# Patient Record
Sex: FEMALE | Race: WHITE | Employment: UNEMPLOYED | URBAN - NONMETROPOLITAN AREA
[De-identification: names, ages, dates, MRNs, and addresses within clinical notes are randomized per-mention and may not be internally consistent; named-entity substitution may affect disease eponyms.]

---

## 2017-03-15 ENCOUNTER — OFFICE VISIT (OUTPATIENT)
Dept: FAMILY MEDICINE CLINIC | Age: 82
End: 2017-03-15

## 2017-03-15 VITALS
BODY MASS INDEX: 29.04 KG/M2 | SYSTOLIC BLOOD PRESSURE: 118 MMHG | WEIGHT: 191.6 LBS | DIASTOLIC BLOOD PRESSURE: 68 MMHG | OXYGEN SATURATION: 98 % | HEART RATE: 93 BPM | TEMPERATURE: 95.9 F | HEIGHT: 68 IN

## 2017-03-15 DIAGNOSIS — I10 ESSENTIAL HYPERTENSION: ICD-10-CM

## 2017-03-15 DIAGNOSIS — Z23 NEED FOR PNEUMOCOCCAL VACCINATION: ICD-10-CM

## 2017-03-15 DIAGNOSIS — E55.9 VITAMIN D DEFICIENCY: ICD-10-CM

## 2017-03-15 DIAGNOSIS — E78.00 PURE HYPERCHOLESTEROLEMIA: Primary | ICD-10-CM

## 2017-03-15 DIAGNOSIS — L30.9 DERMATITIS: ICD-10-CM

## 2017-03-15 DIAGNOSIS — L98.9 SKIN LESION: ICD-10-CM

## 2017-03-15 PROCEDURE — G8399 PT W/DXA RESULTS DOCUMENT: HCPCS | Performed by: NURSE PRACTITIONER

## 2017-03-15 PROCEDURE — 1090F PRES/ABSN URINE INCON ASSESS: CPT | Performed by: NURSE PRACTITIONER

## 2017-03-15 PROCEDURE — 4040F PNEUMOC VAC/ADMIN/RCVD: CPT | Performed by: NURSE PRACTITIONER

## 2017-03-15 PROCEDURE — G8484 FLU IMMUNIZE NO ADMIN: HCPCS | Performed by: NURSE PRACTITIONER

## 2017-03-15 PROCEDURE — G8427 DOCREV CUR MEDS BY ELIG CLIN: HCPCS | Performed by: NURSE PRACTITIONER

## 2017-03-15 PROCEDURE — 90732 PPSV23 VACC 2 YRS+ SUBQ/IM: CPT | Performed by: NURSE PRACTITIONER

## 2017-03-15 PROCEDURE — 1123F ACP DISCUSS/DSCN MKR DOCD: CPT | Performed by: NURSE PRACTITIONER

## 2017-03-15 PROCEDURE — G0009 ADMIN PNEUMOCOCCAL VACCINE: HCPCS | Performed by: NURSE PRACTITIONER

## 2017-03-15 PROCEDURE — G8420 CALC BMI NORM PARAMETERS: HCPCS | Performed by: NURSE PRACTITIONER

## 2017-03-15 PROCEDURE — 1036F TOBACCO NON-USER: CPT | Performed by: NURSE PRACTITIONER

## 2017-03-15 PROCEDURE — 99213 OFFICE O/P EST LOW 20 MIN: CPT | Performed by: NURSE PRACTITIONER

## 2017-03-15 RX ORDER — ERGOCALCIFEROL 1.25 MG/1
50000 CAPSULE ORAL WEEKLY
Qty: 8 CAPSULE | Refills: 2 | Status: SHIPPED | OUTPATIENT
Start: 2017-03-15 | End: 2017-04-12 | Stop reason: SDUPTHER

## 2017-03-15 RX ORDER — ATENOLOL 25 MG/1
25 TABLET ORAL DAILY
Qty: 90 TABLET | Refills: 1 | Status: ON HOLD | OUTPATIENT
Start: 2017-03-15 | End: 2018-08-14

## 2017-03-15 RX ORDER — MOMETASONE FUROATE 1 MG/G
CREAM TOPICAL
Qty: 1 TUBE | Refills: 0 | Status: SHIPPED | OUTPATIENT
Start: 2017-03-15 | End: 2018-08-17 | Stop reason: SDUPTHER

## 2017-03-15 ASSESSMENT — PATIENT HEALTH QUESTIONNAIRE - PHQ9
SUM OF ALL RESPONSES TO PHQ9 QUESTIONS 1 & 2: 0
SUM OF ALL RESPONSES TO PHQ QUESTIONS 1-9: 0
2. FEELING DOWN, DEPRESSED OR HOPELESS: 0
1. LITTLE INTEREST OR PLEASURE IN DOING THINGS: 0

## 2017-03-15 ASSESSMENT — ENCOUNTER SYMPTOMS: SHORTNESS OF BREATH: 0

## 2017-03-31 DIAGNOSIS — I10 ESSENTIAL HYPERTENSION: ICD-10-CM

## 2017-03-31 DIAGNOSIS — E55.9 VITAMIN D DEFICIENCY: ICD-10-CM

## 2017-03-31 DIAGNOSIS — E78.00 PURE HYPERCHOLESTEROLEMIA: ICD-10-CM

## 2017-03-31 LAB
ALBUMIN SERPL-MCNC: 4.1 G/DL (ref 3.9–4.9)
ALP BLD-CCNC: 69 U/L (ref 40–130)
ALT SERPL-CCNC: 8 U/L (ref 0–33)
ANION GAP SERPL CALCULATED.3IONS-SCNC: 9 MEQ/L (ref 7–13)
AST SERPL-CCNC: 13 U/L (ref 0–35)
BILIRUB SERPL-MCNC: 0.4 MG/DL (ref 0–1.2)
BUN BLDV-MCNC: 15 MG/DL (ref 8–23)
CALCIUM SERPL-MCNC: 9 MG/DL (ref 8.6–10.2)
CHLORIDE BLD-SCNC: 104 MEQ/L (ref 98–107)
CHOLESTEROL, TOTAL: 210 MG/DL (ref 0–199)
CO2: 28 MEQ/L (ref 22–29)
CREAT SERPL-MCNC: 0.81 MG/DL (ref 0.5–0.9)
GFR AFRICAN AMERICAN: >60
GFR NON-AFRICAN AMERICAN: >60
GLOBULIN: 2.1 G/DL (ref 2.3–3.5)
GLUCOSE BLD-MCNC: 87 MG/DL (ref 74–109)
HCT VFR BLD CALC: 37.6 % (ref 37–47)
HDLC SERPL-MCNC: 83 MG/DL (ref 40–59)
HEMOGLOBIN: 13 G/DL (ref 12–16)
LDL CHOLESTEROL CALCULATED: 113 MG/DL (ref 0–129)
MCH RBC QN AUTO: 32.4 PG (ref 27–31.3)
MCHC RBC AUTO-ENTMCNC: 34.6 % (ref 33–37)
MCV RBC AUTO: 93.7 FL (ref 82–100)
PDW BLD-RTO: 14.2 % (ref 11.5–14.5)
PLATELET # BLD: 138 K/UL (ref 130–400)
POTASSIUM SERPL-SCNC: 4.4 MEQ/L (ref 3.5–5.1)
RBC # BLD: 4.01 M/UL (ref 4.2–5.4)
SODIUM BLD-SCNC: 141 MEQ/L (ref 132–144)
TOTAL PROTEIN: 6.2 G/DL (ref 6.4–8.1)
TRIGL SERPL-MCNC: 70 MG/DL (ref 0–200)
VITAMIN D 25-HYDROXY: 17.6 NG/ML (ref 30–100)
WBC # BLD: 5.7 K/UL (ref 4.8–10.8)

## 2017-04-03 ENCOUNTER — TELEPHONE (OUTPATIENT)
Dept: FAMILY MEDICINE CLINIC | Age: 82
End: 2017-04-03

## 2017-04-10 ENCOUNTER — OFFICE VISIT (OUTPATIENT)
Dept: FAMILY MEDICINE CLINIC | Age: 82
End: 2017-04-10

## 2017-04-10 VITALS
HEART RATE: 62 BPM | SYSTOLIC BLOOD PRESSURE: 120 MMHG | DIASTOLIC BLOOD PRESSURE: 70 MMHG | WEIGHT: 194.2 LBS | BODY MASS INDEX: 29.43 KG/M2 | HEIGHT: 68 IN | OXYGEN SATURATION: 97 %

## 2017-04-10 DIAGNOSIS — L28.1 PRURIGO NODULARIS: Primary | ICD-10-CM

## 2017-04-10 PROCEDURE — 11302 SHAVE SKIN LESION 1.1-2.0 CM: CPT | Performed by: FAMILY MEDICINE

## 2017-04-10 ASSESSMENT — ENCOUNTER SYMPTOMS
SHORTNESS OF BREATH: 0
ABDOMINAL PAIN: 0

## 2017-04-12 ENCOUNTER — TELEPHONE (OUTPATIENT)
Dept: FAMILY MEDICINE CLINIC | Age: 82
End: 2017-04-12

## 2017-04-12 DIAGNOSIS — E55.9 VITAMIN D DEFICIENCY: ICD-10-CM

## 2017-04-12 RX ORDER — ERGOCALCIFEROL 1.25 MG/1
CAPSULE ORAL
Qty: 16 CAPSULE | Refills: 2 | Status: SHIPPED | OUTPATIENT
Start: 2017-04-12 | End: 2017-10-02 | Stop reason: SDUPTHER

## 2017-04-19 PROBLEM — L28.1 PRURIGO NODULARIS: Status: ACTIVE | Noted: 2017-04-01

## 2017-05-01 ENCOUNTER — OFFICE VISIT (OUTPATIENT)
Dept: FAMILY MEDICINE CLINIC | Age: 82
End: 2017-05-01

## 2017-05-01 VITALS
BODY MASS INDEX: 29.25 KG/M2 | WEIGHT: 193 LBS | SYSTOLIC BLOOD PRESSURE: 126 MMHG | OXYGEN SATURATION: 97 % | HEART RATE: 76 BPM | HEIGHT: 68 IN | DIASTOLIC BLOOD PRESSURE: 70 MMHG

## 2017-05-01 DIAGNOSIS — L57.0 ACTINIC KERATOSES: ICD-10-CM

## 2017-05-01 DIAGNOSIS — L28.1 PRURIGO NODULARIS: Primary | ICD-10-CM

## 2017-05-01 DIAGNOSIS — L30.9 DERMATITIS: ICD-10-CM

## 2017-05-01 PROCEDURE — 17003 DESTRUCT PREMALG LES 2-14: CPT | Performed by: FAMILY MEDICINE

## 2017-05-01 PROCEDURE — G8420 CALC BMI NORM PARAMETERS: HCPCS | Performed by: FAMILY MEDICINE

## 2017-05-01 PROCEDURE — G8427 DOCREV CUR MEDS BY ELIG CLIN: HCPCS | Performed by: FAMILY MEDICINE

## 2017-05-01 PROCEDURE — 17000 DESTRUCT PREMALG LESION: CPT | Performed by: FAMILY MEDICINE

## 2017-05-01 PROCEDURE — 1036F TOBACCO NON-USER: CPT | Performed by: FAMILY MEDICINE

## 2017-05-01 PROCEDURE — G8399 PT W/DXA RESULTS DOCUMENT: HCPCS | Performed by: FAMILY MEDICINE

## 2017-05-01 PROCEDURE — 99212 OFFICE O/P EST SF 10 MIN: CPT | Performed by: FAMILY MEDICINE

## 2017-05-01 PROCEDURE — 4040F PNEUMOC VAC/ADMIN/RCVD: CPT | Performed by: FAMILY MEDICINE

## 2017-05-01 PROCEDURE — 1090F PRES/ABSN URINE INCON ASSESS: CPT | Performed by: FAMILY MEDICINE

## 2017-05-01 PROCEDURE — 1123F ACP DISCUSS/DSCN MKR DOCD: CPT | Performed by: FAMILY MEDICINE

## 2017-05-01 ASSESSMENT — ENCOUNTER SYMPTOMS
SORE THROAT: 0
ABDOMINAL PAIN: 0
SHORTNESS OF BREATH: 0
DIARRHEA: 0

## 2017-05-24 ENCOUNTER — OFFICE VISIT (OUTPATIENT)
Dept: FAMILY MEDICINE CLINIC | Age: 82
End: 2017-05-24

## 2017-05-24 VITALS
BODY MASS INDEX: 29.25 KG/M2 | DIASTOLIC BLOOD PRESSURE: 72 MMHG | WEIGHT: 193 LBS | OXYGEN SATURATION: 95 % | HEART RATE: 62 BPM | HEIGHT: 68 IN | SYSTOLIC BLOOD PRESSURE: 128 MMHG

## 2017-05-24 DIAGNOSIS — L57.0 ACTINIC KERATOSES: ICD-10-CM

## 2017-05-24 DIAGNOSIS — L28.1 PRURIGO NODULARIS: ICD-10-CM

## 2017-05-24 DIAGNOSIS — L30.9 DERMATITIS: Primary | ICD-10-CM

## 2017-05-24 PROCEDURE — 1090F PRES/ABSN URINE INCON ASSESS: CPT | Performed by: FAMILY MEDICINE

## 2017-05-24 PROCEDURE — 99214 OFFICE O/P EST MOD 30 MIN: CPT | Performed by: FAMILY MEDICINE

## 2017-05-24 PROCEDURE — 1123F ACP DISCUSS/DSCN MKR DOCD: CPT | Performed by: FAMILY MEDICINE

## 2017-05-24 PROCEDURE — 4040F PNEUMOC VAC/ADMIN/RCVD: CPT | Performed by: FAMILY MEDICINE

## 2017-05-24 PROCEDURE — G8427 DOCREV CUR MEDS BY ELIG CLIN: HCPCS | Performed by: FAMILY MEDICINE

## 2017-05-24 PROCEDURE — G8399 PT W/DXA RESULTS DOCUMENT: HCPCS | Performed by: FAMILY MEDICINE

## 2017-05-24 PROCEDURE — 1036F TOBACCO NON-USER: CPT | Performed by: FAMILY MEDICINE

## 2017-05-24 PROCEDURE — G8420 CALC BMI NORM PARAMETERS: HCPCS | Performed by: FAMILY MEDICINE

## 2017-05-24 ASSESSMENT — ENCOUNTER SYMPTOMS
SHORTNESS OF BREATH: 0
ABDOMINAL PAIN: 0
DIARRHEA: 0
SORE THROAT: 0

## 2017-10-02 DIAGNOSIS — E55.9 VITAMIN D DEFICIENCY: ICD-10-CM

## 2017-10-03 RX ORDER — ERGOCALCIFEROL 1.25 MG/1
CAPSULE ORAL
Qty: 16 CAPSULE | Refills: 2 | Status: ON HOLD | OUTPATIENT
Start: 2017-10-03 | End: 2018-08-14

## 2018-04-19 ENCOUNTER — OFFICE VISIT (OUTPATIENT)
Dept: FAMILY MEDICINE CLINIC | Age: 83
End: 2018-04-19
Payer: MEDICARE

## 2018-04-19 VITALS
DIASTOLIC BLOOD PRESSURE: 60 MMHG | BODY MASS INDEX: 29.7 KG/M2 | SYSTOLIC BLOOD PRESSURE: 128 MMHG | OXYGEN SATURATION: 98 % | WEIGHT: 196 LBS | HEART RATE: 97 BPM | HEIGHT: 68 IN | TEMPERATURE: 98 F

## 2018-04-19 DIAGNOSIS — E78.00 PURE HYPERCHOLESTEROLEMIA: ICD-10-CM

## 2018-04-19 DIAGNOSIS — R68.89 FORGETFULNESS: ICD-10-CM

## 2018-04-19 DIAGNOSIS — I10 ESSENTIAL HYPERTENSION: Primary | ICD-10-CM

## 2018-04-19 DIAGNOSIS — E55.9 VITAMIN D DEFICIENCY: ICD-10-CM

## 2018-04-19 PROCEDURE — 99213 OFFICE O/P EST LOW 20 MIN: CPT | Performed by: NURSE PRACTITIONER

## 2018-04-19 PROCEDURE — G8399 PT W/DXA RESULTS DOCUMENT: HCPCS | Performed by: NURSE PRACTITIONER

## 2018-04-19 PROCEDURE — 1036F TOBACCO NON-USER: CPT | Performed by: NURSE PRACTITIONER

## 2018-04-19 PROCEDURE — 3288F FALL RISK ASSESSMENT DOCD: CPT | Performed by: NURSE PRACTITIONER

## 2018-04-19 PROCEDURE — 1123F ACP DISCUSS/DSCN MKR DOCD: CPT | Performed by: NURSE PRACTITIONER

## 2018-04-19 PROCEDURE — 4040F PNEUMOC VAC/ADMIN/RCVD: CPT | Performed by: NURSE PRACTITIONER

## 2018-04-19 PROCEDURE — G8419 CALC BMI OUT NRM PARAM NOF/U: HCPCS | Performed by: NURSE PRACTITIONER

## 2018-04-19 PROCEDURE — G8510 SCR DEP NEG, NO PLAN REQD: HCPCS | Performed by: NURSE PRACTITIONER

## 2018-04-19 PROCEDURE — 1090F PRES/ABSN URINE INCON ASSESS: CPT | Performed by: NURSE PRACTITIONER

## 2018-04-19 PROCEDURE — G8427 DOCREV CUR MEDS BY ELIG CLIN: HCPCS | Performed by: NURSE PRACTITIONER

## 2018-04-19 ASSESSMENT — PATIENT HEALTH QUESTIONNAIRE - PHQ9
SUM OF ALL RESPONSES TO PHQ QUESTIONS 1-9: 0
2. FEELING DOWN, DEPRESSED OR HOPELESS: 0
1. LITTLE INTEREST OR PLEASURE IN DOING THINGS: 0
SUM OF ALL RESPONSES TO PHQ9 QUESTIONS 1 & 2: 0

## 2018-04-19 ASSESSMENT — ENCOUNTER SYMPTOMS
COUGH: 0
ORTHOPNEA: 0
SHORTNESS OF BREATH: 0

## 2018-04-20 DIAGNOSIS — R68.89 FORGETFULNESS: ICD-10-CM

## 2018-04-20 DIAGNOSIS — I10 ESSENTIAL HYPERTENSION: ICD-10-CM

## 2018-04-20 DIAGNOSIS — E55.9 VITAMIN D DEFICIENCY: ICD-10-CM

## 2018-04-20 DIAGNOSIS — E78.00 PURE HYPERCHOLESTEROLEMIA: ICD-10-CM

## 2018-04-20 LAB
ALT SERPL-CCNC: 9 U/L (ref 0–33)
ANION GAP SERPL CALCULATED.3IONS-SCNC: 14 MEQ/L (ref 7–13)
AST SERPL-CCNC: 14 U/L (ref 0–35)
BASOPHILS ABSOLUTE: 0.1 K/UL (ref 0–0.2)
BASOPHILS RELATIVE PERCENT: 1.2 %
BUN BLDV-MCNC: 20 MG/DL (ref 8–23)
CALCIUM SERPL-MCNC: 9.1 MG/DL (ref 8.6–10.2)
CHLORIDE BLD-SCNC: 101 MEQ/L (ref 98–107)
CHOLESTEROL, TOTAL: 226 MG/DL (ref 0–199)
CO2: 26 MEQ/L (ref 22–29)
CREAT SERPL-MCNC: 0.81 MG/DL (ref 0.5–0.9)
EOSINOPHILS ABSOLUTE: 0.1 K/UL (ref 0–0.7)
EOSINOPHILS RELATIVE PERCENT: 2.3 %
FOLATE: 6.3 NG/ML (ref 7.3–26.1)
GFR AFRICAN AMERICAN: >60
GFR NON-AFRICAN AMERICAN: >60
GLUCOSE BLD-MCNC: 92 MG/DL (ref 74–109)
HCT VFR BLD CALC: 39.1 % (ref 37–47)
HDLC SERPL-MCNC: 71 MG/DL (ref 40–59)
HEMOGLOBIN: 13.3 G/DL (ref 12–16)
LDL CHOLESTEROL CALCULATED: 142 MG/DL (ref 0–129)
LYMPHOCYTES ABSOLUTE: 0.8 K/UL (ref 1–4.8)
LYMPHOCYTES RELATIVE PERCENT: 16.6 %
MCH RBC QN AUTO: 32.6 PG (ref 27–31.3)
MCHC RBC AUTO-ENTMCNC: 34.1 % (ref 33–37)
MCV RBC AUTO: 95.6 FL (ref 82–100)
MONOCYTES ABSOLUTE: 0.4 K/UL (ref 0.2–0.8)
MONOCYTES RELATIVE PERCENT: 7.6 %
NEUTROPHILS ABSOLUTE: 3.4 K/UL (ref 1.4–6.5)
NEUTROPHILS RELATIVE PERCENT: 72.3 %
PDW BLD-RTO: 14 % (ref 11.5–14.5)
PLATELET # BLD: 146 K/UL (ref 130–400)
POTASSIUM SERPL-SCNC: 4.1 MEQ/L (ref 3.5–5.1)
RBC # BLD: 4.09 M/UL (ref 4.2–5.4)
SODIUM BLD-SCNC: 141 MEQ/L (ref 132–144)
TRIGL SERPL-MCNC: 65 MG/DL (ref 0–200)
TSH REFLEX: 1.05 UIU/ML (ref 0.27–4.2)
VITAMIN B-12: 385 PG/ML (ref 232–1245)
VITAMIN D 25-HYDROXY: 32 NG/ML (ref 30–100)
WBC # BLD: 4.7 K/UL (ref 4.8–10.8)

## 2018-06-25 ENCOUNTER — TELEPHONE (OUTPATIENT)
Dept: FAMILY MEDICINE CLINIC | Age: 83
End: 2018-06-25

## 2018-06-29 ENCOUNTER — HOSPITAL ENCOUNTER (OUTPATIENT)
Dept: MRI IMAGING | Age: 83
Discharge: HOME | End: 2018-07-01
Payer: MEDICARE

## 2018-06-29 DIAGNOSIS — M25.511 RIGHT SHOULDER PAIN, UNSPECIFIED CHRONICITY: ICD-10-CM

## 2018-06-29 PROCEDURE — 73221 MRI JOINT UPR EXTREM W/O DYE: CPT

## 2018-08-14 ENCOUNTER — APPOINTMENT (OUTPATIENT)
Dept: GENERAL RADIOLOGY | Age: 83
End: 2018-08-14
Payer: MEDICARE

## 2018-08-14 ENCOUNTER — HOSPITAL ENCOUNTER (OUTPATIENT)
Age: 83
Setting detail: OBSERVATION
Discharge: HOME | End: 2018-08-16
Attending: INTERNAL MEDICINE | Admitting: INTERNAL MEDICINE
Payer: MEDICARE

## 2018-08-14 ENCOUNTER — APPOINTMENT (OUTPATIENT)
Dept: CT IMAGING | Age: 83
End: 2018-08-14
Payer: MEDICARE

## 2018-08-14 DIAGNOSIS — G45.9 TRANSIENT CEREBRAL ISCHEMIA, UNSPECIFIED TYPE: Primary | ICD-10-CM

## 2018-08-14 DIAGNOSIS — N30.00 ACUTE CYSTITIS WITHOUT HEMATURIA: ICD-10-CM

## 2018-08-14 LAB
ABO/RH: NORMAL
ALBUMIN SERPL-MCNC: 3.9 G/DL (ref 3.9–4.9)
ALP BLD-CCNC: 79 U/L (ref 40–130)
ALT SERPL-CCNC: 9 U/L (ref 0–33)
AMORPHOUS: ABNORMAL
AMPHETAMINE SCREEN, URINE: NORMAL
ANION GAP SERPL CALCULATED.3IONS-SCNC: 13 MEQ/L (ref 7–13)
ANTIBODY SCREEN: NORMAL
APTT: 27.5 SEC (ref 21.6–35.4)
AST SERPL-CCNC: 16 U/L (ref 0–35)
BACTERIA: ABNORMAL /HPF
BARBITURATE SCREEN URINE: NORMAL
BASOPHILS ABSOLUTE: 0.1 K/UL (ref 0–0.2)
BASOPHILS RELATIVE PERCENT: 0.9 %
BENZODIAZEPINE SCREEN, URINE: NORMAL
BILIRUB SERPL-MCNC: 0.4 MG/DL (ref 0–1.2)
BILIRUBIN URINE: NEGATIVE
BLOOD, URINE: ABNORMAL
BUN BLDV-MCNC: 16 MG/DL (ref 8–23)
CALCIUM SERPL-MCNC: 8.7 MG/DL (ref 8.6–10.2)
CANNABINOID SCREEN URINE: NORMAL
CHLORIDE BLD-SCNC: 99 MEQ/L (ref 98–107)
CHP ED QC CHECK: YES
CLARITY: CLEAR
CO2: 25 MEQ/L (ref 22–29)
COCAINE METABOLITE SCREEN URINE: NORMAL
COLOR: YELLOW
CREAT SERPL-MCNC: 0.93 MG/DL (ref 0.5–0.9)
EKG ATRIAL RATE: 67 BPM
EKG P AXIS: 15 DEGREES
EKG P-R INTERVAL: 140 MS
EKG Q-T INTERVAL: 416 MS
EKG QRS DURATION: 94 MS
EKG QTC CALCULATION (BAZETT): 439 MS
EKG R AXIS: -27 DEGREES
EKG T AXIS: 14 DEGREES
EKG VENTRICULAR RATE: 67 BPM
EOSINOPHILS ABSOLUTE: 0.1 K/UL (ref 0–0.7)
EOSINOPHILS RELATIVE PERCENT: 1.4 %
EPITHELIAL CELLS, UA: ABNORMAL /HPF
ETHANOL PERCENT: NORMAL G/DL
ETHANOL: <10 MG/DL (ref 0–0.08)
GFR AFRICAN AMERICAN: >60
GFR NON-AFRICAN AMERICAN: 57.6
GLOBULIN: 2.4 G/DL (ref 2.3–3.5)
GLUCOSE BLD-MCNC: 104 MG/DL
GLUCOSE BLD-MCNC: 104 MG/DL (ref 60–115)
GLUCOSE BLD-MCNC: 97 MG/DL (ref 74–109)
GLUCOSE URINE: NEGATIVE MG/DL
HCT VFR BLD CALC: 37.2 % (ref 37–47)
HEMOGLOBIN: 13 G/DL (ref 12–16)
INR BLD: 1
KETONES, URINE: ABNORMAL MG/DL
LEUKOCYTE ESTERASE, URINE: ABNORMAL
LYMPHOCYTES ABSOLUTE: 0.8 K/UL (ref 1–4.8)
LYMPHOCYTES RELATIVE PERCENT: 13.8 %
Lab: NORMAL
MCH RBC QN AUTO: 32.6 PG (ref 27–31.3)
MCHC RBC AUTO-ENTMCNC: 34.8 % (ref 33–37)
MCV RBC AUTO: 93.7 FL (ref 82–100)
MONOCYTES ABSOLUTE: 0.5 K/UL (ref 0.2–0.8)
MONOCYTES RELATIVE PERCENT: 8.5 %
NEUTROPHILS ABSOLUTE: 4.4 K/UL (ref 1.4–6.5)
NEUTROPHILS RELATIVE PERCENT: 75.4 %
NITRITE, URINE: POSITIVE
OPIATE SCREEN URINE: NORMAL
PDW BLD-RTO: 14.8 % (ref 11.5–14.5)
PERFORMED ON: NORMAL
PH UA: 6.5 (ref 5–9)
PHENCYCLIDINE SCREEN URINE: NORMAL
PLATELET # BLD: 146 K/UL (ref 130–400)
POTASSIUM SERPL-SCNC: 3.9 MEQ/L (ref 3.5–5.1)
PROTEIN UA: NEGATIVE MG/DL
PROTHROMBIN TIME: 10.8 SEC (ref 9.6–12.3)
RBC # BLD: 3.97 M/UL (ref 4.2–5.4)
RBC UA: ABNORMAL /HPF (ref 0–2)
RENAL EPITHELIAL, UA: ABNORMAL /HPF
SODIUM BLD-SCNC: 137 MEQ/L (ref 132–144)
SPECIFIC GRAVITY UA: 1.01 (ref 1–1.03)
TOTAL CK: 57 U/L (ref 0–170)
TOTAL PROTEIN: 6.3 G/DL (ref 6.4–8.1)
TROPONIN: <0.01 NG/ML (ref 0–0.01)
URINE REFLEX TO CULTURE: YES
UROBILINOGEN, URINE: 1 E.U./DL
WBC # BLD: 5.9 K/UL (ref 4.8–10.8)
WBC UA: ABNORMAL /HPF (ref 0–5)

## 2018-08-14 PROCEDURE — 85730 THROMBOPLASTIN TIME PARTIAL: CPT

## 2018-08-14 PROCEDURE — 82550 ASSAY OF CK (CPK): CPT

## 2018-08-14 PROCEDURE — 93005 ELECTROCARDIOGRAM TRACING: CPT

## 2018-08-14 PROCEDURE — 6370000000 HC RX 637 (ALT 250 FOR IP): Performed by: PERSONAL EMERGENCY RESPONSE ATTENDANT

## 2018-08-14 PROCEDURE — 2580000003 HC RX 258: Performed by: PERSONAL EMERGENCY RESPONSE ATTENDANT

## 2018-08-14 PROCEDURE — 84484 ASSAY OF TROPONIN QUANT: CPT

## 2018-08-14 PROCEDURE — G0378 HOSPITAL OBSERVATION PER HR: HCPCS

## 2018-08-14 PROCEDURE — 99285 EMERGENCY DEPT VISIT HI MDM: CPT

## 2018-08-14 PROCEDURE — 70450 CT HEAD/BRAIN W/O DYE: CPT

## 2018-08-14 PROCEDURE — 87186 SC STD MICRODIL/AGAR DIL: CPT

## 2018-08-14 PROCEDURE — 6360000002 HC RX W HCPCS: Performed by: PERSONAL EMERGENCY RESPONSE ATTENDANT

## 2018-08-14 PROCEDURE — 80307 DRUG TEST PRSMV CHEM ANLYZR: CPT

## 2018-08-14 PROCEDURE — 80053 COMPREHEN METABOLIC PANEL: CPT

## 2018-08-14 PROCEDURE — 86900 BLOOD TYPING SEROLOGIC ABO: CPT

## 2018-08-14 PROCEDURE — 87086 URINE CULTURE/COLONY COUNT: CPT

## 2018-08-14 PROCEDURE — 87077 CULTURE AEROBIC IDENTIFY: CPT

## 2018-08-14 PROCEDURE — 2580000003 HC RX 258: Performed by: PHYSICIAN ASSISTANT

## 2018-08-14 PROCEDURE — 71045 X-RAY EXAM CHEST 1 VIEW: CPT

## 2018-08-14 PROCEDURE — 86901 BLOOD TYPING SEROLOGIC RH(D): CPT

## 2018-08-14 PROCEDURE — 85610 PROTHROMBIN TIME: CPT

## 2018-08-14 PROCEDURE — 86850 RBC ANTIBODY SCREEN: CPT

## 2018-08-14 PROCEDURE — 96365 THER/PROPH/DIAG IV INF INIT: CPT

## 2018-08-14 PROCEDURE — 81001 URINALYSIS AUTO W/SCOPE: CPT

## 2018-08-14 PROCEDURE — 87040 BLOOD CULTURE FOR BACTERIA: CPT

## 2018-08-14 PROCEDURE — 85025 COMPLETE CBC W/AUTO DIFF WBC: CPT

## 2018-08-14 PROCEDURE — G0480 DRUG TEST DEF 1-7 CLASSES: HCPCS

## 2018-08-14 PROCEDURE — 36415 COLL VENOUS BLD VENIPUNCTURE: CPT

## 2018-08-14 RX ORDER — SODIUM CHLORIDE 0.9 % (FLUSH) 0.9 %
10 SYRINGE (ML) INJECTION PRN
Status: DISCONTINUED | OUTPATIENT
Start: 2018-08-14 | End: 2018-08-16 | Stop reason: HOSPADM

## 2018-08-14 RX ORDER — ASPIRIN 81 MG/1
324 TABLET, CHEWABLE ORAL ONCE
Status: COMPLETED | OUTPATIENT
Start: 2018-08-14 | End: 2018-08-14

## 2018-08-14 RX ORDER — SODIUM CHLORIDE 0.9 % (FLUSH) 0.9 %
10 SYRINGE (ML) INJECTION EVERY 12 HOURS SCHEDULED
Status: DISCONTINUED | OUTPATIENT
Start: 2018-08-14 | End: 2018-08-16 | Stop reason: HOSPADM

## 2018-08-14 RX ORDER — SODIUM CHLORIDE 9 MG/ML
INJECTION, SOLUTION INTRAVENOUS CONTINUOUS
Status: DISCONTINUED | OUTPATIENT
Start: 2018-08-14 | End: 2018-08-15 | Stop reason: ALTCHOICE

## 2018-08-14 RX ORDER — ONDANSETRON 2 MG/ML
4 INJECTION INTRAMUSCULAR; INTRAVENOUS EVERY 6 HOURS PRN
Status: DISCONTINUED | OUTPATIENT
Start: 2018-08-14 | End: 2018-08-16 | Stop reason: HOSPADM

## 2018-08-14 RX ADMIN — CEFTRIAXONE SODIUM 1 G: 1 INJECTION, POWDER, FOR SOLUTION INTRAMUSCULAR; INTRAVENOUS at 20:32

## 2018-08-14 RX ADMIN — SODIUM CHLORIDE: 9 INJECTION, SOLUTION INTRAVENOUS at 23:05

## 2018-08-14 RX ADMIN — Medication 10 ML: at 23:05

## 2018-08-14 RX ADMIN — ASPIRIN 81 MG 324 MG: 81 TABLET ORAL at 20:33

## 2018-08-14 ASSESSMENT — ENCOUNTER SYMPTOMS
COUGH: 0
SHORTNESS OF BREATH: 0
BLOOD IN STOOL: 0
VOMITING: 0
NAUSEA: 0
RHINORRHEA: 0
ABDOMINAL PAIN: 0
DIARRHEA: 0
SORE THROAT: 0
COLOR CHANGE: 0

## 2018-08-14 ASSESSMENT — PAIN DESCRIPTION - PAIN TYPE: TYPE: ACUTE PAIN

## 2018-08-14 ASSESSMENT — PAIN DESCRIPTION - DESCRIPTORS: DESCRIPTORS: ACHING

## 2018-08-14 ASSESSMENT — PAIN SCALES - GENERAL
PAINLEVEL_OUTOF10: 0
PAINLEVEL_OUTOF10: 3

## 2018-08-14 ASSESSMENT — PAIN DESCRIPTION - LOCATION: LOCATION: HEAD

## 2018-08-14 NOTE — ED PROVIDER NOTES
the patient's condition which required my urgent intervention. Procedures    FINAL IMPRESSION      1. Transient cerebral ischemia, unspecified type    2. Acute cystitis without hematuria          DISPOSITION/PLAN   DISPOSITION Decision To Admit 08/14/2018 07:44:00 PM      PATIENT REFERRED TO:  No follow-up provider specified. DISCHARGE MEDICATIONS:  New Prescriptions    No medications on file          (Please note that portions of this note were completed with a voice recognition program.  Efforts were made to edit the dictations but occasionally words are mis-transcribed. )    JOSH Gunn (electronically signed)  Emergency Physician [address]  08/14/18 2007

## 2018-08-15 ENCOUNTER — APPOINTMENT (OUTPATIENT)
Dept: MRI IMAGING | Age: 83
End: 2018-08-15
Payer: MEDICARE

## 2018-08-15 ENCOUNTER — APPOINTMENT (OUTPATIENT)
Dept: ULTRASOUND IMAGING | Age: 83
End: 2018-08-15
Payer: MEDICARE

## 2018-08-15 LAB
ANION GAP SERPL CALCULATED.3IONS-SCNC: 12 MEQ/L (ref 7–13)
BASOPHILS ABSOLUTE: 0.1 K/UL (ref 0–0.2)
BASOPHILS RELATIVE PERCENT: 0.9 %
BUN BLDV-MCNC: 12 MG/DL (ref 8–23)
CALCIUM SERPL-MCNC: 8.4 MG/DL (ref 8.6–10.2)
CHLORIDE BLD-SCNC: 103 MEQ/L (ref 98–107)
CO2: 25 MEQ/L (ref 22–29)
CREAT SERPL-MCNC: 0.7 MG/DL (ref 0.5–0.9)
EOSINOPHILS ABSOLUTE: 0.1 K/UL (ref 0–0.7)
EOSINOPHILS RELATIVE PERCENT: 1.9 %
GFR AFRICAN AMERICAN: >60
GFR NON-AFRICAN AMERICAN: >60
GLUCOSE BLD-MCNC: 92 MG/DL (ref 74–109)
HCT VFR BLD CALC: 35.2 % (ref 37–47)
HEMOGLOBIN: 12 G/DL (ref 12–16)
LV EF: 60 %
LVEF MODALITY: NORMAL
LYMPHOCYTES ABSOLUTE: 1 K/UL (ref 1–4.8)
LYMPHOCYTES RELATIVE PERCENT: 18.8 %
MAGNESIUM: 2 MG/DL (ref 1.7–2.3)
MCH RBC QN AUTO: 32.3 PG (ref 27–31.3)
MCHC RBC AUTO-ENTMCNC: 34.1 % (ref 33–37)
MCV RBC AUTO: 94.5 FL (ref 82–100)
MONOCYTES ABSOLUTE: 0.5 K/UL (ref 0.2–0.8)
MONOCYTES RELATIVE PERCENT: 9 %
NEUTROPHILS ABSOLUTE: 3.8 K/UL (ref 1.4–6.5)
NEUTROPHILS RELATIVE PERCENT: 69.4 %
PDW BLD-RTO: 14.7 % (ref 11.5–14.5)
PLATELET # BLD: 131 K/UL (ref 130–400)
POTASSIUM REFLEX MAGNESIUM: 3.5 MEQ/L (ref 3.5–5.1)
RBC # BLD: 3.72 M/UL (ref 4.2–5.4)
SODIUM BLD-SCNC: 140 MEQ/L (ref 132–144)
WBC # BLD: 5.5 K/UL (ref 4.8–10.8)

## 2018-08-15 PROCEDURE — 70553 MRI BRAIN STEM W/O & W/DYE: CPT

## 2018-08-15 PROCEDURE — G0378 HOSPITAL OBSERVATION PER HR: HCPCS

## 2018-08-15 PROCEDURE — 80048 BASIC METABOLIC PNL TOTAL CA: CPT

## 2018-08-15 PROCEDURE — G8989 SELF CARE D/C STATUS: HCPCS

## 2018-08-15 PROCEDURE — 93880 EXTRACRANIAL BILAT STUDY: CPT

## 2018-08-15 PROCEDURE — 96372 THER/PROPH/DIAG INJ SC/IM: CPT

## 2018-08-15 PROCEDURE — 70544 MR ANGIOGRAPHY HEAD W/O DYE: CPT

## 2018-08-15 PROCEDURE — G8988 SELF CARE GOAL STATUS: HCPCS

## 2018-08-15 PROCEDURE — G8987 SELF CARE CURRENT STATUS: HCPCS

## 2018-08-15 PROCEDURE — 6360000002 HC RX W HCPCS: Performed by: PHYSICIAN ASSISTANT

## 2018-08-15 PROCEDURE — 93306 TTE W/DOPPLER COMPLETE: CPT

## 2018-08-15 PROCEDURE — 83735 ASSAY OF MAGNESIUM: CPT

## 2018-08-15 PROCEDURE — 6360000004 HC RX CONTRAST MEDICATION: Performed by: PHYSICIAN ASSISTANT

## 2018-08-15 PROCEDURE — 2580000003 HC RX 258: Performed by: PHYSICIAN ASSISTANT

## 2018-08-15 PROCEDURE — 6370000000 HC RX 637 (ALT 250 FOR IP): Performed by: PSYCHIATRY & NEUROLOGY

## 2018-08-15 PROCEDURE — A9579 GAD-BASE MR CONTRAST NOS,1ML: HCPCS | Performed by: PHYSICIAN ASSISTANT

## 2018-08-15 PROCEDURE — 2500000003 HC RX 250 WO HCPCS

## 2018-08-15 PROCEDURE — 36415 COLL VENOUS BLD VENIPUNCTURE: CPT

## 2018-08-15 PROCEDURE — 97165 OT EVAL LOW COMPLEX 30 MIN: CPT

## 2018-08-15 PROCEDURE — 96366 THER/PROPH/DIAG IV INF ADDON: CPT

## 2018-08-15 PROCEDURE — 85025 COMPLETE CBC W/AUTO DIFF WBC: CPT

## 2018-08-15 RX ORDER — DONEPEZIL HYDROCHLORIDE 5 MG/1
5 TABLET, FILM COATED ORAL NIGHTLY
Status: DISCONTINUED | OUTPATIENT
Start: 2018-08-15 | End: 2018-08-16 | Stop reason: HOSPADM

## 2018-08-15 RX ORDER — WATER FOR INJ.,BACTERIOSTATIC
VIAL (ML) INJECTION
Status: COMPLETED
Start: 2018-08-15 | End: 2018-08-15

## 2018-08-15 RX ORDER — ATORVASTATIN CALCIUM 20 MG/1
20 TABLET, FILM COATED ORAL NIGHTLY
Qty: 30 TABLET | Refills: 1 | Status: SHIPPED | OUTPATIENT
Start: 2018-08-15 | End: 2018-10-08 | Stop reason: SDUPTHER

## 2018-08-15 RX ORDER — SODIUM CHLORIDE 0.9 % (FLUSH) 0.9 %
10 SYRINGE (ML) INJECTION 2 TIMES DAILY
Status: DISCONTINUED | OUTPATIENT
Start: 2018-08-15 | End: 2018-08-16 | Stop reason: HOSPADM

## 2018-08-15 RX ORDER — DONEPEZIL HYDROCHLORIDE 5 MG/1
5 TABLET, FILM COATED ORAL NIGHTLY
Qty: 30 TABLET | Refills: 1 | Status: SHIPPED | OUTPATIENT
Start: 2018-08-15 | End: 2018-10-08 | Stop reason: SDUPTHER

## 2018-08-15 RX ORDER — ATORVASTATIN CALCIUM 20 MG/1
20 TABLET, FILM COATED ORAL NIGHTLY
Status: DISCONTINUED | OUTPATIENT
Start: 2018-08-15 | End: 2018-08-16 | Stop reason: HOSPADM

## 2018-08-15 RX ORDER — ASPIRIN 81 MG/1
81 TABLET, CHEWABLE ORAL DAILY
Status: DISCONTINUED | OUTPATIENT
Start: 2018-08-15 | End: 2018-08-16 | Stop reason: HOSPADM

## 2018-08-15 RX ORDER — LISINOPRIL 10 MG/1
10 TABLET ORAL DAILY
Status: DISCONTINUED | OUTPATIENT
Start: 2018-08-15 | End: 2018-08-16 | Stop reason: HOSPADM

## 2018-08-15 RX ADMIN — ENOXAPARIN SODIUM 40 MG: 100 INJECTION SUBCUTANEOUS at 08:42

## 2018-08-15 RX ADMIN — GADOTERIDOL 18 ML: 279.3 INJECTION, SOLUTION INTRAVENOUS at 14:54

## 2018-08-15 RX ADMIN — ATORVASTATIN CALCIUM 20 MG: 20 TABLET, FILM COATED ORAL at 22:13

## 2018-08-15 RX ADMIN — CEFTRIAXONE SODIUM 1 G: 1 INJECTION, POWDER, FOR SOLUTION INTRAMUSCULAR; INTRAVENOUS at 22:13

## 2018-08-15 RX ADMIN — BACTERIOSTATIC WATER 30 ML: 1 INJECTION, SOLUTION INTRAMUSCULAR; INTRAVENOUS; SUBCUTANEOUS at 09:44

## 2018-08-15 RX ADMIN — SODIUM CHLORIDE 75 ML/HR: 9 INJECTION, SOLUTION INTRAVENOUS at 17:51

## 2018-08-15 RX ADMIN — DONEPEZIL HYDROCHLORIDE 5 MG: 5 TABLET, FILM COATED ORAL at 22:13

## 2018-08-15 RX ADMIN — ASPIRIN 81 MG 81 MG: 81 TABLET ORAL at 17:51

## 2018-08-15 ASSESSMENT — PAIN SCALES - GENERAL
PAINLEVEL_OUTOF10: 0
PAINLEVEL_OUTOF10: 0

## 2018-08-15 NOTE — PROGRESS NOTES
Physical Therapy  Facility/Department: [facility] MED SURG [id number]/[id number]      PT evaluation attempted 8/15/18, at 8:35 AM, however this patient status is currently observation. Per facility protocol, PT evaluation and treatment orders for patients in observation status must include a PT specific diagnosis (i.e. \"difficulty ambulating\", \"lack of coordination\", etc.) These order specifications may be added to the \"comments\" section of the PT evaluation and treatment order. Requested updated Physical Therapy orders from referring provider via \"sticky note\". Coordination team notified.      We thank you for your referral!    [facility]) Physical Therapy Department    Electronically signed by Celestino Jc PT on 8/15/18 at 8:35 AM

## 2018-08-15 NOTE — CONSULTS
[address]                       [facility], [address] [facility]                                   CONSULTATION    PATIENT NAME: Chente Moore                      :        1935  MED REC NO:   [id number]                            ROOM:       W406  ACCOUNT NO:   [de-identified]                           ADMIT DATE: 2018  PROVIDER:     Rai Rae MD    CONSULT DATE:  08/15/2018    ATTENDING:  Francisco Stevenson MD    HISTORY OF PRESENT ILLNESS:  This is an 80-year-old right-handed female  admitted with history of confusion episode. This was noticed by the  family. The patient had meatloaf in the oven and she was going back and  forth to the oven and then she was somewhat confused. She recalls all the  events, though. She does have some memory issues going on for almost 2  years. She was recommended to see a neurologist 8 months ago which she did  not see. She between herself and her  functions well. She cooks. She drives. She has been lost while driving according to her daughter but  the patient does not agree. She did have a mild headache yesterday but she  has no history of migraines. She denies any headaches now. Denies any  nausea, vomiting, chest pain, palpitations, or shortness of breath. She is  not complaining of any bleeding, bruising, choking, drooling, recent falls,  injuries, or trauma. No head injury. The patient has no history of CVA in  the past.  There appeared to be suggestion of mild facial droop, though  this was not very well documented. PAST MEDICAL HISTORY:  Remarkable for dermatitis. She has endometrial  cancer, mammography, hyperlipidemia, sciatica, and vitamin D deficiency. She has history of cataract removal and hysterectomy. MEDICATIONS:  Ceftriaxone, enoxaparin, and Zofran. PHYSICAL EXAMINATION:  GENERAL:  She is in no acute distress. General examination reveals no skin  lesions or skin marks.   There is

## 2018-08-15 NOTE — H&P
The patient currently lives home    TOBACCO:   reports that she has never smoked. She has never used smokeless tobacco.  ETOH:   reports that she does not drink alcohol. Family History:       Positive as follows:    Family History   Problem Relation Age of Onset    Coronary Art Dis Father     Coronary Art Dis Brother        REVIEW OF SYSTEMS:   Pertinent positives as noted in the HPI. All other systems reviewed and negative. PHYSICAL EXAM:    BP (!) 154/71   Pulse 65   Temp 97.6 °F (36.4 °C) (Oral)   Resp 18   Ht 5' 8\" (1.727 m)   Wt 190 lb (86.2 kg)   SpO2 97%   BMI 28.89 kg/m²     General appearance:  No apparent distress, appears stated age and cooperative. HEENT:  Normal cephalic, atraumatic without obvious deformity. Pupils equal, round, and reactive to light. Extra ocular muscles intact. Conjunctivae/corneas clear. Neck: Supple, with full range of motion. No jugular venous distention. Trachea midline. Respiratory:  Normal respiratory effort. Clear to auscultation, bilaterally without Rales/Wheezes/Rhonchi. Cardiovascular:  Regular rate and rhythm with normal S1/S2 without murmurs, rubs or gallops. Abdomen: Soft, non-tender, non-distended with normal bowel sounds. Musculoskeletal:  No clubbing, cyanosis or edema bilaterally. Full range of motion without deformity. Skin: Skin color, texture, turgor normal.  No rashes or lesions. Neurologic:  Neurovascularly intact without any focal sensory/motor deficits.  Cranial nerves: II-XII intact, grossly non-focal.  Psychiatric:  Alert and oriented, thought content appropriate, normal insight  Capillary Refill: Brisk,< 3 seconds   Peripheral Pulses: +2 palpable, equal bilaterally       Labs:     Recent Labs      08/14/18   1823   WBC  5.9   HGB  13.0   HCT  37.2   PLT  146     Recent Labs      08/14/18   1823   NA  137   K  3.9   CL  99   CO2  25   BUN  16   CREATININE  0.93*   CALCIUM  8.7     Recent Labs      08/14/18   1823   AST  16   ALT

## 2018-08-15 NOTE — ED NOTES
Pt states that she is having a headache and her pain is increasing. Davy Leventhal PA was advised.       Talya Ribeiro RN  08/14/18 2000

## 2018-08-15 NOTE — PROGRESS NOTES
No new complaints. No nausea, vomiting, chest pain, or headache     BP (!) 164/60   Pulse 66   Temp 97.9 °F (36.6 °C) (Oral)   Resp 16   Ht 5' 8\" (1.727 m)   Wt 198 lb 13.7 oz (90.2 kg)   SpO2 96%   BMI 30.24 kg/m²   Physical Exam   Constitutional: She is well-developed, well-nourished, and in no distress. HENT:   Head: Normocephalic and atraumatic. Eyes: Conjunctivae are normal.   Neck: Normal range of motion. Neck supple. Cardiovascular: Intact distal pulses. Abdominal: Soft. Bowel sounds are normal.   Skin: Skin is warm and dry. Metabolic encephalopathy sec to uti. MRI pending. If studies are neg can dc on statin, asa, and aricept as per neuro. Will convert from iv to po meds at the time of dc.  .    35 minutes total care time, >1/2 in unit/floor time and care coordination

## 2018-08-15 NOTE — ED NOTES
Pt report was called to [address] [place] [address]. [facility] Lucretia placed tele packs.       Talya Ribeiro RN  08/14/18 [id number]

## 2018-08-15 NOTE — PROGRESS NOTES
Action Necessary    [] Patient reports pain at acceptable level for treatment  [] Nursing notified    [] Other      Objective:  Observation:  Pt seated EOB eating dinner. Orientation: Oriented to  [x] Person   [x] Place  []Time (pt used board for visual cue, pt stated 3 different months before correct month. Vision:   [x]  WFL   [] Impaired  Comments:      Hearing:  [] WFL   [x] Impaired  Comments: Some [place]   Sensation:   [x] WFL   []  Impaired   Comments:  Pt accurately stated 4/4 areas on BUE's. Cognition:   [x] WFL   [] Impaired  Comments:  Pt able to identify correctly what to do in case of fire. Pt recalled 3/3 words IND'ly after 1 min. Communication:   [x] WFL   [] Impaired  Comments:    Hand Dominance: [x] Right   [] Left    Range of Motion:  R UE AROM/PROM: []  WFL [x] Impaired  Comments: Horizontal abduction. Reported rotator cuff tear. L UE AROM/PROM:  [x]  [facility] [] Impaired  Comments:     Strength:   R UE Strength: []1    [] 2   [] 2+   [] 3   [] 3+   [] 4   [x] 4+  [] 5  Comments:  Pt reported right rotator cuff tear. Assessed elbow down. L UE Strength:  []1    [] 2   [] 2+   [] 3   [] 3+  [] 4   [] 4+   [x] 5  Comments:     Quality of Movement:  [x] Good   [] Fair   [] Poor     Coordination:  Gross motor: [x] WFL   [] Impaired   Fine motor: [x] WFL   [] Impaired     Functional Mobility:  Toilet Transfers: Mod IND with use of grab bar   Bed Transfer:  NT pt seated EOB   Sit to stand: IND  Bed to Bathroom:  IND    Seated Balance:      Static: [x] Good  [] Fair   [] Poor   Dynamic: [x]  Good  [] Fair   [] Poor     Standing Balance: Pt able to reach down and hold ankles without LOB.       Static: [x] Good   [] Fair  [] Poor   Dynamic: [x] Good   [] Fair   [] Poor     Functional Endurance: [x] Good  [] Fair  [] Poor     ADLs  Feeding:  IND  UE Dressing:  IND  LB Dressing:  IND  Bathing:  Simulated IND  Toileting: Simulated IND  Grooming: IND     Treatment Plan will consist of:   [] ADL Limitation: Self care  Self Care Current Status (): At least 1 percent but less than 20 percent impaired, limited or restricted  Self Care Goal Status (): At least 1 percent but less than 20 percent impaired, limited or restricted  Self Care Discharge Status (): At least 1 percent but less than 20 percent impaired, limited or restricted    Time in:  4:40  Time out:  5:00  Timed treatment minutes:  0  Total treatment time/minutes:  20    Electronically signed by:     BELLA Wynn  8/15/2018, 5:07 PM

## 2018-08-16 VITALS
BODY MASS INDEX: 30.14 KG/M2 | TEMPERATURE: 97.3 F | OXYGEN SATURATION: 99 % | DIASTOLIC BLOOD PRESSURE: 76 MMHG | HEART RATE: 75 BPM | HEIGHT: 68 IN | RESPIRATION RATE: 16 BRPM | SYSTOLIC BLOOD PRESSURE: 157 MMHG | WEIGHT: 198.85 LBS

## 2018-08-16 LAB
ANION GAP SERPL CALCULATED.3IONS-SCNC: 13 MEQ/L (ref 7–13)
BASOPHILS ABSOLUTE: 0.1 K/UL (ref 0–0.2)
BASOPHILS RELATIVE PERCENT: 1.2 %
BUN BLDV-MCNC: 10 MG/DL (ref 8–23)
CALCIUM SERPL-MCNC: 8.9 MG/DL (ref 8.6–10.2)
CHLORIDE BLD-SCNC: 105 MEQ/L (ref 98–107)
CO2: 24 MEQ/L (ref 22–29)
CREAT SERPL-MCNC: 0.56 MG/DL (ref 0.5–0.9)
EOSINOPHILS ABSOLUTE: 0.1 K/UL (ref 0–0.7)
EOSINOPHILS RELATIVE PERCENT: 2.4 %
GFR AFRICAN AMERICAN: >60
GFR NON-AFRICAN AMERICAN: >60
GLUCOSE BLD-MCNC: 95 MG/DL (ref 74–109)
HCT VFR BLD CALC: 37.4 % (ref 37–47)
HEMOGLOBIN: 12.7 G/DL (ref 12–16)
LYMPHOCYTES ABSOLUTE: 0.9 K/UL (ref 1–4.8)
LYMPHOCYTES RELATIVE PERCENT: 15.2 %
MCH RBC QN AUTO: 32 PG (ref 27–31.3)
MCHC RBC AUTO-ENTMCNC: 34 % (ref 33–37)
MCV RBC AUTO: 94.4 FL (ref 82–100)
MONOCYTES ABSOLUTE: 0.5 K/UL (ref 0.2–0.8)
MONOCYTES RELATIVE PERCENT: 9.3 %
NEUTROPHILS ABSOLUTE: 4 K/UL (ref 1.4–6.5)
NEUTROPHILS RELATIVE PERCENT: 71.9 %
ORGANISM: ABNORMAL
PDW BLD-RTO: 14.8 % (ref 11.5–14.5)
PLATELET # BLD: 137 K/UL (ref 130–400)
POTASSIUM REFLEX MAGNESIUM: 3.6 MEQ/L (ref 3.5–5.1)
RBC # BLD: 3.96 M/UL (ref 4.2–5.4)
SODIUM BLD-SCNC: 142 MEQ/L (ref 132–144)
URINE CULTURE, ROUTINE: ABNORMAL
URINE CULTURE, ROUTINE: ABNORMAL
WBC # BLD: 5.6 K/UL (ref 4.8–10.8)

## 2018-08-16 PROCEDURE — 36415 COLL VENOUS BLD VENIPUNCTURE: CPT

## 2018-08-16 PROCEDURE — 96372 THER/PROPH/DIAG INJ SC/IM: CPT

## 2018-08-16 PROCEDURE — 85025 COMPLETE CBC W/AUTO DIFF WBC: CPT

## 2018-08-16 PROCEDURE — 80048 BASIC METABOLIC PNL TOTAL CA: CPT

## 2018-08-16 PROCEDURE — 6370000000 HC RX 637 (ALT 250 FOR IP): Performed by: HOSPITALIST

## 2018-08-16 PROCEDURE — G0378 HOSPITAL OBSERVATION PER HR: HCPCS

## 2018-08-16 PROCEDURE — 93010 ELECTROCARDIOGRAM REPORT: CPT | Performed by: INTERNAL MEDICINE

## 2018-08-16 PROCEDURE — 6370000000 HC RX 637 (ALT 250 FOR IP): Performed by: PSYCHIATRY & NEUROLOGY

## 2018-08-16 PROCEDURE — 6360000002 HC RX W HCPCS: Performed by: PHYSICIAN ASSISTANT

## 2018-08-16 RX ORDER — BENAZEPRIL HYDROCHLORIDE 10 MG/1
10 TABLET ORAL DAILY
Qty: 30 TABLET | Refills: 3 | Status: SHIPPED | OUTPATIENT
Start: 2018-08-16 | End: 2018-11-12 | Stop reason: SINTOL

## 2018-08-16 RX ORDER — LEVOFLOXACIN 500 MG/1
500 TABLET, FILM COATED ORAL DAILY
Qty: 4 TABLET | Refills: 0 | Status: SHIPPED | OUTPATIENT
Start: 2018-08-16 | End: 2018-08-20 | Stop reason: ALTCHOICE

## 2018-08-16 RX ADMIN — ENOXAPARIN SODIUM 40 MG: 100 INJECTION SUBCUTANEOUS at 08:54

## 2018-08-16 RX ADMIN — ASPIRIN 81 MG 81 MG: 81 TABLET ORAL at 08:54

## 2018-08-16 RX ADMIN — LISINOPRIL 10 MG: 10 TABLET ORAL at 00:00

## 2018-08-17 ENCOUNTER — APPOINTMENT (OUTPATIENT)
Dept: GENERAL RADIOLOGY | Age: 83
End: 2018-08-17
Payer: MEDICARE

## 2018-08-17 ENCOUNTER — HOSPITAL ENCOUNTER (EMERGENCY)
Age: 83
Discharge: HOME | End: 2018-08-17
Payer: MEDICARE

## 2018-08-17 ENCOUNTER — TELEPHONE (OUTPATIENT)
Dept: FAMILY MEDICINE CLINIC | Age: 83
End: 2018-08-17

## 2018-08-17 ENCOUNTER — OFFICE VISIT (OUTPATIENT)
Dept: FAMILY MEDICINE CLINIC | Age: 83
End: 2018-08-17
Payer: MEDICARE

## 2018-08-17 VITALS
TEMPERATURE: 97.9 F | OXYGEN SATURATION: 97 % | HEIGHT: 68 IN | HEART RATE: 68 BPM | BODY MASS INDEX: 27.28 KG/M2 | RESPIRATION RATE: 16 BRPM | WEIGHT: 180 LBS | SYSTOLIC BLOOD PRESSURE: 169 MMHG | DIASTOLIC BLOOD PRESSURE: 87 MMHG

## 2018-08-17 VITALS
BODY MASS INDEX: 28.04 KG/M2 | HEART RATE: 72 BPM | DIASTOLIC BLOOD PRESSURE: 80 MMHG | SYSTOLIC BLOOD PRESSURE: 138 MMHG | TEMPERATURE: 97.7 F | HEIGHT: 68 IN | OXYGEN SATURATION: 98 % | WEIGHT: 185 LBS

## 2018-08-17 DIAGNOSIS — N30.01 ACUTE CYSTITIS WITH HEMATURIA: ICD-10-CM

## 2018-08-17 DIAGNOSIS — L30.9 DERMATITIS: ICD-10-CM

## 2018-08-17 DIAGNOSIS — R41.82 ALTERED MENTAL STATUS, UNSPECIFIED ALTERED MENTAL STATUS TYPE: Primary | ICD-10-CM

## 2018-08-17 DIAGNOSIS — Z71.1 FEARED CONDITION NOT DEMONSTRATED: Primary | ICD-10-CM

## 2018-08-17 LAB
ALBUMIN SERPL-MCNC: 4 G/DL (ref 3.9–4.9)
ALP BLD-CCNC: 79 U/L (ref 40–130)
ALT SERPL-CCNC: 10 U/L (ref 0–33)
AMORPHOUS: ABNORMAL
ANION GAP SERPL CALCULATED.3IONS-SCNC: 12 MEQ/L (ref 7–13)
AST SERPL-CCNC: 18 U/L (ref 0–35)
BACTERIA: ABNORMAL /HPF
BASOPHILS ABSOLUTE: 0 K/UL (ref 0–0.2)
BASOPHILS RELATIVE PERCENT: 0.7 %
BILIRUB SERPL-MCNC: 0.4 MG/DL (ref 0–1.2)
BILIRUBIN URINE: NEGATIVE
BILIRUBIN, POC: NORMAL
BLOOD URINE, POC: NORMAL
BLOOD, URINE: ABNORMAL
BUN BLDV-MCNC: 13 MG/DL (ref 8–23)
CALCIUM SERPL-MCNC: 9 MG/DL (ref 8.6–10.2)
CHLORIDE BLD-SCNC: 99 MEQ/L (ref 98–107)
CLARITY, POC: CLEAR
CLARITY: CLEAR
CO2: 26 MEQ/L (ref 22–29)
COLOR, POC: NORMAL
COLOR: YELLOW
CREAT SERPL-MCNC: 0.76 MG/DL (ref 0.5–0.9)
EOSINOPHILS ABSOLUTE: 0.1 K/UL (ref 0–0.7)
EOSINOPHILS RELATIVE PERCENT: 1.2 %
GFR AFRICAN AMERICAN: >60
GFR NON-AFRICAN AMERICAN: >60
GLOBULIN: 2.4 G/DL (ref 2.3–3.5)
GLUCOSE BLD-MCNC: 104 MG/DL (ref 74–109)
GLUCOSE URINE, POC: NORMAL
GLUCOSE URINE: NEGATIVE MG/DL
HCT VFR BLD CALC: 38.7 % (ref 37–47)
HEMOGLOBIN: 13.3 G/DL (ref 12–16)
KETONES, POC: NORMAL
KETONES, URINE: NEGATIVE MG/DL
LEUKOCYTE EST, POC: NORMAL
LEUKOCYTE ESTERASE, URINE: ABNORMAL
LYMPHOCYTES ABSOLUTE: 0.9 K/UL (ref 1–4.8)
LYMPHOCYTES RELATIVE PERCENT: 14.3 %
MCH RBC QN AUTO: 32.4 PG (ref 27–31.3)
MCHC RBC AUTO-ENTMCNC: 34.4 % (ref 33–37)
MCV RBC AUTO: 94.2 FL (ref 82–100)
MONOCYTES ABSOLUTE: 0.5 K/UL (ref 0.2–0.8)
MONOCYTES RELATIVE PERCENT: 8.3 %
NEUTROPHILS ABSOLUTE: 4.5 K/UL (ref 1.4–6.5)
NEUTROPHILS RELATIVE PERCENT: 75.5 %
NITRITE, POC: NORMAL
NITRITE, URINE: NEGATIVE
PDW BLD-RTO: 14.6 % (ref 11.5–14.5)
PH UA: 5 (ref 5–9)
PH, POC: 5
PLATELET # BLD: 140 K/UL (ref 130–400)
POTASSIUM SERPL-SCNC: 3.8 MEQ/L (ref 3.5–5.1)
PROTEIN UA: NEGATIVE MG/DL
PROTEIN, POC: NORMAL
RBC # BLD: 4.11 M/UL (ref 4.2–5.4)
RBC UA: ABNORMAL /HPF (ref 0–2)
SODIUM BLD-SCNC: 137 MEQ/L (ref 132–144)
SPECIFIC GRAVITY UA: 1.02 (ref 1–1.03)
SPECIFIC GRAVITY, POC: 1.01
TOTAL PROTEIN: 6.4 G/DL (ref 6.4–8.1)
URINE REFLEX TO CULTURE: YES
UROBILINOGEN, POC: 0.2
UROBILINOGEN, URINE: 0.2 E.U./DL
WBC # BLD: 6 K/UL (ref 4.8–10.8)
WBC UA: ABNORMAL /HPF (ref 0–5)

## 2018-08-17 PROCEDURE — 85025 COMPLETE CBC W/AUTO DIFF WBC: CPT

## 2018-08-17 PROCEDURE — 81002 URINALYSIS NONAUTO W/O SCOPE: CPT | Performed by: NURSE PRACTITIONER

## 2018-08-17 PROCEDURE — 81001 URINALYSIS AUTO W/SCOPE: CPT

## 2018-08-17 PROCEDURE — 87086 URINE CULTURE/COLONY COUNT: CPT

## 2018-08-17 PROCEDURE — 99284 EMERGENCY DEPT VISIT MOD MDM: CPT

## 2018-08-17 PROCEDURE — 71045 X-RAY EXAM CHEST 1 VIEW: CPT

## 2018-08-17 PROCEDURE — 80053 COMPREHEN METABOLIC PANEL: CPT

## 2018-08-17 PROCEDURE — 99495 TRANSJ CARE MGMT MOD F2F 14D: CPT | Performed by: NURSE PRACTITIONER

## 2018-08-17 PROCEDURE — 36415 COLL VENOUS BLD VENIPUNCTURE: CPT

## 2018-08-17 RX ORDER — MOMETASONE FUROATE 1 MG/G
CREAM TOPICAL
Qty: 1 TUBE | Refills: 0 | Status: SHIPPED | OUTPATIENT
Start: 2018-08-17 | End: 2019-03-30 | Stop reason: SDUPTHER

## 2018-08-17 RX ORDER — MULTIVIT-MIN/IRON/FOLIC ACID/K 18-600-40
CAPSULE ORAL DAILY
COMMUNITY

## 2018-08-17 ASSESSMENT — ENCOUNTER SYMPTOMS
DIARRHEA: 0
RHINORRHEA: 0
SHORTNESS OF BREATH: 0
SHORTNESS OF BREATH: 0
COUGH: 0
BACK PAIN: 0
SORE THROAT: 0
VOMITING: 0
PHOTOPHOBIA: 0
COUGH: 0
EYE PAIN: 0
NAUSEA: 0
ABDOMINAL PAIN: 0

## 2018-08-19 LAB — URINE CULTURE, ROUTINE: NORMAL

## 2018-08-20 ENCOUNTER — OFFICE VISIT (OUTPATIENT)
Dept: FAMILY MEDICINE CLINIC | Age: 83
End: 2018-08-20
Payer: MEDICARE

## 2018-08-20 VITALS
TEMPERATURE: 97.6 F | BODY MASS INDEX: 28.34 KG/M2 | DIASTOLIC BLOOD PRESSURE: 72 MMHG | SYSTOLIC BLOOD PRESSURE: 132 MMHG | WEIGHT: 187 LBS | HEART RATE: 71 BPM | OXYGEN SATURATION: 99 % | HEIGHT: 68 IN

## 2018-08-20 DIAGNOSIS — R41.3 MEMORY LOSS: ICD-10-CM

## 2018-08-20 DIAGNOSIS — N30.01 ACUTE CYSTITIS WITH HEMATURIA: ICD-10-CM

## 2018-08-20 DIAGNOSIS — G45.9 TRANSIENT CEREBRAL ISCHEMIA, UNSPECIFIED TYPE: Primary | ICD-10-CM

## 2018-08-20 LAB
BACTERIA: ABNORMAL /HPF
BILIRUBIN URINE: NEGATIVE
BILIRUBIN, POC: NORMAL
BLOOD CULTURE, ROUTINE: NORMAL
BLOOD URINE, POC: NORMAL
BLOOD, URINE: ABNORMAL
CLARITY, POC: CLEAR
CLARITY: CLEAR
COLOR, POC: NORMAL
COLOR: YELLOW
CULTURE, BLOOD 2: NORMAL
EPITHELIAL CELLS, UA: ABNORMAL /HPF
GLUCOSE URINE, POC: NORMAL
GLUCOSE URINE: NEGATIVE MG/DL
KETONES, POC: NORMAL
KETONES, URINE: NEGATIVE MG/DL
LEUKOCYTE EST, POC: NORMAL
LEUKOCYTE ESTERASE, URINE: ABNORMAL
NITRITE, POC: NORMAL
NITRITE, URINE: NEGATIVE
PH UA: 5 (ref 5–9)
PH, POC: 5
PROTEIN UA: NEGATIVE MG/DL
PROTEIN, POC: NORMAL
RBC UA: ABNORMAL /HPF (ref 0–2)
SPECIFIC GRAVITY UA: 1.01 (ref 1–1.03)
SPECIFIC GRAVITY, POC: 1.02
UROBILINOGEN, POC: 0.2
UROBILINOGEN, URINE: 0.2 E.U./DL
WBC UA: ABNORMAL /HPF (ref 0–5)

## 2018-08-20 PROCEDURE — 1101F PT FALLS ASSESS-DOCD LE1/YR: CPT | Performed by: NURSE PRACTITIONER

## 2018-08-20 PROCEDURE — 1090F PRES/ABSN URINE INCON ASSESS: CPT | Performed by: NURSE PRACTITIONER

## 2018-08-20 PROCEDURE — 99214 OFFICE O/P EST MOD 30 MIN: CPT | Performed by: NURSE PRACTITIONER

## 2018-08-20 PROCEDURE — G8427 DOCREV CUR MEDS BY ELIG CLIN: HCPCS | Performed by: NURSE PRACTITIONER

## 2018-08-20 PROCEDURE — 1036F TOBACCO NON-USER: CPT | Performed by: NURSE PRACTITIONER

## 2018-08-20 PROCEDURE — G8419 CALC BMI OUT NRM PARAM NOF/U: HCPCS | Performed by: NURSE PRACTITIONER

## 2018-08-20 PROCEDURE — 4040F PNEUMOC VAC/ADMIN/RCVD: CPT | Performed by: NURSE PRACTITIONER

## 2018-08-20 PROCEDURE — G8399 PT W/DXA RESULTS DOCUMENT: HCPCS | Performed by: NURSE PRACTITIONER

## 2018-08-20 PROCEDURE — 81002 URINALYSIS NONAUTO W/O SCOPE: CPT | Performed by: NURSE PRACTITIONER

## 2018-08-20 PROCEDURE — 1123F ACP DISCUSS/DSCN MKR DOCD: CPT | Performed by: NURSE PRACTITIONER

## 2018-08-20 ASSESSMENT — ENCOUNTER SYMPTOMS
COUGH: 0
SHORTNESS OF BREATH: 0

## 2018-08-20 NOTE — PROGRESS NOTES
post-menopausal bleeding     Family History   Problem Relation Age of Onset    Coronary Art Dis Father     Coronary Art Dis Brother      Social History     Social History    Marital status:      Spouse name: N/A    Number of children: N/A    Years of education: N/A     Social History Main Topics    Smoking status: Never Smoker    Smokeless tobacco: Never Used    Alcohol use No    Drug use: No    Sexual activity: Not Asked     Other Topics Concern    None     Social History Narrative    None     Current Outpatient Prescriptions on File Prior to Visit   Medication Sig Dispense Refill    mometasone (ELOCON) 0.1 % cream Apply topically daily. 1 Tube 0    Cholecalciferol (VITAMIN D) 2000 units CAPS capsule Take by mouth daily      benazepril (LOTENSIN) 10 MG tablet Take 1 tablet by mouth daily 30 tablet 3    atorvastatin (LIPITOR) 20 MG tablet Take 1 tablet by mouth nightly 30 tablet 1    donepezil (ARICEPT) 5 MG tablet Take 1 tablet by mouth nightly 30 tablet 1    aspirin 81 MG tablet Take 81 mg by mouth daily       No current facility-administered medications on file prior to visit. Allergies   Allergen Reactions    Bactrim [Sulfamethoxazole-Trimethoprim] Hives    Codeine     Sulfa Antibiotics        Review of Systems   Constitutional: Negative for chills, diaphoresis, fatigue and fever. Respiratory: Negative for cough and shortness of breath. Cardiovascular: Negative for chest pain, palpitations and leg swelling. Neurological: Positive for dizziness. Negative for headaches. Psychiatric/Behavioral: Positive for agitation, behavioral problems and confusion. Objective  Vitals:    08/20/18 1203   BP: 132/72   Site: Left Arm   Position: Sitting   Cuff Size: Large Adult   Pulse: 71   Temp: 97.6 °F (36.4 °C)   TempSrc: Tympanic   SpO2: 99%   Weight: 187 lb (84.8 kg)   Height: 5' 8\" (1.727 m)     Physical Exam   Constitutional: She is oriented to person, place, and time.  She appears well-developed and well-nourished. No distress. HENT:   Head: Normocephalic and atraumatic. Cardiovascular: Normal rate, regular rhythm and normal heart sounds. Pulmonary/Chest: Effort normal and breath sounds normal. No respiratory distress. Neurological: She is alert and oriented to person, place, and time. Skin: Skin is warm and dry. No rash noted. She is not diaphoretic. No erythema. No pallor. Psychiatric: She has a normal mood and affect. Her behavior is normal. Judgment and thought content normal.     Assessment & Plan     Diagnosis Orders   1. Transient cerebral ischemia, unspecified type  Referral To Unknown External Neurology   2. Memory loss  Referral To Unknown External Neurology   3. Acute cystitis with hematuria  POCT Urinalysis no Micro    Urine Culture    Urinalysis     MMSE today scored 23.5 down from 25/30 on her previous. Urgent referral ordered to neurology. Safety discussed including close monitoring to ensure she is safe. Once again discussed no driving until seen by neurology. Send urine for culture and urinalysis. Side effects, adverse effects of the medication prescribed today, as well as treatment plan/ rationale and result expectations have been discussed with the patient who expresses understanding and desires to proceed. Close follow up to evaluate treatment results and for coordination of care. I have reviewed the patient's medical history in detail and updated the computerized patient record. As always, patient is advised that if symptoms worsen in any way they will proceed to the nearest emergency room. Orders Placed This Encounter   Procedures    Urine Culture     Standing Status:   Future     Standing Expiration Date:   8/20/2019     Order Specific Question:   Specify (ex-cath, midstream, cysto, etc)?      Answer:   midstream    Urinalysis     Standing Status:   Future     Standing Expiration Date:   8/20/2019    Referral To Unknown External

## 2018-08-23 DIAGNOSIS — N30.01 ACUTE CYSTITIS WITH HEMATURIA: Primary | ICD-10-CM

## 2018-08-23 LAB
ORGANISM: ABNORMAL
URINE CULTURE, ROUTINE: ABNORMAL
URINE CULTURE, ROUTINE: ABNORMAL

## 2018-08-23 RX ORDER — NITROFURANTOIN 25; 75 MG/1; MG/1
100 CAPSULE ORAL 2 TIMES DAILY
Qty: 14 CAPSULE | Refills: 0 | Status: SHIPPED | OUTPATIENT
Start: 2018-08-23 | End: 2018-08-30

## 2018-08-31 ENCOUNTER — NURSE ONLY (OUTPATIENT)
Dept: FAMILY MEDICINE CLINIC | Age: 83
End: 2018-08-31
Payer: MEDICARE

## 2018-08-31 DIAGNOSIS — N30.01 ACUTE CYSTITIS WITH HEMATURIA: Primary | ICD-10-CM

## 2018-08-31 LAB
BILIRUBIN, POC: NORMAL
BLOOD URINE, POC: NORMAL
CLARITY, POC: CLEAR
COLOR, POC: NORMAL
GLUCOSE URINE, POC: NORMAL
KETONES, POC: NORMAL
LEUKOCYTE EST, POC: NORMAL
NITRITE, POC: NORMAL
PH, POC: 6
PROTEIN, POC: NORMAL
SPECIFIC GRAVITY, POC: 1.01
UROBILINOGEN, POC: 0.2

## 2018-08-31 PROCEDURE — 81002 URINALYSIS NONAUTO W/O SCOPE: CPT | Performed by: NURSE PRACTITIONER

## 2018-09-07 ENCOUNTER — NURSE ONLY (OUTPATIENT)
Dept: FAMILY MEDICINE CLINIC | Age: 83
End: 2018-09-07
Payer: MEDICARE

## 2018-09-07 DIAGNOSIS — R30.0 DYSURIA: Primary | ICD-10-CM

## 2018-09-07 DIAGNOSIS — R30.0 DYSURIA: ICD-10-CM

## 2018-09-07 LAB
BACTERIA: ABNORMAL /HPF
BILIRUBIN URINE: NEGATIVE
BILIRUBIN, POC: 0
BLOOD URINE, POC: ABNORMAL
BLOOD, URINE: ABNORMAL
CLARITY, POC: CLEAR
CLARITY: CLEAR
COLOR, POC: YELLOW
COLOR: YELLOW
EPITHELIAL CELLS, UA: ABNORMAL /HPF
GLUCOSE URINE, POC: ABNORMAL
GLUCOSE URINE: NEGATIVE MG/DL
KETONES, POC: ABNORMAL
KETONES, URINE: NEGATIVE MG/DL
LEUKOCYTE EST, POC: ABNORMAL
LEUKOCYTE ESTERASE, URINE: ABNORMAL
NITRITE, POC: ABNORMAL
NITRITE, URINE: NEGATIVE
PH UA: 5.5 (ref 5–9)
PH, POC: 5
PROTEIN UA: NEGATIVE MG/DL
PROTEIN, POC: ABNORMAL
RBC UA: ABNORMAL /HPF (ref 0–2)
SPECIFIC GRAVITY UA: 1.02 (ref 1–1.03)
SPECIFIC GRAVITY, POC: 1.02
UROBILINOGEN, POC: 0.2
UROBILINOGEN, URINE: 1 E.U./DL
WBC UA: ABNORMAL /HPF (ref 0–5)

## 2018-09-07 PROCEDURE — 81002 URINALYSIS NONAUTO W/O SCOPE: CPT | Performed by: NURSE PRACTITIONER

## 2018-09-07 NOTE — PATIENT INSTRUCTIONS
Pt is having no symptoms. Order culture and urinalysis. Sandra Rodriguez saw these results.  Notified pt we will call her Monday with results

## 2018-09-09 LAB — URINE CULTURE, ROUTINE: NORMAL

## 2018-09-10 DIAGNOSIS — R31.9 HEMATURIA, UNSPECIFIED TYPE: Primary | ICD-10-CM

## 2018-09-21 ENCOUNTER — OFFICE VISIT (OUTPATIENT)
Dept: UROLOGY | Age: 83
End: 2018-09-21
Payer: MEDICARE

## 2018-09-21 VITALS
HEIGHT: 68 IN | SYSTOLIC BLOOD PRESSURE: 124 MMHG | DIASTOLIC BLOOD PRESSURE: 64 MMHG | HEART RATE: 72 BPM | BODY MASS INDEX: 27.28 KG/M2 | WEIGHT: 180 LBS

## 2018-09-21 DIAGNOSIS — R33.9 URINARY RETENTION: ICD-10-CM

## 2018-09-21 DIAGNOSIS — R31.29 MICROHEMATURIA: Primary | ICD-10-CM

## 2018-09-21 LAB
BILIRUBIN, POC: ABNORMAL
BLOOD URINE, POC: ABNORMAL
CLARITY, POC: ABNORMAL
COLOR, POC: YELLOW
GLUCOSE URINE, POC: ABNORMAL
KETONES, POC: ABNORMAL
LEUKOCYTE EST, POC: ABNORMAL
NITRITE, POC: ABNORMAL
PH, POC: 5.5
POST VOID RESIDUAL (PVR): 0 ML
PROTEIN, POC: ABNORMAL
SPECIFIC GRAVITY, POC: 1.01
UROBILINOGEN, POC: 0.2

## 2018-09-21 PROCEDURE — 1101F PT FALLS ASSESS-DOCD LE1/YR: CPT | Performed by: UROLOGY

## 2018-09-21 PROCEDURE — 1123F ACP DISCUSS/DSCN MKR DOCD: CPT | Performed by: UROLOGY

## 2018-09-21 PROCEDURE — 1036F TOBACCO NON-USER: CPT | Performed by: UROLOGY

## 2018-09-21 PROCEDURE — G8419 CALC BMI OUT NRM PARAM NOF/U: HCPCS | Performed by: UROLOGY

## 2018-09-21 PROCEDURE — 1090F PRES/ABSN URINE INCON ASSESS: CPT | Performed by: UROLOGY

## 2018-09-21 PROCEDURE — 4040F PNEUMOC VAC/ADMIN/RCVD: CPT | Performed by: UROLOGY

## 2018-09-21 PROCEDURE — G8399 PT W/DXA RESULTS DOCUMENT: HCPCS | Performed by: UROLOGY

## 2018-09-21 PROCEDURE — 81003 URINALYSIS AUTO W/O SCOPE: CPT | Performed by: UROLOGY

## 2018-09-21 PROCEDURE — G8427 DOCREV CUR MEDS BY ELIG CLIN: HCPCS | Performed by: UROLOGY

## 2018-09-21 PROCEDURE — 51798 US URINE CAPACITY MEASURE: CPT | Performed by: UROLOGY

## 2018-09-21 PROCEDURE — 99203 OFFICE O/P NEW LOW 30 MIN: CPT | Performed by: UROLOGY

## 2018-09-25 ENCOUNTER — HOSPITAL ENCOUNTER (OUTPATIENT)
Dept: GENERAL RADIOLOGY | Age: 83
Discharge: HOME | End: 2018-09-27
Payer: MEDICARE

## 2018-09-25 ENCOUNTER — HOSPITAL ENCOUNTER (OUTPATIENT)
Dept: CT IMAGING | Age: 83
Discharge: HOME | End: 2018-09-27
Payer: MEDICARE

## 2018-09-25 VITALS
RESPIRATION RATE: 16 BRPM | DIASTOLIC BLOOD PRESSURE: 82 MMHG | HEIGHT: 68 IN | BODY MASS INDEX: 27.28 KG/M2 | WEIGHT: 180 LBS | SYSTOLIC BLOOD PRESSURE: 145 MMHG | HEART RATE: 68 BPM

## 2018-09-25 DIAGNOSIS — R31.29 HEMATURIA, MICROSCOPIC: ICD-10-CM

## 2018-09-25 DIAGNOSIS — R31.29 MICROHEMATURIA: ICD-10-CM

## 2018-09-25 PROCEDURE — 74178 CT ABD&PLV WO CNTR FLWD CNTR: CPT

## 2018-09-25 PROCEDURE — 6360000004 HC RX CONTRAST MEDICATION: Performed by: UROLOGY

## 2018-09-25 PROCEDURE — 74018 RADEX ABDOMEN 1 VIEW: CPT

## 2018-09-25 PROCEDURE — 2580000003 HC RX 258: Performed by: UROLOGY

## 2018-09-25 RX ORDER — SODIUM CHLORIDE 0.9 % (FLUSH) 0.9 %
10 SYRINGE (ML) INJECTION ONCE
Status: COMPLETED | OUTPATIENT
Start: 2018-09-25 | End: 2018-09-25

## 2018-09-25 RX ADMIN — IOPAMIDOL 100 ML: 755 INJECTION, SOLUTION INTRAVENOUS at 12:45

## 2018-09-25 RX ADMIN — Medication 10 ML: at 12:45

## 2018-10-02 ENCOUNTER — PROCEDURE VISIT (OUTPATIENT)
Dept: UROLOGY | Age: 83
End: 2018-10-02
Payer: MEDICARE

## 2018-10-02 VITALS
DIASTOLIC BLOOD PRESSURE: 84 MMHG | HEART RATE: 78 BPM | SYSTOLIC BLOOD PRESSURE: 120 MMHG | BODY MASS INDEX: 27.28 KG/M2 | HEIGHT: 68 IN | WEIGHT: 180 LBS

## 2018-10-02 DIAGNOSIS — R31.29 MICROHEMATURIA: Primary | ICD-10-CM

## 2018-10-02 LAB
BILIRUBIN, POC: ABNORMAL
BLOOD URINE, POC: ABNORMAL
CLARITY, POC: CLEAR
COLOR, POC: YELLOW
GLUCOSE URINE, POC: ABNORMAL
KETONES, POC: ABNORMAL
LEUKOCYTE EST, POC: ABNORMAL
NITRITE, POC: ABNORMAL
PH, POC: 6
PROTEIN, POC: ABNORMAL
SPECIFIC GRAVITY, POC: 1.01
UROBILINOGEN, POC: 1

## 2018-10-02 PROCEDURE — 99999 PR OFFICE/OUTPT VISIT,PROCEDURE ONLY: CPT | Performed by: UROLOGY

## 2018-10-02 PROCEDURE — 81003 URINALYSIS AUTO W/O SCOPE: CPT | Performed by: UROLOGY

## 2018-10-02 PROCEDURE — 52000 CYSTOURETHROSCOPY: CPT | Performed by: UROLOGY

## 2018-10-02 RX ORDER — DOXYCYCLINE HYCLATE 100 MG
100 TABLET ORAL ONCE
Qty: 1 TABLET | Refills: 0 | COMMUNITY
Start: 2018-10-02 | End: 2018-10-02

## 2018-10-02 NOTE — PROGRESS NOTES
longitudinal ligament T9-T11. Joint space narrowing bilateral hips with bilateral subchondral cyst formation femoral heads. No post operative changes.            Impression       No hydronephrosis/hydroureter. No renal/ureteral calculi.       3.7 mm noncalcified pleural-based right lower lobe nodule.       Hysterectomy.       Other findings discussed.           ____________________________________________________________       Fleischner Society 2017 Guidelines for Management of Incidentally Detected Pulmonary Nodules in Adults       A: Solid Nodules*            Single                     Low risk**                        <6 mm     No routine follow-up                                  6-8 mm     CT at 6-12 months, then consider CT at 18-24 months                    >8 mm     Consider CT at 3 months, PET/CT, or tissue sampling   Nodules <6 mm do not require routine follow-up, but certain patients at high risk with suspicious nodule morphology, upper lobe location, or both may warrant 12-month follow-up (recommendation 1A).               High risk**                   <6 mm     Optional CT at 12 months                  6-8 mm     CT at 6-12 months, then CT at 18-24 months                    >8 mm     Consider CT at 3 months, PET/CT, or tissue sampling   Nodules <6 mm do not require routine follow-up, but certain patients at high risk with suspicious nodule morphology, upper lobe location, or both may warrant 12-month follow-up (recommendation 1A).          Multiple                              Low risk**                   <6 mm     No routine follow-up                  6-8 mm     CT at 3-6 months, then consider CT at 18-24 months                   >8 mm     CT at 3-6 months, then  consider CT at 18-24 months   Use most suspicious nodule as guide to management. Follow-up intervals may vary according to size and risk (recommendation 2A).                               High risk**                   <6 mm     Optional CT at 12 months                  6-8 mm     CT at 3-6 months, then at 18-24 months                  >8 mm     CT at 3-6 months, then at 18-24 months   Use most suspicious nodule as guide to management. Follow-up intervals may vary according to size and risk (recommendation 2A).         B: Subsolid Nodules*            Single                        Ground glass                   <6 mm     No routine follow-up                 >=6 mm     CT at 6-12 months to confirm persistence, then CT  every 2 years until 5 years   In certain suspicious nodules <6 mm, consider follow-up at 2 and 4 years. If solid component(s) or growth develops, consider resection. (Recommendations 3A and 4A).               Part solid                   <6 mm     No routine follow-up                 >=6 mm     CT at 3-6 months to confirm persistence. If unchanged and solid component remains <6 mm, annual CT  should be performed for 5 years. In practice, part-solid nodules cannot be defined as such until >=6 mm, and nodules <6 mm do not usually require follow-up. Persistent part-solid nodules with solid components >=6 mm should be considered highly suspicious (recommendations 4A-4C)            Multiple                   <6 mm     CT at 3-6 months. If stable, consider CT at 2 and 4 years.                >=6 mm     CT at 3-6 months. Subsequent management based  on the most suspicious nodule(s). Multiple <6 mm pure ground-glass nodules are usually benign, but consider follow-up in selected patients at high risk at 2 and 4 years (recommendation 5A).     Note. --These recommendations do not apply to lung cancer screening, patients with immunosuppression, or patients with known primary cancer. * Dimensions are average of long and short axes, rounded to the nearest millimeter.     ** Consider all relevant risk factors (see Risk Factors).       ____________________________________________________________     Calcified lung nodule less than 3 mm does

## 2018-10-09 RX ORDER — ATORVASTATIN CALCIUM 20 MG/1
20 TABLET, FILM COATED ORAL NIGHTLY
Qty: 30 TABLET | Refills: 5 | Status: SHIPPED | OUTPATIENT
Start: 2018-10-09 | End: 2019-01-18 | Stop reason: SDUPTHER

## 2018-10-09 RX ORDER — DONEPEZIL HYDROCHLORIDE 5 MG/1
5 TABLET, FILM COATED ORAL NIGHTLY
Qty: 30 TABLET | Refills: 5 | Status: SHIPPED | OUTPATIENT
Start: 2018-10-09 | End: 2019-01-18 | Stop reason: SDUPTHER

## 2018-10-26 ENCOUNTER — OFFICE VISIT (OUTPATIENT)
Dept: FAMILY MEDICINE CLINIC | Age: 83
End: 2018-10-26
Payer: MEDICARE

## 2018-10-26 VITALS
WEIGHT: 181 LBS | TEMPERATURE: 97.2 F | SYSTOLIC BLOOD PRESSURE: 128 MMHG | BODY MASS INDEX: 27.43 KG/M2 | DIASTOLIC BLOOD PRESSURE: 80 MMHG | OXYGEN SATURATION: 98 % | HEIGHT: 68 IN | HEART RATE: 72 BPM

## 2018-10-26 DIAGNOSIS — E55.9 VITAMIN D DEFICIENCY: ICD-10-CM

## 2018-10-26 DIAGNOSIS — G31.84 MILD COGNITIVE IMPAIRMENT: ICD-10-CM

## 2018-10-26 DIAGNOSIS — E78.00 PURE HYPERCHOLESTEROLEMIA: ICD-10-CM

## 2018-10-26 DIAGNOSIS — I10 ESSENTIAL HYPERTENSION: Primary | ICD-10-CM

## 2018-10-26 PROCEDURE — G8419 CALC BMI OUT NRM PARAM NOF/U: HCPCS | Performed by: NURSE PRACTITIONER

## 2018-10-26 PROCEDURE — G8427 DOCREV CUR MEDS BY ELIG CLIN: HCPCS | Performed by: NURSE PRACTITIONER

## 2018-10-26 PROCEDURE — G8482 FLU IMMUNIZE ORDER/ADMIN: HCPCS | Performed by: NURSE PRACTITIONER

## 2018-10-26 PROCEDURE — 1036F TOBACCO NON-USER: CPT | Performed by: NURSE PRACTITIONER

## 2018-10-26 PROCEDURE — 99213 OFFICE O/P EST LOW 20 MIN: CPT | Performed by: NURSE PRACTITIONER

## 2018-10-26 PROCEDURE — 1090F PRES/ABSN URINE INCON ASSESS: CPT | Performed by: NURSE PRACTITIONER

## 2018-10-26 PROCEDURE — 1101F PT FALLS ASSESS-DOCD LE1/YR: CPT | Performed by: NURSE PRACTITIONER

## 2018-10-26 PROCEDURE — G8399 PT W/DXA RESULTS DOCUMENT: HCPCS | Performed by: NURSE PRACTITIONER

## 2018-10-26 PROCEDURE — 4040F PNEUMOC VAC/ADMIN/RCVD: CPT | Performed by: NURSE PRACTITIONER

## 2018-10-26 PROCEDURE — 1123F ACP DISCUSS/DSCN MKR DOCD: CPT | Performed by: NURSE PRACTITIONER

## 2018-10-26 ASSESSMENT — ENCOUNTER SYMPTOMS
CHEST TIGHTNESS: 0
ABDOMINAL PAIN: 0
COUGH: 0
SHORTNESS OF BREATH: 0

## 2018-10-26 NOTE — PROGRESS NOTES
Subjective  Chief Complaint   Patient presents with    6 Month Follow-Up     pt states that she is here for 6 month f/u, states that her one med is making her cough       Hypertension   This is a chronic problem. The current episode started more than 1 year ago. The problem is unchanged. The problem is controlled. Pertinent negatives include no chest pain, headaches, malaise/fatigue, palpitations, peripheral edema or shortness of breath. There are no associated agents to hypertension. Risk factors for coronary artery disease include obesity, post-menopausal state and dyslipidemia. Past treatments include ACE inhibitors. Ran out of her vitamin d supplement and switched to a different brand and her cough went away completely. Also switched the timing of her benazepril at the same time she started the new vitamin d so isn't sure which helped the cough, but it is completely gone. Feels she is doing very well. States she feels very safe at home. Still driving but only during daytime and close locations that are easy to get to. Had a ramp installed at home. Did see Dr. Gerson Rodriguez and had work up for microhematuria which was negative. She will f/u PRN with him.      Past Medical History:   Diagnosis Date    Actinic keratoses     Arthritis     Dermatitis     Encounter for annual health examination 12/19/2011    Endometrial cancer [facility])     s/p hyst in 2009 - was told clear by Dr. 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

## 2018-11-12 DIAGNOSIS — I10 ESSENTIAL HYPERTENSION: Primary | ICD-10-CM

## 2018-11-12 RX ORDER — LOSARTAN POTASSIUM 25 MG/1
25 TABLET ORAL DAILY
Qty: 90 TABLET | Refills: 1 | Status: SHIPPED | OUTPATIENT
Start: 2018-11-12 | End: 2019-01-18 | Stop reason: SDUPTHER

## 2019-01-18 DIAGNOSIS — E78.00 PURE HYPERCHOLESTEROLEMIA: Primary | ICD-10-CM

## 2019-01-18 DIAGNOSIS — I10 ESSENTIAL HYPERTENSION: ICD-10-CM

## 2019-01-18 RX ORDER — LOSARTAN POTASSIUM 25 MG/1
25 TABLET ORAL DAILY
Qty: 90 TABLET | Refills: 1 | Status: SHIPPED | OUTPATIENT
Start: 2019-01-18 | End: 2019-07-30 | Stop reason: SDUPTHER

## 2019-01-18 RX ORDER — DONEPEZIL HYDROCHLORIDE 5 MG/1
5 TABLET, FILM COATED ORAL NIGHTLY
Qty: 90 TABLET | Refills: 1 | Status: SHIPPED | OUTPATIENT
Start: 2019-01-18 | End: 2019-07-30 | Stop reason: SDUPTHER

## 2019-01-18 RX ORDER — ATORVASTATIN CALCIUM 20 MG/1
20 TABLET, FILM COATED ORAL NIGHTLY
Qty: 90 TABLET | Refills: 1 | Status: SHIPPED | OUTPATIENT
Start: 2019-01-18 | End: 2019-07-30 | Stop reason: SDUPTHER

## 2019-02-28 ENCOUNTER — OFFICE VISIT (OUTPATIENT)
Dept: FAMILY MEDICINE CLINIC | Age: 84
End: 2019-02-28
Payer: MEDICARE

## 2019-02-28 VITALS
OXYGEN SATURATION: 95 % | TEMPERATURE: 97.6 F | HEIGHT: 68 IN | SYSTOLIC BLOOD PRESSURE: 118 MMHG | HEART RATE: 71 BPM | BODY MASS INDEX: 27.28 KG/M2 | DIASTOLIC BLOOD PRESSURE: 70 MMHG | WEIGHT: 180 LBS

## 2019-02-28 DIAGNOSIS — I10 ESSENTIAL HYPERTENSION: Primary | ICD-10-CM

## 2019-02-28 DIAGNOSIS — E78.00 PURE HYPERCHOLESTEROLEMIA: ICD-10-CM

## 2019-02-28 DIAGNOSIS — E55.9 VITAMIN D DEFICIENCY: ICD-10-CM

## 2019-02-28 PROCEDURE — 1101F PT FALLS ASSESS-DOCD LE1/YR: CPT | Performed by: NURSE PRACTITIONER

## 2019-02-28 PROCEDURE — 1036F TOBACCO NON-USER: CPT | Performed by: NURSE PRACTITIONER

## 2019-02-28 PROCEDURE — 99213 OFFICE O/P EST LOW 20 MIN: CPT | Performed by: NURSE PRACTITIONER

## 2019-02-28 PROCEDURE — G8482 FLU IMMUNIZE ORDER/ADMIN: HCPCS | Performed by: NURSE PRACTITIONER

## 2019-02-28 PROCEDURE — G8419 CALC BMI OUT NRM PARAM NOF/U: HCPCS | Performed by: NURSE PRACTITIONER

## 2019-02-28 PROCEDURE — G8510 SCR DEP NEG, NO PLAN REQD: HCPCS | Performed by: NURSE PRACTITIONER

## 2019-02-28 PROCEDURE — G8427 DOCREV CUR MEDS BY ELIG CLIN: HCPCS | Performed by: NURSE PRACTITIONER

## 2019-02-28 PROCEDURE — 4040F PNEUMOC VAC/ADMIN/RCVD: CPT | Performed by: NURSE PRACTITIONER

## 2019-02-28 PROCEDURE — 1090F PRES/ABSN URINE INCON ASSESS: CPT | Performed by: NURSE PRACTITIONER

## 2019-02-28 PROCEDURE — G8399 PT W/DXA RESULTS DOCUMENT: HCPCS | Performed by: NURSE PRACTITIONER

## 2019-02-28 PROCEDURE — 1123F ACP DISCUSS/DSCN MKR DOCD: CPT | Performed by: NURSE PRACTITIONER

## 2019-02-28 ASSESSMENT — PATIENT HEALTH QUESTIONNAIRE - PHQ9
SUM OF ALL RESPONSES TO PHQ9 QUESTIONS 1 & 2: 0
SUM OF ALL RESPONSES TO PHQ QUESTIONS 1-9: 0
SUM OF ALL RESPONSES TO PHQ QUESTIONS 1-9: 0
1. LITTLE INTEREST OR PLEASURE IN DOING THINGS: 0
2. FEELING DOWN, DEPRESSED OR HOPELESS: 0

## 2019-02-28 ASSESSMENT — ENCOUNTER SYMPTOMS
COUGH: 0
SHORTNESS OF BREATH: 0

## 2019-03-30 DIAGNOSIS — L30.9 DERMATITIS: ICD-10-CM

## 2019-03-31 NOTE — TELEPHONE ENCOUNTER
Requesting medication refill. Please approve or deny this request.    Rx requested:  Requested Prescriptions     Pending Prescriptions Disp Refills    mometasone (ELOCON) 0.1 % cream [Pharmacy Med Name: Mometasone Furoate External Cream 0.1 %] 15 g 0     Sig: apply topically every day.        Last Office Visit:   2/28/2019        Next Visit Date:  Future Appointments   Date Time Provider Berenice Weller   7/29/2019 10:30 AM YUSUF Slater - CNP [facility] EMERGENCY [facility] AT [place]

## 2019-04-01 RX ORDER — MOMETASONE FUROATE 1 MG/G
CREAM TOPICAL
Qty: 15 G | Refills: 0 | Status: SHIPPED | OUTPATIENT
Start: 2019-04-01 | End: 2019-09-24 | Stop reason: SDUPTHER

## 2019-05-22 DIAGNOSIS — E78.00 PURE HYPERCHOLESTEROLEMIA: ICD-10-CM

## 2019-05-22 DIAGNOSIS — I10 ESSENTIAL HYPERTENSION: ICD-10-CM

## 2019-05-22 DIAGNOSIS — E55.9 VITAMIN D DEFICIENCY: ICD-10-CM

## 2019-05-22 LAB
ALBUMIN SERPL-MCNC: 4.2 G/DL (ref 3.5–4.6)
ALP BLD-CCNC: 111 U/L (ref 40–130)
ALT SERPL-CCNC: 10 U/L (ref 0–33)
ANION GAP SERPL CALCULATED.3IONS-SCNC: 16 MEQ/L (ref 9–15)
AST SERPL-CCNC: 14 U/L (ref 0–35)
BILIRUB SERPL-MCNC: 0.4 MG/DL (ref 0.2–0.7)
BUN BLDV-MCNC: 16 MG/DL (ref 8–23)
CALCIUM SERPL-MCNC: 8.9 MG/DL (ref 8.5–9.9)
CHLORIDE BLD-SCNC: 102 MEQ/L (ref 95–107)
CHOLESTEROL, TOTAL: 155 MG/DL (ref 0–199)
CO2: 26 MEQ/L (ref 20–31)
CREAT SERPL-MCNC: 0.84 MG/DL (ref 0.5–0.9)
GFR AFRICAN AMERICAN: >60
GFR NON-AFRICAN AMERICAN: >60
GLOBULIN: 2.2 G/DL (ref 2.3–3.5)
GLUCOSE BLD-MCNC: 72 MG/DL (ref 70–99)
HDLC SERPL-MCNC: 75 MG/DL (ref 40–59)
LDL CHOLESTEROL CALCULATED: 71 MG/DL (ref 0–129)
POTASSIUM SERPL-SCNC: 4.3 MEQ/L (ref 3.4–4.9)
SODIUM BLD-SCNC: 144 MEQ/L (ref 135–144)
TOTAL PROTEIN: 6.4 G/DL (ref 6.3–8)
TRIGL SERPL-MCNC: 43 MG/DL (ref 0–150)
VITAMIN D 25-HYDROXY: 25.6 NG/ML (ref 30–100)

## 2019-07-11 ENCOUNTER — OFFICE VISIT (OUTPATIENT)
Dept: FAMILY MEDICINE CLINIC | Age: 84
End: 2019-07-11
Payer: MEDICARE

## 2019-07-11 VITALS
DIASTOLIC BLOOD PRESSURE: 78 MMHG | BODY MASS INDEX: 26.64 KG/M2 | HEART RATE: 80 BPM | OXYGEN SATURATION: 98 % | WEIGHT: 175.8 LBS | HEIGHT: 68 IN | TEMPERATURE: 98.3 F | SYSTOLIC BLOOD PRESSURE: 130 MMHG

## 2019-07-11 DIAGNOSIS — N39.0 URINARY TRACT INFECTION WITHOUT HEMATURIA, SITE UNSPECIFIED: ICD-10-CM

## 2019-07-11 DIAGNOSIS — N39.0 URINARY TRACT INFECTION WITHOUT HEMATURIA, SITE UNSPECIFIED: Primary | ICD-10-CM

## 2019-07-11 LAB
BILIRUBIN, POC: NORMAL
BLOOD URINE, POC: NORMAL
CLARITY, POC: NORMAL
COLOR, POC: NORMAL
GLUCOSE URINE, POC: NORMAL
KETONES, POC: NORMAL
LEUKOCYTE EST, POC: NORMAL
NITRITE, POC: NORMAL
PH, POC: 6
PROTEIN, POC: NORMAL
SPECIFIC GRAVITY, POC: 1.02
UROBILINOGEN, POC: 0.2

## 2019-07-11 PROCEDURE — 4040F PNEUMOC VAC/ADMIN/RCVD: CPT | Performed by: NURSE PRACTITIONER

## 2019-07-11 PROCEDURE — 81002 URINALYSIS NONAUTO W/O SCOPE: CPT | Performed by: NURSE PRACTITIONER

## 2019-07-11 PROCEDURE — G8419 CALC BMI OUT NRM PARAM NOF/U: HCPCS | Performed by: NURSE PRACTITIONER

## 2019-07-11 PROCEDURE — 1123F ACP DISCUSS/DSCN MKR DOCD: CPT | Performed by: NURSE PRACTITIONER

## 2019-07-11 PROCEDURE — G8510 SCR DEP NEG, NO PLAN REQD: HCPCS | Performed by: NURSE PRACTITIONER

## 2019-07-11 PROCEDURE — G8427 DOCREV CUR MEDS BY ELIG CLIN: HCPCS | Performed by: NURSE PRACTITIONER

## 2019-07-11 PROCEDURE — 1090F PRES/ABSN URINE INCON ASSESS: CPT | Performed by: NURSE PRACTITIONER

## 2019-07-11 PROCEDURE — 99213 OFFICE O/P EST LOW 20 MIN: CPT | Performed by: NURSE PRACTITIONER

## 2019-07-11 PROCEDURE — G8399 PT W/DXA RESULTS DOCUMENT: HCPCS | Performed by: NURSE PRACTITIONER

## 2019-07-11 PROCEDURE — 1036F TOBACCO NON-USER: CPT | Performed by: NURSE PRACTITIONER

## 2019-07-11 RX ORDER — NITROFURANTOIN 25; 75 MG/1; MG/1
100 CAPSULE ORAL 2 TIMES DAILY
Qty: 14 CAPSULE | Refills: 0 | Status: SHIPPED | OUTPATIENT
Start: 2019-07-11 | End: 2019-07-18

## 2019-07-11 ASSESSMENT — ENCOUNTER SYMPTOMS
ABDOMINAL PAIN: 0
COUGH: 0
VOMITING: 0
SHORTNESS OF BREATH: 0
NAUSEA: 0

## 2019-07-13 LAB
ORGANISM: ABNORMAL
URINE CULTURE, ROUTINE: ABNORMAL
URINE CULTURE, ROUTINE: ABNORMAL

## 2019-07-29 ENCOUNTER — TELEPHONE (OUTPATIENT)
Dept: FAMILY MEDICINE CLINIC | Age: 84
End: 2019-07-29

## 2019-07-29 ASSESSMENT — LIFESTYLE VARIABLES
AUDIT TOTAL SCORE: 2
HOW OFTEN DURING THE LAST YEAR HAVE YOU NEEDED AN ALCOHOLIC DRINK FIRST THING IN THE MORNING TO GET YOURSELF GOING AFTER A NIGHT OF HEAVY DRINKING: 0
HOW MANY STANDARD DRINKS CONTAINING ALCOHOL DO YOU HAVE ON A TYPICAL DAY: 0
HOW OFTEN DURING THE LAST YEAR HAVE YOU FOUND THAT YOU WERE NOT ABLE TO STOP DRINKING ONCE YOU HAD STARTED: 0
HAVE YOU OR SOMEONE ELSE BEEN INJURED AS A RESULT OF YOUR DRINKING: 0
HOW OFTEN DURING THE LAST YEAR HAVE YOU HAD A FEELING OF GUILT OR REMORSE AFTER DRINKING: 0
HAS A RELATIVE, FRIEND, DOCTOR, OR ANOTHER HEALTH PROFESSIONAL EXPRESSED CONCERN ABOUT YOUR DRINKING OR SUGGESTED YOU CUT DOWN: 0
HOW OFTEN DURING THE LAST YEAR HAVE YOU BEEN UNABLE TO REMEMBER WHAT HAPPENED THE NIGHT BEFORE BECAUSE YOU HAD BEEN DRINKING: 0
HOW OFTEN DO YOU HAVE SIX OR MORE DRINKS ON ONE OCCASION: 0
AUDIT-C TOTAL SCORE: 2
HOW OFTEN DO YOU HAVE A DRINK CONTAINING ALCOHOL: 2
HOW OFTEN DURING THE LAST YEAR HAVE YOU FAILED TO DO WHAT WAS NORMALLY EXPECTED FROM YOU BECAUSE OF DRINKING: 0

## 2019-07-29 ASSESSMENT — PATIENT HEALTH QUESTIONNAIRE - PHQ9
SUM OF ALL RESPONSES TO PHQ QUESTIONS 1-9: 0
SUM OF ALL RESPONSES TO PHQ QUESTIONS 1-9: 0

## 2019-07-30 ENCOUNTER — OFFICE VISIT (OUTPATIENT)
Dept: FAMILY MEDICINE CLINIC | Age: 84
End: 2019-07-30
Payer: MEDICARE

## 2019-07-30 VITALS
WEIGHT: 177 LBS | OXYGEN SATURATION: 98 % | HEART RATE: 69 BPM | SYSTOLIC BLOOD PRESSURE: 132 MMHG | DIASTOLIC BLOOD PRESSURE: 80 MMHG | TEMPERATURE: 98 F | BODY MASS INDEX: 26.83 KG/M2 | HEIGHT: 68 IN

## 2019-07-30 DIAGNOSIS — G31.84 MILD COGNITIVE IMPAIRMENT: ICD-10-CM

## 2019-07-30 DIAGNOSIS — Z65.8 LONELINESS: ICD-10-CM

## 2019-07-30 DIAGNOSIS — I10 ESSENTIAL HYPERTENSION: ICD-10-CM

## 2019-07-30 DIAGNOSIS — Z00.00 ROUTINE GENERAL MEDICAL EXAMINATION AT A HEALTH CARE FACILITY: Primary | ICD-10-CM

## 2019-07-30 DIAGNOSIS — E78.00 PURE HYPERCHOLESTEROLEMIA: ICD-10-CM

## 2019-07-30 DIAGNOSIS — E55.9 VITAMIN D DEFICIENCY: ICD-10-CM

## 2019-07-30 PROCEDURE — 1123F ACP DISCUSS/DSCN MKR DOCD: CPT | Performed by: NURSE PRACTITIONER

## 2019-07-30 PROCEDURE — G0438 PPPS, INITIAL VISIT: HCPCS | Performed by: NURSE PRACTITIONER

## 2019-07-30 PROCEDURE — 4040F PNEUMOC VAC/ADMIN/RCVD: CPT | Performed by: NURSE PRACTITIONER

## 2019-07-30 RX ORDER — ATORVASTATIN CALCIUM 20 MG/1
20 TABLET, FILM COATED ORAL NIGHTLY
Qty: 90 TABLET | Refills: 1 | Status: SHIPPED | OUTPATIENT
Start: 2019-07-30 | End: 2019-08-11 | Stop reason: SDUPTHER

## 2019-07-30 RX ORDER — LOSARTAN POTASSIUM 25 MG/1
25 TABLET ORAL DAILY
Qty: 90 TABLET | Refills: 1 | Status: SHIPPED | OUTPATIENT
Start: 2019-07-30 | End: 2019-08-11 | Stop reason: SDUPTHER

## 2019-07-30 RX ORDER — DONEPEZIL HYDROCHLORIDE 5 MG/1
5 TABLET, FILM COATED ORAL NIGHTLY
Qty: 90 TABLET | Refills: 1 | Status: SHIPPED | OUTPATIENT
Start: 2019-07-30 | End: 2019-08-11 | Stop reason: SDUPTHER

## 2019-07-30 ASSESSMENT — ENCOUNTER SYMPTOMS
SHORTNESS OF BREATH: 0
COUGH: 0

## 2019-07-30 ASSESSMENT — PATIENT HEALTH QUESTIONNAIRE - PHQ9
SUM OF ALL RESPONSES TO PHQ QUESTIONS 1-9: 1
SUM OF ALL RESPONSES TO PHQ QUESTIONS 1-9: 1

## 2019-07-30 NOTE — PROGRESS NOTES
Patient presents with    Check-Up     5 month f/u HTN       Hypertension   This is a chronic problem. The current episode started more than 1 year ago. The problem is unchanged. The problem is controlled. Pertinent negatives include no chest pain, headaches, malaise/fatigue, palpitations, peripheral edema or shortness of breath. There are no associated agents to hypertension. Risk factors for coronary artery disease include post-menopausal state, sedentary lifestyle and dyslipidemia. Past treatments include angiotensin blockers. The current treatment provides significant improvement. Pt caring for her  regularly at home. Has limited social outings related to this. Declines assistance in the home with this at this time. Taking aricept daily. Daughter comes to home to make sure she is taking medications and fills pill boxes. Pt still with rash on right upper ext and new skin lesions on bilateral lower ext.     Past Medical History:   Diagnosis Date    Actinic keratoses     Arthritis     Dermatitis     Encounter for annual health examination 12/19/2011    Endometrial cancer [facility])     s/p hyst in 2009 - was told clear by Dr. 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    Glaucoma suspect 08/20/2015    Hyperopic astigmatism 08/01/2014    Vitamin D deficiency     HTN (hypertension)     Hyperlipidemia     Sciatica      Past Surgical History:   Procedure Laterality Date    CATARACT REMOVAL WITH Component Value Date    WBC 6.0 08/17/2018    HGB 13.3 08/17/2018    HCT 38.7 08/17/2018     08/17/2018    CHOL 155 05/22/2019    TRIG 43 05/22/2019    HDL 75 (H) 05/22/2019    ALT 10 05/22/2019    AST 14 05/22/2019     05/22/2019    K 4.3 05/22/2019     05/22/2019    CREATININE 0.84 05/22/2019    BUN 16 05/22/2019    CO2 26 05/22/2019    TSH 1.140 08/22/2018    INR 1.0 08/14/2018         Assessment& Plan     Diagnosis Orders   1. Routine general medical examination at a health care facility     2. Essential hypertension  losartan (COZAAR) 25 MG tablet   3. Vitamin D deficiency     4. Pure hypercholesterolemia  atorvastatin (LIPITOR) 20 MG tablet   5. Mild cognitive impairment  donepezil (ARICEPT) 5 MG tablet   6. Loneliness       BP well controlled so continue same. Declines further interventions for loneliness. Will schedule with Dr. Varela Members for skin check. Continue current regimen. F/u in 6 months or sooner PRN. Side effects, adverse effects of the medication prescribed today, as well as treatment plan/ rationale and result expectations have been discussed with the patient who expresses understanding and desires to proceed. Close follow up to evaluate treatment results and for coordination of care. I have reviewed the patient's medical history in detail and updated the computerized patient record. As always, patient is advised that if symptoms worsen in any way they will proceed to the nearest emergency room. No orders of the defined types were placed in this encounter.       Orders Placed This Encounter   Medications    donepezil (ARICEPT) 5 MG tablet     Sig: Take 1 tablet by mouth nightly     Dispense:  90 tablet     Refill:  1    atorvastatin (LIPITOR) 20 MG tablet     Sig: Take 1 tablet by mouth nightly     Dispense:  90 tablet     Refill:  1    losartan (COZAAR) 25 MG tablet     Sig: Take 1 tablet by mouth daily     Dispense:  90 tablet     Refill:  1       Return

## 2019-08-11 DIAGNOSIS — I10 ESSENTIAL HYPERTENSION: ICD-10-CM

## 2019-08-11 DIAGNOSIS — G31.84 MILD COGNITIVE IMPAIRMENT: ICD-10-CM

## 2019-08-11 DIAGNOSIS — E78.00 PURE HYPERCHOLESTEROLEMIA: ICD-10-CM

## 2019-08-13 RX ORDER — DONEPEZIL HYDROCHLORIDE 5 MG/1
5 TABLET, FILM COATED ORAL NIGHTLY
Qty: 90 TABLET | Refills: 1 | Status: SHIPPED | OUTPATIENT
Start: 2019-08-13 | End: 2022-04-15 | Stop reason: SDUPTHER

## 2019-08-13 RX ORDER — ATORVASTATIN CALCIUM 20 MG/1
20 TABLET, FILM COATED ORAL NIGHTLY
Qty: 90 TABLET | Refills: 1 | Status: SHIPPED | OUTPATIENT
Start: 2019-08-13 | End: 2022-04-15

## 2019-08-13 RX ORDER — LOSARTAN POTASSIUM 25 MG/1
25 TABLET ORAL DAILY
Qty: 90 TABLET | Refills: 1 | Status: SHIPPED | OUTPATIENT
Start: 2019-08-13 | End: 2022-04-15

## 2019-08-29 ENCOUNTER — HOSPITAL ENCOUNTER (OUTPATIENT)
Dept: GENERAL RADIOLOGY | Age: 84
Discharge: HOME | End: 2019-08-31
Payer: MEDICARE

## 2019-08-29 VITALS — DIASTOLIC BLOOD PRESSURE: 84 MMHG | SYSTOLIC BLOOD PRESSURE: 150 MMHG

## 2019-08-29 DIAGNOSIS — M16.11 OSTEOARTHRITIS OF RIGHT HIP, UNSPECIFIED OSTEOARTHRITIS TYPE: ICD-10-CM

## 2019-08-29 PROCEDURE — 20610 DRAIN/INJ JOINT/BURSA W/O US: CPT

## 2019-08-29 PROCEDURE — 6360000002 HC RX W HCPCS: Performed by: ORTHOPAEDIC SURGERY

## 2019-08-29 PROCEDURE — 2500000003 HC RX 250 WO HCPCS: Performed by: ORTHOPAEDIC SURGERY

## 2019-08-29 PROCEDURE — 6360000004 HC RX CONTRAST MEDICATION: Performed by: ORTHOPAEDIC SURGERY

## 2019-08-29 PROCEDURE — 73525 CONTRAST X-RAY OF HIP: CPT

## 2019-08-29 RX ORDER — BUPIVACAINE HYDROCHLORIDE 5 MG/ML
30 INJECTION, SOLUTION EPIDURAL; INTRACAUDAL ONCE
Status: COMPLETED | OUTPATIENT
Start: 2019-08-29 | End: 2019-08-29

## 2019-08-29 RX ORDER — LIDOCAINE HYDROCHLORIDE 20 MG/ML
20 INJECTION, SOLUTION EPIDURAL; INFILTRATION; INTRACAUDAL; PERINEURAL ONCE
Status: COMPLETED | OUTPATIENT
Start: 2019-08-29 | End: 2019-08-29

## 2019-08-29 RX ORDER — TRIAMCINOLONE ACETONIDE 40 MG/ML
80 INJECTION, SUSPENSION INTRA-ARTICULAR; INTRAMUSCULAR ONCE
Status: COMPLETED | OUTPATIENT
Start: 2019-08-29 | End: 2019-08-29

## 2019-08-29 RX ADMIN — TRIAMCINOLONE ACETONIDE 80 MG: 40 INJECTION, SUSPENSION INTRA-ARTICULAR; INTRAMUSCULAR at 10:10

## 2019-08-29 RX ADMIN — LIDOCAINE HYDROCHLORIDE 9 ML: 20 INJECTION, SOLUTION EPIDURAL; INFILTRATION; INTRACAUDAL; PERINEURAL at 10:10

## 2019-08-29 RX ADMIN — IOVERSOL 1 ML: 678 INJECTION INTRA-ARTERIAL; INTRAVENOUS at 10:10

## 2019-08-29 RX ADMIN — BUPIVACAINE HYDROCHLORIDE 3 MG: 5 INJECTION, SOLUTION EPIDURAL; INTRACAUDAL; PERINEURAL at 10:18

## 2019-09-14 ENCOUNTER — OFFICE VISIT (OUTPATIENT)
Dept: FAMILY MEDICINE CLINIC | Age: 84
End: 2019-09-14
Payer: MEDICARE

## 2019-09-14 ENCOUNTER — HOSPITAL ENCOUNTER (OUTPATIENT)
Dept: GENERAL RADIOLOGY | Age: 84
Discharge: HOME | End: 2019-09-16
Payer: MEDICARE

## 2019-09-14 VITALS
TEMPERATURE: 97.8 F | SYSTOLIC BLOOD PRESSURE: 138 MMHG | HEART RATE: 72 BPM | WEIGHT: 175.6 LBS | RESPIRATION RATE: 16 BRPM | OXYGEN SATURATION: 99 % | DIASTOLIC BLOOD PRESSURE: 80 MMHG | BODY MASS INDEX: 26.61 KG/M2 | HEIGHT: 68 IN

## 2019-09-14 DIAGNOSIS — S99.911A RIGHT ANKLE INJURY, INITIAL ENCOUNTER: ICD-10-CM

## 2019-09-14 DIAGNOSIS — S93.411A SPRAIN OF CALCANEOFIBULAR LIGAMENT OF RIGHT ANKLE, INITIAL ENCOUNTER: ICD-10-CM

## 2019-09-14 DIAGNOSIS — S99.911A RIGHT ANKLE INJURY, INITIAL ENCOUNTER: Primary | ICD-10-CM

## 2019-09-14 PROCEDURE — G8419 CALC BMI OUT NRM PARAM NOF/U: HCPCS | Performed by: PHYSICIAN ASSISTANT

## 2019-09-14 PROCEDURE — G8427 DOCREV CUR MEDS BY ELIG CLIN: HCPCS | Performed by: PHYSICIAN ASSISTANT

## 2019-09-14 PROCEDURE — 99214 OFFICE O/P EST MOD 30 MIN: CPT | Performed by: PHYSICIAN ASSISTANT

## 2019-09-14 PROCEDURE — G8399 PT W/DXA RESULTS DOCUMENT: HCPCS | Performed by: PHYSICIAN ASSISTANT

## 2019-09-14 PROCEDURE — 1090F PRES/ABSN URINE INCON ASSESS: CPT | Performed by: PHYSICIAN ASSISTANT

## 2019-09-14 PROCEDURE — 1036F TOBACCO NON-USER: CPT | Performed by: PHYSICIAN ASSISTANT

## 2019-09-14 PROCEDURE — 4040F PNEUMOC VAC/ADMIN/RCVD: CPT | Performed by: PHYSICIAN ASSISTANT

## 2019-09-14 PROCEDURE — 1123F ACP DISCUSS/DSCN MKR DOCD: CPT | Performed by: PHYSICIAN ASSISTANT

## 2019-09-14 PROCEDURE — 73610 X-RAY EXAM OF ANKLE: CPT

## 2019-09-16 ENCOUNTER — TELEPHONE (OUTPATIENT)
Dept: FAMILY MEDICINE CLINIC | Age: 84
End: 2019-09-16

## 2019-09-16 NOTE — TELEPHONE ENCOUNTER
NO pt. Pt was seen at the walk-in on Saturday and was sent to the hospital to have an ankle XR. Pt is still waiting for the results. Could you please review and release results? Thanks.

## 2019-09-24 DIAGNOSIS — L30.9 DERMATITIS: ICD-10-CM

## 2019-09-26 RX ORDER — MOMETASONE FUROATE 1 MG/G
CREAM TOPICAL
Qty: 15 G | Refills: 0 | Status: SHIPPED | OUTPATIENT
Start: 2019-09-26 | End: 2022-10-10 | Stop reason: ALTCHOICE

## 2019-12-11 ENCOUNTER — HOSPITAL ENCOUNTER (OUTPATIENT)
Dept: GENERAL RADIOLOGY | Age: 84
Discharge: HOME | End: 2019-12-13
Payer: MEDICARE

## 2019-12-11 VITALS — SYSTOLIC BLOOD PRESSURE: 128 MMHG | DIASTOLIC BLOOD PRESSURE: 82 MMHG | HEART RATE: 76 BPM

## 2019-12-11 DIAGNOSIS — M16.9 ARTHROSIS OF HIP: ICD-10-CM

## 2019-12-11 PROCEDURE — 2500000003 HC RX 250 WO HCPCS: Performed by: ORTHOPAEDIC SURGERY

## 2019-12-11 PROCEDURE — 6360000004 HC RX CONTRAST MEDICATION: Performed by: ORTHOPAEDIC SURGERY

## 2019-12-11 PROCEDURE — 20610 DRAIN/INJ JOINT/BURSA W/O US: CPT

## 2019-12-11 PROCEDURE — 6360000002 HC RX W HCPCS: Performed by: ORTHOPAEDIC SURGERY

## 2019-12-11 RX ORDER — BUPIVACAINE HYDROCHLORIDE 5 MG/ML
30 INJECTION, SOLUTION EPIDURAL; INTRACAUDAL ONCE
Status: COMPLETED | OUTPATIENT
Start: 2019-12-11 | End: 2019-12-11

## 2019-12-11 RX ORDER — LIDOCAINE HYDROCHLORIDE 20 MG/ML
5 INJECTION, SOLUTION INFILTRATION; PERINEURAL ONCE
Status: COMPLETED | OUTPATIENT
Start: 2019-12-11 | End: 2019-12-11

## 2019-12-11 RX ORDER — TRIAMCINOLONE ACETONIDE 40 MG/ML
40 INJECTION, SUSPENSION INTRA-ARTICULAR; INTRAMUSCULAR ONCE
Status: COMPLETED | OUTPATIENT
Start: 2019-12-11 | End: 2019-12-11

## 2019-12-11 RX ADMIN — IOVERSOL 1 ML: 678 INJECTION INTRA-ARTERIAL; INTRAVENOUS at 14:19

## 2019-12-11 RX ADMIN — LIDOCAINE HYDROCHLORIDE 5 ML: 20 INJECTION, SOLUTION INFILTRATION; PERINEURAL at 14:19

## 2019-12-11 RX ADMIN — TRIAMCINOLONE ACETONIDE 80 MG: 40 INJECTION, SUSPENSION INTRA-ARTICULAR; INTRAMUSCULAR at 14:20

## 2019-12-11 RX ADMIN — BUPIVACAINE HYDROCHLORIDE 3 ML: 5 INJECTION, SOLUTION EPIDURAL; INTRACAUDAL at 14:18

## 2019-12-11 ASSESSMENT — PAIN SCALES - GENERAL
PAINLEVEL_OUTOF10: 0
PAINLEVEL_OUTOF10: 0

## 2019-12-31 ENCOUNTER — OFFICE VISIT (OUTPATIENT)
Dept: FAMILY MEDICINE CLINIC | Age: 84
End: 2019-12-31
Payer: MEDICARE

## 2019-12-31 VITALS
DIASTOLIC BLOOD PRESSURE: 76 MMHG | BODY MASS INDEX: 25.61 KG/M2 | HEIGHT: 68 IN | WEIGHT: 169 LBS | HEART RATE: 82 BPM | OXYGEN SATURATION: 98 % | SYSTOLIC BLOOD PRESSURE: 124 MMHG | TEMPERATURE: 97.6 F

## 2019-12-31 DIAGNOSIS — G31.84 MILD COGNITIVE IMPAIRMENT: ICD-10-CM

## 2019-12-31 DIAGNOSIS — I10 ESSENTIAL HYPERTENSION: ICD-10-CM

## 2019-12-31 DIAGNOSIS — F43.9 STRESS: ICD-10-CM

## 2019-12-31 DIAGNOSIS — R63.4 WEIGHT LOSS, UNINTENTIONAL: ICD-10-CM

## 2019-12-31 DIAGNOSIS — R20.0 NUMBNESS AND TINGLING OF RIGHT ARM: ICD-10-CM

## 2019-12-31 DIAGNOSIS — R29.898 WEAKNESS OF BOTH LOWER EXTREMITIES: Primary | ICD-10-CM

## 2019-12-31 DIAGNOSIS — R20.2 NUMBNESS AND TINGLING OF RIGHT ARM: ICD-10-CM

## 2019-12-31 PROCEDURE — 1036F TOBACCO NON-USER: CPT | Performed by: NURSE PRACTITIONER

## 2019-12-31 PROCEDURE — G8399 PT W/DXA RESULTS DOCUMENT: HCPCS | Performed by: NURSE PRACTITIONER

## 2019-12-31 PROCEDURE — 99214 OFFICE O/P EST MOD 30 MIN: CPT | Performed by: NURSE PRACTITIONER

## 2019-12-31 PROCEDURE — 4040F PNEUMOC VAC/ADMIN/RCVD: CPT | Performed by: NURSE PRACTITIONER

## 2019-12-31 PROCEDURE — G8417 CALC BMI ABV UP PARAM F/U: HCPCS | Performed by: NURSE PRACTITIONER

## 2019-12-31 PROCEDURE — G8484 FLU IMMUNIZE NO ADMIN: HCPCS | Performed by: NURSE PRACTITIONER

## 2019-12-31 PROCEDURE — 1090F PRES/ABSN URINE INCON ASSESS: CPT | Performed by: NURSE PRACTITIONER

## 2019-12-31 PROCEDURE — G8427 DOCREV CUR MEDS BY ELIG CLIN: HCPCS | Performed by: NURSE PRACTITIONER

## 2019-12-31 PROCEDURE — 1123F ACP DISCUSS/DSCN MKR DOCD: CPT | Performed by: NURSE PRACTITIONER

## 2020-04-21 ENCOUNTER — OFFICE VISIT (OUTPATIENT)
Dept: FAMILY MEDICINE CLINIC | Age: 85
End: 2020-04-21
Payer: MEDICARE

## 2020-04-21 VITALS
HEART RATE: 78 BPM | HEIGHT: 68 IN | BODY MASS INDEX: 24.86 KG/M2 | SYSTOLIC BLOOD PRESSURE: 136 MMHG | DIASTOLIC BLOOD PRESSURE: 74 MMHG | OXYGEN SATURATION: 98 % | WEIGHT: 164 LBS | TEMPERATURE: 97.6 F

## 2020-04-21 PROCEDURE — G8399 PT W/DXA RESULTS DOCUMENT: HCPCS | Performed by: NURSE PRACTITIONER

## 2020-04-21 PROCEDURE — 1123F ACP DISCUSS/DSCN MKR DOCD: CPT | Performed by: NURSE PRACTITIONER

## 2020-04-21 PROCEDURE — G8427 DOCREV CUR MEDS BY ELIG CLIN: HCPCS | Performed by: NURSE PRACTITIONER

## 2020-04-21 PROCEDURE — G8420 CALC BMI NORM PARAMETERS: HCPCS | Performed by: NURSE PRACTITIONER

## 2020-04-21 PROCEDURE — 99213 OFFICE O/P EST LOW 20 MIN: CPT | Performed by: NURSE PRACTITIONER

## 2020-04-21 PROCEDURE — 1036F TOBACCO NON-USER: CPT | Performed by: NURSE PRACTITIONER

## 2020-04-21 PROCEDURE — 4040F PNEUMOC VAC/ADMIN/RCVD: CPT | Performed by: NURSE PRACTITIONER

## 2020-04-21 PROCEDURE — 1090F PRES/ABSN URINE INCON ASSESS: CPT | Performed by: NURSE PRACTITIONER

## 2020-04-21 RX ORDER — SODIUM FLUORIDE 1.1 G/100G
GEL ORAL
COMMUNITY
Start: 2020-02-26 | End: 2022-03-21

## 2020-04-21 ASSESSMENT — PATIENT HEALTH QUESTIONNAIRE - PHQ9
2. FEELING DOWN, DEPRESSED OR HOPELESS: 0
SUM OF ALL RESPONSES TO PHQ QUESTIONS 1-9: 0
SUM OF ALL RESPONSES TO PHQ9 QUESTIONS 1 & 2: 0
1. LITTLE INTEREST OR PLEASURE IN DOING THINGS: 0
SUM OF ALL RESPONSES TO PHQ QUESTIONS 1-9: 0

## 2020-04-21 ASSESSMENT — ENCOUNTER SYMPTOMS
PHOTOPHOBIA: 0
COUGH: 0
BACK PAIN: 0
SHORTNESS OF BREATH: 0

## 2020-04-21 NOTE — PROGRESS NOTES
Lymphadenopathy:      Cervical: No cervical adenopathy. Skin:     General: Skin is warm and dry. Capillary Refill: Capillary refill takes less than 2 seconds. Coloration: Skin is not jaundiced or pale. Findings: No bruising, erythema, lesion or rash. Neurological:      General: No focal deficit present. Mental Status: She is alert and oriented to person, place, and time. Mental status is at baseline. Coordination: Coordination normal.   Psychiatric:         Mood and Affect: Mood normal.         Behavior: Behavior normal.         Thought Content: Thought content normal.         Judgment: Judgment normal.         Assessment& Plan     Diagnosis Orders   1. Essential hypertension  CBC With Auto Differential    Comprehensive Metabolic Panel   2. Vitamin D deficiency  Vitamin D 25 Hydroxy   3. Pure hypercholesterolemia  Lipid Panel     Continue current regimen. Check labs as ordered when safe to do so. Increase caloric intake-boost, ensure, small snacks. F/u in 4 months or sooner PRN>  Side effects, adverse effects of the medication prescribed today, as well as treatment plan/ rationale and result expectations have been discussed with the patient who expresses understanding and desires to proceed. Close follow up to evaluate treatment results and for coordination of care. I have reviewed the patient's medical history in detail and updated the computerized patient record. As always, patient is advised that if symptoms worsen in any way they will proceed to the nearest emergency room.        Orders Placed This Encounter   Procedures    CBC With Auto Differential     Standing Status:   Future     Standing Expiration Date:   4/21/2021    Comprehensive Metabolic Panel     Standing Status:   Future     Standing Expiration Date:   4/21/2021    Lipid Panel     Standing Status:   Future     Standing Expiration Date:   4/21/2021     Order Specific Question:   Is Patient Fasting?/# of

## 2020-07-31 ENCOUNTER — TELEPHONE (OUTPATIENT)
Dept: FAMILY MEDICINE CLINIC | Age: 85
End: 2020-07-31

## 2020-07-31 ENCOUNTER — OFFICE VISIT (OUTPATIENT)
Dept: FAMILY MEDICINE CLINIC | Age: 85
End: 2020-07-31
Payer: MEDICARE

## 2020-07-31 VITALS
HEART RATE: 86 BPM | SYSTOLIC BLOOD PRESSURE: 124 MMHG | TEMPERATURE: 97.5 F | DIASTOLIC BLOOD PRESSURE: 76 MMHG | BODY MASS INDEX: 25.01 KG/M2 | WEIGHT: 165 LBS | HEIGHT: 68 IN | OXYGEN SATURATION: 98 %

## 2020-07-31 PROCEDURE — G0439 PPPS, SUBSEQ VISIT: HCPCS | Performed by: NURSE PRACTITIONER

## 2020-07-31 PROCEDURE — 1123F ACP DISCUSS/DSCN MKR DOCD: CPT | Performed by: NURSE PRACTITIONER

## 2020-07-31 PROCEDURE — 4040F PNEUMOC VAC/ADMIN/RCVD: CPT | Performed by: NURSE PRACTITIONER

## 2020-07-31 ASSESSMENT — PATIENT HEALTH QUESTIONNAIRE - PHQ9
SUM OF ALL RESPONSES TO PHQ QUESTIONS 1-9: 1
SUM OF ALL RESPONSES TO PHQ QUESTIONS 1-9: 1

## 2020-07-31 ASSESSMENT — LIFESTYLE VARIABLES: HOW OFTEN DO YOU HAVE A DRINK CONTAINING ALCOHOL: 0

## 2020-07-31 NOTE — TELEPHONE ENCOUNTER
Attempted to aiden pt for [facility] @ check out and accidentally aiden her with Tenisha Rod. Aug 7 appt needs changed to [facility].

## 2020-07-31 NOTE — PROGRESS NOTES
Medicare Annual Wellness Visit  Name: Duran Correia Date: 2020   MRN: [id number] Sex: Female   Age: 80 y.o. Ethnicity: Non-/Non    : 1935 Race: Marlen Cline is here for Medicare AWV and Depression (states that she does get a little depressed over her homebound )    Screenings for behavioral, psychosocial and functional/safety risks, and cognitive dysfunction are all negative except as indicated below. These results, as well as other patient data from the [address] form, are documented in Flowsheets linked to this Encounter. Allergies   Allergen Reactions    Bactrim [Sulfamethoxazole-Trimethoprim] Hives    Benazepril Other (See Comments)     cough    Codeine     Sulfa Antibiotics          Prior to Visit Medications    Medication Sig Taking?  Authorizing Provider   DENTAGEL 1.1 % GEL USE TWICE PER DAY AS DIRECTED ON TUBE LABEL Yes Historical Provider, MD   mometasone (ELOCON) 0.1 % cream APPLY TOPICALLY EVERY DAY  Yes YUSUF Chawla CNP   donepezil (ARICEPT) 5 MG tablet Take 1 tablet by mouth nightly Yes YUSUF Chawla CNP   atorvastatin (LIPITOR) 20 MG tablet Take 1 tablet by mouth nightly Yes YUSUF Chawla CNP   losartan (COZAAR) 25 MG tablet Take 1 tablet by mouth daily Yes YUSUF Chawla CNP   Cholecalciferol (VITAMIN D) 2000 units CAPS capsule Take by mouth daily Yes Historical Provider, MD   aspirin 81 MG tablet Take 81 mg by mouth daily Yes Historical Provider, MD         Past Medical History:   Diagnosis Date    Actinic keratoses     Arthritis     Dermatitis     Encounter for annual health examination 2011    Endometrial cancer [facility])     s/p hyst in  - was told clear by Dr. Rosita Moss History of bone density study 2011    History of mammography, screening 2011    HTN (hypertension)     Hyperlipidemia     Prurigo nodularis 2017    left lower leg    Sciatica     Vitamin D deficiency        Past Surgical History:   Procedure Laterality Date    CATARACT REMOVAL WITH IMPLANT      HYSTERECTOMY  9/2009    post-menopausal bleeding         Family History   Problem Relation Age of Onset    Coronary Art Dis Father     Other Father         glaucoma    Coronary Art Dis Brother        CareTeam (Including outside providers/suppliers regularly involved in providing care):   Patient Care Team:  YUSUF Currie CNP as PCP - General  YUSUF Currie CNP as PCP - [facility] Empaneled Provider  Maryann Milton MD as Surgeon (Orthopedic Surgery)    Wt Readings from Last 3 Encounters:   07/31/20 165 lb (74.8 kg)   04/21/20 164 lb (74.4 kg)   12/31/19 169 lb (76.7 kg)     Vitals:    07/31/20 0939   BP: 124/76   Site: Left Upper Arm   Position: Sitting   Cuff Size: Medium Adult   Pulse: 86   Temp: 97.5 °F (36.4 °C)   TempSrc: Infrared   SpO2: 98%   Weight: 165 lb (74.8 kg)   Height: 5' 8\" (1.727 m)     Body mass index is 25.09 kg/m². Based upon direct observation of the patient, evaluation of cognition reveals recent and remote memory intact.     General Appearance: alert and oriented to person, place and time, well developed and well- nourished, in no acute distress  Skin: warm and dry, no rash or erythema  Head: normocephalic and atraumatic  Eyes: pupils equal, round, and reactive to light, extraocular eye movements intact, conjunctivae normal  ENT: tympanic membrane, external ear and ear canal normal bilaterally, nose without deformity, nasal mucosa and turbinates normal without polyps  Neck: supple and non-tender without mass, no thyromegaly or thyroid nodules, no cervical lymphadenopathy  Pulmonary/Chest: clear to auscultation bilaterally- no wheezes, rales or rhonchi, normal air movement, no respiratory distress  Cardiovascular: normal rate, regular rhythm, normal S1 and S2, murmur present, rubs, clicks, or gallops, distal pulses intact, bilateral carotid bruits present  Extremities: no cyanosis, clubbing or edema  Musculoskeletal: normal range of motion, no joint swelling, deformity or tenderness  Neurologic: reflexes normal and symmetric, no cranial nerve deficit, gait, coordination and speech normal    Patient's complete Health Risk Assessment and screening values have been reviewed and are found in Flowsheets. The following problems were reviewed today and where indicated follow up appointments were made and/or referrals ordered. Positive Risk Factor Screenings with Interventions:     Health Habits/Nutrition:  Health Habits/Nutrition  Do you exercise for at least 20 minutes 2-3 times per week?: (!) No  Have you lost any weight without trying in the past 3 months?: (!) Yes  Do you eat fewer than 2 meals per day?: No  Have you seen a dentist within the past year?: Yes  Body mass index is 25.09 kg/m².   Health Habits/Nutrition Interventions:  · Inadequate physical activity:  patient agrees to increase physical activity as follows: will work on increasing physical activity  · has not lost weight, has actually gained 1 pound        Personalized Preventive Plan   Current Health Maintenance Status  Immunization History   Administered Date(s) Administered    Influenza Vaccine, unspecified formulation 10/28/2016    Influenza Virus Vaccine 11/01/2011    Influenza, High Dose (Fluzone 65 yrs and older) 10/23/2015, 10/22/2018    Pneumococcal Conjugate 13-valent ([id number]) 01/30/2016    Pneumococcal Conjugate 7-valent (Prevnar7) 10/09/2005    Pneumococcal Polysaccharide ([id number]) 03/15/2017        Health Maintenance   Topic Date Due    DTaP/Tdap/Td vaccine (1 - Tdap) 09/06/1954    Shingles Vaccine (1 of 2) 09/06/1985    Annual Wellness Visit (AWV)  05/29/2019    Lipid screen  05/22/2020    Potassium monitoring  05/22/2020    Creatinine monitoring  05/22/2020    Flu vaccine (1) 09/01/2020    DEXA (modify frequency per FRAX score)  Completed    Pneumococcal 65+ years Vaccine  Completed    Hepatitis A vaccine  Aged Out    Hepatitis B vaccine  Aged Out    Hib vaccine  Aged Out    Meningococcal (ACWY) vaccine  Aged Out     Recommendations for Wir3[employer] Due: see orders and patient instructions/AVS.  . Recommended screening schedule for the next 5-10 years is provided to the patient in written form: see Patient Instructions/AVS.    Андрей Del Real was seen today for medicare awv and depression. Diagnoses and all orders for this visit:    Routine general medical examination at a health care facility    Heart murmur  -     Echocardiogram complete; Future    Adjustment disorder with depressed mood  -     Ambulatory referral to Psychology    Bruit of left carotid artery    Bilateral carotid bruits  -     US CAROTID ARTERY BILATERAL; Future          C/o generalized body aches. \"I hurt all over. Takes aleve once daily. Does seem to help when she takes it. Feeling overwhelmed with being the primary caregiver for her . Fatigue, feeling run down. Plan as above, f/u in 1 month. Side effects, adverse effects of the medication prescribed today, as well as treatment plan/ rationale and result expectations have been discussed with the patient who expresses understanding and desires to proceed. Close follow up to evaluate treatment results and for coordination of care. I have reviewed the patient's medical history in detail and updated the computerized patient record. As always, patient is advised that if symptoms worsen in any way they will proceed to the nearest emergency room.

## 2020-07-31 NOTE — PATIENT INSTRUCTIONS
the tests and screenings are paid in full while other may be subject to a deductible, co-insurance, and/or copay. Some of these benefits include a comprehensive review of your medical history including lifestyle, illnesses that may run in your family, and various assessments and screenings as appropriate. After reviewing your medical record and screening and assessments performed today your provider may have ordered immunizations, labs, imaging, and/or referrals for you. A list of these orders (if applicable) as well as your Preventive Care list are included within your After Visit Summary for your review. Other Preventive Recommendations:    A preventive eye exam performed by an eye specialist is recommended every 1-2 years to screen for glaucoma; cataracts, macular degeneration, and other eye disorders. A preventive dental visit is recommended every 6 months. Try to get at least 150 minutes of exercise per week or 10,000 steps per day on a pedometer . Order or download the FREE \"Exercise & Physical Activity: Your Everyday Guide\" from [employer]. Call 1-652.967.7057 or search [employer] online. You need 7327-1744 mg of calcium and 2638-2077 IU of vitamin D per day. It is possible to meet your calcium requirement with diet alone, but a vitamin D supplement is usually necessary to meet this goal.  When exposed to the sun, use a sunscreen that protects against both UVA and UVB radiation with an SPF of 30 or greater. Reapply every 2 to 3 hours or after sweating, drying off with a towel, or swimming. Always wear a seat belt when traveling in a car. Always wear a helmet when riding a bicycle or motorcycle.

## 2020-08-03 NOTE — TELEPHONE ENCOUNTER
Spoke to pt this AM. States that she does not want to aiden with Dr Shayne Phillips at this time. States that things have improved since seeing you last wk?

## 2020-09-24 DIAGNOSIS — E78.00 PURE HYPERCHOLESTEROLEMIA: ICD-10-CM

## 2020-09-24 DIAGNOSIS — I10 ESSENTIAL HYPERTENSION: ICD-10-CM

## 2020-09-24 DIAGNOSIS — R09.89 BILATERAL CAROTID BRUITS: ICD-10-CM

## 2020-09-24 DIAGNOSIS — E55.9 VITAMIN D DEFICIENCY: ICD-10-CM

## 2020-09-24 LAB
ALBUMIN SERPL-MCNC: 3.9 G/DL (ref 3.5–4.6)
ALP BLD-CCNC: 71 U/L (ref 40–130)
ALT SERPL-CCNC: 6 U/L (ref 0–33)
ANION GAP SERPL CALCULATED.3IONS-SCNC: 10 MEQ/L (ref 9–15)
AST SERPL-CCNC: 13 U/L (ref 0–35)
BASOPHILS ABSOLUTE: 0.1 K/UL (ref 0–0.2)
BASOPHILS RELATIVE PERCENT: 1.1 %
BILIRUB SERPL-MCNC: 0.5 MG/DL (ref 0.2–0.7)
BUN BLDV-MCNC: 13 MG/DL (ref 8–23)
CALCIUM SERPL-MCNC: 9.2 MG/DL (ref 8.5–9.9)
CHLORIDE BLD-SCNC: 101 MEQ/L (ref 95–107)
CHOLESTEROL, TOTAL: 196 MG/DL (ref 0–199)
CO2: 27 MEQ/L (ref 20–31)
CREAT SERPL-MCNC: 0.73 MG/DL (ref 0.5–0.9)
EOSINOPHILS ABSOLUTE: 0.1 K/UL (ref 0–0.7)
EOSINOPHILS RELATIVE PERCENT: 1.1 %
GFR AFRICAN AMERICAN: >60
GFR NON-AFRICAN AMERICAN: >60
GLOBULIN: 2.7 G/DL (ref 2.3–3.5)
GLUCOSE BLD-MCNC: 89 MG/DL (ref 70–99)
HCT VFR BLD CALC: 36.7 % (ref 37–47)
HDLC SERPL-MCNC: 75 MG/DL (ref 40–59)
HEMOGLOBIN: 12.4 G/DL (ref 12–16)
LDL CHOLESTEROL CALCULATED: 111 MG/DL (ref 0–129)
LYMPHOCYTES ABSOLUTE: 0.8 K/UL (ref 1–4.8)
LYMPHOCYTES RELATIVE PERCENT: 16.4 %
MCH RBC QN AUTO: 31.3 PG (ref 27–31.3)
MCHC RBC AUTO-ENTMCNC: 33.7 % (ref 33–37)
MCV RBC AUTO: 92.7 FL (ref 82–100)
MONOCYTES ABSOLUTE: 0.4 K/UL (ref 0.2–0.8)
MONOCYTES RELATIVE PERCENT: 8.8 %
NEUTROPHILS ABSOLUTE: 3.6 K/UL (ref 1.4–6.5)
NEUTROPHILS RELATIVE PERCENT: 72.6 %
PDW BLD-RTO: 15 % (ref 11.5–14.5)
PLATELET # BLD: 158 K/UL (ref 130–400)
POTASSIUM SERPL-SCNC: 4.2 MEQ/L (ref 3.4–4.9)
RBC # BLD: 3.96 M/UL (ref 4.2–5.4)
SODIUM BLD-SCNC: 138 MEQ/L (ref 135–144)
TOTAL PROTEIN: 6.6 G/DL (ref 6.3–8)
TRIGL SERPL-MCNC: 50 MG/DL (ref 0–150)
VITAMIN D 25-HYDROXY: 21 NG/ML (ref 30–100)
WBC # BLD: 4.9 K/UL (ref 4.8–10.8)

## 2021-01-04 ENCOUNTER — OFFICE VISIT (OUTPATIENT)
Dept: FAMILY MEDICINE CLINIC | Age: 86
End: 2021-01-04
Payer: MEDICARE

## 2021-01-04 VITALS
SYSTOLIC BLOOD PRESSURE: 130 MMHG | HEART RATE: 76 BPM | WEIGHT: 145 LBS | DIASTOLIC BLOOD PRESSURE: 74 MMHG | TEMPERATURE: 98.1 F | OXYGEN SATURATION: 99 % | HEIGHT: 68 IN | BODY MASS INDEX: 21.98 KG/M2

## 2021-01-04 DIAGNOSIS — R35.1 FREQUENT URINATION AT NIGHT: Primary | ICD-10-CM

## 2021-01-04 DIAGNOSIS — R31.9 URINARY TRACT INFECTION WITH HEMATURIA, SITE UNSPECIFIED: ICD-10-CM

## 2021-01-04 DIAGNOSIS — R20.0 NUMBNESS OF LEFT LOWER EXTREMITY: ICD-10-CM

## 2021-01-04 DIAGNOSIS — R31.29 OTHER MICROSCOPIC HEMATURIA: ICD-10-CM

## 2021-01-04 DIAGNOSIS — N39.0 URINARY TRACT INFECTION WITH HEMATURIA, SITE UNSPECIFIED: ICD-10-CM

## 2021-01-04 DIAGNOSIS — M16.12 OSTEOARTHRITIS OF LEFT HIP, UNSPECIFIED OSTEOARTHRITIS TYPE: Primary | ICD-10-CM

## 2021-01-04 DIAGNOSIS — R35.1 FREQUENT URINATION AT NIGHT: ICD-10-CM

## 2021-01-04 DIAGNOSIS — M25.552 ARTHRALGIA OF HIP, LEFT: ICD-10-CM

## 2021-01-04 LAB
BILIRUBIN, POC: ABNORMAL
BLOOD URINE, POC: ABNORMAL
CLARITY, POC: CLEAR
COLOR, POC: YELLOW
GLUCOSE URINE, POC: ABNORMAL
KETONES, POC: ABNORMAL
LEUKOCYTE EST, POC: 2
NITRITE, POC: ABNORMAL
PH, POC: 6
PROTEIN, POC: ABNORMAL
SPECIFIC GRAVITY, POC: 1.01
UROBILINOGEN, POC: ABNORMAL

## 2021-01-04 PROCEDURE — G8399 PT W/DXA RESULTS DOCUMENT: HCPCS | Performed by: NURSE PRACTITIONER

## 2021-01-04 PROCEDURE — 1090F PRES/ABSN URINE INCON ASSESS: CPT | Performed by: NURSE PRACTITIONER

## 2021-01-04 PROCEDURE — G8427 DOCREV CUR MEDS BY ELIG CLIN: HCPCS | Performed by: NURSE PRACTITIONER

## 2021-01-04 PROCEDURE — 99213 OFFICE O/P EST LOW 20 MIN: CPT | Performed by: NURSE PRACTITIONER

## 2021-01-04 PROCEDURE — G8420 CALC BMI NORM PARAMETERS: HCPCS | Performed by: NURSE PRACTITIONER

## 2021-01-04 PROCEDURE — 4040F PNEUMOC VAC/ADMIN/RCVD: CPT | Performed by: NURSE PRACTITIONER

## 2021-01-04 PROCEDURE — 81003 URINALYSIS AUTO W/O SCOPE: CPT | Performed by: NURSE PRACTITIONER

## 2021-01-04 PROCEDURE — G8484 FLU IMMUNIZE NO ADMIN: HCPCS | Performed by: NURSE PRACTITIONER

## 2021-01-04 PROCEDURE — 1123F ACP DISCUSS/DSCN MKR DOCD: CPT | Performed by: NURSE PRACTITIONER

## 2021-01-04 PROCEDURE — 1036F TOBACCO NON-USER: CPT | Performed by: NURSE PRACTITIONER

## 2021-01-04 RX ORDER — NITROFURANTOIN 25; 75 MG/1; MG/1
100 CAPSULE ORAL 2 TIMES DAILY
Qty: 10 CAPSULE | Refills: 0 | Status: SHIPPED | OUTPATIENT
Start: 2021-01-04 | End: 2021-01-12 | Stop reason: SDUPTHER

## 2021-01-04 ASSESSMENT — ENCOUNTER SYMPTOMS
NAUSEA: 0
VOMITING: 0
ABDOMINAL PAIN: 0
DIARRHEA: 0

## 2021-01-04 ASSESSMENT — PATIENT HEALTH QUESTIONNAIRE - PHQ9
2. FEELING DOWN, DEPRESSED OR HOPELESS: 0
SUM OF ALL RESPONSES TO PHQ QUESTIONS 1-9: 0

## 2021-01-04 NOTE — PROGRESS NOTES
Subjective  Antonina Benavides, 80 y.o. female presents today with:  Chief Complaint   Patient presents with    Hip Pain     pt states she feels like feeling on his left hip goes away at night x 2 weeks. pt states she uses bathroom alot at night almost every hour for the past several weeks. pt states no pain just numbness. HPI  Patient is here for left hip numbness at night. Left hip hurts all the time during the day for at least 2 weeks. Also having urgency and frequency at night almost every hour. No dysuria. Denies fever, chills sweats. Denies nausea/vomiting. Denies incontinence. Review of Systems   Constitutional: Positive for fatigue. Negative for chills, diaphoresis and fever. Gastrointestinal: Negative for abdominal pain, diarrhea, nausea and vomiting. Genitourinary: Positive for frequency, pelvic pain (left hip) and urgency. Negative for dysuria. Objective    Vitals:    01/04/21 1110   BP: 130/74   Pulse: 76   Temp: 98.1 °F (36.7 °C)   SpO2: 99%   Weight: 145 lb (65.8 kg)   Height: 5' 8\" (1.727 m)       Physical Exam  Vitals signs reviewed. Constitutional:       General: She is not in acute distress. Appearance: She is well-developed. She is not ill-appearing or diaphoretic. HENT:      Head: Normocephalic and atraumatic. Right Ear: External ear normal.      Left Ear: External ear normal.   Neck:      Musculoskeletal: Normal range of motion. Pulmonary:      Effort: Pulmonary effort is normal. No respiratory distress. Abdominal:      Tenderness: There is no abdominal tenderness. There is no right CVA tenderness or left CVA tenderness. Skin:     General: Skin is warm and dry. Neurological:      Mental Status: She is alert.    Psychiatric:         Behavior: Behavior normal.       POC Testing Today:  Results for POC orders placed in visit on 01/04/21   POCT Urinalysis No Micro (Auto)   Result Value Ref Range    Color, UA yellow     Clarity, UA clear     Glucose, UA POC neg     Bilirubin, UA neg     Ketones, UA neg     Spec Grav, UA 1.010     Blood, UA POC pos     pH, UA 6.0     Protein, UA POC neg     Urobilinogen, UA neg     Leukocytes, UA 2     Nitrite, UA pos        Assessment & Plan    Diagnosis Orders   1. Frequent urination at night  POCT Urinalysis No Micro (Auto)    Culture, Urine   2. Other microscopic hematuria  Culture, Urine   3. Urinary tract infection with hematuria, site unspecified  nitrofurantoin, macrocrystal-monohydrate, (MACROBID) 100 MG capsule   4. Arthralgia of hip, left  XR HIP LEFT (2-3 VIEWS)   5. Numbness of left lower extremity  XR HIP LEFT (2-3 VIEWS)       Orders Placed This Encounter   Procedures    Culture, Urine     Standing Status:   Future     Standing Expiration Date:   1/4/2022     Order Specific Question:   Specify (ex-cath, midstream, cysto, etc)? Answer:   midstream    XR HIP LEFT (2-3 VIEWS)     Standing Status:   Future     Standing Expiration Date:   1/4/2022     Order Specific Question:   Reason for exam:     Answer:   acute numbness at night, pain during day of left hip    POCT Urinalysis No Micro (Auto)     Treat UTI. XR ordered. Will treat based on results. UTI Instructions: Complete the full course of antibiotics as ordered. Take each dose with a small snack or meal to lessen potential GI upset. To prevent antibiotic resistance, please take medication as ordered and for the full duration even if you start to feel better. Consider intake of yogurt or probiotic during antibiotic use and for a few days after to help reduce the risk of developing a secondary infection. Separate the yogurt and antibiotic by at least 2 hours. Avoid alcohol while taking antibiotics. Drink plenty of water to keep your urinary system flushed. Urine specimen sent for culture and sensitivity. I will f/u with you when the results are available from the lab and may adjust your antibiotic if needed.   If you do not feel that your symptoms have resolved after completing the ordered course of antibiotics, f/u here or with your PCP for a repeat UA. Return if symptoms worsen or fail to improve, for follow up with your PCP. Side effects and adverse effects of any medication prescribed today, as well as treatment plan/rationale, follow-up care, and result expectations have been discussed with the patient. Expresses understanding and desires to proceed with treatment plan. Discussed signs and symptoms which require immediate follow-up in ED/call to 911. Understanding verbalized. I have reviewed and updated the electronic medical record. As always, if symptoms worsen, go directly to ED.     Renea Bolton, APRN - CNP

## 2021-01-04 NOTE — PATIENT INSTRUCTIONS
UTI Instructions: Complete the full course of antibiotics as ordered. Take each dose with a small snack or meal to lessen potential GI upset. To prevent antibiotic resistance, please take medication as ordered and for the full duration even if you start to feel better. Consider intake of yogurt or probiotic during antibiotic use and for a few days after to help reduce the risk of developing a secondary infection. Separate the yogurt and antibiotic by at least 2 hours. Avoid alcohol while taking antibiotics. Drink plenty of water to keep your urinary system flushed. Urine specimen sent for culture and sensitivity. I will f/u with you when the results are available from the lab and may adjust your antibiotic if needed. If you do not feel that your symptoms have resolved after completing the ordered course of antibiotics, f/u here or with your PCP for a repeat UA. Patient Education        Hip Pain: Care Instructions  Your Care Instructions     Hip pain may be caused by many things, including overuse, a fall, or a twisting movement. Another cause of hip pain is arthritis. Your pain may increase when you stand up, walk, or squat. The pain may come and go or may be constant. Home treatment can help relieve hip pain, swelling, and stiffness. If your pain is ongoing, you may need more tests and treatment. Follow-up care is a key part of your treatment and safety. Be sure to make and go to all appointments, and call your doctor if you are having problems. It's also a good idea to know your test results and keep a list of the medicines you take. How can you care for yourself at home? · Take pain medicines exactly as directed. ? If the doctor gave you a prescription medicine for pain, take it as prescribed. ? If you are not taking a prescription pain medicine, ask your doctor if you can take an over-the-counter medicine. · Rest and protect your hip.  Take a break from any activity, including standing or walking, that may cause pain. · Put ice or a cold pack against your hip for 10 to 20 minutes at a time. Try to do this every 1 to 2 hours for the next 3 days (when you are awake) or until the swelling goes down. Put a thin cloth between the ice and your skin. · Sleep on your healthy side with a pillow between your knees, or sleep on your back with pillows under your knees. · If there is no swelling, you can put moist heat, a heating pad, or a warm cloth on your hip. Do gentle stretching exercises to help keep your hip flexible. · Learn how to prevent falls. Have your vision and hearing checked regularly. Wear slippers or shoes with a nonskid sole. · Stay at a healthy weight. · Wear comfortable shoes. When should you call for help? Call 911 anytime you think you may need emergency care. For example, call if:    · You have sudden chest pain and shortness of breath, or you cough up blood.     · You are not able to stand or walk or bear weight.     · Your buttocks, legs, or feet feel numb or tingly.     · Your leg or foot is cool or pale or changes color.     · You have severe pain. Call your doctor now or seek immediate medical care if:    · You have signs of infection, such as:  ? Increased pain, swelling, warmth, or redness in the hip area. ? Red streaks leading from the hip area. ? Pus draining from the hip area. ? A fever.     · You have signs of a blood clot, such as:  ? Pain in your calf, back of the knee, thigh, or groin. ? Redness and swelling in your leg or groin.     · You are not able to bend, straighten, or move your leg normally.     · You have trouble urinating or having bowel movements. Watch closely for changes in your health, and be sure to contact your doctor if:    · You do not get better as expected. Where can you learn more? Go to https://jovita.[employer]. org and sign in to your [employer] account.  Enter O020 in the [employer] box to learn more about \"Hip

## 2021-01-07 LAB
ORGANISM: ABNORMAL
URINE CULTURE, ROUTINE: ABNORMAL

## 2021-01-12 ENCOUNTER — OFFICE VISIT (OUTPATIENT)
Dept: FAMILY MEDICINE CLINIC | Age: 86
End: 2021-01-12
Payer: MEDICARE

## 2021-01-12 VITALS
SYSTOLIC BLOOD PRESSURE: 122 MMHG | WEIGHT: 158 LBS | DIASTOLIC BLOOD PRESSURE: 70 MMHG | BODY MASS INDEX: 23.95 KG/M2 | HEIGHT: 68 IN | OXYGEN SATURATION: 99 % | HEART RATE: 78 BPM

## 2021-01-12 DIAGNOSIS — N39.0 URINARY TRACT INFECTION WITH HEMATURIA, SITE UNSPECIFIED: ICD-10-CM

## 2021-01-12 DIAGNOSIS — M16.12 OSTEOARTHRITIS OF LEFT HIP, UNSPECIFIED OSTEOARTHRITIS TYPE: ICD-10-CM

## 2021-01-12 DIAGNOSIS — R31.9 URINARY TRACT INFECTION WITH HEMATURIA, SITE UNSPECIFIED: Primary | ICD-10-CM

## 2021-01-12 DIAGNOSIS — N39.0 URINARY TRACT INFECTION WITH HEMATURIA, SITE UNSPECIFIED: Primary | ICD-10-CM

## 2021-01-12 DIAGNOSIS — R31.9 URINARY TRACT INFECTION WITH HEMATURIA, SITE UNSPECIFIED: ICD-10-CM

## 2021-01-12 LAB
BILIRUBIN, POC: NORMAL
BLOOD URINE, POC: NORMAL
CLARITY, POC: NORMAL
COLOR, POC: NORMAL
GLUCOSE URINE, POC: NORMAL
KETONES, POC: NORMAL
LEUKOCYTE EST, POC: NORMAL
NITRITE, POC: NORMAL
PH, POC: 7
PROTEIN, POC: NORMAL
SPECIFIC GRAVITY, POC: 1
UROBILINOGEN, POC: 0.2

## 2021-01-12 PROCEDURE — 81002 URINALYSIS NONAUTO W/O SCOPE: CPT | Performed by: NURSE PRACTITIONER

## 2021-01-12 PROCEDURE — 1123F ACP DISCUSS/DSCN MKR DOCD: CPT | Performed by: NURSE PRACTITIONER

## 2021-01-12 PROCEDURE — G8399 PT W/DXA RESULTS DOCUMENT: HCPCS | Performed by: NURSE PRACTITIONER

## 2021-01-12 PROCEDURE — 99214 OFFICE O/P EST MOD 30 MIN: CPT | Performed by: NURSE PRACTITIONER

## 2021-01-12 PROCEDURE — 4040F PNEUMOC VAC/ADMIN/RCVD: CPT | Performed by: NURSE PRACTITIONER

## 2021-01-12 PROCEDURE — G8484 FLU IMMUNIZE NO ADMIN: HCPCS | Performed by: NURSE PRACTITIONER

## 2021-01-12 PROCEDURE — G8420 CALC BMI NORM PARAMETERS: HCPCS | Performed by: NURSE PRACTITIONER

## 2021-01-12 PROCEDURE — G8427 DOCREV CUR MEDS BY ELIG CLIN: HCPCS | Performed by: NURSE PRACTITIONER

## 2021-01-12 PROCEDURE — 1090F PRES/ABSN URINE INCON ASSESS: CPT | Performed by: NURSE PRACTITIONER

## 2021-01-12 PROCEDURE — 1036F TOBACCO NON-USER: CPT | Performed by: NURSE PRACTITIONER

## 2021-01-12 RX ORDER — NITROFURANTOIN 25; 75 MG/1; MG/1
100 CAPSULE ORAL 2 TIMES DAILY
Qty: 14 CAPSULE | Refills: 0 | Status: SHIPPED | OUTPATIENT
Start: 2021-01-12 | End: 2021-01-19 | Stop reason: ALTCHOICE

## 2021-01-12 RX ORDER — MELOXICAM 7.5 MG/1
7.5 TABLET ORAL DAILY
Qty: 30 TABLET | Refills: 1 | Status: SHIPPED | OUTPATIENT
Start: 2021-01-12 | End: 2022-04-15

## 2021-01-12 ASSESSMENT — ENCOUNTER SYMPTOMS
COUGH: 0
SHORTNESS OF BREATH: 0
CHEST TIGHTNESS: 0
PHOTOPHOBIA: 0

## 2021-01-12 NOTE — PROGRESS NOTES
Subjective  Chief Complaint   Patient presents with    Hip Pain     states that she is having LT hip pain and has been using the voltaren gel and it is not working.  Urinary Tract Infection       HPI    Pt here for RCC f/u. Was seen in [facility] for hip pain and UTI. Urine culture confirmed UTI which was treated with 5 days worth of macrobid. Still having urinary urgency. Left hip pain-had xray which showed:    \"  Narrative   COMPARISON: No prior           HISTORY:   M25.552 Arthralgia of hip, left ICD10       PATIENT NAME: DAVID MENENDEZ:               TECHNIQUE: XR HIP LEFT (2-3 VIEWS)       FINDINGS:   Mild degenerative changes are seen and sacroiliac joints. The pelvic ring is intact. Joint space narrowing is seen bilaterally in the hips. Acetabular sclerosis is also seen. Spurring is seen on the superior and inferior aspects of the femoral heads    bilaterally. Spurring is also seen in the area of the greater tuberosities of both hips.       Impression   Severe degenerative changes are seen bilaterally without evidence for acute fracture.         Was prescribed diclofenac cream, has been using without relief.      Past Medical History:   Diagnosis Date    Actinic keratoses     Arthritis     Dermatitis     Encounter for annual health examination 12/19/2011    Endometrial cancer [facility])     s/p hyst in 2009 - was told clear by Dr. 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    Combined form of senile cataract 08/20/2015    Glaucoma suspect 08/20/2015    Hyperopic astigmatism 08/01/2014    Vitamin D deficiency     HTN (hypertension)     Hyperlipidemia     Sciatica      Past Surgical History:   Procedure Laterality Date    CATARACT REMOVAL WITH IMPLANT      HYSTERECTOMY  9/2009    post-menopausal bleeding     Family History   Problem Relation Age of Onset    Coronary Art Dis Father     Other Father         glaucoma    Coronary Art Dis Brother      Social History     Socioeconomic History    Marital status:      Spouse name: None    Number of children: None    Years of education: None    Highest education level: None   Occupational History    None   Social Needs    Financial resource strain: None    Food insecurity     Worry: None     Inability: None    Transportation needs     Medical: None     Non-medical: None   Tobacco Use    Smoking status: Never Smoker    Smokeless tobacco: Never Used   Substance and Sexual Activity    Alcohol use: No    Drug use: No    Sexual activity: None   Lifestyle    Physical activity     Days per week: None     Minutes per session: None    Stress: None   Relationships    Social connections     Talks on phone: None     Gets together: None     Attends Jain service: None     Active member of club or organization: None     Attends meetings of clubs or organizations: None     Relationship status: None    Intimate partner violence     Fear of current or ex partner: None     Emotionally abused: None     Physically abused: None     Forced sexual activity: None   Other Topics Concern    None   Social History Narrative    None     Current Outpatient Medications on File Prior to Visit   Medication Sig Dispense Refill    diclofenac sodium (VOLTAREN) 1 % GEL Apply 2 g topically 2 times daily for 21 days 100 g 0    DENTAGEL 1.1 % GEL USE TWICE PER DAY AS DIRECTED ON TUBE LABEL      mometasone (ELOCON) 0.1 % cream APPLY TOPICALLY EVERY DAY  15 g 0    donepezil (ARICEPT) 5 MG tablet Take 1 tablet by mouth stridor. No wheezing, rhonchi or rales. Chest:      Chest wall: No tenderness. Musculoskeletal: Normal range of motion. Right lower leg: No edema. Left lower leg: No edema. Lymphadenopathy:      Cervical: No cervical adenopathy. Skin:     General: Skin is warm and dry. Capillary Refill: Capillary refill takes less than 2 seconds. Coloration: Skin is not jaundiced or pale. Findings: No bruising, erythema, lesion or rash. Neurological:      General: No focal deficit present. Mental Status: She is alert and oriented to person, place, and time. Mental status is at baseline. Cranial Nerves: No cranial nerve deficit. Coordination: Coordination normal.      Gait: Gait normal.   Psychiatric:         Mood and Affect: Mood normal.         Behavior: Behavior normal.         Thought Content: Thought content normal.         Judgment: Judgment normal.         Assessment& Plan     Diagnosis Orders   1. Urinary tract infection with hematuria, site unspecified  POCT Urinalysis no Micro    nitrofurantoin, macrocrystal-monohydrate, (MACROBID) 100 MG capsule    Culture, Urine   2. Osteoarthritis of left hip, unspecified osteoarthritis type  meloxicam (MOBIC) 7.5 MG tablet    [address]     Will send urine for culture. Drink plenty of fluids. Pyridium PRN for symptoms. Antibiotic as ordered. F/u if no improvement. Mobic daily with food. No other NSAIDS. Referral to ortho as ordered. F/u in 1 week for recheck urine. Side effects, adverse effects of the medication prescribed today, as well as treatment plan/ rationale and result expectations have been discussed with the patient who expresses understanding and desires to proceed. Close follow up to evaluate treatment results and for coordination of care. I have reviewed the patient's medical history in detail and updated the computerized patient record.     As always, patient is

## 2021-01-14 LAB — URINE CULTURE, ROUTINE: NORMAL

## 2021-01-19 ENCOUNTER — OFFICE VISIT (OUTPATIENT)
Dept: FAMILY MEDICINE CLINIC | Age: 86
End: 2021-01-19
Payer: MEDICARE

## 2021-01-19 VITALS
OXYGEN SATURATION: 99 % | TEMPERATURE: 97 F | HEART RATE: 72 BPM | WEIGHT: 154 LBS | BODY MASS INDEX: 23.34 KG/M2 | HEIGHT: 68 IN | DIASTOLIC BLOOD PRESSURE: 84 MMHG | SYSTOLIC BLOOD PRESSURE: 144 MMHG

## 2021-01-19 DIAGNOSIS — R31.9 URINARY TRACT INFECTION WITH HEMATURIA, SITE UNSPECIFIED: ICD-10-CM

## 2021-01-19 DIAGNOSIS — I10 ESSENTIAL HYPERTENSION: ICD-10-CM

## 2021-01-19 DIAGNOSIS — R63.4 WEIGHT LOSS: ICD-10-CM

## 2021-01-19 DIAGNOSIS — E55.9 VITAMIN D DEFICIENCY: ICD-10-CM

## 2021-01-19 DIAGNOSIS — N39.0 URINARY TRACT INFECTION WITH HEMATURIA, SITE UNSPECIFIED: ICD-10-CM

## 2021-01-19 DIAGNOSIS — M16.12 OSTEOARTHRITIS OF LEFT HIP, UNSPECIFIED OSTEOARTHRITIS TYPE: ICD-10-CM

## 2021-01-19 DIAGNOSIS — R63.4 WEIGHT LOSS: Primary | ICD-10-CM

## 2021-01-19 LAB
ALBUMIN SERPL-MCNC: 4.1 G/DL (ref 3.5–4.6)
ALP BLD-CCNC: 80 U/L (ref 40–130)
ALT SERPL-CCNC: 9 U/L (ref 0–33)
ANION GAP SERPL CALCULATED.3IONS-SCNC: 11 MEQ/L (ref 9–15)
AST SERPL-CCNC: 16 U/L (ref 0–35)
BILIRUB SERPL-MCNC: 0.4 MG/DL (ref 0.2–0.7)
BILIRUBIN, POC: NORMAL
BLOOD URINE, POC: NORMAL
BUN BLDV-MCNC: 16 MG/DL (ref 8–23)
CALCIUM SERPL-MCNC: 9.4 MG/DL (ref 8.5–9.9)
CHLORIDE BLD-SCNC: 98 MEQ/L (ref 95–107)
CLARITY, POC: NORMAL
CO2: 25 MEQ/L (ref 20–31)
COLOR, POC: NORMAL
CREAT SERPL-MCNC: 0.73 MG/DL (ref 0.5–0.9)
GFR AFRICAN AMERICAN: >60
GFR NON-AFRICAN AMERICAN: >60
GLOBULIN: 2.7 G/DL (ref 2.3–3.5)
GLUCOSE BLD-MCNC: 92 MG/DL (ref 70–99)
GLUCOSE URINE, POC: NORMAL
KETONES, POC: NORMAL
LEUKOCYTE EST, POC: NORMAL
NITRITE, POC: NORMAL
PH, POC: 7
POTASSIUM SERPL-SCNC: 4 MEQ/L (ref 3.4–4.9)
PROTEIN, POC: 7
SODIUM BLD-SCNC: 134 MEQ/L (ref 135–144)
SPECIFIC GRAVITY, POC: 1.01
TOTAL PROTEIN: 6.8 G/DL (ref 6.3–8)
TSH REFLEX: 0.8 UIU/ML (ref 0.44–3.86)
UROBILINOGEN, POC: 0.2
VITAMIN D 25-HYDROXY: 17.2 NG/ML (ref 30–100)

## 2021-01-19 PROCEDURE — 1090F PRES/ABSN URINE INCON ASSESS: CPT | Performed by: NURSE PRACTITIONER

## 2021-01-19 PROCEDURE — 99214 OFFICE O/P EST MOD 30 MIN: CPT | Performed by: NURSE PRACTITIONER

## 2021-01-19 PROCEDURE — G8484 FLU IMMUNIZE NO ADMIN: HCPCS | Performed by: NURSE PRACTITIONER

## 2021-01-19 PROCEDURE — 4040F PNEUMOC VAC/ADMIN/RCVD: CPT | Performed by: NURSE PRACTITIONER

## 2021-01-19 PROCEDURE — 81002 URINALYSIS NONAUTO W/O SCOPE: CPT | Performed by: NURSE PRACTITIONER

## 2021-01-19 PROCEDURE — 1036F TOBACCO NON-USER: CPT | Performed by: NURSE PRACTITIONER

## 2021-01-19 PROCEDURE — 1123F ACP DISCUSS/DSCN MKR DOCD: CPT | Performed by: NURSE PRACTITIONER

## 2021-01-19 PROCEDURE — G8420 CALC BMI NORM PARAMETERS: HCPCS | Performed by: NURSE PRACTITIONER

## 2021-01-19 PROCEDURE — G8427 DOCREV CUR MEDS BY ELIG CLIN: HCPCS | Performed by: NURSE PRACTITIONER

## 2021-01-19 PROCEDURE — G8399 PT W/DXA RESULTS DOCUMENT: HCPCS | Performed by: NURSE PRACTITIONER

## 2021-01-19 SDOH — ECONOMIC STABILITY: FOOD INSECURITY: WITHIN THE PAST 12 MONTHS, THE FOOD YOU BOUGHT JUST DIDN'T LAST AND YOU DIDN'T HAVE MONEY TO GET MORE.: NEVER TRUE

## 2021-01-19 SDOH — ECONOMIC STABILITY: FOOD INSECURITY: WITHIN THE PAST 12 MONTHS, YOU WORRIED THAT YOUR FOOD WOULD RUN OUT BEFORE YOU GOT MONEY TO BUY MORE.: NEVER TRUE

## 2021-01-19 ASSESSMENT — ENCOUNTER SYMPTOMS
BACK PAIN: 0
COUGH: 0
CHEST TIGHTNESS: 0
PHOTOPHOBIA: 0
ABDOMINAL PAIN: 0
SHORTNESS OF BREATH: 0

## 2021-01-19 NOTE — PROGRESS NOTES
Subjective  Chief Complaint   Patient presents with    Check-Up     1 week follow up UTI, states that she is feeling better. HPI    F/u on UTI. Feeling much better after being treated with ATB. NO further urinary symptoms. Hip pain is much better since starting meloxicam. Scheduled to see ortho 1/25/21. Pt with continued weight loss, reports she is eating well. 3 meals per day and snacks. Does report being very busy with caring for her .     Past Medical History:   Diagnosis Date    Actinic keratoses     Arthritis     Dermatitis     Encounter for annual health examination 12/19/2011    Endometrial cancer [facility])     s/p hyst in 2009 - was told clear by Dr. 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    Combined form of senile cataract 08/20/2015    Glaucoma suspect 08/20/2015    Hyperopic astigmatism 08/01/2014    Vitamin D deficiency     HTN (hypertension)     Hyperlipidemia     Sciatica      Past Surgical History:   Procedure Laterality Date    CATARACT REMOVAL WITH IMPLANT      HYSTERECTOMY  9/2009    post-menopausal bleeding     Family History   Problem Relation Age of Onset    Coronary Art Dis Father     Other Father         glaucoma    Coronary Art Dis Brother      Social History     Socioeconomic History    Marital status:      Spouse name: None    Number of children: None    Years of education: None    Highest education level: None   Occupational History    None   Social Needs    Financial resource strain: Not hard at all    Food insecurity     Worry: Never true     Inability: Never true    Transportation needs     Medical: No     Non-medical: No   Tobacco Use    Smoking status: Never Smoker    Smokeless tobacco: Never Used   Substance and Sexual Activity    Alcohol use: No    Drug use: No    Sexual activity: None   Lifestyle    Physical activity     Days per week: None     Minutes per session: None    Stress: None   Relationships    Social connections     Talks on phone: None     Gets together: None     Attends Temple service: None     Active member of club or organization: None     Attends meetings of clubs or organizations: None     Relationship status: None    Intimate partner violence     Fear of current or ex partner: None     Emotionally abused: None     Physically abused: None     Forced sexual activity: None   Other Topics Concern    None   Social History Narrative    None     Current Outpatient Medications on File Prior to Visit   Medication Sig Dispense Refill    meloxicam (MOBIC) 7.5 MG tablet Take 1 tablet by mouth daily 30 tablet 1    diclofenac sodium (VOLTAREN) 1 % GEL Apply 2 g topically 2 times daily for 21 days 100 g 0    DENTAGEL 1.1 % GEL USE TWICE PER DAY AS DIRECTED ON TUBE LABEL      donepezil (ARICEPT) 5 MG tablet Take 1 tablet by mouth nightly 90 tablet 1    atorvastatin (LIPITOR) 20 MG tablet Take 1 tablet by mouth nightly 90 tablet 1    losartan (COZAAR) 25 MG tablet Take 1 tablet by mouth daily 90 tablet 1    Cholecalciferol (VITAMIN D) 2000 units CAPS capsule Take by mouth daily      aspirin 81 MG tablet Take 81 mg by mouth daily      mometasone (ELOCON) 0.1 % cream APPLY TOPICALLY EVERY DAY  (Patient not taking: Reported on 1/19/2021) 15 g 0     No current facility-administered medications on file prior to visit.       Allergies   Allergen Reactions    Bactrim [Sulfamethoxazole-Trimethoprim] Hives    Benazepril Other (See Comments)     cough    Codeine     Sulfa Antibiotics        Review of Systems   Constitutional: Positive for unexpected weight change. Negative for chills, diaphoresis, fatigue and fever. HENT: Negative for congestion and ear pain. Eyes: Negative for photophobia and visual disturbance. Respiratory: Negative for cough, chest tightness and shortness of breath. Cardiovascular: Negative for chest pain, palpitations and leg swelling. Gastrointestinal: Negative for abdominal pain. Genitourinary: Negative for dysuria, frequency and urgency. Musculoskeletal: Negative for arthralgias and back pain. Neurological: Negative for dizziness and headaches. Psychiatric/Behavioral: Negative for dysphoric mood. The patient is not nervous/anxious. Objective  Vitals:    01/19/21 1135 01/19/21 1139   BP: (!) 144/82 (!) 144/84   Site: Left Upper Arm    Position: Sitting    Cuff Size: Medium Adult    Pulse: 72    Temp: 97 °F (36.1 °C)    TempSrc: Infrared    SpO2: 99%    Weight: 154 lb (69.9 kg)    Height: 5' 8\" (1.727 m)      Physical Exam  Constitutional:       General: She is not in acute distress. Appearance: Normal appearance. She is normal weight. She is not ill-appearing, toxic-appearing or diaphoretic. HENT:      Head: Normocephalic and atraumatic. Right Ear: External ear normal.      Left Ear: External ear normal.   Neck:      Musculoskeletal: Normal range of motion and neck supple. No muscular tenderness. Cardiovascular:      Rate and Rhythm: Normal rate and regular rhythm. Pulses: Normal pulses. Heart sounds: Murmur present. Pulmonary:      Effort: Pulmonary effort is normal. No respiratory distress. Breath sounds: Normal breath sounds. No stridor. No wheezing, rhonchi or rales. Chest:      Chest wall: No tenderness. Musculoskeletal: Normal range of motion. Right lower leg: No edema. Left lower leg: No edema. Lymphadenopathy:      Cervical: No cervical adenopathy.    Skin:     General: Skin is warm and dry. Capillary Refill: Capillary refill takes less than 2 seconds. Coloration: Skin is not jaundiced or pale. Findings: No bruising, erythema, lesion or rash. Neurological:      General: No focal deficit present. Mental Status: She is alert and oriented to person, place, and time. Mental status is at baseline. Cranial Nerves: No cranial nerve deficit. Coordination: Coordination normal.      Gait: Gait normal.   Psychiatric:         Mood and Affect: Mood normal.         Behavior: Behavior normal.         Thought Content: Thought content normal.         Judgment: Judgment normal.         Assessment& Plan     Diagnosis Orders   1. Weight loss  TSH with Reflex   2. Vitamin D deficiency  Vitamin D 25 Hydroxy   3. Osteoarthritis of left hip, unspecified osteoarthritis type     4. Urinary tract infection with hematuria, site unspecified  Comprehensive Metabolic Panel    Culture, Urine    POCT Urinalysis no Micro   5. Essential hypertension  Comprehensive Metabolic Panel     Check labs as ordered. Send urine for culture. Will see ortho. Encouraged boost, carnation instant breakfast or ensure daily. F/u in 4 weeks to recheck weight. Side effects, adverse effects of the medication prescribed today, as well as treatment plan/ rationale and result expectations have been discussed with the patient who expresses understanding and desires to proceed. Close follow up to evaluate treatment results and for coordination of care. I have reviewed the patient's medical history in detail and updated the computerized patient record. As always, patient is advised that if symptoms worsen in any way they will proceed to the nearest emergency room. Orders Placed This Encounter   Procedures    Culture, Urine     Standing Status:   Future     Number of Occurrences:   1     Standing Expiration Date:   1/19/2022     Order Specific Question:   Specify (ex-cath, midstream, cysto, etc)? Answer:   midstream    TSH with Reflex     Standing Status:   Future     Number of Occurrences:   1     Standing Expiration Date:   1/19/2022    Comprehensive Metabolic Panel     Standing Status:   Future     Number of Occurrences:   1     Standing Expiration Date:   1/19/2022    Vitamin D 25 Hydroxy     Standing Status:   Future     Number of Occurrences:   1     Standing Expiration Date:   1/19/2022    POCT Urinalysis no Micro       No orders of the defined types were placed in this encounter. Return in about 4 weeks (around 2/16/2021) for recheck weight.     Jose Elias Dickson, APRN - CNP

## 2021-01-21 DIAGNOSIS — R31.9 URINARY TRACT INFECTION WITH HEMATURIA, SITE UNSPECIFIED: ICD-10-CM

## 2021-01-21 DIAGNOSIS — E55.9 VITAMIN D DEFICIENCY: Primary | ICD-10-CM

## 2021-01-21 DIAGNOSIS — N39.0 URINARY TRACT INFECTION WITH HEMATURIA, SITE UNSPECIFIED: ICD-10-CM

## 2021-01-21 LAB — URINE CULTURE, ROUTINE: NORMAL

## 2021-01-21 RX ORDER — ERGOCALCIFEROL 1.25 MG/1
50000 CAPSULE ORAL WEEKLY
Qty: 12 CAPSULE | Refills: 1 | Status: SHIPPED | OUTPATIENT
Start: 2021-01-21 | End: 2022-04-15

## 2021-01-22 DIAGNOSIS — N39.0 URINARY TRACT INFECTION WITH HEMATURIA, SITE UNSPECIFIED: ICD-10-CM

## 2021-01-22 DIAGNOSIS — R31.9 URINARY TRACT INFECTION WITH HEMATURIA, SITE UNSPECIFIED: ICD-10-CM

## 2021-01-22 LAB
BACTERIA: NEGATIVE /HPF
BILIRUBIN URINE: NEGATIVE
BLOOD, URINE: ABNORMAL
CLARITY: ABNORMAL
COLOR: YELLOW
EPITHELIAL CELLS, UA: ABNORMAL /HPF (ref 0–5)
GLUCOSE URINE: NEGATIVE MG/DL
HYALINE CASTS: ABNORMAL /HPF (ref 0–5)
KETONES, URINE: NEGATIVE MG/DL
LEUKOCYTE ESTERASE, URINE: ABNORMAL
NITRITE, URINE: NEGATIVE
PH UA: 6 (ref 5–9)
PROTEIN UA: NEGATIVE MG/DL
RBC UA: ABNORMAL /HPF (ref 0–5)
SPECIFIC GRAVITY UA: 1.01 (ref 1–1.03)
UROBILINOGEN, URINE: 1 E.U./DL
WBC UA: ABNORMAL /HPF (ref 0–5)

## 2021-01-24 LAB — URINE CULTURE, ROUTINE: NORMAL

## 2021-01-25 ENCOUNTER — OFFICE VISIT (OUTPATIENT)
Dept: ORTHOPEDIC SURGERY | Age: 86
End: 2021-01-25
Payer: MEDICARE

## 2021-01-25 VITALS
WEIGHT: 154 LBS | HEIGHT: 68 IN | HEART RATE: 69 BPM | BODY MASS INDEX: 23.34 KG/M2 | OXYGEN SATURATION: 98 % | TEMPERATURE: 97.1 F

## 2021-01-25 DIAGNOSIS — M16.12 ARTHRITIS OF LEFT HIP: Primary | ICD-10-CM

## 2021-01-25 PROCEDURE — G8427 DOCREV CUR MEDS BY ELIG CLIN: HCPCS | Performed by: ORTHOPAEDIC SURGERY

## 2021-01-25 PROCEDURE — 1123F ACP DISCUSS/DSCN MKR DOCD: CPT | Performed by: ORTHOPAEDIC SURGERY

## 2021-01-25 PROCEDURE — G8399 PT W/DXA RESULTS DOCUMENT: HCPCS | Performed by: ORTHOPAEDIC SURGERY

## 2021-01-25 PROCEDURE — 99203 OFFICE O/P NEW LOW 30 MIN: CPT | Performed by: ORTHOPAEDIC SURGERY

## 2021-01-25 PROCEDURE — G8484 FLU IMMUNIZE NO ADMIN: HCPCS | Performed by: ORTHOPAEDIC SURGERY

## 2021-01-25 PROCEDURE — G8420 CALC BMI NORM PARAMETERS: HCPCS | Performed by: ORTHOPAEDIC SURGERY

## 2021-01-25 PROCEDURE — 1036F TOBACCO NON-USER: CPT | Performed by: ORTHOPAEDIC SURGERY

## 2021-01-25 PROCEDURE — 4040F PNEUMOC VAC/ADMIN/RCVD: CPT | Performed by: ORTHOPAEDIC SURGERY

## 2021-01-25 PROCEDURE — 1090F PRES/ABSN URINE INCON ASSESS: CPT | Performed by: ORTHOPAEDIC SURGERY

## 2021-01-25 NOTE — PROGRESS NOTES
Subjective:      Patient ID: Chacha De La Paz is a 80 y.o. female who presents today for:  Chief Complaint   Patient presents with    New Patient     left hip pain, no injury,xray 01/04/21, she did have injections in her hip years ago. 6/10 on the pain scale       HPI  Patient presents today for evaluation regarding her left hip. Patient has been taking turmeric as well as meloxicam and has noted significant improvement in her hip pain since the combination of these 2 medications. Overall patient is here today simply because she was referred from her primary care doctor and as such is not actively pursuing any surgical intervention regarding her left hip. Patient ambulates without the need for a walker or cane. Patient maintains a fair amount of independence and helps take care of her  who has been through multiple surgeries over the last 2 years.     Past Medical History:   Diagnosis Date    Actinic keratoses     Arthritis     Dermatitis     Encounter for annual health examination 12/19/2011    Endometrial cancer [facility])     s/p hyst in 2009 - was told clear by Dr. Gladystine Gilford History of bone density study 1/11/2011    History of mammography, screening 1/11/2011    HTN (hypertension)     Hyperlipidemia     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    left lower leg    Sciatica     Vitamin D deficiency       Past Surgical History:   Procedure Laterality Date    CATARACT REMOVAL WITH IMPLANT      HYSTERECTOMY  9/2009    post-menopausal bleeding     Social History     Socioeconomic History    Marital status:      Spouse name: Not on file    Number of children: Not on file    Years of education: Not on file    Highest education level: Not on file   Occupational History    Not on file   Social Needs    Financial resource strain: Not hard at all   [address] insecurity     Worry: Never true     Inability: Never true   [employer] needs     Medical: No     Non-medical: No   Tobacco Use    Smoking status: Never Smoker    Smokeless tobacco: Never Used   Substance and Sexual Activity    Alcohol use: No    Drug use: No    Sexual activity: Not on file   Lifestyle    Physical activity     Days per week: Not on file     Minutes per session: Not on file    Stress: Not on file   Relationships    Social connections     Talks on phone: Not on file     Gets together: Not on file     Attends Pentecostalism service: Not on file     Active member of club or organization: Not on file     Attends meetings of clubs or organizations: Not on file     Relationship status: Not on file    Intimate partner violence     Fear of current or ex partner: Not on file     Emotionally abused: Not on file     Physically abused: Not on file     Forced sexual activity: Not on file   Other Topics Concern    Not on file   Social History Narrative    Not on file     Family History   Problem Relation Age of Onset    Coronary Art Dis Father     Other Father         glaucoma    Coronary Art Dis Brother      Allergies   Allergen Reactions    Bactrim [Sulfamethoxazole-Trimethoprim] Hives    Benazepril Other (See Comments)     cough    Codeine     Sulfa Antibiotics      Current Outpatient Medications on File Prior to Visit   Medication Sig Dispense Refill    vitamin D (ERGOCALCIFEROL) 1.25 MG (57244 UT) CAPS capsule Take 1 capsule by mouth once a week 12 capsule 1    meloxicam (MOBIC) 7.5 MG tablet Take 1 tablet by mouth daily 30 tablet 1    diclofenac sodium (VOLTAREN) 1 % GEL Apply 2 g topically 2 times daily for 21 days 100 g 0    DENTAGEL 1.1 % GEL USE TWICE PER DAY AS DIRECTED ON TUBE LABEL      mometasone (ELOCON) 0.1 % cream APPLY TOPICALLY EVERY DAY  15 g 0    donepezil (ARICEPT) 5 MG tablet Take 1 tablet by mouth nightly 90 tablet 1    atorvastatin (LIPITOR) 20 MG tablet Take 1 tablet by mouth nightly 90 tablet 1    losartan (COZAAR) 25 MG tablet Take 1 tablet by mouth daily 90 tablet 1    Cholecalciferol (VITAMIN D) 2000 units CAPS capsule Take by mouth daily      aspirin 81 MG tablet Take 81 mg by mouth daily       No current facility-administered medications on file prior to visit. Review of Systems   General: Denies fever, chills  Cardiovascular: Denies chest pain  Pulmonary: Denies shortness of breath  GI: Denies nausea or vomiting  Neuro: Denies numbness or tingling      Objective:   Pulse 69   Temp 97.1 °F (36.2 °C) (Temporal)   Ht 5' 8\" (1.727 m)   Wt 154 lb (69.9 kg)   LMP  (LMP Unknown)   SpO2 98%   BMI 23.42 kg/m²     Ortho Exam   General: Well-appearing individual who appears their stated age  HEENT: Normocephalic atraumatic extraocular motions are intact  Pelvis: Axial lateral compression iliac wings does not elicit any increased pain. Nontender palpation about the bilateral SI joints. Lower extremity: Painful range of motion with the left hip specifically on terminal extensive external rotation, internal rotation and hip flexion with minimal motion appreciated at the joint. Hip abductor strength appears to be intact. Significantly limited arc of motion of the right hip compared to normal.  Non tender to palpation about the greater trochanter. Neuro: Sensation intact light touch in the superficial peroneal, deep peroneal, and tibial nerve distributions. Dorsiflexion, plantarflexion and extension of the big toe are intact. No dermatomal sensory deficits compared to the contralateral side. Skin: No significant swelling or ecchymosis no open wounds or ulcers. Vascular: Foot is warm and well-perfused. Radiographs and Laboratory Studies:     Diagnostic Imaging Studies:    Previous imaging was reviewed including AP and lateral imaging of the left hip which includes AP pelvis    X-rays demonstrate advanced arthritic change of the left hip with minimal joint space preserved and mild protrusio appreciated with femoral head appear to be partially crossing the ilioischial line.   Right hip demonstrates advanced arthritic changes well with essentially ankylosed right hip joint. Laboratory Studies:   Lab Results   Component Value Date    WBC 4.9 09/24/2020    HGB 12.4 09/24/2020    HCT 36.7 (L) 09/24/2020    MCV 92.7 09/24/2020     09/24/2020     No results found for: SEDRATE  No results found for: CRP    Assessment:     Advanced arthritic change bilateral hips symptomatic more on the left than the right     Plan:     Patient currently is experiencing significant relief from her anti-inflammatory medication and does not wish to pursue arthroplasty of the hip at this time. Overall given patient's current functional status I think that is very much appropriate. I did  patient that prolonged delay in proceeding with operative invention may ultimately result in a significantly more complex hip surgery and as such would recommend sooner rather than later should symptoms progress as total hip arthroplasty would provide a significant amount of pain relief and overall mild improvement in function as well patient can be seen again on an as-needed basis or should new symptoms present themselves.       Marah Landis MD

## 2021-11-19 ENCOUNTER — TELEPHONE (OUTPATIENT)
Dept: FAMILY MEDICINE CLINIC | Age: 86
End: 2021-11-19

## 2022-01-01 ENCOUNTER — OFFICE VISIT (OUTPATIENT)
Dept: GERIATRIC MEDICINE | Age: 87
End: 2022-01-01

## 2022-01-01 DIAGNOSIS — I10 PRIMARY HYPERTENSION: ICD-10-CM

## 2022-01-01 DIAGNOSIS — G45.9 TIA (TRANSIENT ISCHEMIC ATTACK): ICD-10-CM

## 2022-01-01 DIAGNOSIS — I48.91 ATRIAL FIBRILLATION, UNSPECIFIED TYPE (HCC): Primary | ICD-10-CM

## 2022-01-01 DIAGNOSIS — R53.1 GENERAL WEAKNESS: ICD-10-CM

## 2022-03-21 ENCOUNTER — OFFICE VISIT (OUTPATIENT)
Dept: FAMILY MEDICINE CLINIC | Age: 87
End: 2022-03-21
Payer: MEDICARE

## 2022-03-21 ENCOUNTER — NURSE TRIAGE (OUTPATIENT)
Dept: OTHER | Facility: CLINIC | Age: 87
End: 2022-03-21

## 2022-03-21 DIAGNOSIS — R42 DIZZINESS: Primary | ICD-10-CM

## 2022-03-21 DIAGNOSIS — Z91.81 AT HIGH RISK FOR FALLS: ICD-10-CM

## 2022-03-21 DIAGNOSIS — E55.9 VITAMIN D DEFICIENCY, UNSPECIFIED: ICD-10-CM

## 2022-03-21 DIAGNOSIS — R53.83 FATIGUE, UNSPECIFIED TYPE: ICD-10-CM

## 2022-03-21 PROCEDURE — 1036F TOBACCO NON-USER: CPT | Performed by: NURSE PRACTITIONER

## 2022-03-21 PROCEDURE — G8420 CALC BMI NORM PARAMETERS: HCPCS | Performed by: NURSE PRACTITIONER

## 2022-03-21 PROCEDURE — 4040F PNEUMOC VAC/ADMIN/RCVD: CPT | Performed by: NURSE PRACTITIONER

## 2022-03-21 PROCEDURE — G8427 DOCREV CUR MEDS BY ELIG CLIN: HCPCS | Performed by: NURSE PRACTITIONER

## 2022-03-21 PROCEDURE — 99213 OFFICE O/P EST LOW 20 MIN: CPT | Performed by: NURSE PRACTITIONER

## 2022-03-21 PROCEDURE — 1123F ACP DISCUSS/DSCN MKR DOCD: CPT | Performed by: NURSE PRACTITIONER

## 2022-03-21 PROCEDURE — 1090F PRES/ABSN URINE INCON ASSESS: CPT | Performed by: NURSE PRACTITIONER

## 2022-03-21 PROCEDURE — G8484 FLU IMMUNIZE NO ADMIN: HCPCS | Performed by: NURSE PRACTITIONER

## 2022-03-21 SDOH — ECONOMIC STABILITY: FOOD INSECURITY: WITHIN THE PAST 12 MONTHS, YOU WORRIED THAT YOUR FOOD WOULD RUN OUT BEFORE YOU GOT MONEY TO BUY MORE.: NEVER TRUE

## 2022-03-21 SDOH — ECONOMIC STABILITY: FOOD INSECURITY: WITHIN THE PAST 12 MONTHS, THE FOOD YOU BOUGHT JUST DIDN'T LAST AND YOU DIDN'T HAVE MONEY TO GET MORE.: NEVER TRUE

## 2022-03-21 ASSESSMENT — PATIENT HEALTH QUESTIONNAIRE - PHQ9
SUM OF ALL RESPONSES TO PHQ QUESTIONS 1-9: 0
SUM OF ALL RESPONSES TO PHQ QUESTIONS 1-9: 0
1. LITTLE INTEREST OR PLEASURE IN DOING THINGS: 0
SUM OF ALL RESPONSES TO PHQ QUESTIONS 1-9: 0
SUM OF ALL RESPONSES TO PHQ QUESTIONS 1-9: 0
SUM OF ALL RESPONSES TO PHQ9 QUESTIONS 1 & 2: 0
2. FEELING DOWN, DEPRESSED OR HOPELESS: 0

## 2022-03-21 ASSESSMENT — SOCIAL DETERMINANTS OF HEALTH (SDOH): HOW HARD IS IT FOR YOU TO PAY FOR THE VERY BASICS LIKE FOOD, HOUSING, MEDICAL CARE, AND HEATING?: NOT HARD AT ALL

## 2022-03-21 NOTE — TELEPHONE ENCOUNTER
Received call from [facility] with [employer]. Subjective: Caller states \"The past few days she has been really dizzy and laying in bed. Sunday she slept most of the day and she is eating very little. When she stands up she is super dizzy. When I woke her up yesterday she was very disoriented. She has been diagnosed with Alzheimer's. My dad said she is sleeping all the time. \"     Current Symptoms: dizzy, tired, sleeping a lot    Onset: 5 days ago; worsening    Associated Symptoms: reduced activity, reduced appetite, reduced fluid intake, increased sleepiness    Pain Severity: Unable to assess    Temperature: unable to assess, patient not with caller     What has been tried: rest    LMP: NA Pregnant: NA    Recommended disposition: See in Office Today    Care advice provided, patient verbalizes understanding; denies any other questions or concerns; instructed to call back for any new or worsening symptoms. Patient/Caller agrees with recommended disposition; writer provided warm transfer to [facility] for appointment scheduling     Attention Provider: Thank you for allowing me to participate in the care of your patient. The patient was connected to triage in response to information provided to the ECC/PSC. Please do not respond through this encounter as the response is not directed to a shared pool.           Reason for Disposition   MODERATE dizziness (e.g., interferes with normal activities) (Exception: dizziness caused by heat exposure, sudden standing, or poor fluid intake)    Protocols used: DIZZINESS-ADULT-[place]

## 2022-03-21 NOTE — PROGRESS NOTES
rhinorrhea and sore throat. Respiratory: Negative for cough. Cardiovascular: Negative for chest pain and palpitations. Gastrointestinal: Negative for abdominal pain, diarrhea, nausea and vomiting. Musculoskeletal: Negative for neck pain and neck stiffness. Skin: Negative for rash. Neurological: Positive for dizziness and light-headedness. Negative for headaches. Psychiatric/Behavioral: Negative for sleep disturbance. PMH, Surgical Hx, Family Hx, and Social Hx reviewed and updated. Objective  Vitals:    03/21/22 1330   BP: 116/68   Site: Right Upper Arm   Position: Sitting   Cuff Size: Medium Adult   Pulse: 73   Temp: 97.4 °F (36.3 °C)   TempSrc: Temporal   SpO2: 100%   Height: 5' 8\" (1.727 m)     BP Readings from Last 3 Encounters:   03/21/22 116/68   01/19/21 (!) 144/84   01/12/21 122/70     Wt Readings from Last 3 Encounters:   01/25/21 154 lb (69.9 kg)   01/19/21 154 lb (69.9 kg)   01/12/21 158 lb (71.7 kg)             Physical Exam  Vitals reviewed. Constitutional:       Appearance: Normal appearance. She is not ill-appearing or toxic-appearing. HENT:      Right Ear: Tympanic membrane, ear canal and external ear normal.      Left Ear: Tympanic membrane, ear canal and external ear normal.   Eyes:      General: Lids are normal.      Extraocular Movements: Extraocular movements intact. Conjunctiva/sclera: Conjunctivae normal.      Pupils: Pupils are equal, round, and reactive to light. Cardiovascular:      Rate and Rhythm: Normal rate. Pulmonary:      Effort: Pulmonary effort is normal.   Musculoskeletal:      Cervical back: Normal range of motion. Skin:     General: Skin is warm and dry. Capillary Refill: Capillary refill takes less than 2 seconds. Coloration: Skin is not pale. Neurological:      Mental Status: She is alert and oriented to person, place, and time. Psychiatric:         Mood and Affect: Mood is depressed. Affect is flat. Assessment & Plan    Diagnosis Orders   1. Dizziness  Comprehensive Metabolic Panel    Vitamin D 25 Hydroxy    CBC with Auto Differential    Magnesium   2. Fatigue, unspecified type  Comprehensive Metabolic Panel    Vitamin D 25 Hydroxy    CBC with Auto Differential    Magnesium   3. At high risk for falls     4. Vitamin D deficiency, unspecified   Vitamin D 25 Hydroxy     Orders Placed This Encounter   Procedures    Comprehensive Metabolic Panel     Standing Status:   Future     Standing Expiration Date:   3/21/2023    Vitamin D 25 Hydroxy     Standing Status:   Future     Standing Expiration Date:   3/21/2023    CBC with Auto Differential     Standing Status:   Future     Standing Expiration Date:   3/21/2023    Magnesium     Standing Status:   Future     Standing Expiration Date:   3/21/2023     No orders of the defined types were placed in this encounter. Return for follow up with PCP. Pt to get lab work completed a couple days prior to appt with PCP        Reviewed with the patient/her daughter: current clinical status. The treatment plan/rationale and result expectations have been discussed with the patient who expressed understanding. Discussed dietary needs daily & various supplements to try. Pt will address daily medications with PCP at her visit with PCP. Close follow up to evaluate treatment results and for coordination of care. I have reviewed the patient's medical history in detail and updated the computerized patient record.         YUSUF Howard NP

## 2022-03-24 VITALS
HEART RATE: 73 BPM | OXYGEN SATURATION: 100 % | BODY MASS INDEX: 23.42 KG/M2 | TEMPERATURE: 97.4 F | SYSTOLIC BLOOD PRESSURE: 116 MMHG | DIASTOLIC BLOOD PRESSURE: 68 MMHG | HEIGHT: 68 IN

## 2022-03-24 PROBLEM — Z91.81 AT HIGH RISK FOR FALLS: Status: ACTIVE | Noted: 2022-01-01

## 2022-03-24 ASSESSMENT — ENCOUNTER SYMPTOMS
NAUSEA: 0
SORE THROAT: 0
COUGH: 0
RHINORRHEA: 0
VOMITING: 0
DIARRHEA: 0
ABDOMINAL PAIN: 0

## 2022-04-12 DIAGNOSIS — R42 DIZZINESS: ICD-10-CM

## 2022-04-12 DIAGNOSIS — E55.9 VITAMIN D DEFICIENCY, UNSPECIFIED: ICD-10-CM

## 2022-04-12 DIAGNOSIS — R53.83 FATIGUE, UNSPECIFIED TYPE: ICD-10-CM

## 2022-04-12 LAB
ALBUMIN SERPL-MCNC: 4.1 G/DL (ref 3.5–4.6)
ALP BLD-CCNC: 65 U/L (ref 40–130)
ALT SERPL-CCNC: 6 U/L (ref 0–33)
ANION GAP SERPL CALCULATED.3IONS-SCNC: 10 MEQ/L (ref 9–15)
AST SERPL-CCNC: 14 U/L (ref 0–35)
BASOPHILS ABSOLUTE: 0 K/UL (ref 0–0.2)
BASOPHILS RELATIVE PERCENT: 1 %
BILIRUB SERPL-MCNC: 0.5 MG/DL (ref 0.2–0.7)
BUN BLDV-MCNC: 15 MG/DL (ref 8–23)
CALCIUM SERPL-MCNC: 9.3 MG/DL (ref 8.5–9.9)
CHLORIDE BLD-SCNC: 98 MEQ/L (ref 95–107)
CO2: 28 MEQ/L (ref 20–31)
CREAT SERPL-MCNC: 0.8 MG/DL (ref 0.5–0.9)
EOSINOPHILS ABSOLUTE: 0 K/UL (ref 0–0.7)
EOSINOPHILS RELATIVE PERCENT: 1 %
GFR AFRICAN AMERICAN: >60
GFR NON-AFRICAN AMERICAN: >60
GLOBULIN: 2.5 G/DL (ref 2.3–3.5)
GLUCOSE BLD-MCNC: 98 MG/DL (ref 70–99)
HCT VFR BLD CALC: 37.3 % (ref 37–47)
HEMOGLOBIN: 12.4 G/DL (ref 12–16)
LYMPHOCYTES ABSOLUTE: 0.5 K/UL (ref 1–4.8)
LYMPHOCYTES RELATIVE PERCENT: 12.2 %
MAGNESIUM: 2.1 MG/DL (ref 1.7–2.4)
MCH RBC QN AUTO: 31.6 PG (ref 27–31.3)
MCHC RBC AUTO-ENTMCNC: 33.2 % (ref 33–37)
MCV RBC AUTO: 95 FL (ref 82–100)
MONOCYTES ABSOLUTE: 0.3 K/UL (ref 0.2–0.8)
MONOCYTES RELATIVE PERCENT: 7.7 %
NEUTROPHILS ABSOLUTE: 3 K/UL (ref 1.4–6.5)
NEUTROPHILS RELATIVE PERCENT: 78.1 %
PDW BLD-RTO: 14.3 % (ref 11.5–14.5)
PLATELET # BLD: 144 K/UL (ref 130–400)
POTASSIUM SERPL-SCNC: 4.2 MEQ/L (ref 3.4–4.9)
RBC # BLD: 3.92 M/UL (ref 4.2–5.4)
SODIUM BLD-SCNC: 136 MEQ/L (ref 135–144)
TOTAL PROTEIN: 6.6 G/DL (ref 6.3–8)
WBC # BLD: 3.8 K/UL (ref 4.8–10.8)

## 2022-04-13 LAB — VITAMIN D 25-HYDROXY: 23.1 NG/ML

## 2022-04-15 ENCOUNTER — OFFICE VISIT (OUTPATIENT)
Dept: FAMILY MEDICINE CLINIC | Age: 87
End: 2022-04-15
Payer: MEDICARE

## 2022-04-15 VITALS
HEIGHT: 68 IN | BODY MASS INDEX: 23.16 KG/M2 | HEART RATE: 69 BPM | SYSTOLIC BLOOD PRESSURE: 110 MMHG | DIASTOLIC BLOOD PRESSURE: 72 MMHG | TEMPERATURE: 98.3 F | WEIGHT: 152.8 LBS | OXYGEN SATURATION: 98 %

## 2022-04-15 DIAGNOSIS — R63.4 WEIGHT LOSS: ICD-10-CM

## 2022-04-15 DIAGNOSIS — I10 ESSENTIAL HYPERTENSION: ICD-10-CM

## 2022-04-15 DIAGNOSIS — R42 DIZZINESS: Primary | ICD-10-CM

## 2022-04-15 DIAGNOSIS — G31.84 MILD COGNITIVE IMPAIRMENT: ICD-10-CM

## 2022-04-15 DIAGNOSIS — E55.9 VITAMIN D DEFICIENCY, UNSPECIFIED: ICD-10-CM

## 2022-04-15 PROCEDURE — 1123F ACP DISCUSS/DSCN MKR DOCD: CPT | Performed by: NURSE PRACTITIONER

## 2022-04-15 PROCEDURE — G8427 DOCREV CUR MEDS BY ELIG CLIN: HCPCS | Performed by: NURSE PRACTITIONER

## 2022-04-15 PROCEDURE — 99214 OFFICE O/P EST MOD 30 MIN: CPT | Performed by: NURSE PRACTITIONER

## 2022-04-15 PROCEDURE — 1090F PRES/ABSN URINE INCON ASSESS: CPT | Performed by: NURSE PRACTITIONER

## 2022-04-15 PROCEDURE — 4040F PNEUMOC VAC/ADMIN/RCVD: CPT | Performed by: NURSE PRACTITIONER

## 2022-04-15 PROCEDURE — G8420 CALC BMI NORM PARAMETERS: HCPCS | Performed by: NURSE PRACTITIONER

## 2022-04-15 PROCEDURE — 1036F TOBACCO NON-USER: CPT | Performed by: NURSE PRACTITIONER

## 2022-04-15 RX ORDER — DONEPEZIL HYDROCHLORIDE 5 MG/1
5 TABLET, FILM COATED ORAL NIGHTLY
Qty: 90 TABLET | Refills: 1 | Status: SHIPPED | OUTPATIENT
Start: 2022-04-15

## 2022-04-15 ASSESSMENT — ENCOUNTER SYMPTOMS
COLOR CHANGE: 0
SHORTNESS OF BREATH: 0
BACK PAIN: 0
CONSTIPATION: 0
ABDOMINAL PAIN: 0
COUGH: 0
DIARRHEA: 0
CHEST TIGHTNESS: 0
TROUBLE SWALLOWING: 0
EYE PAIN: 0

## 2022-04-15 NOTE — PROGRESS NOTES
Subjective  Chief Complaint   Patient presents with    Dizziness     follow up [facility], states that she is doing better.  Fatigue     weakness    Other     has not been taking any meds, only MV and turmeric       HPI    F/u from [address]. Was seen at [address] for dizziness. Was found to not being taking any of her medications. Has labs which were okay other than low vitamin d. She is feeling better. Daughter is here with her today. Says between all the children they cover the meals for her and her . Take her grocery shopping. She does drive, but only minimal distances and feels she does well with that. Does not want to take her medications anymore. Weight has gone down some. Per daughter, she is eating at least 3 times per day, but maybe not as much as she used to.     Past Medical History:   Diagnosis Date    Actinic keratoses     Arthritis     Dermatitis     Encounter for annual health examination 12/19/2011    Endometrial [facility])     s/p hyst in 2009 - was told clear by Dr. 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    Glaucoma suspect 08/20/2015    Hyperopic astigmatism 08/01/2014    Vitamin D deficiency     HTN (hypertension)     Hyperlipidemia     Sciatica      Past Surgical History:   Procedure Laterality Date    CATARACT REMOVAL WITH IMPLANT      HYSTERECTOMY  9/2009    post-menopausal bleeding     Family History   Problem Relation Age of Onset    Coronary Art Dis Father     Other Father         glaucoma    Coronary Art Dis Brother      Social History     Socioeconomic History    Marital status:      Spouse name: None    Number of children: None    Years of education: None    Highest education level: None   Occupational History    None   Tobacco Use    Smoking status: Never Smoker    Smokeless tobacco: Never Used   Substance and Sexual Activity    Alcohol use: No    Drug use: No    Sexual activity: None   Other Topics Concern    None   Social History Narrative    None     Social Determinants of Health     Financial Resource Strain: Low Risk     Difficulty of Paying Living Expenses: Not hard at all   Food Insecurity: No Food Insecurity    Worried About Running Out of Food in the Last Year: Never true    Clementina of Food in the Last Year: Never true   Transportation Needs:     Lack of Transportation (Medical): Not on file    Lack of Transportation (Non-Medical):  Not on file   Physical Activity:     Days of Exercise per Week: Not on file    Minutes of Exercise per Session: Not on file   Stress:     Feeling of Stress : Not on file   Social Connections:     Frequency of Communication with Friends and Family: Not on file    Frequency of Social Gatherings with Friends and Family: Not on file    Attends [employer] Services: Not on file    Active Member of [address] [place] [address] or Organizations: Not on file    Attends Club or Organization Meetings: Not on file    Marital Status: Not on file   Intimate Partner Violence:     Fear of Current or Ex-Partner: Not on file    Emotionally Abused: Not on file    Physically Abused: Not on file    Sexually Abused: Not on file   Housing Stability:     Unable to Pay for Housing in the Last Year: Not on file    Number of [place] in the Last Year: Not on file    Unstable Housing in the Last Year: Not on file     Current Outpatient Medications on File Prior to Visit   Medication Sig Dispense Refill    Multiple Vitamin (MULTI-VITAMIN DAILY PO) Take by mouth      TURMERIC PO Take by mouth      mometasone (ELOCON) 0.1 % cream APPLY TOPICALLY EVERY DAY  (Patient not taking: Reported on 3/21/2022) 15 g 0    Cholecalciferol (VITAMIN D) 2000 units CAPS capsule Take by mouth daily (Patient not taking: Reported on 3/21/2022)      aspirin 81 MG tablet Take 81 mg by mouth daily (Patient not taking: Reported on 3/21/2022)       No current facility-administered medications on file prior to visit. Allergies   Allergen Reactions    Bactrim [Sulfamethoxazole-Trimethoprim] Hives    Benazepril Other (See Comments)     cough    Codeine     Sulfa Antibiotics        Review of Systems   Constitutional: Positive for unexpected weight change. Negative for activity change, appetite change, chills, diaphoresis, fatigue and fever. HENT: Negative for congestion, ear pain, hearing loss and trouble swallowing. Eyes: Negative for pain and visual disturbance. Respiratory: Negative for cough, chest tightness and shortness of breath. Cardiovascular: Negative for chest pain, palpitations and leg swelling. Gastrointestinal: Negative for abdominal pain, constipation and diarrhea. Endocrine: Negative for polydipsia, polyphagia and polyuria. Genitourinary: Negative for difficulty urinating and dysuria. Musculoskeletal: Negative for arthralgias and back pain. Skin: Negative for color change and rash. Neurological: Positive for dizziness. Negative for light-headedness. Psychiatric/Behavioral: Negative for dysphoric mood. The patient is not nervous/anxious. Objective  Vitals:    04/15/22 1340   BP: 110/72   Site: Left Upper Arm   Position: Sitting   Cuff Size: Medium Adult   Pulse: 69   Temp: 98.3 °F (36.8 °C)   TempSrc: Axillary   SpO2: 98%   Weight: 152 lb 12.8 oz (69.3 kg)   Height: 5' 8\" (1.727 m)     Physical Exam  Constitutional:       General: She is not in acute distress.      Appearance: Normal appearance. She is obese. She is not ill-appearing, toxic-appearing or diaphoretic. HENT:      Head: Normocephalic and atraumatic. Right Ear: External ear normal.      Left Ear: External ear normal.      Nose: Nose normal. No congestion or rhinorrhea. Eyes:      Extraocular Movements: Extraocular movements intact. Conjunctiva/sclera: Conjunctivae normal.      Pupils: Pupils are equal, round, and reactive to light. Cardiovascular:      Rate and Rhythm: Normal rate and regular rhythm. Pulses: Normal pulses. Heart sounds: Normal heart sounds. No murmur heard. Pulmonary:      Effort: Pulmonary effort is normal. No respiratory distress. Breath sounds: Normal breath sounds. No stridor. No wheezing, rhonchi or rales. Chest:      Chest wall: No tenderness. Musculoskeletal:         General: Normal range of motion. Cervical back: Normal range of motion and neck supple. No tenderness. Right lower leg: No edema. Left lower leg: No edema. Lymphadenopathy:      Cervical: No cervical adenopathy. Skin:     General: Skin is warm. Capillary Refill: Capillary refill takes less than 2 seconds. Coloration: Skin is not jaundiced. Findings: No erythema or lesion. Neurological:      General: No focal deficit present. Mental Status: She is alert and oriented to person, place, and time. Mental status is at baseline. Cranial Nerves: No cranial nerve deficit. Coordination: Coordination normal.      Gait: Gait normal.   Psychiatric:         Mood and Affect: Mood normal.         Behavior: Behavior normal.         Thought Content: Thought content normal.         Judgment: Judgment normal.         Assessment& Plan    1. Mild cognitive impairment  Continue aricept as prescribed. - donepezil (ARICEPT) 5 MG tablet; Take 1 tablet by mouth nightly  Dispense: 90 tablet; Refill: 1    2. Dizziness  Change positions slowly, hydration.      3. Vitamin D deficiency, unspecified   Discussed taking vitamin d supplement. 4. Weight loss  Boost/ensure, small frequent meals/snacks. 5. Essential hypertension  BP well controlled so okay without medication. Will discontinue lipitor per patient preference. F/u in 6 months or sooner PRN. Side effects, adverse effects of the medication prescribed today, as well as treatment plan/ rationale and result expectations have been discussed with the patient who expresses understanding and desires to proceed. Close follow up to evaluate treatment results and for coordination of care. I have reviewed the patient's medical history in detail and updated the computerized patient record. As always, patient is advised that if symptoms worsen in any way they will proceed to the nearest emergency room. No orders of the defined types were placed in this encounter. Orders Placed This Encounter   Medications    donepezil (ARICEPT) 5 MG tablet     Sig: Take 1 tablet by mouth nightly     Dispense:  90 tablet     Refill:  1       Medications Discontinued During This Encounter   Medication Reason    losartan (COZAAR) 25 MG tablet Patient Choice    meloxicam (MOBIC) 7.5 MG tablet Patient Choice    diclofenac sodium (VOLTAREN) 1 % GEL Patient Choice    atorvastatin (LIPITOR) 20 MG tablet Patient Choice    vitamin D (ERGOCALCIFEROL) 1.25 MG (24433 UT) CAPS capsule Patient Choice    donepezil (ARICEPT) 5 MG tablet REORDER       Return in about 6 months (around 10/15/2022) for dizziness, vitamin d def.     Chloé Oliver APRN - CNP

## 2022-07-08 ENCOUNTER — OFFICE VISIT (OUTPATIENT)
Dept: FAMILY MEDICINE CLINIC | Age: 87
End: 2022-07-08
Payer: MEDICARE

## 2022-07-08 VITALS
WEIGHT: 150 LBS | SYSTOLIC BLOOD PRESSURE: 104 MMHG | HEART RATE: 78 BPM | OXYGEN SATURATION: 98 % | DIASTOLIC BLOOD PRESSURE: 78 MMHG | TEMPERATURE: 98.1 F | BODY MASS INDEX: 22.81 KG/M2

## 2022-07-08 DIAGNOSIS — Z00.00 MEDICARE ANNUAL WELLNESS VISIT, SUBSEQUENT: Primary | ICD-10-CM

## 2022-07-08 PROCEDURE — G0439 PPPS, SUBSEQ VISIT: HCPCS | Performed by: NURSE PRACTITIONER

## 2022-07-08 PROCEDURE — 1123F ACP DISCUSS/DSCN MKR DOCD: CPT | Performed by: NURSE PRACTITIONER

## 2022-07-08 ASSESSMENT — PATIENT HEALTH QUESTIONNAIRE - PHQ9
7. TROUBLE CONCENTRATING ON THINGS, SUCH AS READING THE NEWSPAPER OR WATCHING TELEVISION: 0
8. MOVING OR SPEAKING SO SLOWLY THAT OTHER PEOPLE COULD HAVE NOTICED. OR THE OPPOSITE, BEING SO FIGETY OR RESTLESS THAT YOU HAVE BEEN MOVING AROUND A LOT MORE THAN USUAL: 0
SUM OF ALL RESPONSES TO PHQ9 QUESTIONS 1 & 2: 3
SUM OF ALL RESPONSES TO PHQ QUESTIONS 1-9: 5
5. POOR APPETITE OR OVEREATING: 0
9. THOUGHTS THAT YOU WOULD BE BETTER OFF DEAD, OR OF HURTING YOURSELF: 0
2. FEELING DOWN, DEPRESSED OR HOPELESS: 3
SUM OF ALL RESPONSES TO PHQ QUESTIONS 1-9: 5
1. LITTLE INTEREST OR PLEASURE IN DOING THINGS: 0
3. TROUBLE FALLING OR STAYING ASLEEP: 0
SUM OF ALL RESPONSES TO PHQ QUESTIONS 1-9: 5
6. FEELING BAD ABOUT YOURSELF - OR THAT YOU ARE A FAILURE OR HAVE LET YOURSELF OR YOUR FAMILY DOWN: 2
4. FEELING TIRED OR HAVING LITTLE ENERGY: 0
SUM OF ALL RESPONSES TO PHQ QUESTIONS 1-9: 5
10. IF YOU CHECKED OFF ANY PROBLEMS, HOW DIFFICULT HAVE THESE PROBLEMS MADE IT FOR YOU TO DO YOUR WORK, TAKE CARE OF THINGS AT HOME, OR GET ALONG WITH OTHER PEOPLE: 1

## 2022-07-08 NOTE — PROGRESS NOTES
Medicare Annual Wellness Visit    Juan Briggs is here for Medicare AWV (feeling well, over all)    Assessment & Plan   Medicare annual wellness visit, subsequent      Recommendations for Preventive Services Due: see orders and patient instructions/AVS.  Recommended screening schedule for the next 5-10 years is provided to the patient in written form: see Patient Instructions/AVS.     Return for Medicare Annual Wellness Visit in 1 year. Subjective   The following acute and/or chronic problems were also addressed today:      Patient's complete Health Risk Assessment and screening values have been reviewed and are found in Flowsheets. The following problems were reviewed today and where indicated follow up appointments were made and/or referrals ordered. Positive Risk Factor Screenings with Interventions:    Fall Risk:  Do you feel unsteady or are you worried about falling? : (!) yes  2 or more falls in past year?: (!) yes  Fall with injury in past year?: no     Fall Risk Interventions:    · Home safety tips provided  · Patient declines any further evaluation/treatment for this issue  · declines PT     Depression:  PHQ-2 Score: 3  PHQ-9 Total Score: 5    Severity:1-4 = minimal depression, 5-9 = mild depression, 10-14 = moderate depression, 15-19 = moderately severe depression, 20-27 = severe depression    Depression Interventions:  · feeling okay overall          General Health and ACP:       Advance Directives     Power of  Living Will ACP-Advance Directive ACP-Power of     Not on File Not on File Not on File Not on File      General Health Risk Interventions:  · no issues              Objective   Vitals:    07/08/22 1430   BP: 104/78   Site: Left Upper Arm   Position: Sitting   Cuff Size: Medium Adult   Pulse: 78   Temp: 98.1 °F (36.7 °C)   TempSrc: Infrared   SpO2: 98%   Weight: 150 lb (68 kg)      Body mass index is 22.81 kg/m².         General Appearance: alert and oriented to person, place and time, well developed and well- nourished, in no acute distress  Skin: warm and dry, no rash or erythema  Head: normocephalic and atraumatic  Eyes: pupils equal, round, and reactive to light, extraocular eye movements intact, conjunctivae normal  Neck: supple and non-tender without mass, no thyromegaly or thyroid nodules, no cervical lymphadenopathy  Pulmonary/Chest: clear to auscultation bilaterally- no wheezes, rales or rhonchi, normal air movement, no respiratory distress  Cardiovascular: normal rate, regular rhythm, normal S1 and S2, no murmurs, rubs, clicks, or gallops, distal pulses intact, no carotid bruits  Extremities: no cyanosis, clubbing or edema  Musculoskeletal: normal range of motion, no joint swelling, deformity or tenderness  Neurologic: reflexes normal and symmetric, no cranial nerve deficit, gait, coordination and speech normal       Allergies   Allergen Reactions    Bactrim [Sulfamethoxazole-Trimethoprim] Hives    Benazepril Other (See Comments)     cough    Codeine     Sulfa Antibiotics      Prior to Visit Medications    Medication Sig Taking?  Authorizing Provider   Multiple Vitamin (MULTI-VITAMIN DAILY PO) Take by mouth Yes Historical Provider, MD   TURMERIC PO Take by mouth Yes Historical Provider, MD   donepezil (ARICEPT) 5 MG tablet Take 1 tablet by mouth nightly Yes YUSUF Hdez CNP   mometasone (ELOCON) 0.1 % cream APPLY TOPICALLY EVERY DAY   Patient not taking: Reported on 3/21/2022  YUSUF Hdez CNP   Cholecalciferol (VITAMIN D) 2000 units CAPS capsule Take by mouth daily  Patient not taking: Reported on 3/21/2022  Historical Provider, MD   aspirin 81 MG tablet Take 81 mg by mouth daily  Patient not taking: Reported on 3/21/2022  Historical Provider, MD [place] (Including outside providers/suppliers regularly involved in providing care):   Patient Care Team:  YUSUF Hdez CNP as PCP - YUSUF Carpio CNP as PCP [facility] Empaneled Provider  Chey Jose MD as Surgeon (Orthopedic Surgery)     Reviewed and updated this visit:  Tobacco  Allergies  Meds  Med Hx  Surg Hx  Soc Hx  Fam Hx

## 2022-07-08 NOTE — PATIENT INSTRUCTIONS
Personalized Preventive Plan for Angela Aviles - 7/8/2022  Medicare offers a range of preventive health benefits. Some of the tests and screenings are paid in full while other may be subject to a deductible, co-insurance, and/or copay. Some of these benefits include a comprehensive review of your medical history including lifestyle, illnesses that may run in your family, and various assessments and screenings as appropriate. After reviewing your medical record and screening and assessments performed today your provider may have ordered immunizations, labs, imaging, and/or referrals for you. A list of these orders (if applicable) as well as your Preventive Care list are included within your After Visit Summary for your review. Other Preventive Recommendations:    · A preventive eye exam performed by an eye specialist is recommended every 1-2 years to screen for glaucoma; cataracts, macular degeneration, and other eye disorders. · A preventive dental visit is recommended every 6 months. · Try to get at least 150 minutes of exercise per week or 10,000 steps per day on a pedometer . · Order or download the FREE \"Exercise & Physical Activity: Your Everyday Guide\" from [employer]. Call 2-423.957.4566 or search [employer] online. · You need 0427-3311 mg of calcium and 8049-6336 IU of vitamin D per day. It is possible to meet your calcium requirement with diet alone, but a vitamin D supplement is usually necessary to meet this goal.  · When exposed to the sun, use a sunscreen that protects against both UVA and UVB radiation with an SPF of 30 or greater. Reapply every 2 to 3 hours or after sweating, drying off with a towel, or swimming. · Always wear a seat belt when traveling in a car. Always wear a helmet when riding a bicycle or motorcycle.

## 2022-10-10 ENCOUNTER — OFFICE VISIT (OUTPATIENT)
Dept: FAMILY MEDICINE CLINIC | Age: 87
End: 2022-10-10
Payer: MEDICARE

## 2022-10-10 VITALS
OXYGEN SATURATION: 99 % | HEART RATE: 84 BPM | WEIGHT: 150 LBS | HEIGHT: 68 IN | BODY MASS INDEX: 22.73 KG/M2 | SYSTOLIC BLOOD PRESSURE: 146 MMHG | DIASTOLIC BLOOD PRESSURE: 86 MMHG

## 2022-10-10 DIAGNOSIS — G31.84 MILD COGNITIVE IMPAIRMENT: ICD-10-CM

## 2022-10-10 DIAGNOSIS — R63.4 WEIGHT LOSS: ICD-10-CM

## 2022-10-10 DIAGNOSIS — I10 ESSENTIAL HYPERTENSION: Primary | ICD-10-CM

## 2022-10-10 PROCEDURE — 99213 OFFICE O/P EST LOW 20 MIN: CPT | Performed by: NURSE PRACTITIONER

## 2022-10-10 PROCEDURE — 1036F TOBACCO NON-USER: CPT | Performed by: NURSE PRACTITIONER

## 2022-10-10 PROCEDURE — G8420 CALC BMI NORM PARAMETERS: HCPCS | Performed by: NURSE PRACTITIONER

## 2022-10-10 PROCEDURE — 1123F ACP DISCUSS/DSCN MKR DOCD: CPT | Performed by: NURSE PRACTITIONER

## 2022-10-10 PROCEDURE — 1090F PRES/ABSN URINE INCON ASSESS: CPT | Performed by: NURSE PRACTITIONER

## 2022-10-10 PROCEDURE — G8427 DOCREV CUR MEDS BY ELIG CLIN: HCPCS | Performed by: NURSE PRACTITIONER

## 2022-10-10 PROCEDURE — G8484 FLU IMMUNIZE NO ADMIN: HCPCS | Performed by: NURSE PRACTITIONER

## 2022-10-10 ASSESSMENT — ENCOUNTER SYMPTOMS
CONSTIPATION: 0
SHORTNESS OF BREATH: 0
EYE PAIN: 0
CHEST TIGHTNESS: 0
COUGH: 0
DIARRHEA: 0
COLOR CHANGE: 0
BACK PAIN: 0
TROUBLE SWALLOWING: 0
ABDOMINAL PAIN: 0

## 2022-10-10 NOTE — PROGRESS NOTES
Subjective  Chief Complaint   Patient presents with    Hypertension     6 mo f/u    Health Maintenance     Pt states she got a flu vax at a drug store but cannot remember which one       Hypertension  This is a chronic problem. The current episode started more than 1 year ago. The problem is unchanged. The problem is controlled. Pertinent negatives include no chest pain, malaise/fatigue, palpitations, peripheral edema or shortness of breath. There are no associated agents to hypertension. Risk factors for coronary artery disease include post-menopausal state and sedentary lifestyle. Past treatments include nothing (pt has discontinued all htn medications). The current treatment provides moderate improvement. Compliance problems include psychosocial issues. There is no history of CAD/MI, heart failure or PVD. There is no history of chronic renal disease, a hypertension causing med or a thyroid problem. Weight is now stable. Pt reports she is eating and drinking well.      Past Medical History:   Diagnosis Date    Actinic keratoses     Arthritis     Dermatitis     Encounter for annual health examination 12/19/2011    Endometrial cancer ([address]     s/p hyst in 2009 - was told clear by Dr. 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    Glaucoma suspect 08/20/2015    Hyperopic astigmatism 08/01/2014    Vitamin D deficiency     HTN (hypertension)     Hyperlipidemia     Sciatica      Past Surgical History:   Procedure Laterality Date    CATARACT REMOVAL WITH IMPLANT      HYSTERECTOMY (CERVIX STATUS UNKNOWN)  9/2009    post-menopausal bleeding     Family History   Problem Relation Age of Onset    Coronary Art Dis Father     Other Father         glaucoma    Coronary Art Dis Brother      Social History     Socioeconomic History    Marital status:      Spouse name: None    Number of children: None    Years of education: None    Highest education level: None   Tobacco Use    Smoking status: Never    Smokeless tobacco: Never   Substance and Sexual Activity    Alcohol use: No    Drug use: No     Social Determinants of Health     Financial Resource Strain: Low Risk     Difficulty of Paying Living Expenses: Not hard at all   Food Insecurity: No Food Insecurity    Worried About Running Out of Food in the Last Year: Never true    Ran Out of Food in the Last Year: Never true     Current Outpatient Medications on File Prior to Visit   Medication Sig Dispense Refill    Multiple Vitamin (MULTI-VITAMIN DAILY PO) Take by mouth      TURMERIC PO Take by mouth      donepezil (ARICEPT) 5 MG tablet Take 1 tablet by mouth nightly 90 tablet 1    Cholecalciferol (VITAMIN D) 2000 units CAPS capsule Take by mouth daily      aspirin 81 MG tablet Take 81 mg by mouth daily (Patient not taking: No sig reported)       No current facility-administered medications on file prior to visit. Allergies   Allergen Reactions    Bactrim [Sulfamethoxazole-Trimethoprim] Hives    Benazepril Other (See Comments)     cough    Codeine     Sulfa Antibiotics        Review of Systems   Constitutional:  Negative for activity change, appetite change, chills, diaphoresis, fatigue, fever and malaise/fatigue. HENT:  Negative for congestion, ear pain, hearing loss and trouble swallowing. Eyes:  Negative for pain and visual disturbance. Respiratory:  Negative for cough, chest tightness and shortness of breath.     Cardiovascular:  Negative for chest pain, palpitations and leg swelling. Gastrointestinal:  Negative for abdominal pain, constipation and diarrhea. Endocrine: Negative for polydipsia, polyphagia and polyuria. Genitourinary:  Negative for difficulty urinating and dysuria. Musculoskeletal:  Negative for arthralgias and back pain. Skin:  Negative for color change and rash. Neurological:  Negative for dizziness and light-headedness. Psychiatric/Behavioral:  Negative for dysphoric mood. The patient is not nervous/anxious. Objective  Vitals:    10/10/22 1313 10/10/22 1318   BP: (!) 150/90 (!) 146/86   Site: Left Upper Arm    Position: Sitting    Cuff Size: Medium Adult    Pulse: 84    SpO2: 99%    Weight: 150 lb (68 kg)    Height: 5' 8\" (1.727 m)      Physical Exam  Constitutional:       General: She is not in acute distress. Appearance: Normal appearance. She is normal weight. She is not ill-appearing, toxic-appearing or diaphoretic. HENT:      Head: Normocephalic and atraumatic. Right Ear: External ear normal.      Left Ear: External ear normal.      Nose: Nose normal. No congestion or rhinorrhea. Eyes:      Extraocular Movements: Extraocular movements intact. Conjunctiva/sclera: Conjunctivae normal.      Pupils: Pupils are equal, round, and reactive to light. Cardiovascular:      Rate and Rhythm: Normal rate and regular rhythm. Pulses: Normal pulses. Heart sounds: Normal heart sounds. No murmur heard. Pulmonary:      Effort: Pulmonary effort is normal. No respiratory distress. Breath sounds: Normal breath sounds. No stridor. No wheezing, rhonchi or rales. Chest:      Chest wall: No tenderness. Musculoskeletal:         General: Normal range of motion. Cervical back: Normal range of motion and neck supple. No tenderness. Right lower leg: No edema. Left lower leg: No edema. Lymphadenopathy:      Cervical: No cervical adenopathy. Skin:     General: Skin is warm.       Capillary Refill: Capillary refill takes less than 2 seconds. Coloration: Skin is not jaundiced. Findings: No erythema or lesion. Neurological:      General: No focal deficit present. Mental Status: She is alert and oriented to person, place, and time. Mental status is at baseline. Cranial Nerves: No cranial nerve deficit. Coordination: Coordination normal.      Gait: Gait normal.   Psychiatric:         Mood and Affect: Mood normal.         Behavior: Behavior normal.         Thought Content: Thought content normal.         Judgment: Judgment normal.       Assessment& Plan     Diagnosis Orders   1. Essential hypertension        2. Weight loss        3. Mild cognitive impairment          Borderline HTN-will f/u in 3 months to rehceck. Weight is stable. F/u in 3 months or sooner PRN. Side effects, adverse effects of the medication prescribed today, as well as treatment plan/ rationale and result expectations have been discussed with the patient who expresses understanding and desires to proceed. Close follow up to evaluate treatment results and for coordination of care. I have reviewed the patient's medical history in detail and updated the computerized patient record. As always, patient is advised that if symptoms worsen in any way they will proceed to the nearest emergency room. No orders of the defined types were placed in this encounter. No orders of the defined types were placed in this encounter. Medications Discontinued During This Encounter   Medication Reason    mometasone (ELOCON) 0.1 % cream Therapy completed       Return in about 6 months (around 4/10/2023) for htn.     YUSUF Stout CNP

## 2022-10-19 ENCOUNTER — OFFICE VISIT (OUTPATIENT)
Dept: FAMILY MEDICINE CLINIC | Age: 87
End: 2022-10-19
Payer: MEDICARE

## 2022-10-19 ENCOUNTER — TELEPHONE (OUTPATIENT)
Dept: FAMILY MEDICINE CLINIC | Age: 87
End: 2022-10-19

## 2022-10-19 VITALS
HEIGHT: 68 IN | SYSTOLIC BLOOD PRESSURE: 138 MMHG | WEIGHT: 150 LBS | TEMPERATURE: 97.1 F | BODY MASS INDEX: 22.73 KG/M2 | HEART RATE: 70 BPM | DIASTOLIC BLOOD PRESSURE: 82 MMHG | OXYGEN SATURATION: 97 %

## 2022-10-19 DIAGNOSIS — R05.1 ACUTE COUGH: ICD-10-CM

## 2022-10-19 DIAGNOSIS — J40 BRONCHITIS: Primary | ICD-10-CM

## 2022-10-19 DIAGNOSIS — R09.81 NASAL CONGESTION: ICD-10-CM

## 2022-10-19 PROCEDURE — 87804 INFLUENZA ASSAY W/OPTIC: CPT | Performed by: PHYSICIAN ASSISTANT

## 2022-10-19 PROCEDURE — 1123F ACP DISCUSS/DSCN MKR DOCD: CPT | Performed by: PHYSICIAN ASSISTANT

## 2022-10-19 PROCEDURE — G8427 DOCREV CUR MEDS BY ELIG CLIN: HCPCS | Performed by: PHYSICIAN ASSISTANT

## 2022-10-19 PROCEDURE — G8420 CALC BMI NORM PARAMETERS: HCPCS | Performed by: PHYSICIAN ASSISTANT

## 2022-10-19 PROCEDURE — 99213 OFFICE O/P EST LOW 20 MIN: CPT | Performed by: PHYSICIAN ASSISTANT

## 2022-10-19 PROCEDURE — 1036F TOBACCO NON-USER: CPT | Performed by: PHYSICIAN ASSISTANT

## 2022-10-19 PROCEDURE — 1090F PRES/ABSN URINE INCON ASSESS: CPT | Performed by: PHYSICIAN ASSISTANT

## 2022-10-19 PROCEDURE — G8484 FLU IMMUNIZE NO ADMIN: HCPCS | Performed by: PHYSICIAN ASSISTANT

## 2022-10-19 PROCEDURE — 87426 SARSCOV CORONAVIRUS AG IA: CPT | Performed by: PHYSICIAN ASSISTANT

## 2022-10-19 RX ORDER — AMOXICILLIN 500 MG/1
500 CAPSULE ORAL 2 TIMES DAILY
Qty: 20 CAPSULE | Refills: 0 | Status: SHIPPED | OUTPATIENT
Start: 2022-10-19 | End: 2022-10-29

## 2022-10-19 RX ORDER — DEXTROMETHORPHAN HYDROBROMIDE AND PROMETHAZINE HYDROCHLORIDE 15; 6.25 MG/5ML; MG/5ML
5 SYRUP ORAL 4 TIMES DAILY PRN
Qty: 150 ML | Refills: 0 | Status: SHIPPED | OUTPATIENT
Start: 2022-10-19 | End: 2022-10-26

## 2022-10-19 ASSESSMENT — ENCOUNTER SYMPTOMS
COUGH: 1
SINUS PRESSURE: 0
BACK PAIN: 0
CHEST TIGHTNESS: 0
VOMITING: 0
DIARRHEA: 0
TROUBLE SWALLOWING: 0
RHINORRHEA: 1
ABDOMINAL PAIN: 0
SHORTNESS OF BREATH: 0

## 2022-10-19 ASSESSMENT — PATIENT HEALTH QUESTIONNAIRE - PHQ9
SUM OF ALL RESPONSES TO PHQ QUESTIONS 1-9: 0
SUM OF ALL RESPONSES TO PHQ QUESTIONS 1-9: 0
1. LITTLE INTEREST OR PLEASURE IN DOING THINGS: 0
SUM OF ALL RESPONSES TO PHQ QUESTIONS 1-9: 0
2. FEELING DOWN, DEPRESSED OR HOPELESS: 0
SUM OF ALL RESPONSES TO PHQ QUESTIONS 1-9: 0
SUM OF ALL RESPONSES TO PHQ9 QUESTIONS 1 & 2: 0

## 2022-10-19 NOTE — PROGRESS NOTES
Subjective:      Patient ID: Rolanda Riedel is a 80 y.o. female who presents today for:  Chief Complaint   Patient presents with    Cough     Pt's cough started a few days ago (Monday). She is vomitting due to triggering her gag reflux during coughing. Stuffy nose, body ache, chills in the beginning. HPI  80year old female who presents with a productive cough that is causing bronchospams which leads to vomiting. Rhinorrhea, body aches and chills. For the past two days.     Past Medical History:   Diagnosis Date    Actinic keratoses     Arthritis     Dermatitis     Encounter for annual health examination 12/19/2011    Endometrial [address] ([facility] [address]     s/p hyst in 2009 - was told clear by Dr. Gevoany Hooks    History of bone density study 1/11/2011    History of mammography, screening 1/11/2011    HTN (hypertension)     Hyperlipidemia     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    left lower leg    Sciatica     Vitamin D deficiency      Past Surgical History:   Procedure Laterality Date    CATARACT REMOVAL WITH IMPLANT      HYSTERECTOMY (CERVIX STATUS UNKNOWN)  9/2009    post-menopausal bleeding     Social History     Socioeconomic History    Marital status:      Spouse name: Not on file    Number of children: Not on file    Years of education: Not on file    Highest education level: Not on file   Occupational History    Not on file   Tobacco Use    Smoking status: Never    Smokeless tobacco: Never   Substance and Sexual Activity    Alcohol use: No    Drug use: No    Sexual activity: Not on file   Other Topics Concern    Not on file   Social History Narrative    Not on file     Social Determinants of Health     Financial Resource Strain: Low Risk     Difficulty of Paying Living Expenses: Not hard at all   Food Insecurity: No Food Insecurity    Worried About Running Out of Food in the Last Year: Never true    Ran Out of Food in the Last Year: Never true   Transportation Needs: Not on file   Physical Activity: Not on file Stress: Not on file   Social Connections: Not on file   Intimate Partner Violence: Not on file   Housing Stability: Not on file     Family History   Problem Relation Age of Onset    Coronary Art Dis Father     Other Father         glaucoma    Coronary Art Dis Brother      Allergies   Allergen Reactions    Bactrim [Sulfamethoxazole-Trimethoprim] Hives    Benazepril Other (See Comments)     cough    Codeine     Sulfa Antibiotics      Current Outpatient Medications   Medication Sig Dispense Refill    amoxicillin (AMOXIL) 500 MG capsule Take 1 capsule by mouth 2 times daily for 10 days 20 capsule 0    promethazine-dextromethorphan (PROMETHAZINE-DM) 6.25-15 MG/5ML syrup Take 5 mLs by mouth 4 times daily as needed for Cough 150 mL 0    Multiple Vitamin (MULTI-VITAMIN DAILY PO) Take by mouth      TURMERIC PO Take by mouth      donepezil (ARICEPT) 5 MG tablet Take 1 tablet by mouth nightly 90 tablet 1    Cholecalciferol (VITAMIN D) 2000 units CAPS capsule Take by mouth daily      aspirin 81 MG tablet Take 81 mg by mouth daily (Patient not taking: No sig reported)       No current facility-administered medications for this visit. Review of Systems   Constitutional:  Negative for activity change, appetite change, chills, fever and unexpected weight change. HENT:  Positive for congestion and rhinorrhea. Negative for drooling, ear pain, nosebleeds, sinus pressure and trouble swallowing. Respiratory:  Positive for cough. Negative for chest tightness and shortness of breath. Cardiovascular:  Negative for chest pain and leg swelling. Gastrointestinal:  Negative for abdominal pain, diarrhea and vomiting. Endocrine: Negative for polydipsia and polyphagia. Genitourinary:  Negative for dysuria, flank pain and frequency. Musculoskeletal:  Positive for myalgias. Negative for back pain. Skin:  Negative for pallor and rash. Neurological:  Negative for syncope, weakness and headaches.    Hematological:  Does not bruise/bleed easily. Psychiatric/Behavioral:  Negative for agitation, behavioral problems and confusion. All other systems reviewed and are negative. Objective:   /82 (Site: Right Upper Arm, Position: Sitting, Cuff Size: Medium Adult)   Pulse 70   Temp 97.1 °F (36.2 °C) (Infrared)   Ht 5' 8\" (1.727 m)   Wt 150 lb (68 kg)   LMP  (LMP Unknown)   SpO2 97%   BMI 22.81 kg/m²     Physical Exam  Vitals and nursing note reviewed. Constitutional:       General: She is awake. She is not in acute distress. Appearance: Normal appearance. She is well-developed and normal weight. She is not ill-appearing, toxic-appearing or diaphoretic. Comments: No photophobia. No phonophobia. HENT:      Head: Normocephalic and atraumatic. No Aleman's sign. Right Ear: External ear normal.      Left Ear: External ear normal.      Nose: Nose normal. No congestion or rhinorrhea. Mouth/Throat:      Mouth: Mucous membranes are moist.      Pharynx: Oropharynx is clear. No oropharyngeal exudate or posterior oropharyngeal erythema. Eyes:      General: No scleral icterus. Right eye: No foreign body or discharge. Left eye: No discharge. Extraocular Movements: Extraocular movements intact. Conjunctiva/sclera: Conjunctivae normal.      Left eye: No exudate. Pupils: Pupils are equal, round, and reactive to light. Neck:      Vascular: No JVD. Trachea: No tracheal deviation. Comments: No meningismus. Cardiovascular:      Rate and Rhythm: Normal rate and regular rhythm. Heart sounds: Normal heart sounds. Heart sounds not distant. No murmur heard. No friction rub. No gallop. Pulmonary:      Effort: Pulmonary effort is normal. No respiratory distress. Breath sounds: Normal breath sounds. No stridor. No wheezing, rhonchi or rales. Chest:      Chest wall: No tenderness. Abdominal:      General: Abdomen is flat.  Bowel sounds are normal. There is no distension. Palpations: Abdomen is soft. There is no mass. Tenderness: There is no abdominal tenderness. There is no right CVA tenderness, left CVA tenderness, guarding or rebound. Hernia: No hernia is present. Musculoskeletal:         General: No swelling, tenderness, deformity or signs of injury. Normal range of motion. Cervical back: Normal range of motion and neck supple. No rigidity. Lymphadenopathy:      Head:      Right side of head: No submental adenopathy. Left side of head: No submental adenopathy. Skin:     General: Skin is warm and dry. Capillary Refill: Capillary refill takes less than 2 seconds. Coloration: Skin is not jaundiced or pale. Findings: No bruising, erythema, lesion or rash. Neurological:      General: No focal deficit present. Mental Status: She is alert and oriented to person, place, and time. Mental status is at baseline. Cranial Nerves: No cranial nerve deficit. Sensory: No sensory deficit. Motor: No weakness. Coordination: Coordination normal.      Deep Tendon Reflexes: Reflexes are normal and symmetric. Psychiatric:         Mood and Affect: Mood normal.         Behavior: Behavior normal. Behavior is cooperative. Thought Content: Thought content normal.         Judgment: Judgment normal.       Assessment:       Diagnosis Orders   1. Bronchitis  amoxicillin (AMOXIL) 500 MG capsule    promethazine-dextromethorphan (PROMETHAZINE-DM) 6.25-15 MG/5ML syrup    XR CHEST STANDARD (2 VW)      2. Acute cough  POCT COVID-19, Antigen    POCT Influenza A/B    amoxicillin (AMOXIL) 500 MG capsule    promethazine-dextromethorphan (PROMETHAZINE-DM) 6.25-15 MG/5ML syrup    XR CHEST STANDARD (2 VW)      3. Nasal congestion  POCT COVID-19, Antigen    POCT Influenza A/B        No results found for this visit on 10/19/22. Plan:     Assessment & Plan   Alicia was seen today for cough.     Diagnoses and all orders for this visit:    Bronchitis  -     amoxicillin (AMOXIL) 500 MG capsule; Take 1 capsule by mouth 2 times daily for 10 days  -     promethazine-dextromethorphan (PROMETHAZINE-DM) 6.25-15 MG/5ML syrup; Take 5 mLs by mouth 4 times daily as needed for Cough  -     XR CHEST STANDARD (2 VW); Future    Acute cough  -     POCT COVID-19, Antigen  -     POCT Influenza A/B  -     amoxicillin (AMOXIL) 500 MG capsule; Take 1 capsule by mouth 2 times daily for 10 days  -     promethazine-dextromethorphan (PROMETHAZINE-DM) 6.25-15 MG/5ML syrup; Take 5 mLs by mouth 4 times daily as needed for Cough  -     XR CHEST STANDARD (2 VW); Future    Nasal congestion  -     POCT COVID-19, Antigen  -     POCT Influenza A/B    Orders Placed This Encounter   Procedures    XR CHEST STANDARD (2 VW)     Standing Status:   Future     Standing Expiration Date:   10/19/2023     Order Specific Question:   Reason for exam:     Answer:   r/o pna    POCT COVID-19, Antigen     Order Specific Question:   Is this test for diagnosis or screening? Answer:   Diagnosis of ill patient     Order Specific Question:   Symptomatic for COVID-19 as defined by CDC? Answer:   Yes     Order Specific Question:   Date of Symptom Onset     Answer:   10/17/2022     Order Specific Question:   Hospitalized for COVID-19? Answer:   No     Order Specific Question:   Admitted to ICU for COVID-19? Answer:   No     Order Specific Question:   Employed in healthcare setting? Answer:   No     Order Specific Question:   Resident in a congregate (group) care setting? Answer:   No     Order Specific Question:   Pregnant? Answer:   No     Order Specific Question:   Previously tested for COVID-19?      Answer:   No    POCT Influenza A/B     Orders Placed This Encounter   Medications    amoxicillin (AMOXIL) 500 MG capsule     Sig: Take 1 capsule by mouth 2 times daily for 10 days     Dispense:  20 capsule     Refill:  0    promethazine-dextromethorphan (PROMETHAZINE-DM) 6.25-15 MG/5ML syrup     Sig: Take 5 mLs by mouth 4 times daily as needed for Cough     Dispense:  150 mL     Refill:  0     There are no discontinued medications. Return if symptoms worsen or fail to improve. Reviewed with the patient/family: current clinical status & medications. Side effects of the medication prescribed today, as well as treatment plan/rationale and result expectations have been discussed with the patient/family who expresses understanding. Patient will be discharged home in stable condition. Follow up with PCP to evaluate treatment results or return if symptoms worsen or fail to improve. Discussed signs and symptoms which require immediate follow-up in ED/call to 911. Understanding verbalized. I have reviewed the patient's medical history in detail and updated the computerized patient record.     JOSH Montiel

## 2022-12-15 ENCOUNTER — APPOINTMENT (OUTPATIENT)
Dept: GENERAL RADIOLOGY | Age: 87
End: 2022-12-15
Payer: MEDICARE

## 2022-12-15 ENCOUNTER — APPOINTMENT (OUTPATIENT)
Dept: MRI IMAGING | Age: 87
End: 2022-12-15
Payer: MEDICARE

## 2022-12-15 ENCOUNTER — APPOINTMENT (OUTPATIENT)
Dept: CT IMAGING | Age: 87
End: 2022-12-15
Payer: MEDICARE

## 2022-12-15 ENCOUNTER — HOSPITAL ENCOUNTER (INPATIENT)
Age: 87
LOS: 8 days | Discharge: NURSING, REHAB OR LONG-TERM CARE | End: 2022-12-23
Attending: EMERGENCY MEDICINE | Admitting: INTERNAL MEDICINE
Payer: MEDICARE

## 2022-12-15 DIAGNOSIS — R55 SYNCOPE, UNSPECIFIED SYNCOPE TYPE: ICD-10-CM

## 2022-12-15 DIAGNOSIS — M84.454A INSUFFICIENCY FRACTURE OF PELVIS, INITIAL ENCOUNTER: ICD-10-CM

## 2022-12-15 DIAGNOSIS — Z51.5 PALLIATIVE CARE ENCOUNTER: ICD-10-CM

## 2022-12-15 DIAGNOSIS — N39.0 URINARY TRACT INFECTION WITHOUT HEMATURIA, SITE UNSPECIFIED: ICD-10-CM

## 2022-12-15 DIAGNOSIS — I48.91 ATRIAL FIBRILLATION, UNSPECIFIED TYPE (HCC): ICD-10-CM

## 2022-12-15 DIAGNOSIS — R53.1 GENERAL WEAKNESS: Primary | ICD-10-CM

## 2022-12-15 LAB
ALBUMIN SERPL-MCNC: 3.9 G/DL (ref 3.5–4.6)
ALP BLD-CCNC: 83 U/L (ref 40–130)
ALT SERPL-CCNC: 17 U/L (ref 0–33)
ANION GAP SERPL CALCULATED.3IONS-SCNC: 9 MEQ/L (ref 9–15)
AST SERPL-CCNC: 19 U/L (ref 0–35)
BACTERIA: ABNORMAL /HPF
BASOPHILS ABSOLUTE: 0 K/UL (ref 0–0.2)
BASOPHILS RELATIVE PERCENT: 0.3 %
BILIRUB SERPL-MCNC: 0.6 MG/DL (ref 0.2–0.7)
BILIRUBIN URINE: NEGATIVE
BLOOD, URINE: ABNORMAL
BUN BLDV-MCNC: 15 MG/DL (ref 8–23)
CALCIUM SERPL-MCNC: 8.9 MG/DL (ref 8.5–9.9)
CHLORIDE BLD-SCNC: 100 MEQ/L (ref 95–107)
CLARITY: ABNORMAL
CO2: 29 MEQ/L (ref 20–31)
COLOR: YELLOW
CREAT SERPL-MCNC: 0.77 MG/DL (ref 0.5–0.9)
EKG ATRIAL RATE: 104 BPM
EKG Q-T INTERVAL: 364 MS
EKG QRS DURATION: 78 MS
EKG QTC CALCULATION (BAZETT): 459 MS
EKG R AXIS: -31 DEGREES
EKG T AXIS: 35 DEGREES
EKG VENTRICULAR RATE: 96 BPM
EOSINOPHILS ABSOLUTE: 0 K/UL (ref 0–0.7)
EOSINOPHILS RELATIVE PERCENT: 0.2 %
EPITHELIAL CELLS, UA: ABNORMAL /HPF (ref 0–5)
GFR SERPL CREATININE-BSD FRML MDRD: >60 ML/MIN/{1.73_M2}
GLOBULIN: 2.7 G/DL (ref 2.3–3.5)
GLUCOSE BLD-MCNC: 132 MG/DL (ref 70–99)
GLUCOSE URINE: NEGATIVE MG/DL
HCT VFR BLD CALC: 38.3 % (ref 37–47)
HEMOGLOBIN: 12.8 G/DL (ref 12–16)
HYALINE CASTS: ABNORMAL /HPF (ref 0–5)
INR BLD: 1
KETONES, URINE: ABNORMAL MG/DL
LEUKOCYTE ESTERASE, URINE: ABNORMAL
LIPASE: 25 U/L (ref 12–95)
LYMPHOCYTES ABSOLUTE: 0.4 K/UL (ref 1–4.8)
LYMPHOCYTES RELATIVE PERCENT: 3.9 %
MCH RBC QN AUTO: 32.5 PG (ref 27–31.3)
MCHC RBC AUTO-ENTMCNC: 33.5 % (ref 33–37)
MCV RBC AUTO: 97 FL (ref 79.4–94.8)
MONOCYTES ABSOLUTE: 0.6 K/UL (ref 0.2–0.8)
MONOCYTES RELATIVE PERCENT: 5.4 %
NEUTROPHILS ABSOLUTE: 9.7 K/UL (ref 1.4–6.5)
NEUTROPHILS RELATIVE PERCENT: 90.2 %
NITRITE, URINE: POSITIVE
PDW BLD-RTO: 14.9 % (ref 11.5–14.5)
PH UA: 7 (ref 5–9)
PLATELET # BLD: 126 K/UL (ref 130–400)
POTASSIUM SERPL-SCNC: 3.7 MEQ/L (ref 3.4–4.9)
PROTEIN UA: ABNORMAL MG/DL
PROTHROMBIN TIME: 13.6 SEC (ref 12.3–14.9)
RBC # BLD: 3.95 M/UL (ref 4.2–5.4)
RBC UA: ABNORMAL /HPF (ref 0–2)
SODIUM BLD-SCNC: 138 MEQ/L (ref 135–144)
SPECIFIC GRAVITY UA: 1.02 (ref 1–1.03)
TOTAL PROTEIN: 6.6 G/DL (ref 6.3–8)
TROPONIN: <0.01 NG/ML (ref 0–0.01)
TSH REFLEX: 1.1 UIU/ML (ref 0.44–3.86)
URINE REFLEX TO CULTURE: YES
UROBILINOGEN, URINE: 1 E.U./DL
WBC # BLD: 10.8 K/UL (ref 4.8–10.8)
WBC UA: ABNORMAL /HPF (ref 0–5)

## 2022-12-15 PROCEDURE — 96365 THER/PROPH/DIAG IV INF INIT: CPT

## 2022-12-15 PROCEDURE — 74177 CT ABD & PELVIS W/CONTRAST: CPT

## 2022-12-15 PROCEDURE — 87186 SC STD MICRODIL/AGAR DIL: CPT

## 2022-12-15 PROCEDURE — 80053 COMPREHEN METABOLIC PANEL: CPT

## 2022-12-15 PROCEDURE — 87077 CULTURE AEROBIC IDENTIFY: CPT

## 2022-12-15 PROCEDURE — 2060000000 HC ICU INTERMEDIATE R&B

## 2022-12-15 PROCEDURE — 72195 MRI PELVIS W/O DYE: CPT

## 2022-12-15 PROCEDURE — 83690 ASSAY OF LIPASE: CPT

## 2022-12-15 PROCEDURE — 6360000002 HC RX W HCPCS: Performed by: INTERNAL MEDICINE

## 2022-12-15 PROCEDURE — 96375 TX/PRO/DX INJ NEW DRUG ADDON: CPT

## 2022-12-15 PROCEDURE — 6370000000 HC RX 637 (ALT 250 FOR IP): Performed by: CLINICAL NURSE SPECIALIST

## 2022-12-15 PROCEDURE — 6360000004 HC RX CONTRAST MEDICATION: Performed by: EMERGENCY MEDICINE

## 2022-12-15 PROCEDURE — 87086 URINE CULTURE/COLONY COUNT: CPT

## 2022-12-15 PROCEDURE — 93005 ELECTROCARDIOGRAM TRACING: CPT | Performed by: EMERGENCY MEDICINE

## 2022-12-15 PROCEDURE — 70450 CT HEAD/BRAIN W/O DYE: CPT

## 2022-12-15 PROCEDURE — 70551 MRI BRAIN STEM W/O DYE: CPT

## 2022-12-15 PROCEDURE — 2500000003 HC RX 250 WO HCPCS: Performed by: CLINICAL NURSE SPECIALIST

## 2022-12-15 PROCEDURE — 6360000002 HC RX W HCPCS: Performed by: CLINICAL NURSE SPECIALIST

## 2022-12-15 PROCEDURE — 84484 ASSAY OF TROPONIN QUANT: CPT

## 2022-12-15 PROCEDURE — 36415 COLL VENOUS BLD VENIPUNCTURE: CPT

## 2022-12-15 PROCEDURE — 71045 X-RAY EXAM CHEST 1 VIEW: CPT

## 2022-12-15 PROCEDURE — 93010 ELECTROCARDIOGRAM REPORT: CPT | Performed by: INTERNAL MEDICINE

## 2022-12-15 PROCEDURE — 6360000002 HC RX W HCPCS: Performed by: EMERGENCY MEDICINE

## 2022-12-15 PROCEDURE — 2580000003 HC RX 258: Performed by: EMERGENCY MEDICINE

## 2022-12-15 PROCEDURE — 2580000003 HC RX 258: Performed by: CLINICAL NURSE SPECIALIST

## 2022-12-15 PROCEDURE — 84443 ASSAY THYROID STIM HORMONE: CPT

## 2022-12-15 PROCEDURE — 85610 PROTHROMBIN TIME: CPT

## 2022-12-15 PROCEDURE — 99285 EMERGENCY DEPT VISIT HI MDM: CPT

## 2022-12-15 PROCEDURE — 81001 URINALYSIS AUTO W/SCOPE: CPT

## 2022-12-15 PROCEDURE — 85025 COMPLETE CBC W/AUTO DIFF WBC: CPT

## 2022-12-15 RX ORDER — 0.9 % SODIUM CHLORIDE 0.9 %
500 INTRAVENOUS SOLUTION INTRAVENOUS ONCE
Status: COMPLETED | OUTPATIENT
Start: 2022-12-15 | End: 2022-12-15

## 2022-12-15 RX ORDER — ONDANSETRON 4 MG/1
4 TABLET, ORALLY DISINTEGRATING ORAL EVERY 8 HOURS PRN
Status: DISCONTINUED | OUTPATIENT
Start: 2022-12-15 | End: 2022-12-23 | Stop reason: HOSPADM

## 2022-12-15 RX ORDER — POLYETHYLENE GLYCOL 3350 17 G/17G
17 POWDER, FOR SOLUTION ORAL DAILY PRN
Status: DISCONTINUED | OUTPATIENT
Start: 2022-12-15 | End: 2022-12-23 | Stop reason: HOSPADM

## 2022-12-15 RX ORDER — ROSUVASTATIN CALCIUM 20 MG/1
40 TABLET, COATED ORAL NIGHTLY
Status: DISCONTINUED | OUTPATIENT
Start: 2022-12-15 | End: 2022-12-23 | Stop reason: HOSPADM

## 2022-12-15 RX ORDER — ACETAMINOPHEN 650 MG/1
650 SUPPOSITORY RECTAL EVERY 6 HOURS PRN
Status: DISCONTINUED | OUTPATIENT
Start: 2022-12-15 | End: 2022-12-23 | Stop reason: HOSPADM

## 2022-12-15 RX ORDER — ACETAMINOPHEN 325 MG/1
650 TABLET ORAL EVERY 6 HOURS PRN
Status: DISCONTINUED | OUTPATIENT
Start: 2022-12-15 | End: 2022-12-23 | Stop reason: HOSPADM

## 2022-12-15 RX ORDER — MORPHINE SULFATE 2 MG/ML
1 INJECTION, SOLUTION INTRAMUSCULAR; INTRAVENOUS
Status: DISCONTINUED | OUTPATIENT
Start: 2022-12-15 | End: 2022-12-20

## 2022-12-15 RX ORDER — ASPIRIN 81 MG/1
81 TABLET ORAL DAILY
Status: DISCONTINUED | OUTPATIENT
Start: 2022-12-15 | End: 2022-12-23 | Stop reason: HOSPADM

## 2022-12-15 RX ORDER — ASPIRIN 300 MG/1
300 SUPPOSITORY RECTAL DAILY
Status: DISCONTINUED | OUTPATIENT
Start: 2022-12-15 | End: 2022-12-23 | Stop reason: HOSPADM

## 2022-12-15 RX ORDER — KETOROLAC TROMETHAMINE 15 MG/ML
15 INJECTION, SOLUTION INTRAMUSCULAR; INTRAVENOUS ONCE
Status: COMPLETED | OUTPATIENT
Start: 2022-12-15 | End: 2022-12-15

## 2022-12-15 RX ORDER — SODIUM CHLORIDE 9 MG/ML
INJECTION, SOLUTION INTRAVENOUS PRN
Status: DISCONTINUED | OUTPATIENT
Start: 2022-12-15 | End: 2022-12-23 | Stop reason: HOSPADM

## 2022-12-15 RX ORDER — SODIUM CHLORIDE 0.9 % (FLUSH) 0.9 %
5-40 SYRINGE (ML) INJECTION PRN
Status: DISCONTINUED | OUTPATIENT
Start: 2022-12-15 | End: 2022-12-23 | Stop reason: HOSPADM

## 2022-12-15 RX ORDER — MORPHINE SULFATE 2 MG/ML
2 INJECTION, SOLUTION INTRAMUSCULAR; INTRAVENOUS
Status: DISCONTINUED | OUTPATIENT
Start: 2022-12-15 | End: 2022-12-20

## 2022-12-15 RX ORDER — SODIUM CHLORIDE 0.9 % (FLUSH) 0.9 %
5-40 SYRINGE (ML) INJECTION EVERY 12 HOURS SCHEDULED
Status: DISCONTINUED | OUTPATIENT
Start: 2022-12-15 | End: 2022-12-23 | Stop reason: HOSPADM

## 2022-12-15 RX ORDER — ONDANSETRON 2 MG/ML
4 INJECTION INTRAMUSCULAR; INTRAVENOUS EVERY 6 HOURS PRN
Status: DISCONTINUED | OUTPATIENT
Start: 2022-12-15 | End: 2022-12-23 | Stop reason: HOSPADM

## 2022-12-15 RX ORDER — METOPROLOL TARTRATE 5 MG/5ML
5 INJECTION INTRAVENOUS EVERY 6 HOURS PRN
Status: DISCONTINUED | OUTPATIENT
Start: 2022-12-15 | End: 2022-12-23

## 2022-12-15 RX ORDER — ENOXAPARIN SODIUM 100 MG/ML
40 INJECTION SUBCUTANEOUS DAILY
Status: DISCONTINUED | OUTPATIENT
Start: 2022-12-15 | End: 2022-12-18

## 2022-12-15 RX ADMIN — IOPAMIDOL 50 ML: 612 INJECTION, SOLUTION INTRAVENOUS at 12:17

## 2022-12-15 RX ADMIN — KETOROLAC TROMETHAMINE 15 MG: 15 INJECTION, SOLUTION INTRAMUSCULAR; INTRAVENOUS at 12:30

## 2022-12-15 RX ADMIN — MORPHINE SULFATE 1 MG: 2 INJECTION, SOLUTION INTRAMUSCULAR; INTRAVENOUS at 20:18

## 2022-12-15 RX ADMIN — ASPIRIN 81 MG: 81 TABLET, COATED ORAL at 22:09

## 2022-12-15 RX ADMIN — ENOXAPARIN SODIUM 40 MG: 100 INJECTION SUBCUTANEOUS at 22:09

## 2022-12-15 RX ADMIN — SODIUM CHLORIDE 500 ML: 9 INJECTION, SOLUTION INTRAVENOUS at 11:49

## 2022-12-15 RX ADMIN — Medication 10 ML: at 20:19

## 2022-12-15 RX ADMIN — METOPROLOL TARTRATE 5 MG: 5 INJECTION, SOLUTION INTRAVENOUS at 19:48

## 2022-12-15 RX ADMIN — CEFTRIAXONE SODIUM 1000 MG: 1 INJECTION, POWDER, FOR SOLUTION INTRAMUSCULAR; INTRAVENOUS at 14:16

## 2022-12-15 RX ADMIN — ROSUVASTATIN CALCIUM 40 MG: 20 TABLET, FILM COATED ORAL at 22:09

## 2022-12-15 ASSESSMENT — PAIN DESCRIPTION - DESCRIPTORS: DESCRIPTORS: ACHING;JABBING

## 2022-12-15 ASSESSMENT — PAIN DESCRIPTION - LOCATION: LOCATION: SACRUM

## 2022-12-15 ASSESSMENT — PAIN DESCRIPTION - ORIENTATION: ORIENTATION: MID

## 2022-12-15 ASSESSMENT — LIFESTYLE VARIABLES
HOW MANY STANDARD DRINKS CONTAINING ALCOHOL DO YOU HAVE ON A TYPICAL DAY: PATIENT DOES NOT DRINK
HOW OFTEN DO YOU HAVE A DRINK CONTAINING ALCOHOL: NEVER

## 2022-12-15 ASSESSMENT — PAIN - FUNCTIONAL ASSESSMENT: PAIN_FUNCTIONAL_ASSESSMENT: PREVENTS OR INTERFERES SOME ACTIVE ACTIVITIES AND ADLS

## 2022-12-15 ASSESSMENT — PAIN SCALES - GENERAL: PAINLEVEL_OUTOF10: 9

## 2022-12-15 NOTE — ED NOTES
Pt arrived to ED via EMS with c/o of a syncopal episode. Pt states she was making coffee and began to feel dizzy then LOC. Pt fell from a standing position. Pt denies hitting her head. Pt denies blood thinners. Pt c/o of back pain. Pt denies chest pain, abd pain, n/v/d. Per EMS pt had new onset of afib on monitor. Pt has no know hx. Pt denies medications changes, pt was placed on cardiac monitor, ekg done at bedside. Dr. America Saini at bedside. Pt provided with warm blankets.  Call light within reach     Arden Tapia RN  12/15/22 1756

## 2022-12-15 NOTE — PLAN OF CARE
Care plan initiated and remains ongoing.  Electronically signed by Rehan Solares RN on 12/15/2022 at 4:50 PM

## 2022-12-15 NOTE — H&P
Encounter for annual health examination 12/19/2011    Endometrial cancer [facility])     s/p hyst in 2009 - was told clear by Dr. 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  Authorizing Provider   Multiple Vitamin (MULTI-VITAMIN DAILY PO) Take by mouth    Historical Provider, MD   TURMERIC PO Take by mouth    Historical Provider, MD   donepezil (ARICEPT) 5 MG tablet Take 1 tablet by mouth nightly 4/15/22   YUSUF Wild - CNP   Cholecalciferol (VITAMIN D) 2000 units CAPS capsule Take by mouth daily    Historical Provider, MD   aspirin 81 MG tablet Take 81 mg by mouth daily  Patient not taking: No sig reported    Historical Provider, MD        Allergies   Bactrim [sulfamethoxazole-trimethoprim], Benazepril, Codeine, and Sulfa antibiotics    Social History     Social History       Tobacco History       Smoking Status  Never      Smokeless Tobacco Use  Never              Alcohol History       Alcohol Use Status  No              Drug Use       Drug Use Status  No              Sexual Activity       Sexually Active  Not Asked                    Family History     Family History   Problem Relation Age of Onset    Coronary Art Dis Father     Other Father         glaucoma    Coronary Art Dis Brother        Review of Systems   12 point review of systems reviewed with patient pertinent positives listed in HPI otherwise review of systems:    Physical Exam   BP (!) 158/121   Pulse 91   Temp 97.8 °F (36.6 °C)   Resp 21   LMP (LMP Unknown)   SpO2 100%     CONSTITUTIONAL:  alert, cooperative, no apparent distress, and appears younger than stated age  EYES:  pupils equal, round and reactive to light and sclera clear  BACK:  symmetric  LUNGS:  No increased work of breathing, good air exchange, clear to auscultation bilaterally, no crackles or wheezing  CARDIOVASCULAR:  irregularly irregular rhythm and normal S1 and S2  ABDOMEN:  normal bowel sounds and tenderness noted in the suprapubic region  MUSCULOSKELETAL:  TENDER LEFT LATERAL CHEST WALL, LEFT SIDE ABOUT WHERE ACETABULUM, NO EXTERNAL ROTATION AND EXTREMITIES NOT SHORTENED  NEUROLOGIC:  ALERT ORIENTED TO SELF AND PLACE, GAIT NOT OBSERVED, STRENGTH EQUAL AND STRONG TO ALL EXTREMITIES  SKIN:  no bruising or bleeding, normal skin color, texture, turgor, and no redness, warmth, or swelling    Labs      Recent Results (from the past 24 hour(s))   EKG 12 Lead    Collection Time: 12/15/22 11:31 AM   Result Value Ref Range    Ventricular Rate 96 BPM    Atrial Rate 104 BPM    QRS Duration 78 ms    Q-T Interval 364 ms    QTc Calculation (Bazett) 459 ms    R Axis -31 degrees    T Axis 35 degrees   CBC with Auto Differential    Collection Time: 12/15/22 11:45 AM   Result Value Ref Range    WBC 10.8 4.8 - 10.8 K/uL    RBC 3.95 (L) 4.20 - 5.40 M/uL    Hemoglobin 12.8 12.0 - 16.0 g/dL    Hematocrit 38.3 37.0 - 47.0 %    MCV 97.0 (H) 79.4 - 94.8 fL    MCH 32.5 (H) 27.0 - 31.3 pg    MCHC 33.5 33.0 - 37.0 %    RDW 14.9 (H) 11.5 - 14.5 %    Platelets 191 (L) 554 - 400 K/uL    Neutrophils % 90.2 %    Lymphocytes % 3.9 %    Monocytes % 5.4 %    Eosinophils % 0.2 %    Basophils % 0.3 %    Neutrophils Absolute 9.7 (H) 1.4 - 6.5 K/uL    Lymphocytes Absolute 0.4 (L) 1.0 - 4.8 K/uL    Monocytes Absolute 0.6 0.2 - 0.8 K/uL    Eosinophils Absolute 0.0 0.0 - 0.7 K/uL    Basophils Absolute 0.0 0.0 - 0.2 K/uL   CMP    Collection Time: 12/15/22 11:45 AM   Result Value Ref Range    Sodium 138 135 - 144 mEq/L Potassium 3.7 3.4 - 4.9 mEq/L    Chloride 100 95 - 107 mEq/L    CO2 29 20 - 31 mEq/L    Anion Gap 9 9 - 15 mEq/L    Glucose 132 (H) 70 - 99 mg/dL    BUN 15 8 - 23 mg/dL    Creatinine 0.77 0.50 - 0.90 mg/dL    Est, Glom Filt Rate >60.0 >60    Calcium 8.9 8.5 - 9.9 mg/dL    Total Protein 6.6 6.3 - 8.0 g/dL    Albumin 3.9 3.5 - 4.6 g/dL    Total Bilirubin 0.6 0.2 - 0.7 mg/dL    Alkaline Phosphatase 83 40 - 130 U/L    ALT 17 0 - 33 U/L    AST 19 0 - 35 U/L    Globulin 2.7 2.3 - 3.5 g/dL   Troponin    Collection Time: 12/15/22 11:45 AM   Result Value Ref Range    Troponin <0.010 0.000 - 0.010 ng/mL   Protime-INR    Collection Time: 12/15/22 11:45 AM   Result Value Ref Range    Protime 13.6 12.3 - 14.9 sec    INR 1.0    Urinalysis with Reflex to Culture    Collection Time: 12/15/22 11:45 AM    Specimen: Urine   Result Value Ref Range    Color, UA Yellow Straw/Yellow    Clarity, UA CLOUDY (A) Clear    Glucose, Ur Negative Negative mg/dL    Bilirubin Urine Negative Negative    Ketones, Urine TRACE (A) Negative mg/dL    Specific Gravity, UA 1.017 1.005 - 1.030    Blood, Urine MODERATE (A) Negative    pH, UA 7.0 5.0 - 9.0    Protein, UA TRACE (A) Negative mg/dL    Urobilinogen, Urine 1.0 <2.0 E.U./dL    Nitrite, Urine POSITIVE (A) Negative    Leukocyte Esterase, Urine LARGE (A) Negative    Urine Reflex to Culture Yes    Lipase    Collection Time: 12/15/22 11:45 AM   Result Value Ref Range    Lipase 25 12 - 95 U/L   TSH with Reflex    Collection Time: 12/15/22 11:45 AM   Result Value Ref Range    TSH 1.100 0.440 - 3.860 uIU/mL   Microscopic Urinalysis    Collection Time: 12/15/22 11:45 AM   Result Value Ref Range    RBC, UA 0-2 0 - 2 /HPF    Bacteria, UA MANY (A) Negative /HPF    Hyaline Casts, UA 1-3 0 - 5 /HPF    WBC, UA  (A) 0 - 5 /HPF    Epithelial Cells, UA 3-5 0 - 5 /HPF        Imaging/Diagnostics Last 24 Hours   CT Head W/O Contrast    Result Date: 12/15/2022  EXAMINATION: CT OF THE HEAD WITHOUT CONTRAST 12/15/2022 12:06 pm TECHNIQUE: CT of the head was performed without the administration of intravenous contrast. Automated exposure control, iterative reconstruction, and/or weight based adjustment of the mA/kV was utilized to reduce the radiation dose to as low as reasonably achievable. COMPARISON: None. HISTORY: ORDERING SYSTEM PROVIDED HISTORY: syncope TECHNOLOGIST PROVIDED HISTORY: Reason for exam:->syncope Has a \"code stroke\" or \"stroke alert\" been called? ->No Decision Support Exception - unselect if not a suspected or confirmed emergency medical condition->Emergency Medical Condition (MA) What reading provider will be dictating this exam?->CRC FINDINGS: BRAIN/VENTRICLES: There is no acute intracranial hemorrhage, mass effect or midline shift. No abnormal extra-axial fluid collection. The gray-white differentiation is maintained without evidence of an acute infarct. There is no evidence of hydrocephalus. The ventricles, cisterns and sulci are prominent consistent with atrophy. There is decreased attenuation within the periventricular white matter consistent with periventricular leukomalacia. There are old lacunar infarcts seen within the right and left basal ganglia. ORBITS: The visualized portion of the orbits demonstrate no acute abnormality. SINUSES: The visualized paranasal sinuses and mastoid air cells demonstrate no acute abnormality. SOFT TISSUES/SKULL:  No acute abnormality of the visualized skull or soft tissues. 1. There is no acute intracranial abnormality. Specifically, there is no intracranial hemorrhage.  2. Atrophy and periventricular leukomalacia,     CT ABDOMEN PELVIS W IV CONTRAST Additional Contrast? None    Result Date: 12/15/2022  EXAMINATION: ONE XRAY VIEW OF THE CHEST; CT OF THE ABDOMEN AND PELVIS WITH CONTRAST 12/15/2022 12:22 pm COMPARISON: Chest x-ray, October 19 2022; CT abdomen pelvis, September 25, 2018 HISTORY: ORDERING SYSTEM PROVIDED HISTORY: sob TECHNOLOGIST PROVIDED HISTORY: Reason for exam:->sob What reading provider will be dictating this exam?->CRC FINDINGS: There also is a small amount of fluid. There is coarsening of the interstitium. The lungs are hyperinflated. The cardiac silhouette is within normal limits. Atherosclerotic calcifications are seen thoracic aorta. No consolidation, pneumothorax or pleural effusion seen. Degenerative changes are seen in the visualized portion of both shoulders and also spine. CT of the abdomen and pelvis: Lungs: In the visualized bases of the lungs a 5.7 mm pleural base nodule is seen posteriorly on the left (series 2, image 1). A 7.7 mm nodule is seen posteriorly in the right lower lobe that is pleural based as well. (Series 2, image 20). No consolidating pneumonia, pneumothorax or pleural effusion is seen. Changes of some emphysema are visualized. Organs: The liver, pancreas, right adrenal gland and liver are unremarkable. There are calcifications seen in the liver and also there is a calcified splenic artery again seen that is unchanged from previous study. In the area of the left adrenal gland there is a 1.7 cm hypodense thick wall cystic area. This was also seen on previous study. In the region of the tail of the pancreas a hypodense area is seen that measures 1.2 cm. This area measures fluid density on this examination. This could be an IPMN in. This was vaguely seen on previous study and also appears stable. Both kidneys show no evidence of hydronephrosis. No renal calculus is visualized. Prominent atherosclerotic calcifications are visualized. GI/bowel: No dilated loops of bowel are seen. The appendix is visualized and is unremarkable. Pelvis: In the pre sacral region there is soft tissue density posterior to the bowel that is thickened. A superior there also is some stranding visualized. The uterus is not seen. There also is a small amount of fluid. The bladder distends normally.  Bones: No destructive bony lesions are seen. Slight irregularity is seen in the lower sacral region on the left. 0.82 degenerative changes are seen in both hips and is most prominent on the right. Chest x-ray: No evidence for acute cardiopulmonary process. CT of the abdomen and pelvis: 1.  2 nodules are seen in the visualized bases of the lungs. The nodule in the right lower lobe was seen on previous study but was smaller in size. There are also emphysematous changes seen in the visualized bases. Numeral 2. Hypodense lesion is seen in the region of the left adrenal gland. This could be an adrenal nodule of the possibility is necrotic lymph node . It is slightly larger than what was seen on previous study. 3.  A stable hypodense area is seen in the tail the pancreas. This could represent IPMN. 4.  There is no evidence for appendicitis, diverticulitis or bowel obstruction 5. There is presacral soft tissue density and stranding seen in the pelvis. This is new when compared to previous study. Query if patient may have had any radiation treatment due to endometrial cancer. Recommend further evaluation there is also full small amount of fluid. RECOMMENDATION: 1. Low does CT of the lungs, non emergently 2. It presacral soft tissue seen also there is a small amount of fluid. Possibility the of hemorrhage or of the etiology could be related to patient's history of endometrial cancer and any possibility of prior radiation treatment. Recommend follow-up. Also there is some irregularity in the sacrum and occult fracture is not excluded. This was discussed with JOSH Gallo, and CT of the pelvis was recommended.      XR CHEST PORTABLE    Result Date: 12/15/2022  EXAMINATION: ONE XRAY VIEW OF THE CHEST; CT OF THE ABDOMEN AND PELVIS WITH CONTRAST 12/15/2022 12:22 pm COMPARISON: Chest x-ray, October 19 2022; CT abdomen pelvis, September 25, 2018 HISTORY: ORDERING SYSTEM PROVIDED HISTORY: sob TECHNOLOGIST PROVIDED HISTORY: Reason for exam:->sob What reading provider will be dictating this exam?->CRC FINDINGS: There also is a small amount of fluid. There is coarsening of the interstitium. The lungs are hyperinflated. The cardiac silhouette is within normal limits. Atherosclerotic calcifications are seen thoracic aorta. No consolidation, pneumothorax or pleural effusion seen. Degenerative changes are seen in the visualized portion of both shoulders and also spine. CT of the abdomen and pelvis: Lungs: In the visualized bases of the lungs a 5.7 mm pleural base nodule is seen posteriorly on the left (series 2, image 1). A 7.7 mm nodule is seen posteriorly in the right lower lobe that is pleural based as well. (Series 2, image 20). No consolidating pneumonia, pneumothorax or pleural effusion is seen. Changes of some emphysema are visualized. Organs: The liver, pancreas, right adrenal gland and liver are unremarkable. There are calcifications seen in the liver and also there is a calcified splenic artery again seen that is unchanged from previous study. In the area of the left adrenal gland there is a 1.7 cm hypodense thick wall cystic area. This was also seen on previous study. In the region of the tail of the pancreas a hypodense area is seen that measures 1.2 cm. This area measures fluid density on this examination. This could be an IPMN in. This was vaguely seen on previous study and also appears stable. Both kidneys show no evidence of hydronephrosis. No renal calculus is visualized. Prominent atherosclerotic calcifications are visualized. GI/bowel: No dilated loops of bowel are seen. The appendix is visualized and is unremarkable. Pelvis: In the pre sacral region there is soft tissue density posterior to the bowel that is thickened. A superior there also is some stranding visualized. The uterus is not seen. There also is a small amount of fluid. The bladder distends normally. Bones: No destructive bony lesions are seen. Slight irregularity is seen in the lower sacral region on the left. 0.82 degenerative changes are seen in both hips and is most prominent on the right. Chest x-ray: No evidence for acute cardiopulmonary process. CT of the abdomen and pelvis: 1.  2 nodules are seen in the visualized bases of the lungs. The nodule in the right lower lobe was seen on previous study but was smaller in size. There are also emphysematous changes seen in the visualized bases. Numeral 2. Hypodense lesion is seen in the region of the left adrenal gland. This could be an adrenal nodule of the possibility is necrotic lymph node . It is slightly larger than what was seen on previous study. 3.  A stable hypodense area is seen in the tail the pancreas. This could represent IPMN. 4.  There is no evidence for appendicitis, diverticulitis or bowel obstruction 5. There is presacral soft tissue density and stranding seen in the pelvis. This is new when compared to previous study. Query if patient may have had any radiation treatment due to endometrial cancer. Recommend further evaluation there is also full small amount of fluid. RECOMMENDATION: 1. Low does CT of the lungs, non emergently 2. It presacral soft tissue seen also there is a small amount of fluid. Possibility the of hemorrhage or of the etiology could be related to patient's history of endometrial cancer and any possibility of prior radiation treatment. Recommend follow-up. Also there is some irregularity in the sacrum and occult fracture is not excluded. This was discussed with JOSH Gallo, and CT of the pelvis was recommended. Assessment      Hospital Problems             Last Modified POA    * (Principal) A-fib ([address] 12/15/2022 Yes       Plan   Syncope-possibly secondary to atrial fibrillation with differential of acute CVA versus orthostatic hypotension versus UTI. Acute cystitis-IV ceftriaxone, reviewed prior urine cultures E. coli .   Urine Culture ordered  New onset atrial fibrillation slow ventricular rate-patient has had remote CVAs in the past I suspect that A. fib has been longstanding. Echocardiogram ordered. TSH is normal.  Consult to cardiology. Patient is on Lovenox for DVT prophylaxis, [id number] 2 score-7- (remote cva found on ct head, ct abdomen - + atherosclerosis, htn. Will need oac- will defer to cardiology. Rule out cva- mri head without contrast- + syncope with afib. HGB A1C, HOLD PT/OT UNTIL SACRAL FRACTURE RULED OUT  Left sided pelvic pain- mri pelvis, sacral thickening rule out  fracture-  HOLD PT/OT UNTIL ACUTE FRACTURE RULED OUT  Lung nodules- stable since last imaging  Htn- hold antihypertensives until cva ruled out. H/O ENDOMETRIAL CANCER- OUTPATIENT GYN FOLLOW UP  DVTP- LOVENOX FOR NOW. DOAC PER CARDIOLOGY   DISCUSSED POC WITH DR Glne Loo PATIENT AND RN  ADMIT AS INPATIENT.  SHE WILL REQUIRE >2 MIDNIGHT HOSPITAL STAY  TIME SPENT 75 MIN    Consultations Ordered:  None    Electronically signed by YUSUF Barnett on 12/15/22 at 4:04 PM EST

## 2022-12-15 NOTE — FLOWSHEET NOTE
1630- Patient arrived to room 174 via stretcher from ER. Transferred to bed using slide board. Eda care performed and gown changed from incontinent urine. Pure wick catheter placed and set to medium wall suction and a depends placed as well. Removed SL from left AC secondary to IV hanging out. New #20g SL placed to left dorsal forearm with 1 attempt. Patient tolerated well with no complaints of any discomfort. Oriented to room and call light. Denies any chest pain at this time. Remains SR/ST on the monitor, HR currently 120's. No edema noted. Pedal pulses palpable. Denies any shortness of breath. Lungs are diminished bilaterally. SATS 96% on RA. She is A/Ox2-3, has intermittent confusion but redirects easily. She has difficulty moving in the bed and turning. Elsy GOLDBERG present to assess her and stated she would order a MRI of brain/pelvis. Skin remains warm, dry and intact. Vital signs stable. Call light remains in reach. 1705- Perfect serve message sent to Elsy GOLDBERG letting her know that I'm unable to obtain ortho vitals due to impaired mobility at this time. She stated it was OK. 1745- Patient to MRI via bed. 1830- Noted that patient was still showing on the telemetry monitor after physically removing it from her for MRI. Patient had on the wrong tele monitor. Actual heart rate reads  NSR/ST. Will notify MD at this time.  and Nursing Supervisor notified as well. Dr Cassidy Sherman also notified in person at this time. 1940- HR up to 180's. Rapid response called. 1945- Medicated with 5mg IV lopressor. HR remains elevated 110-120 at this time, Afib. Dr Cassidy Sehrman aware. MRI pelvis results in chart. Stacey MARCUS contacting Dr Yaneth Levin for pain medication orders. Avasys also placed in room at this time to monitor for safety. Call light remains in reach.      Electronically signed by Deni Garrido RN on 12/15/2022 at 7:52 PM

## 2022-12-15 NOTE — ED PROVIDER NOTES
[id number] ED  EMERGENCY DEPARTMENT ENCOUNTER      Pt Name: aNte David  MRN: [id number]  [place] 1935  Date of evaluation: 12/15/2022  Provider: Monica Senior MD    CHIEF COMPLAINT       Chief Complaint   Patient presents with    Loss of Consciousness     Pt states she felt dizzy and passed out, pt fell from standing, per EMS pt has new onset afib         HISTORY OF PRESENT ILLNESS   (Location/Symptom, Timing/Onset, Context/Setting, Quality, Duration, Modifying Factors, Severity)  Note limiting factors. 49-year-old female presenting after syncopal event. Patient was pouring coffee when she passed out. She was too weak to get up. Did not hit head or have LOC. She notes lower abdominal and left hip pain on arrival to the ER. The symptoms are not new but are worse than normal.  Is not on any anticoagulations. Is not on any medications. Alert and oriented on arrival.  No hx of afib. Nursing Notes were reviewed. REVIEW OF SYSTEMS    (2-9 systems for level 4, 10 or more for level 5)     Review of Systems   Neurological:  Positive for syncope and weakness. All other systems reviewed and are negative. Except as noted above the remainder of the review of systems was reviewed and negative.        PAST MEDICAL HISTORY     Past Medical History:   Diagnosis Date    Actinic keratoses     Arthritis     Dermatitis     Encounter for annual health examination 12/19/2011    Endometrial cancer [facility])     s/p hyst in 2009 - was told clear by Dr. 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    Sciatica     Vitamin D deficiency          SURGICAL HISTORY       Past Surgical History:   Procedure Laterality Date    CATARACT REMOVAL WITH IMPLANT      HYSTERECTOMY (CERVIX STATUS UNKNOWN)  9/2009    post-menopausal bleeding         CURRENT MEDICATIONS Previous Medications    ASPIRIN 81 MG TABLET    Take 81 mg by mouth daily    CHOLECALCIFEROL (VITAMIN D) 2000 UNITS CAPS CAPSULE    Take by mouth daily    DONEPEZIL (ARICEPT) 5 MG TABLET    Take 1 tablet by mouth nightly    MULTIPLE VITAMIN (MULTI-VITAMIN DAILY PO)    Take by mouth    TURMERIC PO    Take by mouth       ALLERGIES     Bactrim [sulfamethoxazole-trimethoprim], Benazepril, Codeine, and Sulfa antibiotics    FAMILY HISTORY       Family History   Problem Relation Age of Onset    Coronary Art Dis Father     Other Father         glaucoma    Coronary Art Dis Brother           SOCIAL HISTORY       Social History     Socioeconomic History    Marital status:    Tobacco Use    Smoking status: Never    Smokeless tobacco: Never   Substance and Sexual Activity    Alcohol use: No    Drug use: No     Social Determinants of Health     Financial Resource Strain: Low Risk     Difficulty of Paying Living Expenses: Not hard at all   Food Insecurity: No Food Insecurity    Worried About Running Out of Food in the Last Year: Never true    Ran Out of Food in the Last Year: Never true       SCREENINGS    [place] Coma Scale  Eye Opening: Spontaneous  Best Verbal Response: Oriented  Best Motor Response: Obeys commands  [place] Coma Scale Score: 15          PHYSICAL EXAM    (up to 7 for level 4, 8 or more for level 5)     ED Triage Vitals [12/15/22 1124]   BP Temp Temp src Heart Rate Resp SpO2 Height Weight   (!) 173/87 97.8 °F (36.6 °C) -- 88 18 100 % -- --       Physical Exam  Vitals and nursing note reviewed. Constitutional:       General: She is not in acute distress. Appearance: Normal appearance. She is well-developed. She is not ill-appearing. HENT:      Head: Normocephalic and atraumatic. Mouth/Throat:      Mouth: Mucous membranes are moist.      Pharynx: Oropharynx is clear. Eyes:      Extraocular Movements: Extraocular movements intact.       Conjunctiva/sclera: Conjunctivae normal. Cardiovascular:      Rate and Rhythm: Normal rate. Rhythm irregular. Pulmonary:      Effort: Pulmonary effort is normal.      Breath sounds: Normal breath sounds. Abdominal:      General: Bowel sounds are normal.      Palpations: Abdomen is soft. Tenderness: There is no abdominal tenderness. Musculoskeletal:         General: No deformity. Normal range of motion. Cervical back: Normal range of motion and neck supple. Skin:     General: Skin is warm and dry. Capillary Refill: Capillary refill takes less than 2 seconds. Neurological:      General: No focal deficit present. Mental Status: She is alert and oriented to person, place, and time. Mental status is at baseline. Cranial Nerves: No cranial nerve deficit. Psychiatric:         Thought Content: Thought content normal.       DIAGNOSTIC RESULTS     EKG: All EKG's are interpreted by the Emergency Department Physician who either signs or Co-signs this chart in the absence of a cardiologist.    Afib, rate 96, normal intervals, no ST elevation/ depression    RADIOLOGY:   Non-plain film images such as CT, Ultrasound and MRI are read by the radiologist. Plain radiographic images are visualized and preliminarily interpreted by the emergency physician with the below findings:    Interpretation per the Radiologist below, if available at the time of this note:    XR CHEST PORTABLE   Final Result   Chest x-ray: No evidence for acute cardiopulmonary process. CT of the abdomen and pelvis:      1.  2 nodules are seen in the visualized bases of the lungs. The nodule in   the right lower lobe was seen on previous study but was smaller in size. There are also emphysematous changes seen in the visualized bases. Numeral   2. Hypodense lesion is seen in the region of the left adrenal gland. This   could be an adrenal nodule of the possibility is necrotic lymph node . It is   slightly larger than what was seen on previous study.   3.  A stable hypodense   area is seen in the tail the pancreas. This could represent IPMN. 4.  There   is no evidence for appendicitis, diverticulitis or bowel obstruction 5. There is presacral soft tissue density and stranding seen in the pelvis. This is new when compared to previous study. Query if patient may have had   any radiation treatment due to endometrial cancer. Recommend further   evaluation there is also full small amount of fluid. RECOMMENDATION:   1. Low does CT of the lungs, non emergently 2. It presacral soft tissue   seen also there is a small amount of fluid. Possibility the of hemorrhage or   of the etiology could be related to patient's history of endometrial cancer   and any possibility of prior radiation treatment. Recommend follow-up. Also   there is some irregularity in the sacrum and occult fracture is not excluded. This was discussed with JOSH Gallo, and CT of the pelvis was recommended. CT Head W/O Contrast   Final Result   1. There is no acute intracranial abnormality. Specifically, there is no   intracranial hemorrhage. 2. Atrophy and periventricular leukomalacia,         CT ABDOMEN PELVIS W IV CONTRAST Additional Contrast? None   Final Result   Chest x-ray: No evidence for acute cardiopulmonary process. CT of the abdomen and pelvis:      1.  2 nodules are seen in the visualized bases of the lungs. The nodule in   the right lower lobe was seen on previous study but was smaller in size. There are also emphysematous changes seen in the visualized bases. Numeral   2. Hypodense lesion is seen in the region of the left adrenal gland. This   could be an adrenal nodule of the possibility is necrotic lymph node . It is   slightly larger than what was seen on previous study. 3.  A stable hypodense   area is seen in the tail the pancreas. This could represent IPMN. 4.  There   is no evidence for appendicitis, diverticulitis or bowel obstruction 5.    There is presacral soft tissue density and stranding seen in the pelvis. This is new when compared to previous study. Query if patient may have had   any radiation treatment due to endometrial cancer. Recommend further   evaluation there is also full small amount of fluid. RECOMMENDATION:   1. Low does CT of the lungs, non emergently 2. It presacral soft tissue   seen also there is a small amount of fluid. Possibility the of hemorrhage or   of the etiology could be related to patient's history of endometrial cancer   and any possibility of prior radiation treatment. Recommend follow-up. Also   there is some irregularity in the sacrum and occult fracture is not excluded. This was discussed with JOSH Gallo, and CT of the pelvis was recommended. LABS:  Labs Reviewed   CBC WITH AUTO DIFFERENTIAL - Abnormal; Notable for the following components:       Result Value    RBC 3.95 (*)     MCV 97.0 (*)     MCH 32.5 (*)     RDW 14.9 (*)     Platelets 416 (*)     Neutrophils Absolute 9.7 (*)     Lymphocytes Absolute 0.4 (*)     All other components within normal limits   COMPREHENSIVE METABOLIC PANEL - Abnormal; Notable for the following components:    Glucose 132 (*)     All other components within normal limits   URINALYSIS WITH REFLEX TO CULTURE - Abnormal; Notable for the following components:    Clarity, UA CLOUDY (*)     Ketones, Urine TRACE (*)     Blood, Urine MODERATE (*)     Protein, UA TRACE (*)     Nitrite, Urine POSITIVE (*)     Leukocyte Esterase, Urine LARGE (*)     All other components within normal limits   MICROSCOPIC URINALYSIS - Abnormal; Notable for the following components:    Bacteria, UA MANY (*)     WBC, UA  (*)     All other components within normal limits   CULTURE, URINE   TROPONIN   PROTIME-INR   LIPASE   TSH WITH REFLEX       All other labs were within normal range or not returned as of this dictation.     EMERGENCY DEPARTMENT COURSE and DIFFERENTIAL DIAGNOSIS/MDM:   Vitals: Vitals:    12/15/22 1124 12/15/22 1136 12/15/22 1431 12/15/22 1501   BP: (!) 173/87 (!) 162/87 (!) 123/94 (!) 158/121   Pulse: 88 96 95 91   Resp: 18 16 28 21   Temp: 97.8 °F (36.6 °C)      SpO2: 100%          MDM  Number of Diagnoses or Management Options  Atrial fibrillation, unspecified type (HCC)  General weakness  Syncope, unspecified syncope type  Urinary tract infection without hematuria, site unspecified  Diagnosis management comments: Suspect UTI causing atrial fibrillation and symptoms of general illness. Treated with Rocephin. She remains in A. fib with controlled rate. Spoke to Jad Ware, hospitalist who accepts admission. Hemodynamically stable throughout ED course. Transferred from ED in fair condition. Procedures    CRITICAL CARE TIME   Total Critical Care time was 0 minutes, excluding separately reportable procedures. There was a high probability of clinically significant/life threatening deterioration in the patient's condition which required my urgent intervention. FINAL IMPRESSION      1. General weakness    2. Syncope, unspecified syncope type    3. Atrial fibrillation, unspecified type ([address]    4.  Urinary tract infection without hematuria, site unspecified          DISPOSITION/PLAN   DISPOSITION Admitted 12/15/2022 02:34:28 PM      (Please note that portions of this note were completed with a voice recognition program.  Efforts were made to edit the dictations but occasionally words are mis-transcribed.)    Gildardo Dominguez MD (electronically signed)  Attending Emergency Physician        Gildardo Dominguez MD  12/15/22 [phone]

## 2022-12-16 PROBLEM — R53.1 GENERAL WEAKNESS: Status: ACTIVE | Noted: 2022-12-16

## 2022-12-16 LAB
ANION GAP SERPL CALCULATED.3IONS-SCNC: 14 MEQ/L (ref 9–15)
BASOPHILS ABSOLUTE: 0 K/UL (ref 0–0.2)
BASOPHILS RELATIVE PERCENT: 0.4 %
BUN BLDV-MCNC: 18 MG/DL (ref 8–23)
CALCIUM SERPL-MCNC: 8.8 MG/DL (ref 8.5–9.9)
CHLORIDE BLD-SCNC: 101 MEQ/L (ref 95–107)
CHOLESTEROL, TOTAL: 182 MG/DL (ref 0–199)
CO2: 24 MEQ/L (ref 20–31)
CREAT SERPL-MCNC: 0.73 MG/DL (ref 0.5–0.9)
EOSINOPHILS ABSOLUTE: 0 K/UL (ref 0–0.7)
EOSINOPHILS RELATIVE PERCENT: 0.2 %
GFR SERPL CREATININE-BSD FRML MDRD: >60 ML/MIN/{1.73_M2}
GFR SERPL CREATININE-BSD FRML MDRD: >60 ML/MIN/{1.73_M2}
GLUCOSE BLD-MCNC: 118 MG/DL (ref 70–99)
HBA1C MFR BLD: 4.9 % (ref 4.8–5.9)
HCT VFR BLD CALC: 35.7 % (ref 37–47)
HDLC SERPL-MCNC: 94 MG/DL (ref 40–59)
HEMOGLOBIN: 11.9 G/DL (ref 12–16)
LDL CHOLESTEROL CALCULATED: 78 MG/DL (ref 0–129)
LYMPHOCYTES ABSOLUTE: 0.4 K/UL (ref 1–4.8)
LYMPHOCYTES RELATIVE PERCENT: 4.3 %
MAGNESIUM: 2.1 MG/DL (ref 1.7–2.4)
MCH RBC QN AUTO: 32 PG (ref 27–31.3)
MCHC RBC AUTO-ENTMCNC: 33.3 % (ref 33–37)
MCV RBC AUTO: 96 FL (ref 79.4–94.8)
MONOCYTES ABSOLUTE: 0.7 K/UL (ref 0.2–0.8)
MONOCYTES RELATIVE PERCENT: 7.9 %
NEUTROPHILS ABSOLUTE: 7.4 K/UL (ref 1.4–6.5)
NEUTROPHILS RELATIVE PERCENT: 87.2 %
PDW BLD-RTO: 14.7 % (ref 11.5–14.5)
PERFORMED ON: NORMAL
PLATELET # BLD: 98 K/UL (ref 130–400)
POC CREATININE: 0.8 MG/DL (ref 0.6–1.2)
POC SAMPLE TYPE: NORMAL
POTASSIUM REFLEX MAGNESIUM: 3.3 MEQ/L (ref 3.4–4.9)
RBC # BLD: 3.72 M/UL (ref 4.2–5.4)
SODIUM BLD-SCNC: 139 MEQ/L (ref 135–144)
TRIGL SERPL-MCNC: 51 MG/DL (ref 0–150)
WBC # BLD: 8.8 K/UL (ref 4.8–10.8)

## 2022-12-16 PROCEDURE — 6360000002 HC RX W HCPCS: Performed by: INTERNAL MEDICINE

## 2022-12-16 PROCEDURE — 85025 COMPLETE CBC W/AUTO DIFF WBC: CPT

## 2022-12-16 PROCEDURE — 80061 LIPID PANEL: CPT

## 2022-12-16 PROCEDURE — 83735 ASSAY OF MAGNESIUM: CPT

## 2022-12-16 PROCEDURE — C8929 TTE W OR WO FOL WCON,DOPPLER: HCPCS

## 2022-12-16 PROCEDURE — 80048 BASIC METABOLIC PNL TOTAL CA: CPT

## 2022-12-16 PROCEDURE — 6370000000 HC RX 637 (ALT 250 FOR IP): Performed by: PHYSICIAN ASSISTANT

## 2022-12-16 PROCEDURE — 36415 COLL VENOUS BLD VENIPUNCTURE: CPT

## 2022-12-16 PROCEDURE — APPSS45 APP SPLIT SHARED TIME 31-45 MINUTES: Performed by: PHYSICIAN ASSISTANT

## 2022-12-16 PROCEDURE — 83036 HEMOGLOBIN GLYCOSYLATED A1C: CPT

## 2022-12-16 PROCEDURE — 6360000002 HC RX W HCPCS: Performed by: CLINICAL NURSE SPECIALIST

## 2022-12-16 PROCEDURE — 6360000004 HC RX CONTRAST MEDICATION: Performed by: INTERNAL MEDICINE

## 2022-12-16 PROCEDURE — 2060000000 HC ICU INTERMEDIATE R&B

## 2022-12-16 PROCEDURE — 99223 1ST HOSP IP/OBS HIGH 75: CPT | Performed by: INTERNAL MEDICINE

## 2022-12-16 PROCEDURE — 2580000003 HC RX 258: Performed by: CLINICAL NURSE SPECIALIST

## 2022-12-16 PROCEDURE — 6370000000 HC RX 637 (ALT 250 FOR IP): Performed by: CLINICAL NURSE SPECIALIST

## 2022-12-16 RX ORDER — POTASSIUM CHLORIDE 20 MEQ/1
40 TABLET, EXTENDED RELEASE ORAL ONCE
Status: COMPLETED | OUTPATIENT
Start: 2022-12-16 | End: 2022-12-16

## 2022-12-16 RX ADMIN — Medication 10 ML: at 10:48

## 2022-12-16 RX ADMIN — ASPIRIN 81 MG: 81 TABLET, COATED ORAL at 10:54

## 2022-12-16 RX ADMIN — METOPROLOL TARTRATE 25 MG: 25 TABLET, FILM COATED ORAL at 20:39

## 2022-12-16 RX ADMIN — POTASSIUM CHLORIDE 40 MEQ: 1500 TABLET, EXTENDED RELEASE ORAL at 11:34

## 2022-12-16 RX ADMIN — METOPROLOL TARTRATE 25 MG: 25 TABLET, FILM COATED ORAL at 11:34

## 2022-12-16 RX ADMIN — ROSUVASTATIN CALCIUM 40 MG: 20 TABLET, FILM COATED ORAL at 20:39

## 2022-12-16 RX ADMIN — ACETAMINOPHEN 650 MG: 325 TABLET ORAL at 23:03

## 2022-12-16 RX ADMIN — MORPHINE SULFATE 1 MG: 2 INJECTION, SOLUTION INTRAMUSCULAR; INTRAVENOUS at 11:44

## 2022-12-16 RX ADMIN — CEFTRIAXONE SODIUM 1000 MG: 1 INJECTION, POWDER, FOR SOLUTION INTRAMUSCULAR; INTRAVENOUS at 15:43

## 2022-12-16 RX ADMIN — PERFLUTREN 1.5 ML: 6.52 INJECTION, SUSPENSION INTRAVENOUS at 10:46

## 2022-12-16 RX ADMIN — MORPHINE SULFATE 1 MG: 2 INJECTION, SOLUTION INTRAMUSCULAR; INTRAVENOUS at 04:49

## 2022-12-16 RX ADMIN — ENOXAPARIN SODIUM 40 MG: 100 INJECTION SUBCUTANEOUS at 10:00

## 2022-12-16 RX ADMIN — Medication 10 ML: at 21:01

## 2022-12-16 RX ADMIN — MORPHINE SULFATE 2 MG: 2 INJECTION, SOLUTION INTRAMUSCULAR; INTRAVENOUS at 15:32

## 2022-12-16 RX ADMIN — Medication 10 ML: at 04:49

## 2022-12-16 ASSESSMENT — PAIN DESCRIPTION - LOCATION
LOCATION: SACRUM
LOCATION: BACK
LOCATION: SACRUM
LOCATION: SACRUM

## 2022-12-16 ASSESSMENT — ENCOUNTER SYMPTOMS
VOMITING: 0
SHORTNESS OF BREATH: 0
DIARRHEA: 0
SORE THROAT: 0
VOICE CHANGE: 0
COLOR CHANGE: 0
CONSTIPATION: 0
TROUBLE SWALLOWING: 0
NAUSEA: 0
CHEST TIGHTNESS: 0
BACK PAIN: 0
COUGH: 0
ABDOMINAL PAIN: 0

## 2022-12-16 ASSESSMENT — PAIN DESCRIPTION - DESCRIPTORS
DESCRIPTORS: SHARP;SHOOTING
DESCRIPTORS: ACHING

## 2022-12-16 ASSESSMENT — PAIN SCALES - GENERAL
PAINLEVEL_OUTOF10: 6
PAINLEVEL_OUTOF10: 7
PAINLEVEL_OUTOF10: 8
PAINLEVEL_OUTOF10: 10

## 2022-12-16 ASSESSMENT — PAIN - FUNCTIONAL ASSESSMENT: PAIN_FUNCTIONAL_ASSESSMENT: PREVENTS OR INTERFERES SOME ACTIVE ACTIVITIES AND ADLS

## 2022-12-16 ASSESSMENT — PAIN DESCRIPTION - ORIENTATION: ORIENTATION: POSTERIOR

## 2022-12-16 NOTE — CARE COORDINATION
[facility] Case Management Initial Discharge Assessment    Met with Patient and Family to discuss discharge plan. PCP: YUSUF Alvarenga CNP                                Date of Last Visit: 1 month    [facility] Patient: No        [facility] Notified: no    If no PCP, list provided? N/A    Discharge Planning    Living Arrangements: independently at home    Who do you live with?     Who helps you with your care:  self    If lives at home:     Do you have any barriers navigating in your home? no    Patient can perform ADL? Yes    Current Services (outpatient and in home) :  Cleaning Help    Dialysis: No    Is transportation available to get to your appointments? Yes    DME Equipment:  no    Respiratory equipment: None    Respiratory provider:  no     Pharmacy:  yes - [facility]    Consult with Medication Assistance Program?  No        Does Patient Have a High-Risk for Readmission Diagnosis (CHF, PN, MI, COPD)? No      Initial Discharge Plan? (Note: please see concurrent daily documentation for any updates after initial note). Pt was given information on hhc/rehab/snf services. Cm to assess for d/c needs and referrals.     Readmission Risk              Risk of Unplanned Readmission:  9         Electronically signed by Gina Paul RN on 12/15/2022 at 8:30 PM

## 2022-12-16 NOTE — PROGRESS NOTES
Pelvic MRI results reported to Dr. Anju Carcamo and new order in place. Patient in bed crying  C/O \"buttock\" pain, medicated per MAR with (+) results. Avasys in place d/t impulsive behavior. Daughter called and updated.      BP (!) 162/92   Pulse (!) 119   Temp 97.7 °F (36.5 °C) (Oral)   Resp 18   Ht 5' 8\" (1.727 m)   Wt 149 lb 14.6 oz (68 kg)   LMP  (LMP Unknown)   SpO2 99%   BMI 22.79 kg/m²

## 2022-12-16 NOTE — CONSULTS
Consult Note  Patient: Cody Perdomo  Unit/Bed: G473/G493-35  YOB: 1935  MRN: [id number]  Acct: [de-identified]   Admitting Diagnosis: A-fib ([address] [I48.91]  General weakness [R53.1]  Urinary tract infection without hematuria, site unspecified [N39.0]  Syncope, unspecified syncope type [R55]  Atrial fibrillation, unspecified type [facility]) [I48.91]  Date:  12/15/2022  Hospital Day: 1      Chief Complaint:  Passed out, fall    History of Present Illness: This is an 51-year-old  female with past medical history hypertension and dyslipidemia presented to ER yesterday with chief complaint of passing out. Patient is a very difficult historian and currently only alert and oriented to self so majority of history was obtained from patient's electronic medical record. Per ER note, patient was pouring coffee at which time she became dizzy and subsequently passed out. She was brought to the emergency room for further evaluation. Presentation to the emergency room, blood pressure elevated 173/87, heart rate 88, respiratory rate 18, pulse ox 100%, temperature of 97.8 °F.  Sodium 138, potassium 3.7, chloride 100, total CO2 29, BUN 15, creatinine 0.77, GFR greater than 60, glucose elevated 132. Troponin negative at less than 0.010. TSH 1.10. WBC 10.8, hemoglobin 12.8, hematocrit 38.3, platelets low 189. Urinalysis consistent with UTI. Chest x-ray showed no evidence for acute cardiopulmonary process. CT brain showed no acute intracranial abnormality. CT abdomen pelvis showed 2 nodules seen in visualized bases of the lungs, hypodense lesion seen in the region of the left adrenal gland, no evidence of appendicitis, diverticulitis or bowel obstruction, presacral soft tissue density and stranding seen in the pelvis. 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   MRI of the pelvis revealed multiple nondisplaced sacral fractures with classic H shaped pattern suggestive of sacral insufficiency fracture. EKG was of poor quality and significant artifact noted with reading of A. fib however appears to be sinus. She was admitted for further evaluation. Cardiology consulted for new onset A. fib. Rapid response was called around 1930 yesterday evening when she was noted on telemetry to have tachycardia with heart rate in the 160s to 170s range. Patient was treated with 5 mg of IV Lopressor after which heart rate improved but remain elevated in the 110s to 120s range. Appeared to be A. fib RVR at that time. Currently on telemetry patient is sinus rhythm with heart rate 60s to 70s with occasional PVCs. Telemetry alarms reviewed showed possible A. fib episodes early this morning versus sinus with frequent ectopy or possible ectopic atrial rhythm. At time evaluation today patient is resting comfortably and echo being performed at bedside. Patient is only alert and oriented to self at this time. She believes that she was in [facility] and thought the year was 1960s. She is unable to provide any reliable history at this time and states that she believes she came in due to abdominal pain and need for surgery. She denies any current complaints including chest pain, shortness of breath, nausea, vomiting, dizziness, abdominal pain.     Allergies   Allergen Reactions    Bactrim [Sulfamethoxazole-Trimethoprim] Hives    Benazepril Other (See Comments)     cough    Codeine     Sulfa Antibiotics        Current Facility-Administered Medications   Medication Dose Route Frequency Provider Last Rate Last Admin    metoprolol tartrate (LOPRESSOR) tablet 25 mg  25 mg Oral BID JOSH Pathak        potassium chloride (KLOR-CON M) extended release tablet 40 mEq  40 mEq Oral Once Bacilio Pathak        sodium chloride flush 0.9 % injection 5-40 mL  5-40 mL IntraVENous 2 times per day Shona Howard, APRN - CNS   10 mL at 12/15/22 2019    sodium chloride flush 0.9 % injection 5-40 mL  5-40 mL IntraVENous PRN Jose Martin Ros Dials, APRN - CNS   10 mL at 12/16/22 0449    0.9 % sodium chloride infusion   IntraVENous PRN Jose Martin Ros Dials, APRN - CNS        enoxaparin (LOVENOX) injection 40 mg  40 mg SubCUTAneous Daily Jose Martin Ros Dials, APRN - CNS   40 mg at 12/16/22 1000    ondansetron (ZOFRAN-ODT) disintegrating tablet 4 mg  4 mg Oral Q8H PRN Jose Martin Ros Dials, APRN - CNS        Or    ondansetron (ZOFRAN) injection 4 mg  4 mg IntraVENous Q6H PRN Jose Martin Ros Dials, APRN - CNS        acetaminophen (TYLENOL) tablet 650 mg  650 mg Oral Q6H PRN Jose Martin Ros Dials, APRN - CNS        Or    acetaminophen (TYLENOL) suppository 650 mg  650 mg Rectal Q6H PRN Jose Martin Ros Dials, APRN - CNS        polyethylene glycol (GLYCOLAX) packet 17 g  17 g Oral Daily PRN Jose Martin Ros Dials, APRN - CNS        cefTRIAXone (ROCEPHIN) 1,000 mg in dextrose 5 % 50 mL IVPB mini-bag  1,000 mg IntraVENous Q24H Jose Martin Ros Dials, APRN - CNS        metoprolol (LOPRESSOR) injection 5 mg  5 mg IntraVENous Q6H PRN Jose Martin Ros Dials, APRN - CNS   5 mg at 12/15/22 1948    aspirin EC tablet 81 mg  81 mg Oral Daily Jose Martin Ros Dials, APRN - CNS   81 mg at 12/16/22 1054    Or    aspirin suppository 300 mg  300 mg Rectal Daily Jose Martin Ros Dials, APRN - CNS        rosuvastatin (CRESTOR) tablet 40 mg  40 mg Oral Nightly Jose Martin Ros Dials, APRN - CNS   40 mg at 12/15/22 2209    bisacodyl (DULCOLAX) EC tablet 5 mg  5 mg Oral Daily PRN Jose Martin Ros Dials, APRN - CNS        morphine (PF) injection 1 mg  1 mg IntraVENous Q3H PRN Tom Hayes Sedar, DO   1 mg at 12/16/22 0449    Or    morphine (PF) injection 2 mg  2 mg IntraVENous Q3H PRN Kehinde Rae DO           PMHx:  Past Medical History:   Diagnosis Date    Actinic keratoses     Arthritis     Dermatitis     Encounter for annual health examination 12/19/2011    Endometrial cancer [facility])     s/p hyst in 2009 - was told clear by  [address] [place] [address]    History of bone density study 1/11/2011 History of mammography, screening 1/11/2011    HTN (hypertension)     Hyperlipidemia     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    left lower leg    Sciatica     Vitamin D deficiency        PSHx:  Past Surgical History:   Procedure Laterality Date    CATARACT REMOVAL WITH IMPLANT      HYSTERECTOMY (CERVIX STATUS UNKNOWN)  9/2009    post-menopausal bleeding       Social Hx:  Social History     Socioeconomic History    Marital status:    Tobacco Use    Smoking status: Never    Smokeless tobacco: Never   Substance and Sexual Activity    Alcohol use: No    Drug use: No     Social Determinants of Health     Financial Resource Strain: Low Risk     Difficulty of Paying Living Expenses: Not hard at all   Food Insecurity: No Food Insecurity    Worried About Running Out of Food in the Last Year: Never true    Ran Out of Food in the Last Year: Never true       Family Hx:  Family History   Problem Relation Age of Onset    Coronary Art Dis Father     Other Father         glaucoma    Coronary Art Dis Brother        Review of Systems:   Review of Systems   Constitutional:  Negative for activity change. HENT:  Negative for congestion. Respiratory:  Negative for chest tightness and shortness of breath. Cardiovascular:  Negative for chest pain and leg swelling. Gastrointestinal:  Negative for abdominal pain, nausea and vomiting. Genitourinary:  Negative for difficulty urinating. Musculoskeletal:  Negative for arthralgias. Skin:  Negative for color change. Neurological:  Negative for dizziness, syncope and light-headedness. Psychiatric/Behavioral:  Negative for agitation. Physical Examination:    BP (!) 160/70   Pulse 72   Temp 98.1 °F (36.7 °C) (Oral)   Resp 17   Ht 5' 8\" (1.727 m)   Wt 149 lb 14.6 oz (68 kg)   LMP  (LMP Unknown)   SpO2 96%   BMI 22.79 kg/m²    Physical Exam  Constitutional:       General: She is not in acute distress. HENT:      Head: Normocephalic and atraumatic.    Cardiovascular: AM    LABGLOM >60.0 12/16/2022 05:15 AM    GLUCOSE 118 12/16/2022 05:15 AM    GLUCOSE 87 12/27/2011 08:41 AM    PROT 6.6 12/15/2022 11:45 AM    LABALBU 3.9 12/15/2022 11:45 AM    LABALBU 4.3 12/27/2011 08:41 AM    CALCIUM 8.8 12/16/2022 05:15 AM    BILITOT 0.6 12/15/2022 11:45 AM    ALKPHOS 83 12/15/2022 11:45 AM    AST 19 12/15/2022 11:45 AM    ALT 17 12/15/2022 11:45 AM     BMP:    Lab Results   Component Value Date/Time     12/16/2022 05:15 AM    K 3.3 12/16/2022 05:15 AM     12/16/2022 05:15 AM    CO2 24 12/16/2022 05:15 AM    BUN 18 12/16/2022 05:15 AM    LABALBU 3.9 12/15/2022 11:45 AM    LABALBU 4.3 12/27/2011 08:41 AM    CREATININE 0.73 12/16/2022 05:15 AM    CALCIUM 8.8 12/16/2022 05:15 AM    GFRAA >60.0 04/12/2022 09:23 AM    LABGLOM >60.0 12/16/2022 05:15 AM    GLUCOSE 118 12/16/2022 05:15 AM    GLUCOSE 87 12/27/2011 08:41 AM     Magnesium:    Lab Results   Component Value Date/Time    MG 2.1 12/16/2022 05:15 AM     Troponin:    Lab Results   Component Value Date/Time    TROPONINI <0.010 12/15/2022 11:45 AM       Radiology:  CT Head W/O Contrast    Result Date: 12/15/2022  EXAMINATION: CT OF THE HEAD WITHOUT CONTRAST  12/15/2022 12:06 pm TECHNIQUE: CT of the head was performed without the administration of intravenous contrast. Automated exposure control, iterative reconstruction, and/or weight based adjustment of the mA/kV was utilized to reduce the radiation dose to as low as reasonably achievable. COMPARISON: None. HISTORY: ORDERING SYSTEM PROVIDED HISTORY: syncope TECHNOLOGIST PROVIDED HISTORY: Reason for exam:->syncope Has a \"code stroke\" or \"stroke alert\" been called? ->No Decision Support Exception - unselect if not a suspected or confirmed emergency medical condition->Emergency Medical Condition (MA) What reading provider will be dictating this exam?->CRC FINDINGS: BRAIN/VENTRICLES: There is no acute intracranial hemorrhage, mass effect or midline shift.   No abnormal extra-axial fluid collection. The gray-white differentiation is maintained without evidence of an acute infarct. There is no evidence of hydrocephalus. The ventricles, cisterns and sulci are prominent consistent with atrophy. There is decreased attenuation within the periventricular white matter consistent with periventricular leukomalacia. There are old lacunar infarcts seen within the right and left basal ganglia. ORBITS: The visualized portion of the orbits demonstrate no acute abnormality. SINUSES: The visualized paranasal sinuses and mastoid air cells demonstrate no acute abnormality. SOFT TISSUES/SKULL:  No acute abnormality of the visualized skull or soft tissues. 1. There is no acute intracranial abnormality. Specifically, there is no intracranial hemorrhage. 2. Atrophy and periventricular leukomalacia,     CT ABDOMEN PELVIS W IV CONTRAST Additional Contrast? None    Result Date: 12/15/2022  EXAMINATION: ONE XRAY VIEW OF THE CHEST; CT OF THE ABDOMEN AND PELVIS WITH CONTRAST 12/15/2022 12:22 pm COMPARISON: Chest x-ray, October 19 2022; CT abdomen pelvis, September 25, 2018 HISTORY: ORDERING SYSTEM PROVIDED HISTORY: sob TECHNOLOGIST PROVIDED HISTORY: Reason for exam:->sob What reading provider will be dictating this exam?->CRC FINDINGS: There also is a small amount of fluid. There is coarsening of the interstitium. The lungs are hyperinflated. The cardiac silhouette is within normal limits. Atherosclerotic calcifications are seen thoracic aorta. No consolidation, pneumothorax or pleural effusion seen. Degenerative changes are seen in the visualized portion of both shoulders and also spine. CT of the abdomen and pelvis: Lungs: In the visualized bases of the lungs a 5.7 mm pleural base nodule is seen posteriorly on the left (series 2, image 1). A 7.7 mm nodule is seen posteriorly in the right lower lobe that is pleural based as well. (Series 2, image 20).   No consolidating pneumonia, pneumothorax or pleural effusion is seen. Changes of some emphysema are visualized. Organs: The liver, pancreas, right adrenal gland and liver are unremarkable. There are calcifications seen in the liver and also there is a calcified splenic artery again seen that is unchanged from previous study. In the area of the left adrenal gland there is a 1.7 cm hypodense thick wall cystic area. This was also seen on previous study. In the region of the tail of the pancreas a hypodense area is seen that measures 1.2 cm. This area measures fluid density on this examination. This could be an IPMN in. This was vaguely seen on previous study and also appears stable. Both kidneys show no evidence of hydronephrosis. No renal calculus is visualized. Prominent atherosclerotic calcifications are visualized. GI/bowel: No dilated loops of bowel are seen. The appendix is visualized and is unremarkable. Pelvis: In the pre sacral region there is soft tissue density posterior to the bowel that is thickened. A superior there also is some stranding visualized. The uterus is not seen. There also is a small amount of fluid. The bladder distends normally. Bones: No destructive bony lesions are seen. Slight irregularity is seen in the lower sacral region on the left. 0.82 degenerative changes are seen in both hips and is most prominent on the right. Chest x-ray: No evidence for acute cardiopulmonary process. CT of the abdomen and pelvis: 1.  2 nodules are seen in the visualized bases of the lungs. The nodule in the right lower lobe was seen on previous study but was smaller in size. There are also emphysematous changes seen in the visualized bases. Numeral 2. Hypodense lesion is seen in the region of the left adrenal gland. This could be an adrenal nodule of the possibility is necrotic lymph node . It is slightly larger than what was seen on previous study. 3.  A stable hypodense area is seen in the tail the pancreas. This could represent IPMN. 4.  There is no evidence for appendicitis, diverticulitis or bowel obstruction 5. There is presacral soft tissue density and stranding seen in the pelvis. This is new when compared to previous study. Query if patient may have had any radiation treatment due to endometrial cancer. Recommend further evaluation there is also full small amount of fluid. RECOMMENDATION: 1. Low does CT of the lungs, non emergently 2. It presacral soft tissue seen also there is a small amount of fluid. Possibility the of hemorrhage or of the etiology could be related to patient's history of endometrial cancer and any possibility of prior radiation treatment. Recommend follow-up. Also there is some irregularity in the sacrum and occult fracture is not excluded. This was discussed with JOSH Gallo, and CT of the pelvis was recommended. XR CHEST PORTABLE    Result Date: 12/15/2022  EXAMINATION: ONE XRAY VIEW OF THE CHEST; CT OF THE ABDOMEN AND PELVIS WITH CONTRAST 12/15/2022 12:22 pm COMPARISON: Chest x-ray, October 19 2022; CT abdomen pelvis, September 25, 2018 HISTORY: ORDERING SYSTEM PROVIDED HISTORY: sob TECHNOLOGIST PROVIDED HISTORY: Reason for exam:->sob What reading provider will be dictating this exam?->CRC FINDINGS: There also is a small amount of fluid. There is coarsening of the interstitium. The lungs are hyperinflated. The cardiac silhouette is within normal limits. Atherosclerotic calcifications are seen thoracic aorta. No consolidation, pneumothorax or pleural effusion seen. Degenerative changes are seen in the visualized portion of both shoulders and also spine. CT of the abdomen and pelvis: Lungs: In the visualized bases of the lungs a 5.7 mm pleural base nodule is seen posteriorly on the left (series 2, image 1). A 7.7 mm nodule is seen posteriorly in the right lower lobe that is pleural based as well. (Series 2, image 20). No consolidating pneumonia, pneumothorax or pleural effusion is seen.   Changes of some emphysema are visualized. Organs: The liver, pancreas, right adrenal gland and liver are unremarkable. There are calcifications seen in the liver and also there is a calcified splenic artery again seen that is unchanged from previous study. In the area of the left adrenal gland there is a 1.7 cm hypodense thick wall cystic area. This was also seen on previous study. In the region of the tail of the pancreas a hypodense area is seen that measures 1.2 cm. This area measures fluid density on this examination. This could be an IPMN in. This was vaguely seen on previous study and also appears stable. Both kidneys show no evidence of hydronephrosis. No renal calculus is visualized. Prominent atherosclerotic calcifications are visualized. GI/bowel: No dilated loops of bowel are seen. The appendix is visualized and is unremarkable. Pelvis: In the pre sacral region there is soft tissue density posterior to the bowel that is thickened. A superior there also is some stranding visualized. The uterus is not seen. There also is a small amount of fluid. The bladder distends normally. Bones: No destructive bony lesions are seen. Slight irregularity is seen in the lower sacral region on the left. 0.82 degenerative changes are seen in both hips and is most prominent on the right. Chest x-ray: No evidence for acute cardiopulmonary process. CT of the abdomen and pelvis: 1.  2 nodules are seen in the visualized bases of the lungs. The nodule in the right lower lobe was seen on previous study but was smaller in size. There are also emphysematous changes seen in the visualized bases. Numeral 2. Hypodense lesion is seen in the region of the left adrenal gland. This could be an adrenal nodule of the possibility is necrotic lymph node . It is slightly larger than what was seen on previous study. 3.  A stable hypodense area is seen in the tail the pancreas. This could represent IPMN.   4.  There is no evidence for appendicitis, diverticulitis or bowel obstruction 5. There is presacral soft tissue density and stranding seen in the pelvis. This is new when compared to previous study. Query if patient may have had any radiation treatment due to endometrial cancer. Recommend further evaluation there is also full small amount of fluid. RECOMMENDATION: 1. Low does CT of the lungs, non emergently 2. It presacral soft tissue seen also there is a small amount of fluid. Possibility the of hemorrhage or of the etiology could be related to patient's history of endometrial cancer and any possibility of prior radiation treatment. Recommend follow-up. Also there is some irregularity in the sacrum and occult fracture is not excluded. This was discussed with JOSH Gallo, and CT of the pelvis was recommended. MRI BRAIN WO CONTRAST    Result Date: 12/15/2022  EXAMINATION: MRI OF THE BRAIN WITHOUT CONTRAST  12/15/2022 5:57 pm TECHNIQUE: Multiplanar multisequence MRI of the brain was performed without the administration of intravenous contrast. COMPARISON: Noncontrast CT head done earlier same day. HISTORY: ORDERING SYSTEM PROVIDED HISTORY: syncope atrial fibrillation TECHNOLOGIST PROVIDED HISTORY: Reason for exam:->syncope atrial fibrillation FINDINGS: INTRACRANIAL STRUCTURES/VENTRICLES: There is no acute infarct. No mass effect or midline shift. No evidence of an acute intracranial hemorrhage. Generalized cerebral and cerebellar volume loss. Diffuse ventricular prominence may relate to central volume loss. The sellar/suprasellar regions appear unremarkable. The normal signal voids within the major intracranial vessels appear maintained. The axial FLAIR images demonstrate pontine, bilateral periventricular and deep white matter signal hyperintensities which are nonspecific but commonly seen in the setting of chronic small vessel ischemic disease. 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  ORBITS: The visualized portion of the orbits demonstrate no acute abnormality. SINUSES: Small amount of nonspecific fluid in the bilateral mastoid air cells. The paranasal sinuses are clear. BONES/SOFT TISSUES: The bone marrow signal intensity appears normal. The soft tissues demonstrate no acute abnormality. Multilevel degenerative changes in the visualized spine. 1.  No acute intracranial abnormality per unenhanced brain MRI. No acute stroke. 2.  Moderate to severe chronic small vessel ischemic disease. 3.  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 MRI PELVIS WO CONTRAST    Result Date: 12/15/2022  EXAMINATION: MRI OF THE PELVIS WITHOUT CONTRAST, 12/15/2022 4:10 pm TECHNIQUE: Multiplanar multisequence MRI of the pelvis was performed without the administration of intravenous contrast. COMPARISON: CT abdomen and pelvis 12/15/2022 HISTORY: ORDERING SYSTEM PROVIDED HISTORY: endometrial thickening rule out sacral fracture TECHNOLOGIST PROVIDED HISTORY: Reason for exam:->endometrial thickening rule out sacral fracture What reading provider will be dictating this exam?->CRC FINDINGS: SOFT TISSUES: Small amount of presacral free fluid is identified, which is stable compared to the prior CT. No large hematoma is seen. BONE MARROW: Vertical fractures are identified through the bilateral sacral alae with associated transverse fracture of the sacral body. Fractures demonstrate classic H-shaped pattern. No hip fracture is identified. Joint alignment is intact. JOINTS: Moderate-severe bilateral hip degenerative changes noted with loss of the articular space. Small joint effusion is noted on the left hip. Multiple nondisplaced sacral fractures with a classic H-shaped pattern suggestive of sacral insufficiency fracture. Echocardiogram 8/15/18:  Conclusions   Summary   Normal LV and RV. No significant valve disease. Normal estimated PA pressure. Impaired diastolic relaxation. Negative bubble study for shunt.    Signature ----------------------------------------------------------------   Electronically signed by Agnieszka Still MD(Interpreting   physician) on 08/16/2018 07:14 AM   ----------------------------------------------------------------   Findings  Left Ventricle  Left ventricular ejection fraction is visually estimated at 60%. Normal left ventricular size and function. Normal left ventricular wall thickness. Impaired diastolic relaxation. Right Ventricle  Normal right ventricle structure and function. Normal right ventricle systolic pressure. Left Atrium  Normal left atrium. Right Atrium  Normal right atrium. Mitral Valve  Normal mitral valve structure and function. Tricuspid Valve  Normal tricuspid valve structure and function. Aortic Valve  Normal aortic valve structure and function. Trivial aortic insufficiency. Pulmonic Valve  Normal pulmonic valve structure and function. Pericardial Effusion  No evidence of pericardial effusion. Aorta \ Miscellaneous  The aorta is within normal limits. EKG 12/15/22: ?SR with PACs vs A-fib (significant artifact), nonspecific ST changes, QTc 459ms    Telemetry 12/16/22: SR 60s-70s, occasional PVC      Assessment:    Active Hospital Problems    Diagnosis Date Noted    A-fib [facility]) [I48.91] 12/15/2022     Priority: Medium     New onset PA-fib RVR--HR into 160s-180s yesterday evening, Currently SR with PVCs  Syncope with fall  Multiple nondisplaced sacral fractures per MRI pelvis on 12/15/22  Hypokalemia--replace  UTI  HTN  Dyslipidemia  Thrombocytopenia--plts 98 on 12/16/22    Plan:  Continue current medications-aspirin 81 mg p.o. daily, add Lopressor 25 mg p.o. twice daily, Crestor 40 mg p.o. nightly, Rocephin 1000 mg IV every 24 hours x5 doses, Lovenox 40 mg subcu daily  Continue with DVT prophylactic dose of Lovenox for now. May consider transitioning to full dose anticoagulation in future if recurrent/definitive A-fib noted.   Questionable long-term oral anticoagulation candidate secondary to advanced age and fall risk  Cardiac diet recommended  Check echocardiogram  Monitor on telemetry for any tachycardia or bradycardia arrhythmias  Maintain potassium greater than 4, magnesium greater than 2  GI/DVT prophylaxis  Hospitalist recommendations  Orthopedic surgery recommendations regarding mild nondisplaced sacral fractures  Conservative medical therapy from a cardiac standpoint at this time given advanced age and multiple nondisplaced sacral fractures. May consider noninvasive ischemic evaluation in future as outpatient  Further recommendations to follow            Electronically signed by JOSH Lucas on 12/16/2022 at 10:57 AM    Attending Supervising [de-identified] Attestation Statement  The patient is a 80 y.o. female. I have performed a history and physical examination of the patient. I discussed the case with the physician assistant. I reviewed the patient's Past Medical History, Past Surgical History, Medications, and Allergies. Physical Exam:  Vitals:    12/16/22 0454 12/16/22 0920 12/16/22 1443 12/16/22 1532   BP: (!) 151/70 (!) 160/70  (!) 185/81   Pulse: 81 72  77   Resp: 18 17  18   Temp: 97.3 °F (36.3 °C) 98.1 °F (36.7 °C)  97.9 °F (36.6 °C)   TempSrc: Oral Oral  Oral   SpO2: 94% 96%  100%   Weight:   139 lb 8 oz (63.3 kg)    Height:           Review of Systems - dementia   Pulmonary/Chest: clear to auscultation bilaterally- no wheezes, rales or rhonchi, normal air movement, no respiratory distress  Cardiovascular: normal rate, normal S1 and S2, no gallops, intact distal pulses, and no carotid bruits  Abdomen: soft, non-tender, non-distended, normal bowel sounds, no masses or organomegaly    Active Hospital Problems    Diagnosis Date Noted    A-fib [facility]) [I48.91] 12/15/2022     Priority: Medium        I reviewed and agree with the findings and plan documented in her note . Impression    No clear evidence of atrial fibrillation. Currently normal sinus rhythm. Questionable 1 episode when heart rate elevated to 160s. Syncope with fall  Multiple nondisplaced sacral fractures per MRI pelvis on 12/15/22  Hypokalemia--replace  UTI  HTN  Dyslipidemia  Thrombocytopenia--plts 98 on 12/16/22      Plan     No clear evidence of atrial fibrillation. Would hold off of full dose anticoagulation at this time. If has persistent episodes of atrial fibrillation or recurrent episodes then will consider initiating. Questionable long-term oral anticoagulation candidate secondary to advanced age as well as fall risk. DVT prophylactic dose for now  Continue current medications-aspirin 81 mg p.o. daily, , Crestor 40 mg p.o. nightly, Rocephin 1000 mg IV every 24 hours x5 doses, Lovenox 40 mg subcu daily  add Lopressor 25 mg p.o. twice daily  Cardiac diet recommended  Check echocardiogram  Monitor on telemetry for any tachycardia or bradycardia arrhythmias  Maintain potassium greater than 4, magnesium greater than 2  GI/DVT prophylaxis  Hospitalist recommendations  Orthopedic surgery recommendations regarding mild nondisplaced sacral fractures  Conservative medical therapy from a cardiac standpoint at this time given advanced age and multiple nondisplaced sacral fractures.   May consider noninvasive ischemic evaluation in future as outpatient  Further recommendations to follow         Electronically signed by Enriqueta Townsend DO on 12/16/22 at 4:05 PM EST

## 2022-12-16 NOTE — ACP (ADVANCE CARE PLANNING)
Advance Care Planning     Advance Care Planning Activator (Inpatient)  Conversation Note      Date of ACP Conversation: 12/15/2022     Conversation Conducted with: Patient with Decision Making Capacity    ACP Activator: Ebony Puckett RN        Health Care Decision Maker:     Current Designated Health Care Decision Maker:     Primary Decision Maker: Twyla Klinefelter - Child - [phone]    Secondary Decision Maker: Glenis Kenny - Child - [phone]    Supplemental (Other) Decision Maker: Juan Ardon Spouse - [phone]    Care Preferences    Ventilation: \"If you were in your present state of health and suddenly became very ill and were unable to breathe on your own, what would your preference be about the use of a ventilator (breathing machine) if it were available to you? \"      Would the patient desire the use of ventilator (breathing machine)?: yes    \"If your health worsens and it becomes clear that your chance of recovery is unlikely, what would your preference be about the use of a ventilator (breathing machine) if it were available to you? \"     Would the patient desire the use of ventilator (breathing machine)?: states this would change her opinion. Resuscitation  \"CPR works best to restart the heart when there is a sudden event, like a heart attack, in someone who is otherwise healthy. Unfortunately, CPR does not typically restart the heart for people who have serious health conditions or who are very sick. \"    \"In the event your heart stopped as a result of an underlying serious health condition, would you want attempts to be made to restart your heart (answer \"yes\" for attempt to resuscitate) or would you prefer a natural death (answer \"no\" for do not attempt to resuscitate)? \" yes       [x] Yes   [] No   Educated Patient / Amelia May regarding differences between Advance Directives and portable DNR orders.     Length of ACP Conversation in minutes:  10    Conversation Outcomes:  [x] ACP discussion completed  [] Existing advance directive reviewed with patient; no changes to patient's previously recorded wishes  [] New Advance Directive completed  [] Portable Do Not Rescitate prepared for Provider review and signature  [] POLST/POST/MOLST/MOST prepared for Provider review and signature      Follow-up plan:    [] Schedule follow-up conversation to continue planning  [] Referred individual to Provider for additional questions/concerns   [] Advised patient/agent/surrogate to review completed ACP document and update if needed with changes in condition, patient preferences or care setting    [x] This note routed to one or more involved healthcare providers

## 2022-12-16 NOTE — CONSULTS
Consult Note  Patient: Soco Grant  Unit/Bed: M241/M596-46  YOB: 1935  MRN: [id number]  Acct: [de-identified]   Admitting Diagnosis: A-fib ([facility] [I48.91]  General weakness [R53.1]  Urinary tract infection without hematuria, site unspecified [N39.0]  Syncope, unspecified syncope type [R55]  Atrial fibrillation, unspecified type [facility]) [I48.91]  Date:  12/15/2022  Hospital Day: 1    Complaint:  Generalized weakness with syncope and fall    Requesting Physician: Dr. Crystal Eduardo    History of Present Illness:  Patient is a 80year old female with history of arthritis, endometrial cancer, HTN, hyperlipidemia, prurigo nodularis, sciatic, and vit d deficiency. Patient presented to the ER yesterday with complaints of syncope. She was standing in the kitchen getting coffee and was found on the kitchen floor by  when he returned home. She complains of left sided hip pain. Imaging performed in the ER shown patient to have sacral fracture in which orthopedics was consulted for evaluation and treatment recommendations. Patient reports mild pain to pelvis left greater than right. She denies any numbness or tingling to lower extremities. She denies any pain radiation and pain is exacerbated by movement.        PMHx:  Past Medical History:   Diagnosis Date    Actinic keratoses     Arthritis     Dermatitis     Encounter for annual health examination 12/19/2011    Endometrial cancer ([facility]     s/p hyst in 2009 - was told clear by Dr. 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    left lower leg    Sciatica     Vitamin D deficiency        PSHx:  Past Surgical History:   Procedure Laterality Date    CATARACT REMOVAL WITH IMPLANT      HYSTERECTOMY (CERVIX STATUS UNKNOWN)  9/2009    post-menopausal bleeding       Social Hx:  Social History     Socioeconomic History    Marital status:  Tobacco Use    Smoking status: Never    Smokeless tobacco: Never   Substance and Sexual Activity    Alcohol use: No    Drug use: No     Social Determinants of Health     Financial Resource Strain: Low Risk     Difficulty of Paying Living Expenses: Not hard at all   Food Insecurity: No Food Insecurity    Worried About Running Out of Food in the Last Year: Never true    Ran Out of Food in the Last Year: Never true       Family Hx:  Family History   Problem Relation Age of Onset    Coronary Art Dis Father     Other Father         glaucoma    Coronary Art Dis Brother        Review of Systems:   Review of Systems   Constitutional:  Negative for appetite change and fever. HENT:  Negative for drooling, ear pain, sore throat, trouble swallowing and voice change. Respiratory:  Negative for cough and shortness of breath. Cardiovascular:  Negative for chest pain. Gastrointestinal:  Negative for abdominal pain, constipation, diarrhea, nausea and vomiting. Genitourinary:  Negative for decreased urine volume and dysuria. Musculoskeletal:  Positive for arthralgias and gait problem. Negative for back pain. Skin:  Negative for color change. Neurological:  Negative for dizziness, weakness, light-headedness and headaches. Psychiatric/Behavioral:  Negative for agitation and behavioral problems. All other systems reviewed and are negative. Physical Examination:    BP (!) 160/70   Pulse 72   Temp 98.1 °F (36.7 °C) (Oral)   Resp 17   Ht 5' 8\" (1.727 m)   Wt 149 lb 14.6 oz (68 kg)   LMP  (LMP Unknown)   SpO2 96%   BMI 22.79 kg/m²    Physical Exam  Vitals and nursing note reviewed. Constitutional:       General: She is not in acute distress. Appearance: Normal appearance. She is well-developed. HENT:      Head: Normocephalic and atraumatic. Nose: Nose normal.      Mouth/Throat:      Mouth: Mucous membranes are moist.   Eyes:      General:         Right eye: No discharge.          Left eye: No discharge. Conjunctiva/sclera: Conjunctivae normal.   Neck:      Vascular: No JVD. Trachea: No tracheal deviation. Cardiovascular:      Rate and Rhythm: Normal rate. Pulmonary:      Effort: Pulmonary effort is normal.   Abdominal:      Palpations: Abdomen is soft. Musculoskeletal:         General: Tenderness (left buttock) present. Normal range of motion. Cervical back: Normal range of motion and neck supple. No rigidity. No muscular tenderness. Lymphadenopathy:      Cervical: No cervical adenopathy. Skin:     General: Skin is warm and dry. Capillary Refill: Capillary refill takes less than 2 seconds. Neurological:      Mental Status: She is alert and oriented to person, place, and time.    Psychiatric:         Mood and Affect: Mood normal.         Behavior: Behavior normal.       LABS:  CBC:   Lab Results   Component Value Date/Time    WBC 8.8 12/16/2022 05:15 AM    RBC 3.72 12/16/2022 05:15 AM    HGB 11.9 12/16/2022 05:15 AM    HCT 35.7 12/16/2022 05:15 AM    MCV 96.0 12/16/2022 05:15 AM    MCH 32.0 12/16/2022 05:15 AM    MCHC 33.3 12/16/2022 05:15 AM    RDW 14.7 12/16/2022 05:15 AM    PLT 98 12/16/2022 05:15 AM    MPV 8.6 02/14/2014 08:32 AM     CBC with Differential:    Lab Results   Component Value Date/Time    WBC 8.8 12/16/2022 05:15 AM    RBC 3.72 12/16/2022 05:15 AM    HGB 11.9 12/16/2022 05:15 AM    HCT 35.7 12/16/2022 05:15 AM    PLT 98 12/16/2022 05:15 AM    MCV 96.0 12/16/2022 05:15 AM    MCH 32.0 12/16/2022 05:15 AM    MCHC 33.3 12/16/2022 05:15 AM    RDW 14.7 12/16/2022 05:15 AM    LYMPHOPCT 4.3 12/16/2022 05:15 AM    MONOPCT 7.9 12/16/2022 05:15 AM    BASOPCT 0.4 12/16/2022 05:15 AM    MONOSABS 0.7 12/16/2022 05:15 AM    LYMPHSABS 0.4 12/16/2022 05:15 AM    EOSABS 0.0 12/16/2022 05:15 AM    BASOSABS 0.0 12/16/2022 05:15 AM     CMP:    Lab Results   Component Value Date/Time     12/16/2022 05:15 AM    K 3.3 12/16/2022 05:15 AM     12/16/2022 05:15 AM CO2 24 12/16/2022 05:15 AM    BUN 18 12/16/2022 05:15 AM    CREATININE 0.73 12/16/2022 05:15 AM    GFRAA >60.0 04/12/2022 09:23 AM    LABGLOM >60.0 12/16/2022 05:15 AM    GLUCOSE 118 12/16/2022 05:15 AM    GLUCOSE 87 12/27/2011 08:41 AM    PROT 6.6 12/15/2022 11:45 AM    LABALBU 3.9 12/15/2022 11:45 AM    LABALBU 4.3 12/27/2011 08:41 AM    CALCIUM 8.8 12/16/2022 05:15 AM    BILITOT 0.6 12/15/2022 11:45 AM    ALKPHOS 83 12/15/2022 11:45 AM    AST 19 12/15/2022 11:45 AM    ALT 17 12/15/2022 11:45 AM     BMP:    Lab Results   Component Value Date/Time     12/16/2022 05:15 AM    K 3.3 12/16/2022 05:15 AM     12/16/2022 05:15 AM    CO2 24 12/16/2022 05:15 AM    BUN 18 12/16/2022 05:15 AM    LABALBU 3.9 12/15/2022 11:45 AM    LABALBU 4.3 12/27/2011 08:41 AM    CREATININE 0.73 12/16/2022 05:15 AM    CALCIUM 8.8 12/16/2022 05:15 AM    GFRAA >60.0 04/12/2022 09:23 AM    LABGLOM >60.0 12/16/2022 05:15 AM    GLUCOSE 118 12/16/2022 05:15 AM    GLUCOSE 87 12/27/2011 08:41 AM     Magnesium:  Lab Results   Component Value Date/Time    MG 2.1 12/16/2022 05:15 AM     Troponin:    Lab Results   Component Value Date/Time    TROPONINI <0.010 12/15/2022 11:45 AM           Assessment:    Active Hospital Problems    Diagnosis Date Noted    A-fib [facility]) [I48.91] 12/15/2022     Priority: Medium     80year old female admitted to hospital with a syncopal episode causing her to fall. She presented with left sided pain and imaging reviewed and is consistent with sacral insufficiency fracture. This type of fracture will go onto heal on it's own and does not require any orthopedic surgical intervention. Her pelvis is stable. She has some pain with palpation to left groin. She is able to move bilateral lower extremities without difficulty and able to perform straight leg raise. She does not have any pain with log rolling of her lower extremities.      She may WBAT to bilateral lower extremities and can follow up as an outpatient in the orthopedic clinic in four weeks. Thank you for this consultation and allowing us to be a part of this patient's care. Orthopedics will sign off. Please call for any questions or concerns.      Plan:  WBAT to bilateral lower extremities  Follow up as an outpatient in four weeks with orthopedics             Electronically signed by YUSUF Daniels CNP on 12/16/2022 at 10:49 AM

## 2022-12-16 NOTE — PROGRESS NOTES
Comprehensive Nutrition Assessment    Type and Reason for Visit:  Initial, Consult (poor po > 5 days)    Nutrition Recommendations/Plan:   Continue with General diet as tolerated  Counseled on importance of adequate energy & protein intake, for recovery and disease management, with emphasis on nutrient rich food choices      Malnutrition Assessment:  Malnutrition Status: Moderate malnutrition (12/16/22 1516)    Context:  Social/Environmental Circumstances     Findings of the 6 clinical characteristics of malnutrition:  Energy Intake:  Less than 75% estimated energy requirements for 3 months or longer  Weight Loss:  Greater than 7.5% over 3 months     Body Fat Loss:  Mild body fat loss Triceps   Muscle Mass Loss:  Severe muscle mass loss Clavicles (pectoralis & deltoids), Calf (gastrocnemius)  Fluid Accumulation:  No significant fluid accumulation     Strength:  Not Performed    Nutrition Assessment:    Pt admitted with mild-moderate malnutrition, related to inadequate energy/protein intake at home due to generalized anorexia, diet history obtained, Pt not interested in trying an oral nutrition supplement at this time, reviewed high calorie high protein menu options    Nutrition Related Findings:    \"history of arthritis, endometrial cancer, HTN, hyperlipidemia, vit d deficiency. Admitted for syncope. ..complains of left sided hip pain. +  sacral fracture (12/15) HR up to 180's. Rapid response called. ., labs noted, meds reviewed, denies any GI &/or nutrition related complaints, ' just not hungry' denies recent illness or medication adjustments Wound Type: None       Current Nutrition Intake & Therapies:    Average Meal Intake: 26-50%  Average Supplements Intake: None Ordered  ADULT DIET; Regular    Anthropometric Measures:  Height: 5' 8\" (172.7 cm)  Ideal Body Weight (IBW): 140 lbs (64 kg)    Admission Body Weight: 149 lb (67.6 kg)  Current Body Weight: 139 lb 8 oz (63.3 kg) (12/16),   ~100% IBW.  Weight Source: Bed Scale  Current BMI (kg/m2): 21.2  Usual Body Weight: 158 lb (71.7 kg) ((1/2021) ; 150# ( 7/22) & ( 10/22))  % Weight Change (Calculated): -11.7                    BMI Categories: Underweight (BMI less than 22) age over 72    Estimated Daily Nutrient Needs:  Energy Requirements Based On: Kcal/kg  Weight Used for Energy Requirements: Current  Energy (kcal/day): 4218-2041 kcals @ 25-28 kcal/kg  Weight Used for Protein Requirements: Current  Protein (g/day): ~ 82 g protein @ 1.3 g/kg     Fluid (ml/day): ~1800    Nutrition Diagnosis:   Moderate malnutrition, In context of social or environmental circumstances related to inadequate protein-energy intake as evidenced by other (comment) (anorexia)    Nutrition Interventions:   Food and/or Nutrient Delivery: Continue Current Diet  Nutrition Education/Counseling: Education initiated  Coordination of Nutrition Care: Continue to monitor while inpatient       Goals:     Goals: PO intake 50% or greater, by next RD assessment       Nutrition Monitoring and Evaluation:   Behavioral-Environmental Outcomes: None Identified  Food/Nutrient Intake Outcomes: Food and Nutrient Intake  Physical Signs/Symptoms Outcomes: Meal Time Behavior, Weight    Discharge Planning:    No discharge needs at this time     Laura Moore, DELBERT, LD

## 2022-12-16 NOTE — DISCHARGE INSTRUCTIONS
Weight bearing as tolerated to both lower extremities  Follow up in four weeks in the outpatient orthopedic clinic

## 2022-12-16 NOTE — PROGRESS NOTES
Progress Note  Date:2022       Room:W174/W174-01  Patient Alannah Guzman     YOB: 1935     Age:87 y.o. Subjective      Rapid response yesterday evening approximately 8 PM, patient delirious on arrival to medical floor from MRI scans of head and pelvis, heart rate reportedly ~180s briefly before falling to 110s-120s, for which she received IV Lopressor with good rate control. MRI pelvis revealing bilateral sacral \"H fracture\", for which orthopedic surgery was consulted. Patient remains in atrial fibrillation without RVR this morning. Did not sleep overnight until ~5-6 AM per nursing. Awakens easily to voice this morning, slightly confused but significantly less so than previous day during time of admission. Objective         Vitals Last 24 Hours:  TEMPERATURE:  Temp  Av.7 °F (36.5 °C)  Min: 97.3 °F (36.3 °C)  Max: 98.1 °F (36.7 °C)  RESPIRATIONS RANGE: Resp  Av  Min: 16  Max: 28  PULSE OXIMETRY RANGE: SpO2  Av %  Min: 94 %  Max: 100 %  PULSE RANGE: Pulse  Av.5  Min: 72  Max: 133  BLOOD PRESSURE RANGE: Systolic (85XUB), VXD:382 , Min:123 , AVQ:123   ; Diastolic (46SPT), DEJ:17, Min:70, Max:121    I/O (24Hr): Intake/Output Summary (Last 24 hours) at 2022 1006  Last data filed at 12/15/2022 2018  Gross per 24 hour   Intake 10 ml   Output --   Net 10 ml     Objective  Constitutional: Frail elderly adult female reclining in bed no acute distress  Head: NCAT  Eyes: PERRLA, EOMI. Arcus senilis bilaterally. ENT: Hearing grossly intact. No rhinorrhea. MMM. Neck: Trachea midline, phonation normal  Cardiovascular: Irregularly irregular rhythm with controlled rate. No significant murmurs appreciated. No significant pretibial edema. Pulmonary: Normal rate and effort respiration on O2 by NC. Grossly CTA B. No coughing during exam.  Abdomen: Soft, nontense, nondistended.   Mild suprapubic urgency provocation on palpation, but generally nontender. Neurologic: Alert, slightly disoriented but significantly less encephalopathic than previous evening. A&O 2-3. No lower extremity strength testing in setting of sacral fractures. Otherwise mild generalized nonfocal weakness. MSK/integumentary: Generalized nonfocal pelvic discomfort, provoked with any movement. Small scattered ecchymoses in various stages of healing. No other gross bony abnormalities appreciated      Labs/Imaging/Diagnostics    Labs:  CBC:  Recent Labs     12/15/22  1145 12/16/22  0515   WBC 10.8 8.8   RBC 3.95* 3.72*   HGB 12.8 11.9*   HCT 38.3 35.7*   MCV 97.0* 96.0*   RDW 14.9* 14.7*   * 98*     CHEMISTRIES:  Recent Labs     12/15/22  1145 12/16/22  0515    139   K 3.7 3.3*    101   CO2 29 24   BUN 15 18   CREATININE 0.77 0.73   GLUCOSE 132* 118*   MG  --  2.1     PT/INR:  Recent Labs     12/15/22  1145   PROTIME 13.6   INR 1.0     APTT:No results for input(s): APTT in the last 72 hours. LIVER PROFILE:  Recent Labs     12/15/22  1145   AST 19   ALT 17   BILITOT 0.6   ALKPHOS 83       Imaging Last 24 Hours:  CT Head W/O Contrast    Result Date: 12/15/2022  EXAMINATION: CT OF THE HEAD WITHOUT CONTRAST  12/15/2022 12:06 pm TECHNIQUE: CT of the head was performed without the administration of intravenous contrast. Automated exposure control, iterative reconstruction, and/or weight based adjustment of the mA/kV was utilized to reduce the radiation dose to as low as reasonably achievable. COMPARISON: None. HISTORY: ORDERING SYSTEM PROVIDED HISTORY: syncope TECHNOLOGIST PROVIDED HISTORY: Reason for exam:->syncope Has a \"code stroke\" or \"stroke alert\" been called? ->No Decision Support Exception - unselect if not a suspected or confirmed emergency medical condition->Emergency Medical Condition (MA) What reading provider will be dictating this exam?->CRC FINDINGS: BRAIN/VENTRICLES: There is no acute intracranial hemorrhage, mass effect or midline shift.   No abnormal extra-axial fluid collection. The gray-white differentiation is maintained without evidence of an acute infarct. There is no evidence of hydrocephalus. The ventricles, cisterns and sulci are prominent consistent with atrophy. There is decreased attenuation within the periventricular white matter consistent with periventricular leukomalacia. There are old lacunar infarcts seen within the right and left basal ganglia. ORBITS: The visualized portion of the orbits demonstrate no acute abnormality. SINUSES: The visualized paranasal sinuses and mastoid air cells demonstrate no acute abnormality. SOFT TISSUES/SKULL:  No acute abnormality of the visualized skull or soft tissues. 1. There is no acute intracranial abnormality. Specifically, there is no intracranial hemorrhage. 2. Atrophy and periventricular leukomalacia,     CT ABDOMEN PELVIS W IV CONTRAST Additional Contrast? None    Result Date: 12/15/2022  EXAMINATION: ONE XRAY VIEW OF THE CHEST; CT OF THE ABDOMEN AND PELVIS WITH CONTRAST 12/15/2022 12:22 pm COMPARISON: Chest x-ray, October 19 2022; CT abdomen pelvis, September 25, 2018 HISTORY: ORDERING SYSTEM PROVIDED HISTORY: sob TECHNOLOGIST PROVIDED HISTORY: Reason for exam:->sob What reading provider will be dictating this exam?->CRC FINDINGS: There also is a small amount of fluid. There is coarsening of the interstitium. The lungs are hyperinflated. The cardiac silhouette is within normal limits. Atherosclerotic calcifications are seen thoracic aorta. No consolidation, pneumothorax or pleural effusion seen. Degenerative changes are seen in the visualized portion of both shoulders and also spine. CT of the abdomen and pelvis: Lungs: In the visualized bases of the lungs a 5.7 mm pleural base nodule is seen posteriorly on the left (series 2, image 1). A 7.7 mm nodule is seen posteriorly in the right lower lobe that is pleural based as well. (Series 2, image 20).   No consolidating pneumonia, pneumothorax or pleural effusion is seen. Changes of some emphysema are visualized. Organs: The liver, pancreas, right adrenal gland and liver are unremarkable. There are calcifications seen in the liver and also there is a calcified splenic artery again seen that is unchanged from previous study. In the area of the left adrenal gland there is a 1.7 cm hypodense thick wall cystic area. This was also seen on previous study. In the region of the tail of the pancreas a hypodense area is seen that measures 1.2 cm. This area measures fluid density on this examination. This could be an IPMN in. This was vaguely seen on previous study and also appears stable. Both kidneys show no evidence of hydronephrosis. No renal calculus is visualized. Prominent atherosclerotic calcifications are visualized. GI/bowel: No dilated loops of bowel are seen. The appendix is visualized and is unremarkable. Pelvis: In the pre sacral region there is soft tissue density posterior to the bowel that is thickened. A superior there also is some stranding visualized. The uterus is not seen. There also is a small amount of fluid. The bladder distends normally. Bones: No destructive bony lesions are seen. Slight irregularity is seen in the lower sacral region on the left. 0.82 degenerative changes are seen in both hips and is most prominent on the right. Chest x-ray: No evidence for acute cardiopulmonary process. CT of the abdomen and pelvis: 1.  2 nodules are seen in the visualized bases of the lungs. The nodule in the right lower lobe was seen on previous study but was smaller in size. There are also emphysematous changes seen in the visualized bases. Numeral 2. Hypodense lesion is seen in the region of the left adrenal gland. This could be an adrenal nodule of the possibility is necrotic lymph node . It is slightly larger than what was seen on previous study. 3.  A stable hypodense area is seen in the tail the pancreas.   This could represent IPMN. 4.  There is no evidence for appendicitis, diverticulitis or bowel obstruction 5. There is presacral soft tissue density and stranding seen in the pelvis. This is new when compared to previous study. Query if patient may have had any radiation treatment due to endometrial cancer. Recommend further evaluation there is also full small amount of fluid. RECOMMENDATION: 1. Low does CT of the lungs, non emergently 2. It presacral soft tissue seen also there is a small amount of fluid. Possibility the of hemorrhage or of the etiology could be related to patient's history of endometrial cancer and any possibility of prior radiation treatment. Recommend follow-up. Also there is some irregularity in the sacrum and occult fracture is not excluded. This was discussed with JOSH Gallo, and CT of the pelvis was recommended. XR CHEST PORTABLE    Result Date: 12/15/2022  EXAMINATION: ONE XRAY VIEW OF THE CHEST; CT OF THE ABDOMEN AND PELVIS WITH CONTRAST 12/15/2022 12:22 pm COMPARISON: Chest x-ray, October 19 2022; CT abdomen pelvis, September 25, 2018 HISTORY: ORDERING SYSTEM PROVIDED HISTORY: sob TECHNOLOGIST PROVIDED HISTORY: Reason for exam:->sob What reading provider will be dictating this exam?->CRC FINDINGS: There also is a small amount of fluid. There is coarsening of the interstitium. The lungs are hyperinflated. The cardiac silhouette is within normal limits. Atherosclerotic calcifications are seen thoracic aorta. No consolidation, pneumothorax or pleural effusion seen. Degenerative changes are seen in the visualized portion of both shoulders and also spine. CT of the abdomen and pelvis: Lungs: In the visualized bases of the lungs a 5.7 mm pleural base nodule is seen posteriorly on the left (series 2, image 1). A 7.7 mm nodule is seen posteriorly in the right lower lobe that is pleural based as well. (Series 2, image 20).   No consolidating pneumonia, pneumothorax or pleural effusion is seen.  Changes of some emphysema are visualized. Organs: The liver, pancreas, right adrenal gland and liver are unremarkable. There are calcifications seen in the liver and also there is a calcified splenic artery again seen that is unchanged from previous study. In the area of the left adrenal gland there is a 1.7 cm hypodense thick wall cystic area. This was also seen on previous study. In the region of the tail of the pancreas a hypodense area is seen that measures 1.2 cm. This area measures fluid density on this examination. This could be an IPMN in. This was vaguely seen on previous study and also appears stable. Both kidneys show no evidence of hydronephrosis. No renal calculus is visualized. Prominent atherosclerotic calcifications are visualized. GI/bowel: No dilated loops of bowel are seen. The appendix is visualized and is unremarkable. Pelvis: In the pre sacral region there is soft tissue density posterior to the bowel that is thickened. A superior there also is some stranding visualized. The uterus is not seen. There also is a small amount of fluid. The bladder distends normally. Bones: No destructive bony lesions are seen. Slight irregularity is seen in the lower sacral region on the left. 0.82 degenerative changes are seen in both hips and is most prominent on the right. Chest x-ray: No evidence for acute cardiopulmonary process. CT of the abdomen and pelvis: 1.  2 nodules are seen in the visualized bases of the lungs. The nodule in the right lower lobe was seen on previous study but was smaller in size. There are also emphysematous changes seen in the visualized bases. Numeral 2. Hypodense lesion is seen in the region of the left adrenal gland. This could be an adrenal nodule of the possibility is necrotic lymph node . It is slightly larger than what was seen on previous study. 3.  A stable hypodense area is seen in the tail the pancreas. This could represent IPMN.   4.  There is no evidence for appendicitis, diverticulitis or bowel obstruction 5. There is presacral soft tissue density and stranding seen in the pelvis. This is new when compared to previous study. Query if patient may have had any radiation treatment due to endometrial cancer. Recommend further evaluation there is also full small amount of fluid. RECOMMENDATION: 1. Low does CT of the lungs, non emergently 2. It presacral soft tissue seen also there is a small amount of fluid. Possibility the of hemorrhage or of the etiology could be related to patient's history of endometrial cancer and any possibility of prior radiation treatment. Recommend follow-up. Also there is some irregularity in the sacrum and occult fracture is not excluded. This was discussed with JOSH Gallo, and CT of the pelvis was recommended. MRI BRAIN WO CONTRAST    Result Date: 12/15/2022  EXAMINATION: MRI OF THE BRAIN WITHOUT CONTRAST  12/15/2022 5:57 pm TECHNIQUE: Multiplanar multisequence MRI of the brain was performed without the administration of intravenous contrast. COMPARISON: Noncontrast CT head done earlier same day. HISTORY: ORDERING SYSTEM PROVIDED HISTORY: syncope atrial fibrillation TECHNOLOGIST PROVIDED HISTORY: Reason for exam:->syncope atrial fibrillation FINDINGS: INTRACRANIAL STRUCTURES/VENTRICLES: There is no acute infarct. No mass effect or midline shift. No evidence of an acute intracranial hemorrhage. Generalized cerebral and cerebellar volume loss. Diffuse ventricular prominence may relate to central volume loss. The sellar/suprasellar regions appear unremarkable. The normal signal voids within the major intracranial vessels appear maintained. The axial FLAIR images demonstrate pontine, bilateral periventricular and deep white matter signal hyperintensities which are nonspecific but commonly seen in the setting of chronic small vessel ischemic disease. 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  ORBITS: The visualized portion of the orbits demonstrate no acute abnormality. SINUSES: Small amount of nonspecific fluid in the bilateral mastoid air cells. The paranasal sinuses are clear. BONES/SOFT TISSUES: The bone marrow signal intensity appears normal. The soft tissues demonstrate no acute abnormality. Multilevel degenerative changes in the visualized spine. 1.  No acute intracranial abnormality per unenhanced brain MRI. No acute stroke. 2.  Moderate to severe chronic small vessel ischemic disease. 3.  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 MRI PELVIS WO CONTRAST    Result Date: 12/15/2022  EXAMINATION: MRI OF THE PELVIS WITHOUT CONTRAST, 12/15/2022 4:10 pm TECHNIQUE: Multiplanar multisequence MRI of the pelvis was performed without the administration of intravenous contrast. COMPARISON: CT abdomen and pelvis 12/15/2022 HISTORY: ORDERING SYSTEM PROVIDED HISTORY: endometrial thickening rule out sacral fracture TECHNOLOGIST PROVIDED HISTORY: Reason for exam:->endometrial thickening rule out sacral fracture What reading provider will be dictating this exam?->CRC FINDINGS: SOFT TISSUES: Small amount of presacral free fluid is identified, which is stable compared to the prior CT. No large hematoma is seen. BONE MARROW: Vertical fractures are identified through the bilateral sacral alae with associated transverse fracture of the sacral body. Fractures demonstrate classic H-shaped pattern. No hip fracture is identified. Joint alignment is intact. JOINTS: Moderate-severe bilateral hip degenerative changes noted with loss of the articular space. Small joint effusion is noted on the left hip. Multiple nondisplaced sacral fractures with a classic H-shaped pattern suggestive of sacral insufficiency fracture.      Assessment//Plan           Hospital Problems             Last Modified POA    * (Principal) A-fib ([address] 12/15/2022 Yes     Assessment & Plan    Acute problems:  Syncope versus ground-level fall  Urinary tract infection  New onset atrial fibrillation with intermittent RVR  Bilateral sacral fractures s/p fall    Chronic problems:  Suspected malnutrition, type/severity pending dietitian evaluation  Hypertension  Lung nodules (stable since previous)  Hx endometrial cancer    Plan:  Admitted to inpatient status 12/15  CT head negative for acute bony injury  MRI brain negative for acute CVA  MRI pelvis positive for bilateral sacral \"H fracture\", for which orthopedic surgery was consulted  Pain control PRN. So far, no significant pelvic pain with relatively flat positioning, when not moving. New onset atrial fibrillation, with intermittent RVR since admission, for which cardiology is consulted. Defer AC to cardiology in setting of both new onset A. fib and bilateral sacral fractures  Carotid Dopplers in setting of possible syncope  Pt unable to perform Orthostatic BP&P in setting of sacral fractures. Antibiotic for UTI. Culture pending. Previous cultures with E. coli.   No lower extremity physical therapy, pending orthopedic surgery evaluation and recommendations    Electronically signed by Bre Sweet DO on 12/16/22 at 10:06 AM EST

## 2022-12-16 NOTE — PLAN OF CARE
Nutrition Problem #1: Moderate malnutrition, In context of social or environmental circumstances  Intervention: Food and/or Nutrient Delivery: Continue Current Diet  Nutritional

## 2022-12-17 LAB
ANION GAP SERPL CALCULATED.3IONS-SCNC: 16 MEQ/L (ref 9–15)
BASOPHILS ABSOLUTE: 0.1 K/UL (ref 0–0.2)
BASOPHILS RELATIVE PERCENT: 0.6 %
BUN BLDV-MCNC: 20 MG/DL (ref 8–23)
CALCIUM SERPL-MCNC: 9.1 MG/DL (ref 8.5–9.9)
CHLORIDE BLD-SCNC: 100 MEQ/L (ref 95–107)
CO2: 21 MEQ/L (ref 20–31)
CREAT SERPL-MCNC: 0.57 MG/DL (ref 0.5–0.9)
EOSINOPHILS ABSOLUTE: 0 K/UL (ref 0–0.7)
EOSINOPHILS RELATIVE PERCENT: 0.2 %
GFR SERPL CREATININE-BSD FRML MDRD: >60 ML/MIN/{1.73_M2}
GLUCOSE BLD-MCNC: 128 MG/DL (ref 70–99)
HCT VFR BLD CALC: 36.4 % (ref 37–47)
HEMOGLOBIN: 12.2 G/DL (ref 12–16)
LYMPHOCYTES ABSOLUTE: 0.4 K/UL (ref 1–4.8)
LYMPHOCYTES RELATIVE PERCENT: 3.9 %
MAGNESIUM: 1.9 MG/DL (ref 1.7–2.4)
MCH RBC QN AUTO: 32.4 PG (ref 27–31.3)
MCHC RBC AUTO-ENTMCNC: 33.5 % (ref 33–37)
MCV RBC AUTO: 96.6 FL (ref 79.4–94.8)
MONOCYTES ABSOLUTE: 0.7 K/UL (ref 0.2–0.8)
MONOCYTES RELATIVE PERCENT: 7.9 %
NEUTROPHILS ABSOLUTE: 8.2 K/UL (ref 1.4–6.5)
NEUTROPHILS RELATIVE PERCENT: 87.4 %
ORGANISM: ABNORMAL
PDW BLD-RTO: 14.6 % (ref 11.5–14.5)
PLATELET # BLD: 98 K/UL (ref 130–400)
POTASSIUM SERPL-SCNC: 3.3 MEQ/L (ref 3.4–4.9)
RBC # BLD: 3.77 M/UL (ref 4.2–5.4)
SODIUM BLD-SCNC: 137 MEQ/L (ref 135–144)
URINE CULTURE, ROUTINE: ABNORMAL
URINE CULTURE, ROUTINE: ABNORMAL
WBC # BLD: 9.4 K/UL (ref 4.8–10.8)

## 2022-12-17 PROCEDURE — 83735 ASSAY OF MAGNESIUM: CPT

## 2022-12-17 PROCEDURE — 36415 COLL VENOUS BLD VENIPUNCTURE: CPT

## 2022-12-17 PROCEDURE — 6370000000 HC RX 637 (ALT 250 FOR IP): Performed by: PHYSICIAN ASSISTANT

## 2022-12-17 PROCEDURE — 99232 SBSQ HOSP IP/OBS MODERATE 35: CPT | Performed by: INTERNAL MEDICINE

## 2022-12-17 PROCEDURE — 85025 COMPLETE CBC W/AUTO DIFF WBC: CPT

## 2022-12-17 PROCEDURE — 6360000002 HC RX W HCPCS: Performed by: CLINICAL NURSE SPECIALIST

## 2022-12-17 PROCEDURE — 6370000000 HC RX 637 (ALT 250 FOR IP): Performed by: CLINICAL NURSE SPECIALIST

## 2022-12-17 PROCEDURE — 6360000002 HC RX W HCPCS: Performed by: INTERNAL MEDICINE

## 2022-12-17 PROCEDURE — 2060000000 HC ICU INTERMEDIATE R&B

## 2022-12-17 PROCEDURE — 80048 BASIC METABOLIC PNL TOTAL CA: CPT

## 2022-12-17 PROCEDURE — 2580000003 HC RX 258: Performed by: CLINICAL NURSE SPECIALIST

## 2022-12-17 RX ORDER — HYDRALAZINE HYDROCHLORIDE 20 MG/ML
5 INJECTION INTRAMUSCULAR; INTRAVENOUS EVERY 6 HOURS PRN
Status: DISCONTINUED | OUTPATIENT
Start: 2022-12-17 | End: 2022-12-23 | Stop reason: HOSPADM

## 2022-12-17 RX ADMIN — ROSUVASTATIN CALCIUM 40 MG: 20 TABLET, FILM COATED ORAL at 20:29

## 2022-12-17 RX ADMIN — CEFTRIAXONE SODIUM 1000 MG: 1 INJECTION, POWDER, FOR SOLUTION INTRAMUSCULAR; INTRAVENOUS at 16:19

## 2022-12-17 RX ADMIN — MORPHINE SULFATE 1 MG: 2 INJECTION, SOLUTION INTRAMUSCULAR; INTRAVENOUS at 09:01

## 2022-12-17 RX ADMIN — METOPROLOL TARTRATE 25 MG: 25 TABLET, FILM COATED ORAL at 20:29

## 2022-12-17 RX ADMIN — Medication 8 ML: at 08:59

## 2022-12-17 RX ADMIN — ENOXAPARIN SODIUM 40 MG: 100 INJECTION SUBCUTANEOUS at 08:41

## 2022-12-17 RX ADMIN — METOPROLOL TARTRATE 25 MG: 25 TABLET, FILM COATED ORAL at 08:41

## 2022-12-17 RX ADMIN — ASPIRIN 81 MG: 81 TABLET, COATED ORAL at 08:41

## 2022-12-17 RX ADMIN — MORPHINE SULFATE 1 MG: 2 INJECTION, SOLUTION INTRAMUSCULAR; INTRAVENOUS at 17:34

## 2022-12-17 RX ADMIN — MORPHINE SULFATE 1 MG: 2 INJECTION, SOLUTION INTRAMUSCULAR; INTRAVENOUS at 13:38

## 2022-12-17 RX ADMIN — Medication 10 ML: at 20:29

## 2022-12-17 ASSESSMENT — PAIN SCALES - GENERAL
PAINLEVEL_OUTOF10: 3
PAINLEVEL_OUTOF10: 6
PAINLEVEL_OUTOF10: 8
PAINLEVEL_OUTOF10: 9

## 2022-12-17 ASSESSMENT — ENCOUNTER SYMPTOMS
NAUSEA: 0
STRIDOR: 0
EYES NEGATIVE: 1
BLOOD IN STOOL: 0
GASTROINTESTINAL NEGATIVE: 1
CHEST TIGHTNESS: 0
WHEEZING: 0
COUGH: 0

## 2022-12-17 NOTE — FLOWSHEET NOTE
Am nursing  assessment completed. Pt :    restless and agitated           Alert and oriented to self only  Breakfast: declines  RESP:    even and non labored           Lung sounds:        diminished                         Oxygen: room air  Complaints of: hip and leg discomfort  Pain:  IV: SL  TELE: refuses  Dressings:   Precautions:   AVASYS           Falls:    85    Osmany: 19  Chart and meds reviewed. Noted Labs: am labs ordered  Plan for today:    Call light in reach. NOTES:  Spoke to Dr Yoli Sherman. Aricept on hold for now. Call placed to dtr Víctor Cadena. Prn morphine for pain and discomfort. Electronically signed by Bryan Hernandez RN on 12/17/2022 at 10:08 AM    [age] daughter at bedside. Resting with eyes closed. .Electronically signed by Bryan Hernandez RN on 12/17/2022 at 11:27 AM    [id number] lunch completed per family assist. Prn morphine per request and complaints of hip and back pain. Electronically signed by Bryan Hernandez RN on 12/17/2022 at 3:00 PM    1430 resting quietly. Family at bedside. Electronically signed by Bryan Hernandez RN on 12/17/2022 at 6:48 PM    [phone] incont urine. Pericare and reposition. Assist feed. Ice to sacral area for comfort. New iv. Pt removed previous iv. Prn morphine for comfort.  Electronically signed by Bryan Hernandez RN on 12/17/2022 at 6:49 PM

## 2022-12-17 NOTE — PROGRESS NOTES
Progress Note  Patient: Khadra Arias  Unit/Bed: X066/N838-20  YOB: 1935  MRN: [id number]  Acct: [de-identified]   Admitting Diagnosis: A-fib ([facility] [I48.91]  General weakness [R53.1]  Urinary tract infection without hematuria, site unspecified [N39.0]  Syncope, unspecified syncope type [R55]  Atrial fibrillation, unspecified type ([facility] [I48.91]  Admit Date:  12/15/2022  Hospital Day: 2    Chief Complaint: Tachycardia    Histories:  Past Medical History:   Diagnosis Date    Actinic keratoses     Arthritis     Dermatitis     Encounter for annual health examination 12/19/2011    Endometrial cancer ([facility]     s/p hyst in 2009 - was told clear by Dr. 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    left lower leg    Sciatica     Vitamin D deficiency      Past Surgical History:   Procedure Laterality Date    CATARACT REMOVAL WITH IMPLANT      HYSTERECTOMY (CERVIX STATUS UNKNOWN)  9/2009    post-menopausal bleeding     Family History   Problem Relation Age of Onset    Coronary Art Dis Father     Other Father         glaucoma    Coronary Art Dis Brother      Social History     Socioeconomic History    Marital status:    Tobacco Use    Smoking status: Never    Smokeless tobacco: Never   Substance and Sexual Activity    Alcohol use: No    Drug use: No     Social Determinants of Health     Financial Resource Strain: Low Risk     Difficulty of Paying Living Expenses: Not hard at all   Food Insecurity: No Food Insecurity    Worried About Running Out of Food in the Last Year: Never true    Ran Out of Food in the Last Year: Never true       Subjective/HPI  pt is panicking. Her feet hut she can't breath. She is on no O2. We placed pulse ox and she is at 98%. She is crying. EKG:  she has refused Tele but last recording was SR 77 w APC.          Review of Systems:   Review of Systems Constitutional: Negative. Negative for diaphoresis and fatigue. HENT: Negative. Eyes: Negative. Respiratory:  Negative for cough, chest tightness, wheezing and stridor. Cardiovascular: Negative. Negative for chest pain, palpitations and leg swelling. Gastrointestinal: Negative. Negative for blood in stool and nausea. Genitourinary: Negative. Musculoskeletal: Negative. Skin: Negative. Neurological: Negative. Negative for dizziness, syncope, weakness and light-headedness. Hematological: Negative. Psychiatric/Behavioral: Negative. Physical Examination:    BP (!) 175/80   Pulse 75   Temp 98.6 °F (37 °C) (Oral)   Resp 18   Ht 5' 8\" (1.727 m)   Wt 139 lb 8 oz (63.3 kg)   LMP  (LMP Unknown)   SpO2 97%   BMI 21.21 kg/m²    Physical Exam   Constitutional: She appears healthy. HENT:   Normal cephalic and Atraumatic   Eyes: Pupils are equal, round, and reactive to light. Neck: Thyroid normal. No JVD present. No neck adenopathy. No thyromegaly present. Cardiovascular: Normal rate, regular rhythm, normal heart sounds, intact distal pulses and normal pulses. Pulmonary/Chest: Effort normal and breath sounds normal. She has no wheezes. She has no rales. She exhibits no tenderness. Abdominal: Soft. Bowel sounds are normal. There is no abdominal tenderness. Musculoskeletal:         General: No tenderness or edema. Normal range of motion. Cervical back: Normal range of motion and neck supple. Neurological: She is alert and oriented to person, place, and time. Skin: Skin is warm. No cyanosis. Nails show no clubbing.      LABS:  CBC:   Lab Results   Component Value Date/Time    WBC 8.8 12/16/2022 05:15 AM    RBC 3.72 12/16/2022 05:15 AM    HGB 11.9 12/16/2022 05:15 AM    HCT 35.7 12/16/2022 05:15 AM    MCV 96.0 12/16/2022 05:15 AM    MCH 32.0 12/16/2022 05:15 AM    MCHC 33.3 12/16/2022 05:15 AM    RDW 14.7 12/16/2022 05:15 AM    PLT 98 12/16/2022 05:15 AM    MPV 8.6 02/14/2014 08:32 AM     CBC with Differential:    Lab Results   Component Value Date/Time    WBC 8.8 12/16/2022 05:15 AM    RBC 3.72 12/16/2022 05:15 AM    HGB 11.9 12/16/2022 05:15 AM    HCT 35.7 12/16/2022 05:15 AM    PLT 98 12/16/2022 05:15 AM    MCV 96.0 12/16/2022 05:15 AM    MCH 32.0 12/16/2022 05:15 AM    MCHC 33.3 12/16/2022 05:15 AM    RDW 14.7 12/16/2022 05:15 AM    LYMPHOPCT 4.3 12/16/2022 05:15 AM    MONOPCT 7.9 12/16/2022 05:15 AM    BASOPCT 0.4 12/16/2022 05:15 AM    MONOSABS 0.7 12/16/2022 05:15 AM    LYMPHSABS 0.4 12/16/2022 05:15 AM    EOSABS 0.0 12/16/2022 05:15 AM    BASOSABS 0.0 12/16/2022 05:15 AM     CMP:    Lab Results   Component Value Date/Time     12/16/2022 05:15 AM    K 3.3 12/16/2022 05:15 AM     12/16/2022 05:15 AM    CO2 24 12/16/2022 05:15 AM    BUN 18 12/16/2022 05:15 AM    CREATININE 0.73 12/16/2022 05:15 AM    GFRAA >60.0 04/12/2022 09:23 AM    LABGLOM >60.0 12/16/2022 05:15 AM    GLUCOSE 118 12/16/2022 05:15 AM    GLUCOSE 87 12/27/2011 08:41 AM    PROT 6.6 12/15/2022 11:45 AM    LABALBU 3.9 12/15/2022 11:45 AM    LABALBU 4.3 12/27/2011 08:41 AM    CALCIUM 8.8 12/16/2022 05:15 AM    BILITOT 0.6 12/15/2022 11:45 AM    ALKPHOS 83 12/15/2022 11:45 AM    AST 19 12/15/2022 11:45 AM    ALT 17 12/15/2022 11:45 AM     BMP:    Lab Results   Component Value Date/Time     12/16/2022 05:15 AM    K 3.3 12/16/2022 05:15 AM     12/16/2022 05:15 AM    CO2 24 12/16/2022 05:15 AM    BUN 18 12/16/2022 05:15 AM    LABALBU 3.9 12/15/2022 11:45 AM    LABALBU 4.3 12/27/2011 08:41 AM    CREATININE 0.73 12/16/2022 05:15 AM    CALCIUM 8.8 12/16/2022 05:15 AM    GFRAA >60.0 04/12/2022 09:23 AM    LABGLOM >60.0 12/16/2022 05:15 AM    GLUCOSE 118 12/16/2022 05:15 AM    GLUCOSE 87 12/27/2011 08:41 AM     Magnesium:    Lab Results   Component Value Date/Time    MG 2.1 12/16/2022 05:15 AM     Troponin:    Lab Results   Component Value Date/Time    TROPONINI <0.010 12/15/2022 11:45 AM Active Hospital Problems    Diagnosis Date Noted    General weakness [R53.1] 12/16/2022     Priority: High    A-fib [facility]) [I48.91] 12/15/2022     Priority: Medium        Assessment/Plan:  Tachycardia - SR and APCs noted. No AF noted thus far. ? Anxiety  Syncope/fall  Multiple prior falls and fractures  UTI- on abx  LVEF 40-45%  HTN is elevated but she is so upset and anxious.    HPL -on Statin  Thrombocytopenia - periodically f/u lab  Conservative CV mangement       Electronically signed by Manuela Johnston MD on 12/17/2022 at 8:13 AM

## 2022-12-17 NOTE — PROGRESS NOTES
Progress Note  Date:2022       Room:74W174-01  Patient Jad Thurman     YOB: 1935     Age:87 y.o. Subjective      O/N deliurim again per RN. Pain this AM, given PRN morphine with fair control. Daughter present. Dementia DX ~1yr ago, per daughter. Other family having difficult time acknowledging dementia DX until recently. Rhythm had flipped to NSR with PACs prior to patient taking off/refusing Tele. Objective         Vitals Last 24 Hours:  TEMPERATURE:  Temp  Av.2 °F (36.8 °C)  Min: 97.9 °F (36.6 °C)  Max: 98.6 °F (37 °C)  RESPIRATIONS RANGE: Resp  Av  Min: 18  Max: 18  PULSE OXIMETRY RANGE: SpO2  Av.3 %  Min: 95 %  Max: 100 %  PULSE RANGE: Pulse  Av.7  Min: 75  Max: 96  BLOOD PRESSURE RANGE: Systolic (24GLF), NFS:154 , Min:169 , FXS:517   ; Diastolic (88NHK), RYB:48, Min:80, Max:97    I/O (24Hr): Intake/Output Summary (Last 24 hours) at 2022 1050  Last data filed at 2022 0747  Gross per 24 hour   Intake 5 ml   Output 500 ml   Net -495 ml       Objective:  Vital signs: (most recent): Blood pressure (!) 169/97, pulse 96, temperature 98.2 °F (36.8 °C), temperature source Oral, resp. rate 18, height 5' 8\" (1.727 m), weight 139 lb 8 oz (63.3 kg), SpO2 95 %, not currently breastfeeding. Constitutional: Frail elderly adult female reclining in bed no acute distress. Daughter at bedside. Head: NCAT  Eyes: PERRLA, EOMI. Arcus senilis bilaterally. ENT: Hearing grossly intact. No rhinorrhea. MMM. Neck: Trachea midline, phonation normal  Cardiovascular: Regular rhythm with occasional early beats, with controlled rate. No significant murmurs appreciated. No significant pretibial edema. Pulmonary: Normal rate and effort respiration on O2 by NC. Grossly CTAB. No coughing during exam.  Abdomen: Soft, nontense, nondistended. Neurologic: Alert, slightly disoriented. A&O 2-3.   Unchanged mild generalized nonfocal weakness. MSK/integumentary: Generalized nonfocal pelvic discomfort, provoked with any movement. Small scattered ecchymoses of skin in various stages of healing. No other gross bony abnormalities appreciated. Labs/Imaging/Diagnostics    Labs:  CBC:  Recent Labs     12/15/22  1145 12/16/22  0515 12/17/22  0856   WBC 10.8 8.8 9.4   RBC 3.95* 3.72* 3.77*   HGB 12.8 11.9* 12.2   HCT 38.3 35.7* 36.4*   MCV 97.0* 96.0* 96.6*   RDW 14.9* 14.7* 14.6*   * 98* 98*       CHEMISTRIES:  Recent Labs     12/15/22  1145 12/15/22  1147 12/16/22  0515 12/17/22  0856     --  139 137   K 3.7  --  3.3* 3.3*     --  101 100   CO2 29  --  24 21   BUN 15  --  18 20   CREATININE 0.77 0.8 0.73 0.57   GLUCOSE 132*  --  118* 128*   MG  --   --  2.1 1.9       PT/INR:  Recent Labs     12/15/22  1145   PROTIME 13.6   INR 1.0       APTT:No results for input(s): APTT in the last 72 hours. LIVER PROFILE:  Recent Labs     12/15/22  1145   AST 19   ALT 17   BILITOT 0.6   ALKPHOS 83         Imaging Last 24 Hours:  CT Head W/O Contrast    Result Date: 12/15/2022  EXAMINATION: CT OF THE HEAD WITHOUT CONTRAST  12/15/2022 12:06 pm TECHNIQUE: CT of the head was performed without the administration of intravenous contrast. Automated exposure control, iterative reconstruction, and/or weight based adjustment of the mA/kV was utilized to reduce the radiation dose to as low as reasonably achievable. COMPARISON: None. HISTORY: ORDERING SYSTEM PROVIDED HISTORY: syncope TECHNOLOGIST PROVIDED HISTORY: Reason for exam:->syncope Has a \"code stroke\" or \"stroke alert\" been called? ->No Decision Support Exception - unselect if not a suspected or confirmed emergency medical condition->Emergency Medical Condition (MA) What reading provider will be dictating this exam?->CRC FINDINGS: BRAIN/VENTRICLES: There is no acute intracranial hemorrhage, mass effect or midline shift. No abnormal extra-axial fluid collection.   The gray-white differentiation is maintained without evidence of an acute infarct. There is no evidence of hydrocephalus. The ventricles, cisterns and sulci are prominent consistent with atrophy. There is decreased attenuation within the periventricular white matter consistent with periventricular leukomalacia. There are old lacunar infarcts seen within the right and left basal ganglia. ORBITS: The visualized portion of the orbits demonstrate no acute abnormality. SINUSES: The visualized paranasal sinuses and mastoid air cells demonstrate no acute abnormality. SOFT TISSUES/SKULL:  No acute abnormality of the visualized skull or soft tissues. 1. There is no acute intracranial abnormality. Specifically, there is no intracranial hemorrhage. 2. Atrophy and periventricular leukomalacia,     CT ABDOMEN PELVIS W IV CONTRAST Additional Contrast? None    Result Date: 12/15/2022  EXAMINATION: ONE XRAY VIEW OF THE CHEST; CT OF THE ABDOMEN AND PELVIS WITH CONTRAST 12/15/2022 12:22 pm COMPARISON: Chest x-ray, October 19 2022; CT abdomen pelvis, September 25, 2018 HISTORY: ORDERING SYSTEM PROVIDED HISTORY: sob TECHNOLOGIST PROVIDED HISTORY: Reason for exam:->sob What reading provider will be dictating this exam?->CRC FINDINGS: There also is a small amount of fluid. There is coarsening of the interstitium. The lungs are hyperinflated. The cardiac silhouette is within normal limits. Atherosclerotic calcifications are seen thoracic aorta. No consolidation, pneumothorax or pleural effusion seen. Degenerative changes are seen in the visualized portion of both shoulders and also spine. CT of the abdomen and pelvis: Lungs: In the visualized bases of the lungs a 5.7 mm pleural base nodule is seen posteriorly on the left (series 2, image 1). A 7.7 mm nodule is seen posteriorly in the right lower lobe that is pleural based as well. (Series 2, image 20). No consolidating pneumonia, pneumothorax or pleural effusion is seen.   Changes of some emphysema are visualized. Organs: The liver, pancreas, right adrenal gland and liver are unremarkable. There are calcifications seen in the liver and also there is a calcified splenic artery again seen that is unchanged from previous study. In the area of the left adrenal gland there is a 1.7 cm hypodense thick wall cystic area. This was also seen on previous study. In the region of the tail of the pancreas a hypodense area is seen that measures 1.2 cm. This area measures fluid density on this examination. This could be an IPMN in. This was vaguely seen on previous study and also appears stable. Both kidneys show no evidence of hydronephrosis. No renal calculus is visualized. Prominent atherosclerotic calcifications are visualized. GI/bowel: No dilated loops of bowel are seen. The appendix is visualized and is unremarkable. Pelvis: In the pre sacral region there is soft tissue density posterior to the bowel that is thickened. A superior there also is some stranding visualized. The uterus is not seen. There also is a small amount of fluid. The bladder distends normally. Bones: No destructive bony lesions are seen. Slight irregularity is seen in the lower sacral region on the left. 0.82 degenerative changes are seen in both hips and is most prominent on the right. Chest x-ray: No evidence for acute cardiopulmonary process. CT of the abdomen and pelvis: 1.  2 nodules are seen in the visualized bases of the lungs. The nodule in the right lower lobe was seen on previous study but was smaller in size. There are also emphysematous changes seen in the visualized bases. Numeral 2. Hypodense lesion is seen in the region of the left adrenal gland. This could be an adrenal nodule of the possibility is necrotic lymph node . It is slightly larger than what was seen on previous study. 3.  A stable hypodense area is seen in the tail the pancreas. This could represent IPMN.   4.  There is no evidence for appendicitis, diverticulitis or bowel obstruction 5. There is presacral soft tissue density and stranding seen in the pelvis. This is new when compared to previous study. Query if patient may have had any radiation treatment due to endometrial cancer. Recommend further evaluation there is also full small amount of fluid. RECOMMENDATION: 1. Low does CT of the lungs, non emergently 2. It presacral soft tissue seen also there is a small amount of fluid. Possibility the of hemorrhage or of the etiology could be related to patient's history of endometrial cancer and any possibility of prior radiation treatment. Recommend follow-up. Also there is some irregularity in the sacrum and occult fracture is not excluded. This was discussed with JOSH Gallo, and CT of the pelvis was recommended. XR CHEST PORTABLE    Result Date: 12/15/2022  EXAMINATION: ONE XRAY VIEW OF THE CHEST; CT OF THE ABDOMEN AND PELVIS WITH CONTRAST 12/15/2022 12:22 pm COMPARISON: Chest x-ray, October 19 2022; CT abdomen pelvis, September 25, 2018 HISTORY: ORDERING SYSTEM PROVIDED HISTORY: sob TECHNOLOGIST PROVIDED HISTORY: Reason for exam:->sob What reading provider will be dictating this exam?->CRC FINDINGS: There also is a small amount of fluid. There is coarsening of the interstitium. The lungs are hyperinflated. The cardiac silhouette is within normal limits. Atherosclerotic calcifications are seen thoracic aorta. No consolidation, pneumothorax or pleural effusion seen. Degenerative changes are seen in the visualized portion of both shoulders and also spine. CT of the abdomen and pelvis: Lungs: In the visualized bases of the lungs a 5.7 mm pleural base nodule is seen posteriorly on the left (series 2, image 1). A 7.7 mm nodule is seen posteriorly in the right lower lobe that is pleural based as well. (Series 2, image 20). No consolidating pneumonia, pneumothorax or pleural effusion is seen. Changes of some emphysema are visualized. Organs:  The liver, pancreas, right adrenal gland and liver are unremarkable. There are calcifications seen in the liver and also there is a calcified splenic artery again seen that is unchanged from previous study. In the area of the left adrenal gland there is a 1.7 cm hypodense thick wall cystic area. This was also seen on previous study. In the region of the tail of the pancreas a hypodense area is seen that measures 1.2 cm. This area measures fluid density on this examination. This could be an IPMN in. This was vaguely seen on previous study and also appears stable. Both kidneys show no evidence of hydronephrosis. No renal calculus is visualized. Prominent atherosclerotic calcifications are visualized. GI/bowel: No dilated loops of bowel are seen. The appendix is visualized and is unremarkable. Pelvis: In the pre sacral region there is soft tissue density posterior to the bowel that is thickened. A superior there also is some stranding visualized. The uterus is not seen. There also is a small amount of fluid. The bladder distends normally. Bones: No destructive bony lesions are seen. Slight irregularity is seen in the lower sacral region on the left. 0.82 degenerative changes are seen in both hips and is most prominent on the right. Chest x-ray: No evidence for acute cardiopulmonary process. CT of the abdomen and pelvis: 1.  2 nodules are seen in the visualized bases of the lungs. The nodule in the right lower lobe was seen on previous study but was smaller in size. There are also emphysematous changes seen in the visualized bases. Numeral 2. Hypodense lesion is seen in the region of the left adrenal gland. This could be an adrenal nodule of the possibility is necrotic lymph node . It is slightly larger than what was seen on previous study. 3.  A stable hypodense area is seen in the tail the pancreas. This could represent IPMN.   4.  There is no evidence for appendicitis, diverticulitis or bowel obstruction 5. There is presacral soft tissue density and stranding seen in the pelvis. This is new when compared to previous study. Query if patient may have had any radiation treatment due to endometrial cancer. Recommend further evaluation there is also full small amount of fluid. RECOMMENDATION: 1. Low does CT of the lungs, non emergently 2. It presacral soft tissue seen also there is a small amount of fluid. Possibility the of hemorrhage or of the etiology could be related to patient's history of endometrial cancer and any possibility of prior radiation treatment. Recommend follow-up. Also there is some irregularity in the sacrum and occult fracture is not excluded. This was discussed with JOSH Gallo, and CT of the pelvis was recommended. MRI BRAIN WO CONTRAST    Result Date: 12/15/2022  EXAMINATION: MRI OF THE BRAIN WITHOUT CONTRAST  12/15/2022 5:57 pm TECHNIQUE: Multiplanar multisequence MRI of the brain was performed without the administration of intravenous contrast. COMPARISON: Noncontrast CT head done earlier same day. HISTORY: ORDERING SYSTEM PROVIDED HISTORY: syncope atrial fibrillation TECHNOLOGIST PROVIDED HISTORY: Reason for exam:->syncope atrial fibrillation FINDINGS: INTRACRANIAL STRUCTURES/VENTRICLES: There is no acute infarct. No mass effect or midline shift. No evidence of an acute intracranial hemorrhage. Generalized cerebral and cerebellar volume loss. Diffuse ventricular prominence may relate to central volume loss. The sellar/suprasellar regions appear unremarkable. The normal signal voids within the major intracranial vessels appear maintained. The axial FLAIR images demonstrate pontine, bilateral periventricular and deep white matter signal hyperintensities which are nonspecific but commonly seen in the setting of chronic small vessel ischemic disease. 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  ORBITS: The visualized portion of the orbits demonstrate no acute abnormality. SINUSES: Small amount of nonspecific fluid in the bilateral mastoid air cells. The paranasal sinuses are clear. BONES/SOFT TISSUES: The bone marrow signal intensity appears normal. The soft tissues demonstrate no acute abnormality. Multilevel degenerative changes in the visualized spine. 1.  No acute intracranial abnormality per unenhanced brain MRI. No acute stroke. 2.  Moderate to severe chronic small vessel ischemic disease. 3.  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 MRI PELVIS WO CONTRAST    Result Date: 12/15/2022  EXAMINATION: MRI OF THE PELVIS WITHOUT CONTRAST, 12/15/2022 4:10 pm TECHNIQUE: Multiplanar multisequence MRI of the pelvis was performed without the administration of intravenous contrast. COMPARISON: CT abdomen and pelvis 12/15/2022 HISTORY: ORDERING SYSTEM PROVIDED HISTORY: endometrial thickening rule out sacral fracture TECHNOLOGIST PROVIDED HISTORY: Reason for exam:->endometrial thickening rule out sacral fracture What reading provider will be dictating this exam?->CRC FINDINGS: SOFT TISSUES: Small amount of presacral free fluid is identified, which is stable compared to the prior CT. No large hematoma is seen. BONE MARROW: Vertical fractures are identified through the bilateral sacral alae with associated transverse fracture of the sacral body. Fractures demonstrate classic H-shaped pattern. No hip fracture is identified. Joint alignment is intact. JOINTS: Moderate-severe bilateral hip degenerative changes noted with loss of the articular space. Small joint effusion is noted on the left hip. Multiple nondisplaced sacral fractures with a classic H-shaped pattern suggestive of sacral insufficiency fracture.      Assessment//Plan           Hospital Problems             Last Modified POA    * (Principal) A-fib ([address] 12/15/2022 Yes    General weakness 12/16/2022 Yes   Assessment & Plan    Acute problems:  Syncope versus ground-level fall  Urinary tract infection  New onset atrial fibrillation with intermittent RVR  Bilateral sacral fractures s/p fall    Chronic problems:  Suspected malnutrition, type/severity pending dietitian evaluation  Hypertension  Lung nodules (stable since previous)  Hx endometrial cancer    Plan:  Admitted to inpatient status 12/15  CT head negative for acute bony injury  MRI brain negative for acute CVA  MRI pelvis positive for bilateral sacral \"H fracture\", for which orthopedic surgery was consulted  Pain control PRN. So far, no significant pelvic pain with relatively flat positioning, when not moving. New onset atrial fibrillation, with intermittent RVR since admission, for which cardiology is consulted. Defer AC to cardiology in setting of both new onset A. fib and bilateral sacral fractures  Carotid Dopplers in setting of possible syncope  Pt unable to perform Orthostatic BP&P in setting of sacral fractures. Antibiotic for UTI. Culture pending. Previous cultures with E. coli. No lower extremity physical therapy, pending orthopedic surgery evaluation and recommendations  12/17 - No significant changes. Non-operative pelvic Fx treatment, per Orthopedics. Continues ABX for UTI, and pain meds for symptomatic pain control. Waxing/waning delirium. Encourage good day/night hygiene. Flipped back to NSR with PAC. Appreciate Cardiology recs regarding cardiac management.       Electronically signed by Aristides Rogers DO on 12/17/22

## 2022-12-17 NOTE — PROGRESS NOTES
Calling out for nursing staff, pt noted to be pulling @ IV, pt reoriented to surroundings and encouraged to not remove IV.    12/16/22 2200  Assessment completed, pt attempting to leave bed, assisted back into bed and repositioned, purewick remains intact    12/16/22 2305  Attempting to leave bed, asking to \"go home\" pt informed that had to stay in bed and was still in hospital and was not discharged yet, IV found removed, tele monitor found removed, c/o back pain, pt unable to give rating, medicated with Tylenol     12/16/22 2355  Placed back into bed, tele monitor placed back on pt    12/17/22 0040  Monitor found removed, pt assisted back to bed, pt refusing to wear heart monitor @ this time    12/17/22 0330  Cleaned and repositioned per need, vs attempted, pt refusing vs

## 2022-12-18 LAB
ANION GAP SERPL CALCULATED.3IONS-SCNC: 11 MEQ/L (ref 9–15)
BASOPHILS ABSOLUTE: 0.1 K/UL (ref 0–0.2)
BASOPHILS RELATIVE PERCENT: 0.9 %
BUN BLDV-MCNC: 31 MG/DL (ref 8–23)
CALCIUM SERPL-MCNC: 8.7 MG/DL (ref 8.5–9.9)
CHLORIDE BLD-SCNC: 96 MEQ/L (ref 95–107)
CO2: 25 MEQ/L (ref 20–31)
CREAT SERPL-MCNC: 0.89 MG/DL (ref 0.5–0.9)
EOSINOPHILS ABSOLUTE: 0.1 K/UL (ref 0–0.7)
EOSINOPHILS RELATIVE PERCENT: 2 %
GFR SERPL CREATININE-BSD FRML MDRD: >60 ML/MIN/{1.73_M2}
GLUCOSE BLD-MCNC: 98 MG/DL (ref 70–99)
HCT VFR BLD CALC: 31.4 % (ref 37–47)
HEMOGLOBIN: 10.9 G/DL (ref 12–16)
LYMPHOCYTES ABSOLUTE: 0.7 K/UL (ref 1–4.8)
LYMPHOCYTES RELATIVE PERCENT: 9.5 %
MAGNESIUM: 2.1 MG/DL (ref 1.7–2.4)
MCH RBC QN AUTO: 32.9 PG (ref 27–31.3)
MCHC RBC AUTO-ENTMCNC: 34.6 % (ref 33–37)
MCV RBC AUTO: 95 FL (ref 79.4–94.8)
MONOCYTES ABSOLUTE: 0.7 K/UL (ref 0.2–0.8)
MONOCYTES RELATIVE PERCENT: 10.3 %
NEUTROPHILS ABSOLUTE: 5.5 K/UL (ref 1.4–6.5)
NEUTROPHILS RELATIVE PERCENT: 77.3 %
PDW BLD-RTO: 14.7 % (ref 11.5–14.5)
PLATELET # BLD: 95 K/UL (ref 130–400)
POTASSIUM SERPL-SCNC: 3.7 MEQ/L (ref 3.4–4.9)
RBC # BLD: 3.3 M/UL (ref 4.2–5.4)
SODIUM BLD-SCNC: 132 MEQ/L (ref 135–144)
WBC # BLD: 7.2 K/UL (ref 4.8–10.8)

## 2022-12-18 PROCEDURE — 6360000002 HC RX W HCPCS: Performed by: CLINICAL NURSE SPECIALIST

## 2022-12-18 PROCEDURE — 99233 SBSQ HOSP IP/OBS HIGH 50: CPT | Performed by: INTERNAL MEDICINE

## 2022-12-18 PROCEDURE — 83735 ASSAY OF MAGNESIUM: CPT

## 2022-12-18 PROCEDURE — 6370000000 HC RX 637 (ALT 250 FOR IP): Performed by: CLINICAL NURSE SPECIALIST

## 2022-12-18 PROCEDURE — 85025 COMPLETE CBC W/AUTO DIFF WBC: CPT

## 2022-12-18 PROCEDURE — 6360000002 HC RX W HCPCS: Performed by: INTERNAL MEDICINE

## 2022-12-18 PROCEDURE — 80048 BASIC METABOLIC PNL TOTAL CA: CPT

## 2022-12-18 PROCEDURE — 6370000000 HC RX 637 (ALT 250 FOR IP): Performed by: INTERNAL MEDICINE

## 2022-12-18 PROCEDURE — 2580000003 HC RX 258: Performed by: CLINICAL NURSE SPECIALIST

## 2022-12-18 PROCEDURE — 36415 COLL VENOUS BLD VENIPUNCTURE: CPT

## 2022-12-18 PROCEDURE — 2060000000 HC ICU INTERMEDIATE R&B

## 2022-12-18 RX ORDER — METOPROLOL TARTRATE 50 MG/1
50 TABLET, FILM COATED ORAL 2 TIMES DAILY
Status: DISCONTINUED | OUTPATIENT
Start: 2022-12-18 | End: 2022-12-23 | Stop reason: HOSPADM

## 2022-12-18 RX ORDER — DIGOXIN 125 MCG
125 TABLET ORAL DAILY
Status: DISCONTINUED | OUTPATIENT
Start: 2022-12-18 | End: 2022-12-23 | Stop reason: HOSPADM

## 2022-12-18 RX ORDER — ENOXAPARIN SODIUM 100 MG/ML
1 INJECTION SUBCUTANEOUS 2 TIMES DAILY
Status: DISCONTINUED | OUTPATIENT
Start: 2022-12-18 | End: 2022-12-23 | Stop reason: HOSPADM

## 2022-12-18 RX ADMIN — ENOXAPARIN SODIUM 60 MG: 100 INJECTION SUBCUTANEOUS at 08:59

## 2022-12-18 RX ADMIN — MORPHINE SULFATE 2 MG: 2 INJECTION, SOLUTION INTRAMUSCULAR; INTRAVENOUS at 18:35

## 2022-12-18 RX ADMIN — CEFTRIAXONE SODIUM 1000 MG: 1 INJECTION, POWDER, FOR SOLUTION INTRAMUSCULAR; INTRAVENOUS at 15:59

## 2022-12-18 RX ADMIN — MORPHINE SULFATE 2 MG: 2 INJECTION, SOLUTION INTRAMUSCULAR; INTRAVENOUS at 01:27

## 2022-12-18 RX ADMIN — ASPIRIN 81 MG: 81 TABLET, COATED ORAL at 08:59

## 2022-12-18 RX ADMIN — ROSUVASTATIN CALCIUM 40 MG: 20 TABLET, FILM COATED ORAL at 21:54

## 2022-12-18 RX ADMIN — ENOXAPARIN SODIUM 60 MG: 100 INJECTION SUBCUTANEOUS at 21:58

## 2022-12-18 RX ADMIN — METOPROLOL TARTRATE 50 MG: 50 TABLET ORAL at 21:55

## 2022-12-18 RX ADMIN — METOPROLOL TARTRATE 50 MG: 50 TABLET ORAL at 08:59

## 2022-12-18 RX ADMIN — DIGOXIN 125 MCG: 125 TABLET ORAL at 08:59

## 2022-12-18 RX ADMIN — Medication 10 ML: at 21:00

## 2022-12-18 RX ADMIN — MORPHINE SULFATE 1 MG: 2 INJECTION, SOLUTION INTRAMUSCULAR; INTRAVENOUS at 10:17

## 2022-12-18 RX ADMIN — Medication 10 ML: at 08:59

## 2022-12-18 ASSESSMENT — ENCOUNTER SYMPTOMS
STRIDOR: 0
COUGH: 0
EYES NEGATIVE: 1
CHEST TIGHTNESS: 0
WHEEZING: 0
BLOOD IN STOOL: 0
NAUSEA: 0
GASTROINTESTINAL NEGATIVE: 1

## 2022-12-18 ASSESSMENT — PAIN SCALES - GENERAL
PAINLEVEL_OUTOF10: 7
PAINLEVEL_OUTOF10: 10
PAINLEVEL_OUTOF10: 7
PAINLEVEL_OUTOF10: 6

## 2022-12-18 ASSESSMENT — PAIN DESCRIPTION - DESCRIPTORS: DESCRIPTORS: SHARP;TENDER;THROBBING

## 2022-12-18 ASSESSMENT — PAIN DESCRIPTION - LOCATION
LOCATION: BACK
LOCATION: BACK

## 2022-12-18 ASSESSMENT — PAIN DESCRIPTION - ORIENTATION: ORIENTATION: POSTERIOR

## 2022-12-18 NOTE — FLOWSHEET NOTE
Am nursing  assessment completed. Pt :    restless      Alert and oriented x3   Breakfast: declines  RESP:    even and non labored           Lung sounds:        diminished                         Oxygen: room air  Complaints of: hip and leg discomfort  Pain:  IV: SL  TELE: AFIB 116  Dressings:   Precautions:   AVASYS           Falls:    85    Osmany: 19  Chart and meds reviewed. Noted Labs:  K 3.7 BUN 31 CR 0.89 WBC 7.2 HGB 10.9  Plan for today:     Call light in reach. NOTES:  Dr Thu Cruz by for rounds. Pb Meyer by for rounds. Prn morphine for discomfort. 1200 pt daughter at bedside. Assist feed. Pt unable to tolerate sitting up right in bed due to back discomfort. Electronically signed by Mya Foote RN on 12/18/2022 at 12:52 PM    1300 pill on floor found by family member. Identified as tylenol and wasted.  Electronically signed by Mya Foote RN on 12/18/2022 at 1:13 PM

## 2022-12-18 NOTE — PROGRESS NOTES
Progress Note  Date:2022       Room:74W174-01  Patient Alannah Guzman     YOB: 1935     Age:87 y.o. Subjective      O/N deliurim again per RN. Pain this AM, given PRN morphine with fair control. Daughter present. Dementia DX ~1yr ago, per daughter. Other family having difficult time acknowledging dementia DX until recently. Rhythm had flipped to NSR with PACs prior to patient taking off/refusing Tele. Objective         Vitals Last 24 Hours:  TEMPERATURE:  Temp  Av.8 °F (36.6 °C)  Min: 97.2 °F (36.2 °C)  Max: 98.6 °F (37 °C)  RESPIRATIONS RANGE: Resp  Av.3  Min: 16  Max: 18  PULSE OXIMETRY RANGE: SpO2  Av %  Min: 97 %  Max: 99 %  PULSE RANGE: Pulse  Av.3  Min: 64  Max: 119  BLOOD PRESSURE RANGE: Systolic (93FIM), GRL:020 , Min:127 , TVS:541   ; Diastolic (82FMP), MUM:38, Min:61, Max:83    I/O (24Hr): Intake/Output Summary (Last 24 hours) at 2022 0919  Last data filed at 2022 0503  Gross per 24 hour   Intake 530 ml   Output 300 ml   Net 230 ml       Objective:  Vital signs: (most recent): Blood pressure (!) 153/79, pulse (!) 119, temperature 97.2 °F (36.2 °C), resp. rate 16, height 5' 8\" (1.727 m), weight 139 lb 8 oz (63.3 kg), SpO2 97 %, not currently breastfeeding. Constitutional: Frail elderly adult female reclining in bed no acute distress. Daughter at bedside. Head: NCAT  Eyes: PERRLA, EOMI. Arcus senilis bilaterally. ENT: Hearing grossly intact. No rhinorrhea. MMM. Neck: Trachea midline, phonation normal  Cardiovascular: Regular rhythm with occasional early beats, with controlled rate. No significant murmurs appreciated. No significant pretibial edema. Pulmonary: Normal rate and effort respiration on O2 by NC. Grossly CTAB. No coughing during exam.  Abdomen: Soft, nontense, nondistended. Neurologic: Alert, slightly disoriented. A&O 2-3. Unchanged mild generalized nonfocal weakness.   MSK/integumentary: Generalized nonfocal pelvic discomfort, provoked with any movement. Small scattered ecchymoses of skin in various stages of healing. No other gross bony abnormalities appreciated. Labs/Imaging/Diagnostics    Labs:  CBC:  Recent Labs     12/16/22  0515 12/17/22  0856 12/18/22  0458   WBC 8.8 9.4 7.2   RBC 3.72* 3.77* 3.30*   HGB 11.9* 12.2 10.9*   HCT 35.7* 36.4* 31.4*   MCV 96.0* 96.6* 95.0*   RDW 14.7* 14.6* 14.7*   PLT 98* 98* 95*       CHEMISTRIES:  Recent Labs     12/16/22  0515 12/17/22  0856 12/18/22  0458    137 132*   K 3.3* 3.3* 3.7    100 96   CO2 24 21 25   BUN 18 20 31*   CREATININE 0.73 0.57 0.89   GLUCOSE 118* 128* 98   MG 2.1 1.9 2.1       PT/INR:  Recent Labs     12/15/22  1145   PROTIME 13.6   INR 1.0       APTT:No results for input(s): APTT in the last 72 hours. LIVER PROFILE:  Recent Labs     12/15/22  1145   AST 19   ALT 17   BILITOT 0.6   ALKPHOS 83         Imaging Last 24 Hours:  CT Head W/O Contrast    Result Date: 12/15/2022  EXAMINATION: CT OF THE HEAD WITHOUT CONTRAST  12/15/2022 12:06 pm TECHNIQUE: CT of the head was performed without the administration of intravenous contrast. Automated exposure control, iterative reconstruction, and/or weight based adjustment of the mA/kV was utilized to reduce the radiation dose to as low as reasonably achievable. COMPARISON: None. HISTORY: ORDERING SYSTEM PROVIDED HISTORY: syncope TECHNOLOGIST PROVIDED HISTORY: Reason for exam:->syncope Has a \"code stroke\" or \"stroke alert\" been called? ->No Decision Support Exception - unselect if not a suspected or confirmed emergency medical condition->Emergency Medical Condition (MA) What reading provider will be dictating this exam?->CRC FINDINGS: BRAIN/VENTRICLES: There is no acute intracranial hemorrhage, mass effect or midline shift. No abnormal extra-axial fluid collection. The gray-white differentiation is maintained without evidence of an acute infarct. There is no evidence of hydrocephalus.  The ventricles, cisterns and sulci are prominent consistent with atrophy. There is decreased attenuation within the periventricular white matter consistent with periventricular leukomalacia. There are old lacunar infarcts seen within the right and left basal ganglia. ORBITS: The visualized portion of the orbits demonstrate no acute abnormality. SINUSES: The visualized paranasal sinuses and mastoid air cells demonstrate no acute abnormality. SOFT TISSUES/SKULL:  No acute abnormality of the visualized skull or soft tissues. 1. There is no acute intracranial abnormality. Specifically, there is no intracranial hemorrhage. 2. Atrophy and periventricular leukomalacia,     CT ABDOMEN PELVIS W IV CONTRAST Additional Contrast? None    Result Date: 12/15/2022  EXAMINATION: ONE XRAY VIEW OF THE CHEST; CT OF THE ABDOMEN AND PELVIS WITH CONTRAST 12/15/2022 12:22 pm COMPARISON: Chest x-ray, October 19 2022; CT abdomen pelvis, September 25, 2018 HISTORY: ORDERING SYSTEM PROVIDED HISTORY: sob TECHNOLOGIST PROVIDED HISTORY: Reason for exam:->sob What reading provider will be dictating this exam?->CRC FINDINGS: There also is a small amount of fluid. There is coarsening of the interstitium. The lungs are hyperinflated. The cardiac silhouette is within normal limits. Atherosclerotic calcifications are seen thoracic aorta. No consolidation, pneumothorax or pleural effusion seen. Degenerative changes are seen in the visualized portion of both shoulders and also spine. CT of the abdomen and pelvis: Lungs: In the visualized bases of the lungs a 5.7 mm pleural base nodule is seen posteriorly on the left (series 2, image 1). A 7.7 mm nodule is seen posteriorly in the right lower lobe that is pleural based as well. (Series 2, image 20). No consolidating pneumonia, pneumothorax or pleural effusion is seen. Changes of some emphysema are visualized. Organs: The liver, pancreas, right adrenal gland and liver are unremarkable.  There are calcifications seen in the liver and also there is a calcified splenic artery again seen that is unchanged from previous study. In the area of the left adrenal gland there is a 1.7 cm hypodense thick wall cystic area. This was also seen on previous study. In the region of the tail of the pancreas a hypodense area is seen that measures 1.2 cm. This area measures fluid density on this examination. This could be an IPMN in. This was vaguely seen on previous study and also appears stable. Both kidneys show no evidence of hydronephrosis. No renal calculus is visualized. Prominent atherosclerotic calcifications are visualized. GI/bowel: No dilated loops of bowel are seen. The appendix is visualized and is unremarkable. Pelvis: In the pre sacral region there is soft tissue density posterior to the bowel that is thickened. A superior there also is some stranding visualized. The uterus is not seen. There also is a small amount of fluid. The bladder distends normally. Bones: No destructive bony lesions are seen. Slight irregularity is seen in the lower sacral region on the left. 0.82 degenerative changes are seen in both hips and is most prominent on the right. Chest x-ray: No evidence for acute cardiopulmonary process. CT of the abdomen and pelvis: 1.  2 nodules are seen in the visualized bases of the lungs. The nodule in the right lower lobe was seen on previous study but was smaller in size. There are also emphysematous changes seen in the visualized bases. Numeral 2. Hypodense lesion is seen in the region of the left adrenal gland. This could be an adrenal nodule of the possibility is necrotic lymph node . It is slightly larger than what was seen on previous study. 3.  A stable hypodense area is seen in the tail the pancreas. This could represent IPMN. 4.  There is no evidence for appendicitis, diverticulitis or bowel obstruction 5.  There is presacral soft tissue density and stranding seen in the pelvis. This is new when compared to previous study. Query if patient may have had any radiation treatment due to endometrial cancer. Recommend further evaluation there is also full small amount of fluid. RECOMMENDATION: 1. Low does CT of the lungs, non emergently 2. It presacral soft tissue seen also there is a small amount of fluid. Possibility the of hemorrhage or of the etiology could be related to patient's history of endometrial cancer and any possibility of prior radiation treatment. Recommend follow-up. Also there is some irregularity in the sacrum and occult fracture is not excluded. This was discussed with JOSH Gallo, and CT of the pelvis was recommended. XR CHEST PORTABLE    Result Date: 12/15/2022  EXAMINATION: ONE XRAY VIEW OF THE CHEST; CT OF THE ABDOMEN AND PELVIS WITH CONTRAST 12/15/2022 12:22 pm COMPARISON: Chest x-ray, October 19 2022; CT abdomen pelvis, September 25, 2018 HISTORY: ORDERING SYSTEM PROVIDED HISTORY: sob TECHNOLOGIST PROVIDED HISTORY: Reason for exam:->sob What reading provider will be dictating this exam?->CRC FINDINGS: There also is a small amount of fluid. There is coarsening of the interstitium. The lungs are hyperinflated. The cardiac silhouette is within normal limits. Atherosclerotic calcifications are seen thoracic aorta. No consolidation, pneumothorax or pleural effusion seen. Degenerative changes are seen in the visualized portion of both shoulders and also spine. CT of the abdomen and pelvis: Lungs: In the visualized bases of the lungs a 5.7 mm pleural base nodule is seen posteriorly on the left (series 2, image 1). A 7.7 mm nodule is seen posteriorly in the right lower lobe that is pleural based as well. (Series 2, image 20). No consolidating pneumonia, pneumothorax or pleural effusion is seen. Changes of some emphysema are visualized. Organs: The liver, pancreas, right adrenal gland and liver are unremarkable.  There are calcifications seen in the liver and also there is a calcified splenic artery again seen that is unchanged from previous study. In the area of the left adrenal gland there is a 1.7 cm hypodense thick wall cystic area. This was also seen on previous study. In the region of the tail of the pancreas a hypodense area is seen that measures 1.2 cm. This area measures fluid density on this examination. This could be an IPMN in. This was vaguely seen on previous study and also appears stable. Both kidneys show no evidence of hydronephrosis. No renal calculus is visualized. Prominent atherosclerotic calcifications are visualized. GI/bowel: No dilated loops of bowel are seen. The appendix is visualized and is unremarkable. Pelvis: In the pre sacral region there is soft tissue density posterior to the bowel that is thickened. A superior there also is some stranding visualized. The uterus is not seen. There also is a small amount of fluid. The bladder distends normally. Bones: No destructive bony lesions are seen. Slight irregularity is seen in the lower sacral region on the left. 0.82 degenerative changes are seen in both hips and is most prominent on the right. Chest x-ray: No evidence for acute cardiopulmonary process. CT of the abdomen and pelvis: 1.  2 nodules are seen in the visualized bases of the lungs. The nodule in the right lower lobe was seen on previous study but was smaller in size. There are also emphysematous changes seen in the visualized bases. Numeral 2. Hypodense lesion is seen in the region of the left adrenal gland. This could be an adrenal nodule of the possibility is necrotic lymph node . It is slightly larger than what was seen on previous study. 3.  A stable hypodense area is seen in the tail the pancreas. This could represent IPMN. 4.  There is no evidence for appendicitis, diverticulitis or bowel obstruction 5. There is presacral soft tissue density and stranding seen in the pelvis.  This is new when compared to previous study. Query if patient may have had any radiation treatment due to endometrial cancer. Recommend further evaluation there is also full small amount of fluid. RECOMMENDATION: 1. Low does CT of the lungs, non emergently 2. It presacral soft tissue seen also there is a small amount of fluid. Possibility the of hemorrhage or of the etiology could be related to patient's history of endometrial cancer and any possibility of prior radiation treatment. Recommend follow-up. Also there is some irregularity in the sacrum and occult fracture is not excluded. This was discussed with JOSH Gallo, and CT of the pelvis was recommended. MRI BRAIN WO CONTRAST    Result Date: 12/15/2022  EXAMINATION: MRI OF THE BRAIN WITHOUT CONTRAST  12/15/2022 5:57 pm TECHNIQUE: Multiplanar multisequence MRI of the brain was performed without the administration of intravenous contrast. COMPARISON: Noncontrast CT head done earlier same day. HISTORY: ORDERING SYSTEM PROVIDED HISTORY: syncope atrial fibrillation TECHNOLOGIST PROVIDED HISTORY: Reason for exam:->syncope atrial fibrillation FINDINGS: INTRACRANIAL STRUCTURES/VENTRICLES: There is no acute infarct. No mass effect or midline shift. No evidence of an acute intracranial hemorrhage. Generalized cerebral and cerebellar volume loss. Diffuse ventricular prominence may relate to central volume loss. The sellar/suprasellar regions appear unremarkable. The normal signal voids within the major intracranial vessels appear maintained. The axial FLAIR images demonstrate pontine, bilateral periventricular and deep white matter signal hyperintensities which are nonspecific but commonly seen in the setting of chronic small vessel ischemic disease. 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 ORBITS: The visualized portion of the orbits demonstrate no acute abnormality. SINUSES: Small amount of nonspecific fluid in the bilateral mastoid air cells.   The paranasal sinuses are clear. BONES/SOFT TISSUES: The bone marrow signal intensity appears normal. The soft tissues demonstrate no acute abnormality. Multilevel degenerative changes in the visualized spine. 1.  No acute intracranial abnormality per unenhanced brain MRI. No acute stroke. 2.  Moderate to severe chronic small vessel ischemic disease. 3.  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 MRI PELVIS WO CONTRAST    Result Date: 12/15/2022  EXAMINATION: MRI OF THE PELVIS WITHOUT CONTRAST, 12/15/2022 4:10 pm TECHNIQUE: Multiplanar multisequence MRI of the pelvis was performed without the administration of intravenous contrast. COMPARISON: CT abdomen and pelvis 12/15/2022 HISTORY: ORDERING SYSTEM PROVIDED HISTORY: endometrial thickening rule out sacral fracture TECHNOLOGIST PROVIDED HISTORY: Reason for exam:->endometrial thickening rule out sacral fracture What reading provider will be dictating this exam?->CRC FINDINGS: SOFT TISSUES: Small amount of presacral free fluid is identified, which is stable compared to the prior CT. No large hematoma is seen. BONE MARROW: Vertical fractures are identified through the bilateral sacral alae with associated transverse fracture of the sacral body. Fractures demonstrate classic H-shaped pattern. No hip fracture is identified. Joint alignment is intact. JOINTS: Moderate-severe bilateral hip degenerative changes noted with loss of the articular space. Small joint effusion is noted on the left hip. Multiple nondisplaced sacral fractures with a classic H-shaped pattern suggestive of sacral insufficiency fracture.      Assessment//Plan           Hospital Problems             Last Modified POA    * (Principal) A-fib ([facility] [address] 12/15/2022 Yes    General weakness 12/16/2022 Yes   Assessment & Plan    Acute problems:  Syncope versus ground-level fall  Urinary tract infection  New onset atrial fibrillation with intermittent RVR  Bilateral sacral fractures s/p fall  Moderate Malnutrition (per RD)     Chronic problems:  Hypertension  Lung nodules (stable since previous)  Hx endometrial cancer    Plan:  Admitted to inpatient status 12/15  CT head negative for acute bony injury  MRI brain negative for acute CVA  MRI pelvis positive for bilateral sacral \"H fracture\", for which orthopedic surgery was consulted  Pain control PRN. So far, no significant pelvic pain with relatively flat positioning, when not moving. New onset atrial fibrillation, with intermittent RVR since admission, for which cardiology is consulted. Defer AC to cardiology in setting of both new onset A. fib and bilateral sacral fractures  Carotid Dopplers in setting of possible syncope  Pt unable to perform Orthostatic BP&P in setting of sacral fractures. Antibiotic for UTI. Culture pending. Previous cultures with E. coli. No lower extremity physical therapy, pending orthopedic surgery evaluation and recommendations  12/17 - No significant changes. Non-operative pelvic Fx treatment, per Orthopedics. Continues ABX for UTI, and pain meds for symptomatic pain control. Waxing/waning delirium. Encourage good day/night hygiene. Flipped back to NSR with PAC. Appreciate Cardiology recs regarding cardiac management. 12/18 - Continuing antibiotics for UTI. Continuing nonoperative pain management for bilateral sacral fractures. Did flip back into A. fib with mild RVR overnight, for which cardiology is adjusting cardiac medications this morning. Still in A. fib at time of exam.  Waxing/waning mentation during admission in setting of baseline dementia with superimposed UTI and pain of fractures on admission, mentating slightly more clearly at time of exam today,  but variable throughout the day per care staff.     Electronically signed by Sherry Maddox DO on 12/18/22

## 2022-12-18 NOTE — PROGRESS NOTES
Progress Note  Patient: Maria Antonia Quijano  Unit/Bed: B553/B805-76  YOB: 1935  MRN: [id number]  Acct: [de-identified]   Admitting Diagnosis: A-fib ([facility] [I48.91]  General weakness [R53.1]  Urinary tract infection without hematuria, site unspecified [N39.0]  Syncope, unspecified syncope type [R55]  Atrial fibrillation, unspecified type ([facility] [I48.91]  Admit Date:  12/15/2022  Hospital Day: 3    Chief Complaint: Tachycardia    Histories:  Past Medical History:   Diagnosis Date    Actinic keratoses     Arthritis     Dermatitis     Encounter for annual health examination 12/19/2011    Endometrial cancer ([facility]     s/p hyst in 2009 - was told clear by Dr. 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    left lower leg    Sciatica     Vitamin D deficiency      Past Surgical History:   Procedure Laterality Date    CATARACT REMOVAL WITH IMPLANT      HYSTERECTOMY (CERVIX STATUS UNKNOWN)  9/2009    post-menopausal bleeding     Family History   Problem Relation Age of Onset    Coronary Art Dis Father     Other Father         glaucoma    Coronary Art Dis Brother      Social History     Socioeconomic History    Marital status:    Tobacco Use    Smoking status: Never    Smokeless tobacco: Never   Substance and Sexual Activity    Alcohol use: No    Drug use: No     Social Determinants of Health     Financial Resource Strain: Low Risk     Difficulty of Paying Living Expenses: Not hard at all   Food Insecurity: No Food Insecurity    Worried About Running Out of Food in the Last Year: Never true    Ran Out of Food in the Last Year: Never true       Subjective/HPI she is doing better this am. She is not crying she is not distressed wiit anxiety. Now she wants to go home. EKG:  today she is in AF rate 120        Review of Systems:   Review of Systems   Constitutional: Negative.   Negative for diaphoresis and fatigue. HENT: Negative. Eyes: Negative. Respiratory:  Negative for cough, chest tightness, wheezing and stridor. Cardiovascular: Negative. Negative for chest pain, palpitations and leg swelling. Gastrointestinal: Negative. Negative for blood in stool and nausea. Genitourinary: Negative. Musculoskeletal: Negative. Skin: Negative. Neurological: Negative. Negative for dizziness, syncope, weakness and light-headedness. Hematological: Negative. Psychiatric/Behavioral: Negative. Physical Examination:    BP (!) 158/74   Pulse 93   Temp 97.3 °F (36.3 °C)   Resp 16   Ht 5' 8\" (1.727 m)   Wt 139 lb 8 oz (63.3 kg)   LMP  (LMP Unknown)   SpO2 99%   BMI 21.21 kg/m²    Physical Exam   Constitutional: She appears healthy. HENT:   Normal cephalic and Atraumatic   Eyes: Pupils are equal, round, and reactive to light. Neck: Thyroid normal. No JVD present. No neck adenopathy. No thyromegaly present. Cardiovascular: Normal rate, regular rhythm, normal heart sounds, intact distal pulses and normal pulses. Pulmonary/Chest: Effort normal and breath sounds normal. She has no wheezes. She has no rales. She exhibits no tenderness. Abdominal: Soft. Bowel sounds are normal. There is no abdominal tenderness. Musculoskeletal:         General: No tenderness or edema. Normal range of motion. Cervical back: Normal range of motion and neck supple. Neurological: She is alert and oriented to person, place, and time. Skin: Skin is warm. No cyanosis. Nails show no clubbing.      LABS:  CBC:   Lab Results   Component Value Date/Time    WBC 7.2 12/18/2022 04:58 AM    RBC 3.30 12/18/2022 04:58 AM    HGB 10.9 12/18/2022 04:58 AM    HCT 31.4 12/18/2022 04:58 AM    MCV 95.0 12/18/2022 04:58 AM    MCH 32.9 12/18/2022 04:58 AM    MCHC 34.6 12/18/2022 04:58 AM    RDW 14.7 12/18/2022 04:58 AM    PLT 95 12/18/2022 04:58 AM    MPV 8.6 02/14/2014 08:32 AM     CBC with Differential: Lab Results   Component Value Date/Time    WBC 7.2 12/18/2022 04:58 AM    RBC 3.30 12/18/2022 04:58 AM    HGB 10.9 12/18/2022 04:58 AM    HCT 31.4 12/18/2022 04:58 AM    PLT 95 12/18/2022 04:58 AM    MCV 95.0 12/18/2022 04:58 AM    MCH 32.9 12/18/2022 04:58 AM    MCHC 34.6 12/18/2022 04:58 AM    RDW 14.7 12/18/2022 04:58 AM    LYMPHOPCT 9.5 12/18/2022 04:58 AM    MONOPCT 10.3 12/18/2022 04:58 AM    BASOPCT 0.9 12/18/2022 04:58 AM    MONOSABS 0.7 12/18/2022 04:58 AM    LYMPHSABS 0.7 12/18/2022 04:58 AM    EOSABS 0.1 12/18/2022 04:58 AM    BASOSABS 0.1 12/18/2022 04:58 AM     CMP:    Lab Results   Component Value Date/Time     12/18/2022 04:58 AM    K 3.7 12/18/2022 04:58 AM    K 3.3 12/16/2022 05:15 AM    CL 96 12/18/2022 04:58 AM    CO2 25 12/18/2022 04:58 AM    BUN 31 12/18/2022 04:58 AM    CREATININE 0.89 12/18/2022 04:58 AM    GFRAA >60.0 04/12/2022 09:23 AM    LABGLOM >60.0 12/18/2022 04:58 AM    GLUCOSE 98 12/18/2022 04:58 AM    GLUCOSE 87 12/27/2011 08:41 AM    PROT 6.6 12/15/2022 11:45 AM    LABALBU 3.9 12/15/2022 11:45 AM    LABALBU 4.3 12/27/2011 08:41 AM    CALCIUM 8.7 12/18/2022 04:58 AM    BILITOT 0.6 12/15/2022 11:45 AM    ALKPHOS 83 12/15/2022 11:45 AM    AST 19 12/15/2022 11:45 AM    ALT 17 12/15/2022 11:45 AM     BMP:    Lab Results   Component Value Date/Time     12/18/2022 04:58 AM    K 3.7 12/18/2022 04:58 AM    K 3.3 12/16/2022 05:15 AM    CL 96 12/18/2022 04:58 AM    CO2 25 12/18/2022 04:58 AM    BUN 31 12/18/2022 04:58 AM    LABALBU 3.9 12/15/2022 11:45 AM    LABALBU 4.3 12/27/2011 08:41 AM    CREATININE 0.89 12/18/2022 04:58 AM    CALCIUM 8.7 12/18/2022 04:58 AM    GFRAA >60.0 04/12/2022 09:23 AM    LABGLOM >60.0 12/18/2022 04:58 AM    GLUCOSE 98 12/18/2022 04:58 AM    GLUCOSE 87 12/27/2011 08:41 AM     Magnesium:    Lab Results   Component Value Date/Time    MG 2.1 12/18/2022 04:58 AM     Troponin:    Lab Results   Component Value Date/Time    TROPONINI <0.010 12/15/2022 11:45 AM        Active Hospital Problems    Diagnosis Date Noted    General weakness [R53.1] 12/16/2022     Priority: High    A-fib [facility]) [I48.91] 12/15/2022     Priority: Medium        Assessment/Plan:  AF - rapid. Advance BB to 50 bid. Add Dig. Change Lovenox to full dose for now. Not sure she is a good candidate for long term A/C due to frequent falls. ? Anxiety  Syncope/fall  Multiple prior falls and fractures  UTI- on abx  LVEF 40-45%  HTN is elevated but she is so upset and anxious.    HPL -on Statin  Thrombocytopenia - periodically f/u lab  Conservative CV mangement       Electronically signed by Zachariah Jimenez MD on 12/18/2022 at 8:36 AM

## 2022-12-19 PROBLEM — Z51.5 PALLIATIVE CARE ENCOUNTER: Status: ACTIVE | Noted: 2022-01-01

## 2022-12-19 PROBLEM — Z71.89 ADVANCED CARE PLANNING/COUNSELING DISCUSSION: Status: ACTIVE | Noted: 2022-12-19

## 2022-12-19 PROBLEM — Z71.89 GOALS OF CARE, COUNSELING/DISCUSSION: Status: ACTIVE | Noted: 2022-01-01

## 2022-12-19 LAB
ANION GAP SERPL CALCULATED.3IONS-SCNC: 13 MEQ/L (ref 9–15)
BASOPHILS ABSOLUTE: 0.1 K/UL (ref 0–0.2)
BASOPHILS RELATIVE PERCENT: 1 %
BUN BLDV-MCNC: 30 MG/DL (ref 8–23)
CALCIUM SERPL-MCNC: 8.8 MG/DL (ref 8.5–9.9)
CHLORIDE BLD-SCNC: 99 MEQ/L (ref 95–107)
CO2: 25 MEQ/L (ref 20–31)
CREAT SERPL-MCNC: 0.71 MG/DL (ref 0.5–0.9)
EOSINOPHILS ABSOLUTE: 0.1 K/UL (ref 0–0.7)
EOSINOPHILS RELATIVE PERCENT: 1.8 %
GFR SERPL CREATININE-BSD FRML MDRD: >60 ML/MIN/{1.73_M2}
GLUCOSE BLD-MCNC: 102 MG/DL (ref 70–99)
HCT VFR BLD CALC: 34.8 % (ref 37–47)
HEMOGLOBIN: 12 G/DL (ref 12–16)
LYMPHOCYTES ABSOLUTE: 0.7 K/UL (ref 1–4.8)
LYMPHOCYTES RELATIVE PERCENT: 10.6 %
MAGNESIUM: 2.1 MG/DL (ref 1.7–2.4)
MCH RBC QN AUTO: 32.6 PG (ref 27–31.3)
MCHC RBC AUTO-ENTMCNC: 34.6 % (ref 33–37)
MCV RBC AUTO: 94.2 FL (ref 79.4–94.8)
MONOCYTES ABSOLUTE: 0.7 K/UL (ref 0.2–0.8)
MONOCYTES RELATIVE PERCENT: 10.6 %
NEUTROPHILS ABSOLUTE: 5 K/UL (ref 1.4–6.5)
NEUTROPHILS RELATIVE PERCENT: 76 %
PDW BLD-RTO: 14.5 % (ref 11.5–14.5)
PLATELET # BLD: 131 K/UL (ref 130–400)
POTASSIUM SERPL-SCNC: 3.4 MEQ/L (ref 3.4–4.9)
RBC # BLD: 3.7 M/UL (ref 4.2–5.4)
SODIUM BLD-SCNC: 137 MEQ/L (ref 135–144)
WBC # BLD: 6.6 K/UL (ref 4.8–10.8)

## 2022-12-19 PROCEDURE — 80048 BASIC METABOLIC PNL TOTAL CA: CPT

## 2022-12-19 PROCEDURE — 6360000002 HC RX W HCPCS: Performed by: INTERNAL MEDICINE

## 2022-12-19 PROCEDURE — 6370000000 HC RX 637 (ALT 250 FOR IP): Performed by: CLINICAL NURSE SPECIALIST

## 2022-12-19 PROCEDURE — 6370000000 HC RX 637 (ALT 250 FOR IP): Performed by: PAIN MEDICINE

## 2022-12-19 PROCEDURE — 2580000003 HC RX 258: Performed by: CLINICAL NURSE SPECIALIST

## 2022-12-19 PROCEDURE — 6370000000 HC RX 637 (ALT 250 FOR IP): Performed by: PHYSICIAN ASSISTANT

## 2022-12-19 PROCEDURE — 99232 SBSQ HOSP IP/OBS MODERATE 35: CPT | Performed by: INTERNAL MEDICINE

## 2022-12-19 PROCEDURE — 83735 ASSAY OF MAGNESIUM: CPT

## 2022-12-19 PROCEDURE — 97166 OT EVAL MOD COMPLEX 45 MIN: CPT

## 2022-12-19 PROCEDURE — 36415 COLL VENOUS BLD VENIPUNCTURE: CPT

## 2022-12-19 PROCEDURE — APPSS30 APP SPLIT SHARED TIME 16-30 MINUTES: Performed by: PHYSICIAN ASSISTANT

## 2022-12-19 PROCEDURE — 99223 1ST HOSP IP/OBS HIGH 75: CPT

## 2022-12-19 PROCEDURE — 6360000002 HC RX W HCPCS: Performed by: CLINICAL NURSE SPECIALIST

## 2022-12-19 PROCEDURE — 2060000000 HC ICU INTERMEDIATE R&B

## 2022-12-19 PROCEDURE — 6370000000 HC RX 637 (ALT 250 FOR IP): Performed by: INTERNAL MEDICINE

## 2022-12-19 PROCEDURE — 85025 COMPLETE CBC W/AUTO DIFF WBC: CPT

## 2022-12-19 RX ORDER — LOSARTAN POTASSIUM 25 MG/1
25 TABLET ORAL DAILY
Status: DISCONTINUED | OUTPATIENT
Start: 2022-12-19 | End: 2022-12-23 | Stop reason: HOSPADM

## 2022-12-19 RX ORDER — HYDROCODONE BITARTRATE AND ACETAMINOPHEN 10; 325 MG/1; MG/1
1 TABLET ORAL EVERY 6 HOURS PRN
Status: DISCONTINUED | OUTPATIENT
Start: 2022-12-19 | End: 2022-12-23 | Stop reason: HOSPADM

## 2022-12-19 RX ORDER — POTASSIUM CHLORIDE 20 MEQ/1
20 TABLET, EXTENDED RELEASE ORAL ONCE
Status: COMPLETED | OUTPATIENT
Start: 2022-12-19 | End: 2022-12-19

## 2022-12-19 RX ADMIN — CEFTRIAXONE SODIUM 1000 MG: 1 INJECTION, POWDER, FOR SOLUTION INTRAMUSCULAR; INTRAVENOUS at 16:51

## 2022-12-19 RX ADMIN — POTASSIUM CHLORIDE 20 MEQ: 1500 TABLET, EXTENDED RELEASE ORAL at 10:49

## 2022-12-19 RX ADMIN — METOPROLOL TARTRATE 50 MG: 50 TABLET ORAL at 22:43

## 2022-12-19 RX ADMIN — ASPIRIN 81 MG: 81 TABLET, COATED ORAL at 10:48

## 2022-12-19 RX ADMIN — Medication 10 ML: at 11:14

## 2022-12-19 RX ADMIN — MORPHINE SULFATE 1 MG: 2 INJECTION, SOLUTION INTRAMUSCULAR; INTRAVENOUS at 11:11

## 2022-12-19 RX ADMIN — ROSUVASTATIN CALCIUM 40 MG: 20 TABLET, FILM COATED ORAL at 22:42

## 2022-12-19 RX ADMIN — LOSARTAN POTASSIUM 25 MG: 25 TABLET, FILM COATED ORAL at 10:48

## 2022-12-19 RX ADMIN — METOPROLOL TARTRATE 50 MG: 50 TABLET ORAL at 10:48

## 2022-12-19 RX ADMIN — ENOXAPARIN SODIUM 60 MG: 100 INJECTION SUBCUTANEOUS at 10:48

## 2022-12-19 RX ADMIN — DIGOXIN 125 MCG: 125 TABLET ORAL at 10:48

## 2022-12-19 RX ADMIN — ENOXAPARIN SODIUM 60 MG: 100 INJECTION SUBCUTANEOUS at 22:42

## 2022-12-19 RX ADMIN — HYDROCODONE BITARTRATE AND ACETAMINOPHEN 1 TABLET: 10; 325 TABLET ORAL at 22:42

## 2022-12-19 RX ADMIN — HYDROCODONE BITARTRATE AND ACETAMINOPHEN 1 TABLET: 10; 325 TABLET ORAL at 13:23

## 2022-12-19 ASSESSMENT — PAIN SCALES - GENERAL
PAINLEVEL_OUTOF10: 6
PAINLEVEL_OUTOF10: 6

## 2022-12-19 ASSESSMENT — ENCOUNTER SYMPTOMS
NAUSEA: 0
CHEST TIGHTNESS: 0
COLOR CHANGE: 0
SHORTNESS OF BREATH: 0
VOMITING: 0
ABDOMINAL DISTENTION: 0

## 2022-12-19 ASSESSMENT — PAIN DESCRIPTION - LOCATION
LOCATION: SACRUM
LOCATION: SACRUM

## 2022-12-19 ASSESSMENT — VISUAL ACUITY: OU: 1

## 2022-12-19 NOTE — PROGRESS NOTES
MUSIC THERAPY NOTE    Found pt in bed, presenting disoriented and confused as to where she was. Pt was easily redirected with music. To reorient and comfort pt, this music therapist facilitated an intervention of pt preferred songs, \"You are my Sunshine\" and \"This Little Light of Mine\" sung and played live with guitar. Pt sang every word saying \"I sang in choir and was a low alto. \"    During intervention, pt was comforted AEB smiles, singing and conversation oriented to the here and now. Will follow during hospitalization.

## 2022-12-19 NOTE — PROGRESS NOTES
present          Pain at start of treatment: Yes: Pt. unable to rate pain-      Pain at end of treatment: Yes: Pt. unable to rate pain-      Location: low back and hips  Nursing notified: Yes  RN:   Intervention: Repositioned    Prior Level of Function:  Social/Functional History  Lives With: Spouse  Type of Home: House  Home Layout: Two level, Performs ADL's on one level  Home Access: Ramped entrance  Bathroom Shower/Tub: Tub/Shower unit  Bathroom Equipment: Grab bars in shower, Hand-held shower, Shower chair  Has the patient had two or more falls in the past year or any fall with injury in the past year?: No  ADL Assistance: [address] [place] [address] Avenue: Needs assistance (Family provides meals and patinet has hired assist for heavy homemaking)  Homemaking Responsibilities: Yes  Ambulation Assistance: Independent  Transfer Assistance: Independent  Active : Yes    OBJECTIVE:     Orientation Status:  Orientation  Orientation Level: Oriented to person;Oriented to situation;Oriented to place    Observation:  Observation/Palpation  Observation: Pt max A to attempt to sit    Cognition Status:  Cognition  Cognition Comment: Follows commands consistently    Perception Status:  Perception  Overall Perceptual Status: WFL    Vision and Hearing Status:  Vision  Vision Exceptions: Wears glasses at all times  Hearing  Hearing: Within functional limits   Vision - Basic Assessment  Prior Vision: Wears glasses all the time  Visual History: No significant visual history  Patient Visual Report: No visual complaint reported. Visual Field Cut: No  Oculo Motor Control: WNL    GROSS ASSESSMENT AROM/PROM:  AROM: Within functional limits       ROM:   LUE AROM (degrees)  LUE AROM : WFL  Left Hand AROM (degrees)  Left Hand AROM: WFL  RUE AROM (degrees)  RUE AROM : WFL  Right Hand AROM (degrees)  Right Hand AROM: WFL    UE STRENGTH:  Strength:  Within functional limits (4/5 bilateral UE)    UE COORDINATION:  Coordination: Within functional limits    UE TONE:  Tone: Normal    UE SENSATION:  Sensation: Intact    Hand Dominance:  Hand Dominance  Hand Dominance: Right    ADL Status:  ADL  Feeding: Moderate assistance  Grooming: Minimal assistance; Increased time to complete  UE Bathing: Minimal assistance; Increased time to complete  LE Bathing: Maximum assistance; Increased time to complete  UE Dressing: Moderate assistance; Increased time to complete  LE Dressing: Dependent/Total  Toileting: Unable to assess(comment)    Functional Mobility:       Bed Mobility  Bed mobility  Rolling to Left: Contact guard assistance  Supine to Sit: Maximum assistance  Sit to Supine: Maximum assistance  Scooting: Minimal assistance    Seated and Standing Balance:       Functional Endurance:  Activity Tolerance  Activity Tolerance: Patient limited by pain; Patient limited by fatigue;Treatment limited secondary to medical complications (free text)    D/C Recommendations:  OT D/C RECOMMENDATIONS  REQUIRES OT FOLLOW-UP: Yes    Equipment Recommendations:       OT Education:        OT Follow Up:   OT D/C RECOMMENDATIONS  REQUIRES OT FOLLOW-UP: Yes       Assessment/Discharge Disposition:     Performance deficits / Impairments: Decreased functional mobility , Decreased balance, Decreased posture, Decreased ADL status, Decreased endurance, Decreased strength, Decreased high-level IADLs  Prognosis: Fair  Discharge Recommendations: Continue to assess pending progress  Decision Making: Medium Complexity  History: 4 complexities  Exam: 7 deficits  Assistance / Modification: Max A    AMPAC (Six Click) Self care Score    How much help for putting on and taking off regular lower body clothing?: Total  How much help for Bathing?: A Lot  How much help for Toileting?: A Lot  How much help for putting on and taking off regular upper body clothing?: A Lot  How much help for taking care of personal grooming?: A Little  How much help for eating meals?: A Lot  AM-[facility] Inpatient Daily Activity Raw Score: 12  AM-PAC Inpatient ADL T-Scale Score : 30.6  ADL Inpatient CMS 0-100% Score: 66.57    Therapy key for assistance levels -   Independent/Mod I = Pt. is able to perform task with no assistance but may require a device   Stand by assistance = Pt. does not perform task at an independent level but does not need physical assistance, requires verbal cues  Minimal, Moderate, Maximal Assistance = Pt. requires physical assistance (25%, 50%, 75% assist from helper) for task but is able to actively participate in task   Dependent = Pt. requires total assistance with task and is not able to actively participate with task completion     Plan:  Occupational Therapy Plan  Times Per Week: 1-4x/wk  Current Treatment Recommendations: Strengthening, Balance training, Functional mobility training, Endurance training, Neuromuscular re-education, Self-Care / ADL, Safety education & training    Goals:   Patient will:    - Improve functional endurance to tolerate/complete 15-20 mins of ADL's  - Be Set up in UB ADLs   - Be Min A in LB ADLs  - Be Min A in ADL transfers without LOB  - Be Min S in toileting tasks  - Improve bilateral UE strength and endurance to FAir in order to participate in self-care activities as projected. Patient Goal: Patient goals :  To return to home when ready      Discussed and agreed upon: Yes Comments:       Therapy Time:   Individual   Time In 1126   Time Out 1158   Minutes 32          Eval: 32 minutes     Electronically signed by:    BELLA Seaman,   50/41/5167, 1:14 PM Electronically signed by BELLA Seaman on 61/45/98 at 1:15 PM EST

## 2022-12-19 NOTE — FLOWSHEET NOTE
1050- AM assessment completed. Patient resting in bed at this time. Family visiting at bedside. Updated on plan of care. Denies any chest pain at this time. Remains NSR/ST with frequent PAC's noted. No edema noted. Pedal pulses palpable. Denies any shortness of breath at this time. Lungs are diminished bilaterally. SATS 96% on RA. She is A/Ox1-2 with intermittent confusion. Was able to tell me her name and where she was at this time. She can turn herself in the bed but prefers not to secondary to pain in her sacral area secondary to fractures. Denies any pain with elimination. Pure wick catheter remains present and set to medium wall suction. Clear yellow urine noted. Skin remains warm, dry and intact. #22g to lower right forearm removed at this time secondary to site leaking. Catheter intact and dressing applied. New #22g SL placed to right ventral forearm with 1 attempt. Patient tolerated well with no complaints of any discomfort. Vital signs stable and AM medications provided. [employer] camera present in room for continued safety monitoring. Call light remains in reach. 1115- Medicated with 1mg IV morphine for complaints of 5/10 pain to her \"butt\". No signs of any acute distress noted, but facial grimacing noted with movement. Family remains at bedside. Call light remains in reach. 1325- Patient resting in bed at this time. Only oriented to her self at this time. Does complain of continued pain to her \"butt\" when she moves around in the bed. Medicated with 1 tab PO norco 10/325mg for complaints of 6/10 pain. No signs of any acute distress noted. Call light remains in reach. 1600- Patient repositioned for comfort. Depends changed from incontinent urine and new pure wick placed and set to medium wall suction. Family at bedside and updated on plan of care. Call light remains in reach. 1815- Patient resting in bed at this time. Has no complaints/concerns. Only oriented to self at this time.  [employer] camera remains present for safety monitoring. Call light remains in reach.      Electronically signed by Charlene Vang RN on 12/19/2022 at 6:26 PM

## 2022-12-19 NOTE — PROGRESS NOTES
Progress Note  Date:2022       Room:[place] [id number]  Patient Mila Cheng     YOB: 1935     Age:87 y.o. Subjective      Patient is not sure where she is; complaining of pain in her bottom from laying in bed; denies any other complaints at this time; AON x 1      Objective         Vitals Last 24 Hours:  TEMPERATURE:  Temp  Av.6 °F (36.4 °C)  Min: 97.2 °F (36.2 °C)  Max: 97.9 °F (36.6 °C)  RESPIRATIONS RANGE: Resp  Av  Min: 16  Max: 20  PULSE OXIMETRY RANGE: SpO2  Av.7 %  Min: 97 %  Max: 99 %  PULSE RANGE: Pulse  Av.3  Min: 60  Max: 119  BLOOD PRESSURE RANGE: Systolic (01IMN), NPR:978 , Min:137 , GBF:709   ; Diastolic (43WFL), KX, Min:78, Max:93    I/O (24Hr): No intake or output data in the 24 hours ending 22 0738    Objective:  Vital signs: (most recent): Blood pressure (!) 151/78, pulse 60, temperature 97.7 °F (36.5 °C), resp. rate 20, height 5' 8\" (1.727 m), weight 139 lb 8 oz (63.3 kg), SpO2 97 %, not currently breastfeeding. Constitutional: Frail elderly adult female reclining in bed no acute distress. Head: NCAT  Eyes: PERRLA,  Neck: Trachea midline  Cardiovascular: RRR  Pulmonary: Clear bilateral breath sounds. Abdomen: Soft, nontense, nondistended. Neurologic: AON x 1; non focal.      Labs/Imaging/Diagnostics    Labs:  CBC:  Recent Labs     22  0856 22  0458 22  0505   WBC 9.4 7.2 6.6   RBC 3.77* 3.30* 3.70*   HGB 12.2 10.9* 12.0   HCT 36.4* 31.4* 34.8*   MCV 96.6* 95.0* 94.2   RDW 14.6* 14.7* 14.5   PLT 98* 95* 131       CHEMISTRIES:  Recent Labs     22  0856 22  0458 22  0505    132* 137   K 3.3* 3.7 3.4    96 99   CO2 21 25 25   BUN 20 31* 30*   CREATININE 0.57 0.89 0.71   GLUCOSE 128* 98 102*   MG 1.9 2.1 2.1       PT/INR:  No results for input(s): PROTIME, INR in the last 72 hours. APTT:No results for input(s): APTT in the last 72 hours.   LIVER PROFILE:  No results for input(s): AST, ALT, BILIDIR, BILITOT, ALKPHOS in the last 72 hours. Imaging Last 24 Hours:  CT Head W/O Contrast    Result Date: 12/15/2022  EXAMINATION: CT OF THE HEAD WITHOUT CONTRAST  12/15/2022 12:06 pm TECHNIQUE: CT of the head was performed without the administration of intravenous contrast. Automated exposure control, iterative reconstruction, and/or weight based adjustment of the mA/kV was utilized to reduce the radiation dose to as low as reasonably achievable. COMPARISON: None. HISTORY: ORDERING SYSTEM PROVIDED HISTORY: syncope TECHNOLOGIST PROVIDED HISTORY: Reason for exam:->syncope Has a \"code stroke\" or \"stroke alert\" been called? ->No Decision Support Exception - unselect if not a suspected or confirmed emergency medical condition->Emergency Medical Condition (MA) What reading provider will be dictating this exam?->CRC FINDINGS: BRAIN/VENTRICLES: There is no acute intracranial hemorrhage, mass effect or midline shift. No abnormal extra-axial fluid collection. The gray-white differentiation is maintained without evidence of an acute infarct. There is no evidence of hydrocephalus. The ventricles, cisterns and sulci are prominent consistent with atrophy. There is decreased attenuation within the periventricular white matter consistent with periventricular leukomalacia. There are old lacunar infarcts seen within the right and left basal ganglia. ORBITS: The visualized portion of the orbits demonstrate no acute abnormality. SINUSES: The visualized paranasal sinuses and mastoid air cells demonstrate no acute abnormality. SOFT TISSUES/SKULL:  No acute abnormality of the visualized skull or soft tissues. 1. There is no acute intracranial abnormality. Specifically, there is no intracranial hemorrhage.  2. Atrophy and periventricular leukomalacia,     CT ABDOMEN PELVIS W IV CONTRAST Additional Contrast? None    Result Date: 12/15/2022  EXAMINATION: ONE XRAY VIEW OF THE CHEST; CT OF THE ABDOMEN AND PELVIS WITH A superior there also is some stranding visualized. The uterus is not seen. There also is a small amount of fluid. The bladder distends normally. Bones: No destructive bony lesions are seen. Slight irregularity is seen in the lower sacral region on the left. 0.82 degenerative changes are seen in both hips and is most prominent on the right. Chest x-ray: No evidence for acute cardiopulmonary process. CT of the abdomen and pelvis: 1.  2 nodules are seen in the visualized bases of the lungs. The nodule in the right lower lobe was seen on previous study but was smaller in size. There are also emphysematous changes seen in the visualized bases. Numeral 2. Hypodense lesion is seen in the region of the left adrenal gland. This could be an adrenal nodule of the possibility is necrotic lymph node . It is slightly larger than what was seen on previous study. 3.  A stable hypodense area is seen in the tail the pancreas. This could represent IPMN. 4.  There is no evidence for appendicitis, diverticulitis or bowel obstruction 5. There is presacral soft tissue density and stranding seen in the pelvis. This is new when compared to previous study. Query if patient may have had any radiation treatment due to endometrial cancer. Recommend further evaluation there is also full small amount of fluid. RECOMMENDATION: 1. Low does CT of the lungs, non emergently 2. It presacral soft tissue seen also there is a small amount of fluid. Possibility the of hemorrhage or of the etiology could be related to patient's history of endometrial cancer and any possibility of prior radiation treatment. Recommend follow-up. Also there is some irregularity in the sacrum and occult fracture is not excluded. This was discussed with JOSH Gallo, and CT of the pelvis was recommended.      XR CHEST PORTABLE    Result Date: 12/15/2022  EXAMINATION: ONE XRAY VIEW OF THE CHEST; CT OF THE ABDOMEN AND PELVIS WITH CONTRAST 12/15/2022 12:22 pm COMPARISON: Chest x-ray, October 19 2022; CT abdomen pelvis, September 25, 2018 HISTORY: ORDERING SYSTEM PROVIDED HISTORY: sob TECHNOLOGIST PROVIDED HISTORY: Reason for exam:->sob What reading provider will be dictating this exam?->CRC FINDINGS: There also is a small amount of fluid. There is coarsening of the interstitium. The lungs are hyperinflated. The cardiac silhouette is within normal limits. Atherosclerotic calcifications are seen thoracic aorta. No consolidation, pneumothorax or pleural effusion seen. Degenerative changes are seen in the visualized portion of both shoulders and also spine. CT of the abdomen and pelvis: Lungs: In the visualized bases of the lungs a 5.7 mm pleural base nodule is seen posteriorly on the left (series 2, image 1). A 7.7 mm nodule is seen posteriorly in the right lower lobe that is pleural based as well. (Series 2, image 20). No consolidating pneumonia, pneumothorax or pleural effusion is seen. Changes of some emphysema are visualized. Organs: The liver, pancreas, right adrenal gland and liver are unremarkable. There are calcifications seen in the liver and also there is a calcified splenic artery again seen that is unchanged from previous study. In the area of the left adrenal gland there is a 1.7 cm hypodense thick wall cystic area. This was also seen on previous study. In the region of the tail of the pancreas a hypodense area is seen that measures 1.2 cm. This area measures fluid density on this examination. This could be an IPMN in. This was vaguely seen on previous study and also appears stable. Both kidneys show no evidence of hydronephrosis. No renal calculus is visualized. Prominent atherosclerotic calcifications are visualized. GI/bowel: No dilated loops of bowel are seen. The appendix is visualized and is unremarkable. Pelvis: In the pre sacral region there is soft tissue density posterior to the bowel that is thickened.   A superior there also is some stranding visualized. The uterus is not seen. There also is a small amount of fluid. The bladder distends normally. Bones: No destructive bony lesions are seen. Slight irregularity is seen in the lower sacral region on the left. 0.82 degenerative changes are seen in both hips and is most prominent on the right. Chest x-ray: No evidence for acute cardiopulmonary process. CT of the abdomen and pelvis: 1.  2 nodules are seen in the visualized bases of the lungs. The nodule in the right lower lobe was seen on previous study but was smaller in size. There are also emphysematous changes seen in the visualized bases. Numeral 2. Hypodense lesion is seen in the region of the left adrenal gland. This could be an adrenal nodule of the possibility is necrotic lymph node . It is slightly larger than what was seen on previous study. 3.  A stable hypodense area is seen in the tail the pancreas. This could represent IPMN. 4.  There is no evidence for appendicitis, diverticulitis or bowel obstruction 5. There is presacral soft tissue density and stranding seen in the pelvis. This is new when compared to previous study. Query if patient may have had any radiation treatment due to endometrial cancer. Recommend further evaluation there is also full small amount of fluid. RECOMMENDATION: 1. Low does CT of the lungs, non emergently 2. It presacral soft tissue seen also there is a small amount of fluid. Possibility the of hemorrhage or of the etiology could be related to patient's history of endometrial cancer and any possibility of prior radiation treatment. Recommend follow-up. Also there is some irregularity in the sacrum and occult fracture is not excluded. This was discussed with JOSH Gallo, and CT of the pelvis was recommended.      MRI BRAIN WO CONTRAST    Result Date: 12/15/2022  EXAMINATION: MRI OF THE BRAIN WITHOUT CONTRAST  12/15/2022 5:57 pm TECHNIQUE: Multiplanar multisequence MRI of the brain was performed without the administration of intravenous contrast. COMPARISON: Noncontrast CT head done earlier same day. HISTORY: ORDERING SYSTEM PROVIDED HISTORY: syncope atrial fibrillation TECHNOLOGIST PROVIDED HISTORY: Reason for exam:->syncope atrial fibrillation FINDINGS: INTRACRANIAL STRUCTURES/VENTRICLES: There is no acute infarct. No mass effect or midline shift. No evidence of an acute intracranial hemorrhage. Generalized cerebral and cerebellar volume loss. Diffuse ventricular prominence may relate to central volume loss. The sellar/suprasellar regions appear unremarkable. The normal signal voids within the major intracranial vessels appear maintained. The axial FLAIR images demonstrate pontine, bilateral periventricular and deep white matter signal hyperintensities which are nonspecific but commonly seen in the setting of chronic small vessel ischemic disease. 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 ORBITS: The visualized portion of the orbits demonstrate no acute abnormality. SINUSES: Small amount of nonspecific fluid in the bilateral mastoid air cells. The paranasal sinuses are clear. BONES/SOFT TISSUES: The bone marrow signal intensity appears normal. The soft tissues demonstrate no acute abnormality. Multilevel degenerative changes in the visualized spine. 1.  No acute intracranial abnormality per unenhanced brain MRI. No acute stroke. 2.  Moderate to severe chronic small vessel ischemic disease. 3.  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      MRI PELVIS WO CONTRAST    Result Date: 12/15/2022  EXAMINATION: MRI OF THE PELVIS WITHOUT CONTRAST, 12/15/2022 4:10 pm TECHNIQUE: Multiplanar multisequence MRI of the pelvis was performed without the administration of intravenous contrast. COMPARISON: CT abdomen and pelvis 12/15/2022 HISTORY: ORDERING SYSTEM PROVIDED HISTORY: endometrial thickening rule out sacral fracture TECHNOLOGIST PROVIDED HISTORY: Reason for exam:->endometrial thickening rule out sacral fracture What reading provider will be dictating this exam?->CRC FINDINGS: SOFT TISSUES: Small amount of presacral free fluid is identified, which is stable compared to the prior CT. No large hematoma is seen. BONE MARROW: Vertical fractures are identified through the bilateral sacral alae with associated transverse fracture of the sacral body. Fractures demonstrate classic H-shaped pattern. No hip fracture is identified. Joint alignment is intact. JOINTS: Moderate-severe bilateral hip degenerative changes noted with loss of the articular space. Small joint effusion is noted on the left hip. Multiple nondisplaced sacral fractures with a classic H-shaped pattern suggestive of sacral insufficiency fracture. Assessment//Plan           Hospital Problems             Last Modified POA    * (Principal) A-fib ([address] 12/15/2022 Yes    General weakness 12/16/2022 Yes   Assessment & Plan    Acute problems:  Syncope versus ground-level fall  Urinary tract infection  New onset atrial fibrillation with intermittent RVR  Bilateral sacral fractures s/p fall  Moderate Malnutrition (per RD)     Chronic problems:  Hypertension  Lung nodules (stable since previous)  Hx endometrial cancer    Plan:  Admitted to inpatient status 12/15  CT head negative for acute bony injury  MRI brain negative for acute CVA  MRI pelvis positive for bilateral sacral \"H fracture\", for which orthopedic surgery was consulted  Pain control PRN. So far, no significant pelvic pain with relatively flat positioning, when not moving. New onset atrial fibrillation, with intermittent RVR since admission, for which cardiology is consulted. Defer AC to cardiology in setting of both new onset A. fib and bilateral sacral fractures  Carotid Dopplers in setting of possible syncope  Pt unable to perform Orthostatic BP&P in setting of sacral fractures. Antibiotic for UTI. Culture pending. Previous cultures with E. coli.   No lower extremity physical therapy, pending orthopedic surgery evaluation and recommendations  12/17 - No significant changes. Non-operative pelvic Fx treatment, per Orthopedics. Continues ABX for UTI, and pain meds for symptomatic pain control. Waxing/waning delirium. Encourage good day/night hygiene. Flipped back to NSR with PAC. Appreciate Cardiology recs regarding cardiac management. 12/18 - Continuing antibiotics for UTI. Continuing nonoperative pain management for bilateral sacral fractures. Did flip back into A. fib with mild RVR overnight, for which cardiology is adjusting cardiac medications this morning. Still in A. fib at time of exam.  Waxing/waning mentation during admission in setting of baseline dementia with superimposed UTI and pain of fractures on admission, mentating slightly more clearly at time of exam today,  but variable throughout the day per care staff.   12/19:  Cardiology is managing the A Fib; continue IV Abx for UTI; given the non operative fractures causing pain and pt being on IV pain meds will consult pain management for transition to PO and consult palliative care for overall recommendations and outpatient follow up given her dementia and pelvic  fractures    [address]

## 2022-12-19 NOTE — CONSULTS
Palliative Care Consult Note  Patient: Parul Camacho  Gender: female  YOB: 1935  Unit/Bed: P507/T890-35  CodeStatus: Full Code  Inpatient Treatment Team: Treatment Team: Attending Provider: Frankie Leong MD; Consulting Physician: Shanna Schlatter, DO; Patient Care Tech: Decaln Peres; Registered Nurse: Xavier Hicks RN; Respiratory Therapist (Day): Maria De Jesus Schwartz RCP; : Jean-Paul Tee RN; Consulting Physician: Chris Valle MD; Utilization Reviewer: Lito Valdez RN  Admit Date:  12/15/2022    Chief Complaint: Loss of consciousness    History of Presenting Illness:      Parul Camacho is a 80 y.o. female on hospital day 4 with a history of hypertension, dyslipidemia, dementia (diagnosed approximately 2021). Patient was brought to the emergency room with loss of consciousness and fall. Patient was admitted for syncope possibly secondary to atrial fibrillation. Palliative care was consulted for goals of care, CODE STATUS discussion, family support, and symptom management. Upon entering the room I find patient sleeping but awakens easily to voice. Patient is A&O X1. Patient's previous functional status is A&O X1 per patient and staff notes. Patient lives independently at home with . Patient/health caregiver admits to no weight loss. Patient/health caregiver has had severe functional decline over last five years. Patient was able to get around and care for herself up until her most recent fall. Patient admits to \"occasional frustration with decreased abilities\" and \"not being able to doing things she used to\". Per patient conversation patient has no current issue with appetite, memory or mood. Patient is currently pleasantly confused and has been throughout the day per nursing staff. We will try to contact patient's family. Called Enzo Abrams, no answer and left voicemail. Enzo Abrams called back and all questions were answered before end of call.  Patient already has advanced directives in place in the form of healthcare power of . Edla Rodriguez \"Melina\" Turton, [phone]. Rogelio Hull asked that I remove Pat Carcamo from decision making capacity and contact information from within our charting. Patient and daughter Rogelio Hull stated they do not want to sign-on with palliative care at this time. Patient expressed that she would like to remain a full code. Daughter Rogelio Hull also reconfirmed patient CODE STATUS. Most recent notable labs: BUN 30, random glucose 102, RBC 3.7, hematocrit 34.8, HDL cholesterol 94, urinalysis positive ketones trace, blood moderate, protein trace, positive nitrite and large leukocyte Estrace and with many bacteria      Review of Systems:       Review of Systems   Unable to perform ROS: Dementia   Constitutional:  Negative for unexpected weight change. Neurological:  Positive for weakness. All other systems reviewed and are negative. Physical Examination:       /71   Pulse 80   Temp 98.2 °F (36.8 °C) (Oral)   Resp 20   Ht 5' 8\" (1.727 m)   Wt 139 lb 8 oz (63.3 kg)   LMP  (LMP Unknown)   SpO2 96%   BMI 21.21 kg/m²    Physical Exam  Vitals and nursing note reviewed. Constitutional:       General: She is awake. She is not in acute distress. Appearance: Normal appearance. She is well-developed. Comments: Celes monitor in room/present for safety   HENT:      Head: Normocephalic and atraumatic. No right periorbital erythema or left periorbital erythema. Jaw: There is normal jaw occlusion. Right Ear: Hearing and external ear normal. No laceration. Left Ear: Hearing and external ear normal. No laceration. Nose: Nose normal. No nasal deformity, signs of injury, laceration, congestion or rhinorrhea. Mouth/Throat:      Lips: Pink. No lesions. Mouth: Mucous membranes are moist. No injury, lacerations or angioedema. Eyes:      General: Lids are normal. Vision grossly intact. Gaze aligned appropriately.  No scleral icterus. Right eye: No discharge. Left eye: No discharge. Extraocular Movements: Extraocular movements intact. Conjunctiva/sclera: Conjunctivae normal.      Pupils: Pupils are equal, round, and reactive to light. Cardiovascular:      Rate and Rhythm: Normal rate and regular rhythm. Pulses: Normal pulses. Heart sounds: Murmur heard. Pulmonary:      Effort: Pulmonary effort is normal. No respiratory distress. Breath sounds: Normal breath sounds. Decreased air movement present. No wheezing or rhonchi. Abdominal:      General: Abdomen is flat. Bowel sounds are normal. There is no distension. Palpations: Abdomen is soft. Tenderness: There is no abdominal tenderness. Genitourinary:     Comments: Incontinent urine, purewick external catheter in place  Musculoskeletal:      Cervical back: Normal range of motion and neck supple. No edema, erythema or signs of trauma. No pain with movement. Right lower leg: No edema. Left lower leg: No edema. Skin:     General: Skin is warm and dry. Capillary Refill: Capillary refill takes less than 2 seconds. Coloration: Skin is pale. Findings: Bruising present. No burn or rash. Nails: There is no clubbing. Neurological:      Mental Status: She is alert and easily aroused. Mental status is at baseline. She is disoriented and confused. Cranial Nerves: No facial asymmetry. Gait: Gait abnormal.   Psychiatric:         Attention and Perception: Attention and perception normal.         Mood and Affect: Mood and affect normal.         Speech: Speech normal.         Behavior: Behavior normal. Behavior is cooperative. Thought Content: Thought content normal.         Cognition and Memory: Cognition is impaired. Memory is impaired. Judgment: Judgment is impulsive. Comments: A&OX1       Allergies:       Allergies   Allergen Reactions    Bactrim [Sulfamethoxazole-Trimethoprim] Hives    Benazepril Other (See Comments)     cough    Codeine     Sulfa Antibiotics        Medications:      Current Facility-Administered Medications   Medication Dose Route Frequency Provider Last Rate Last Admin    potassium chloride (KLOR-CON M) extended release tablet 20 mEq  20 mEq Oral Once JOSH Monson        losartan (COZAAR) tablet 25 mg  25 mg Oral Daily Bacilio Monson        enoxaparin (LOVENOX) injection 60 mg  1 mg/kg SubCUTAneous BID Fermin Perez MD   60 mg at 12/18/22 2158    metoprolol tartrate (LOPRESSOR) tablet 50 mg  50 mg Oral BID Fermin Perez MD   50 mg at 12/18/22 2155    digoxin (LANOXIN) tablet 125 mcg  125 mcg Oral Daily Fermin Perez MD   125 mcg at 12/18/22 7802    hydrALAZINE (APRESOLINE) injection 5 mg  5 mg IntraVENous Q6H PRN Suellyn Grace, DO        sodium chloride flush 0.9 % injection 5-40 mL  5-40 mL IntraVENous 2 times per day Nash Baca, APRN - CNS   10 mL at 12/18/22 2100    sodium chloride flush 0.9 % injection 5-40 mL  5-40 mL IntraVENous PRN Quillian Dhaval Dials, APRN - CNS   10 mL at 12/16/22 0449    0.9 % sodium chloride infusion   IntraVENous PRN Quillian Dhaval Dials, APRN - CNS        ondansetron (ZOFRAN-ODT) disintegrating tablet 4 mg  4 mg Oral Q8H PRN Quillian Dhaval Dials, APRN - CNS        Or    ondansetron (ZOFRAN) injection 4 mg  4 mg IntraVENous Q6H PRN Quillian Dhaval Dials, APRN - CNS        acetaminophen (TYLENOL) tablet 650 mg  650 mg Oral Q6H PRN Quillian Dhaval Dials, APRN - CNS   650 mg at 12/16/22 2303    Or    acetaminophen (TYLENOL) suppository 650 mg  650 mg Rectal Q6H PRN Quillian Dhaval Dials, APRN - CNS        polyethylene glycol (GLYCOLAX) packet 17 g  17 g Oral Daily PRN Quillian Dhaval Dials, APRN - CNS        cefTRIAXone (ROCEPHIN) 1,000 mg in dextrose 5 % 50 mL IVPB mini-bag  1,000 mg IntraVENous Q24H Quillian Dhaval Dials, APRN - CNS   Stopped at 12/18/22 1719    metoprolol (LOPRESSOR) injection 5 mg  5 mg IntraVENous Q6H PRN YUSUF Campbell   5 mg at 12/15/22 1948    aspirin EC tablet 81 mg  81 mg Oral Daily Jose Martin Ros Dials, APRN - CNS   81 mg at 12/18/22 2943    Or    aspirin suppository 300 mg  300 mg Rectal Daily Jose Martin Ros Dials, APRN - CNS        rosuvastatin (CRESTOR) tablet 40 mg  40 mg Oral Nightly Jose Martin Ros Dials, APRN - CNS   40 mg at 12/18/22 2154    bisacodyl (DULCOLAX) EC tablet 5 mg  5 mg Oral Daily PRN Jose Martin Ros Dials, APRN - CNS        morphine (PF) injection 1 mg  1 mg IntraVENous Q3H PRN Tom Labor Sedar, DO   1 mg at 12/18/22 1017    Or    morphine (PF) injection 2 mg  2 mg IntraVENous Q3H PRN Tom Labor Sedar, DO   2 mg at 12/18/22 1835       History:       PMHx:  Past Medical History:   Diagnosis Date    Actinic keratoses     Arthritis     Dermatitis     Encounter for annual health examination 12/19/2011    Endometrial cancer ([address]     s/p hyst in 2009 - was told clear by Dr. 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    left lower leg    Sciatica     Vitamin D deficiency        PSHx:  Past Surgical History:   Procedure Laterality Date    CATARACT REMOVAL WITH IMPLANT      HYSTERECTOMY (CERVIX STATUS UNKNOWN)  9/2009    post-menopausal bleeding       Social Hx:  Social History     Socioeconomic History    Marital status:    Tobacco Use    Smoking status: Never    Smokeless tobacco: Never   Substance and Sexual Activity    Alcohol use: No    Drug use: No     Social Determinants of Health     Financial Resource Strain: Low Risk     Difficulty of Paying Living Expenses: Not hard at all   Food Insecurity: No Food Insecurity    Worried About Running Out of Food in the Last Year: Never true    Ran Out of Food in the Last Year: Never true       Family Hx:  Family History   Problem Relation Age of Onset    Coronary Art Dis Father     Other Father         glaucoma    Coronary Art Dis Brother        LABS: Reviewed     CBC:  Lab Results   Component Value Date/Time WBC 6.6 12/19/2022 05:05 AM    RBC 3.70 12/19/2022 05:05 AM    HGB 12.0 12/19/2022 05:05 AM    HCT 34.8 12/19/2022 05:05 AM    MCV 94.2 12/19/2022 05:05 AM    MCH 32.6 12/19/2022 05:05 AM    MCHC 34.6 12/19/2022 05:05 AM    RDW 14.5 12/19/2022 05:05 AM     12/19/2022 05:05 AM    MPV 8.6 02/14/2014 08:32 AM     CBC with Differential:   Lab Results   Component Value Date/Time    WBC 6.6 12/19/2022 05:05 AM    RBC 3.70 12/19/2022 05:05 AM    HGB 12.0 12/19/2022 05:05 AM    HCT 34.8 12/19/2022 05:05 AM     12/19/2022 05:05 AM    MCV 94.2 12/19/2022 05:05 AM    MCH 32.6 12/19/2022 05:05 AM    MCHC 34.6 12/19/2022 05:05 AM    RDW 14.5 12/19/2022 05:05 AM    LYMPHOPCT 10.6 12/19/2022 05:05 AM    MONOPCT 10.6 12/19/2022 05:05 AM    BASOPCT 1.0 12/19/2022 05:05 AM    MONOSABS 0.7 12/19/2022 05:05 AM    LYMPHSABS 0.7 12/19/2022 05:05 AM    EOSABS 0.1 12/19/2022 05:05 AM    BASOSABS 0.1 12/19/2022 05:05 AM     CMP:    Lab Results   Component Value Date/Time     12/19/2022 05:05 AM    K 3.4 12/19/2022 05:05 AM    K 3.3 12/16/2022 05:15 AM    CL 99 12/19/2022 05:05 AM    CO2 25 12/19/2022 05:05 AM    BUN 30 12/19/2022 05:05 AM    CREATININE 0.71 12/19/2022 05:05 AM    GFRAA >60.0 04/12/2022 09:23 AM    LABGLOM >60.0 12/19/2022 05:05 AM    GLUCOSE 102 12/19/2022 05:05 AM    GLUCOSE 87 12/27/2011 08:41 AM    PROT 6.6 12/15/2022 11:45 AM    LABALBU 3.9 12/15/2022 11:45 AM    LABALBU 4.3 12/27/2011 08:41 AM    CALCIUM 8.8 12/19/2022 05:05 AM    BILITOT 0.6 12/15/2022 11:45 AM    ALKPHOS 83 12/15/2022 11:45 AM    AST 19 12/15/2022 11:45 AM    ALT 17 12/15/2022 11:45 AM     BMP:    Lab Results   Component Value Date/Time     12/19/2022 05:05 AM    K 3.4 12/19/2022 05:05 AM    K 3.3 12/16/2022 05:15 AM    CL 99 12/19/2022 05:05 AM    CO2 25 12/19/2022 05:05 AM    BUN 30 12/19/2022 05:05 AM    LABALBU 3.9 12/15/2022 11:45 AM    LABALBU 4.3 12/27/2011 08:41 AM    CREATININE 0.71 12/19/2022 05:05 AM    CALCIUM 8.8 12/19/2022 05:05 AM    GFRAA >60.0 04/12/2022 09:23 AM    LABGLOM >60.0 12/19/2022 05:05 AM    GLUCOSE 102 12/19/2022 05:05 AM    GLUCOSE 87 12/27/2011 08:41 AM     TSH:   Lab Results   Component Value Date/Time    TSH 1.140 08/22/2018 09:28 AM     Urinalysis:   Lab Results   Component Value Date/Time    NITRU POSITIVE 12/15/2022 11:45 AM    WBCUA  12/15/2022 11:45 AM    BACTERIA MANY 12/15/2022 11:45 AM    RBCUA 0-2 12/15/2022 11:45 AM    BLOODU MODERATE 12/15/2022 11:45 AM    SPECGRAV 1.017 12/15/2022 11:45 AM    GLUCOSEU Negative 12/15/2022 11:45 AM     Albumin:   Lab Results   Component Value Date    LABALBU 3.9 12/15/2022     Liver:   Lab Results   Component Value Date    ALT 17 12/15/2022    AST 19 12/15/2022    ALKPHOS 83 12/15/2022    BILITOT 0.6 12/15/2022     Urine:   Lab Results   Component Value Date/Time    COLORU Yellow 12/15/2022 11:45 AM    NITRU POSITIVE 12/15/2022 11:45 AM    GLUCOSEU Negative 12/15/2022 11:45 AM    KETUA TRACE 12/15/2022 11:45 AM    UROBILINOGEN 1.0 12/15/2022 11:45 AM    BILIRUBINUR Negative 12/15/2022 11:45 AM    BILIRUBINUR N 01/19/2021 11:30 AM      Weight Trends  Wt Readings from Last 10 Encounters:   12/16/22 139 lb 8 oz (63.3 kg)   12/15/22 149 lb (67.6 kg)   10/19/22 150 lb (68 kg)   10/10/22 150 lb (68 kg)   07/08/22 150 lb (68 kg)   04/15/22 152 lb 12.8 oz (69.3 kg)   01/25/21 154 lb (69.9 kg)   01/19/21 154 lb (69.9 kg)   01/12/21 158 lb (71.7 kg)   01/04/21 145 lb (65.8 kg)          FUNCTIONAL ADL´S:     Independent: [  ] Eating, [   ] Dressing, [   ] Transferring, [   ] Rogelio Pollo, [   ] Lolita Chip, [   ] Continence  Dependent   : [  ] Eating, [ x ] Dressing, [ x ] Transferring, [ x ] Toileting, [ x ] Bathing, [ x ] Continence  W. assistant : [ x ] Eating, [   ] Dressing, [   ] Transferring, [   ] Rogelio Pollo, [   ] Lolita Chip, [   ] Continence    Radiology: Reviewed      CT Result (most recent):  CT ABDOMEN PELVIS W IV CONTRAST 12/15/2022    Narrative  EXAMINATION:  ONE XRAY VIEW OF THE CHEST; CT OF THE ABDOMEN AND PELVIS WITH CONTRAST    12/15/2022 12:22 pm    COMPARISON:  Chest x-ray, October 19 2022; CT abdomen pelvis, September 25, 2018    HISTORY:  ORDERING SYSTEM PROVIDED HISTORY: sob  TECHNOLOGIST PROVIDED HISTORY:  Reason for exam:->sob  What reading provider will be dictating this exam?->CRC    FINDINGS:  There also is a small amount of fluid. There is coarsening of the  interstitium. The lungs are hyperinflated. The cardiac silhouette is within  normal limits. Atherosclerotic calcifications are seen thoracic aorta. No  consolidation, pneumothorax or pleural effusion seen. Degenerative changes  are seen in the visualized portion of both shoulders and also spine. CT of the abdomen and pelvis:    Lungs: In the visualized bases of the lungs a 5.7 mm pleural base nodule is  seen posteriorly on the left (series 2, image 1). A 7.7 mm nodule is seen  posteriorly in the right lower lobe that is pleural based as well. (Series  2, image 20). No consolidating pneumonia, pneumothorax or pleural effusion  is seen. Changes of some emphysema are visualized. Organs: The liver, pancreas, right adrenal gland and liver are unremarkable. There are calcifications seen in the liver and also there is a calcified  splenic artery again seen that is unchanged from previous study. In the area  of the left adrenal gland there is a 1.7 cm hypodense thick wall cystic area. This was also seen on previous study. In the region of the tail of the  pancreas a hypodense area is seen that measures    1.2 cm. This area measures fluid density on this examination. This could be  an IPMN in. This was vaguely seen on previous study and also appears stable. Both kidneys show no evidence of hydronephrosis. No renal calculus is  visualized. Prominent atherosclerotic calcifications are visualized. GI/bowel: No dilated loops of bowel are seen. The appendix is visualized and  is unremarkable. Pelvis: In the pre sacral region there is soft tissue density posterior to  the bowel that is thickened. A superior there also is some stranding  visualized. The uterus is not seen. There also is a small amount of fluid. The bladder distends normally. Bones: No destructive bony lesions are seen. Slight irregularity is seen in  the lower sacral region on the left. 0.82 degenerative changes are seen in  both hips and is most prominent on the right. Impression  Chest x-ray: No evidence for acute cardiopulmonary process. CT of the abdomen and pelvis:    1.  2 nodules are seen in the visualized bases of the lungs. The nodule in  the right lower lobe was seen on previous study but was smaller in size. There are also emphysematous changes seen in the visualized bases. Numeral  2. Hypodense lesion is seen in the region of the left adrenal gland. This  could be an adrenal nodule of the possibility is necrotic lymph node . It is  slightly larger than what was seen on previous study. 3.  A stable hypodense  area is seen in the tail the pancreas. This could represent IPMN. 4.  There  is no evidence for appendicitis, diverticulitis or bowel obstruction 5. There is presacral soft tissue density and stranding seen in the pelvis. This is new when compared to previous study. Query if patient may have had  any radiation treatment due to endometrial cancer. Recommend further  evaluation there is also full small amount of fluid. RECOMMENDATION:  1. Low does CT of the lungs, non emergently 2. It presacral soft tissue  seen also there is a small amount of fluid. Possibility the of hemorrhage or  of the etiology could be related to patient's history of endometrial cancer  and any possibility of prior radiation treatment. Recommend follow-up. Also  there is some irregularity in the sacrum and occult fracture is not excluded.   This was discussed with JOSH Gallo, and CT of the pelvis was recommended. Xray Result (most recent):  XR CHEST PORTABLE 12/15/2022    Narrative  EXAMINATION:  ONE XRAY VIEW OF THE CHEST; CT OF THE ABDOMEN AND PELVIS WITH CONTRAST    12/15/2022 12:22 pm    COMPARISON:  Chest x-ray, October 19 2022; CT abdomen pelvis, September 25, 2018    HISTORY:  ORDERING SYSTEM PROVIDED HISTORY: sob  TECHNOLOGIST PROVIDED HISTORY:  Reason for exam:->sob  What reading provider will be dictating this exam?->CRC    FINDINGS:  There also is a small amount of fluid. There is coarsening of the  interstitium. The lungs are hyperinflated. The cardiac silhouette is within  normal limits. Atherosclerotic calcifications are seen thoracic aorta. No  consolidation, pneumothorax or pleural effusion seen. Degenerative changes  are seen in the visualized portion of both shoulders and also spine. CT of the abdomen and pelvis:    Lungs: In the visualized bases of the lungs a 5.7 mm pleural base nodule is  seen posteriorly on the left (series 2, image 1). A 7.7 mm nodule is seen  posteriorly in the right lower lobe that is pleural based as well. (Series  2, image 20). No consolidating pneumonia, pneumothorax or pleural effusion  is seen. Changes of some emphysema are visualized. Organs: The liver, pancreas, right adrenal gland and liver are unremarkable. There are calcifications seen in the liver and also there is a calcified  splenic artery again seen that is unchanged from previous study. In the area  of the left adrenal gland there is a 1.7 cm hypodense thick wall cystic area. This was also seen on previous study. In the region of the tail of the  pancreas a hypodense area is seen that measures    1.2 cm. This area measures fluid density on this examination. This could be  an IPMN in. This was vaguely seen on previous study and also appears stable. Both kidneys show no evidence of hydronephrosis. No renal calculus is  visualized.   Prominent atherosclerotic calcifications are visualized. GI/bowel: No dilated loops of bowel are seen. The appendix is visualized and  is unremarkable. Pelvis: In the pre sacral region there is soft tissue density posterior to  the bowel that is thickened. A superior there also is some stranding  visualized. The uterus is not seen. There also is a small amount of fluid. The bladder distends normally. Bones: No destructive bony lesions are seen. Slight irregularity is seen in  the lower sacral region on the left. 0.82 degenerative changes are seen in  both hips and is most prominent on the right. Impression  Chest x-ray: No evidence for acute cardiopulmonary process. CT of the abdomen and pelvis:    1.  2 nodules are seen in the visualized bases of the lungs. The nodule in  the right lower lobe was seen on previous study but was smaller in size. There are also emphysematous changes seen in the visualized bases. Numeral  2. Hypodense lesion is seen in the region of the left adrenal gland. This  could be an adrenal nodule of the possibility is necrotic lymph node . It is  slightly larger than what was seen on previous study. 3.  A stable hypodense  area is seen in the tail the pancreas. This could represent IPMN. 4.  There  is no evidence for appendicitis, diverticulitis or bowel obstruction 5. There is presacral soft tissue density and stranding seen in the pelvis. This is new when compared to previous study. Query if patient may have had  any radiation treatment due to endometrial cancer. Recommend further  evaluation there is also full small amount of fluid. RECOMMENDATION:  1. Low does CT of the lungs, non emergently 2. It presacral soft tissue  seen also there is a small amount of fluid. Possibility the of hemorrhage or  of the etiology could be related to patient's history of endometrial cancer  and any possibility of prior radiation treatment. Recommend follow-up.   Also  there is some irregularity in the sacrum and occult fracture is not excluded. This was discussed with JOSH Gallo, and CT of the pelvis was recommended. MRI Result (most recent):  MRI PELVIS WO CONTRAST 12/15/2022    Narrative  EXAMINATION:  MRI OF THE PELVIS WITHOUT CONTRAST, 12/15/2022 4:10 pm    TECHNIQUE:  Multiplanar multisequence MRI of the pelvis was performed without the  administration of intravenous contrast.    COMPARISON:  CT abdomen and pelvis 12/15/2022    HISTORY:  ORDERING SYSTEM PROVIDED HISTORY: endometrial thickening rule out sacral  fracture  TECHNOLOGIST PROVIDED HISTORY:  Reason for exam:->endometrial thickening rule out sacral fracture  What reading provider will be dictating this exam?->CRC    FINDINGS:  SOFT TISSUES: Small amount of presacral free fluid is identified, which is  stable compared to the prior CT. No large hematoma is seen. BONE MARROW: Vertical fractures are identified through the bilateral sacral  alae with associated transverse fracture of the sacral body. Fractures  demonstrate classic H-shaped pattern. No hip fracture is identified. Joint  alignment is intact. JOINTS: Moderate-severe bilateral hip degenerative changes noted with loss of  the articular space. Small joint effusion is noted on the left hip. Impression  Multiple nondisplaced sacral fractures with a classic H-shaped pattern  suggestive of sacral insufficiency fracture. MRI brain (most recent 12/15/2022):  Narrative   EXAMINATION:   MRI OF THE BRAIN WITHOUT CONTRAST  12/15/2022 5:57 pm       TECHNIQUE:   Multiplanar multisequence MRI of the brain was performed without the   administration of intravenous contrast.       COMPARISON:   Noncontrast CT head done earlier same day.        HISTORY:   ORDERING SYSTEM PROVIDED HISTORY: syncope atrial fibrillation   TECHNOLOGIST PROVIDED HISTORY:   Reason for exam:->syncope atrial fibrillation       FINDINGS:   INTRACRANIAL STRUCTURES/VENTRICLES: There is no acute infarct. No mass effect   or midline shift. No evidence of an acute intracranial hemorrhage. Generalized cerebral and cerebellar volume loss. Diffuse ventricular   prominence may relate to central volume loss. The sellar/suprasellar regions   appear unremarkable. The normal signal voids within the major intracranial   vessels appear maintained. The axial FLAIR images demonstrate pontine,   bilateral periventricular and deep white matter signal hyperintensities which   are nonspecific but commonly seen in the setting of chronic small vessel   ischemic disease. 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 ORBITS: The visualized portion of the orbits demonstrate no acute abnormality. SINUSES: Small amount of nonspecific fluid in the bilateral mastoid air   cells. The paranasal sinuses are clear. BONES/SOFT TISSUES: The bone marrow signal intensity appears normal. The soft   tissues demonstrate no acute abnormality. Multilevel degenerative changes in   the visualized spine. Impression   1. No acute intracranial abnormality per unenhanced brain MRI. No acute   stroke. 2.  Moderate to severe chronic small vessel ischemic disease. 3.  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      Echocardiogram (most recent 12/16/2022):  Echocardiogram complete 2D with doppler with color  Order: [id number]  Status: Final result    Visible to patient: Yes (not seen)    Next appt: 07/11/2023 at 02:30 PM in Family Medicine (Huong Rice, YUSUF - CNP)    0 Result Notes     Narrative & Impression    Transthoracic Echocardiography Report (TTE)      Demographics      Patient Name      Sandy Michaud  Gender                  Female      Patient Number    [id number]       Race                                                           Ethnicity      Visit Number      [id number]      Room Number             F627      Corporate ID                     Date of Study 12/16/2022      Accession Number  [id number]     Referring Physician      Date of Birth     1935     Sonographer             Marlyn Villeda RCS      Age               80 year(s)     Interpreting Physician  Dagoberto Whitehead MD     Procedure     Type of Study      TTE procedure:ECHO COMPLETE 2D W/DOP W/COLOR. Procedure Date  Date: 12/16/2022 Start: 10:09 AM     Study Location: Portable  Technical Quality: Poor visualization due to poor acoustical window. Indications:Atrial fibrillation. Patient Status: Routine     Contrast Medium: Definity. Amount - 2 ml     Height: 68 inches Weight: 149 pounds BSA: 1.8 m^2 BMI: 22.66 kg/m^2     BP: 151/70 mmHg      Conclusions      Summary   Mitral annular calcification is present. 1+ MR   Left ventricular ejection fraction is visually estimated at 40-45%. E/A flow reversal noted. Suggestive of diastolic dysfunction. Signature      ----------------------------------------------------------------   Electronically signed by Dagoberto Whitehead MD(Interpreting   physician) on 12/17/2022 08:29 AM   ----------------------------------------------------------------      Findings     Left Ventricle  Left ventricular ejection fraction is visually estimated at 40-45%. E/A flow reversal noted. Suggestive of diastolic dysfunction. Right Ventricle  Normal right ventricle structure and function. Normal right ventricle systolic pressure. Left Atrium  Mildly dilated left atrium. Right Atrium  Normal right atrium. Mitral Valve  Mitral annular calcification is present. 1+ MR  Tricuspid Valve  Normal tricuspid valve structure and function. Aortic Valve  Aortic valve leaflets are mildly thickened. Pulmonic Valve  The pulmonic valve was not well visualized . Pericardial Effusion  No evidence of pericardial effusion. Pleural Effusion  No evidence of pleural effusion. Aorta \ Miscellaneous  The aorta is within normal limits.      M-Mode Measurements (cm)        AO Root Dimension: 2.55 cm     ACS: 1.1 cm     LVOT: 1.89 cm     Doppler Measurements:      AV Velocity:0.01 m/s                   MV Peak E-Wave: 0.6 m/s   AV Peak Gradient: 32.89 mmHg           MV Peak A-Wave: 0.9 m/s   AV Mean Gradient: 17.55 mmHg   AV Area (Continuity):1.15 cm^2     Valves      Mitral Valve      Peak E-Wave: 0.6 m/s                 Peak A-Wave: 0.9 m/s                                        E/A Ratio: 0.67                                        Peak Gradient: 1.45 mmHg                                        Deceleration Time: 271.1 msec      Tissue Doppler      E' Septal Velocity: 0.05 m/s   E' Lateral Velocity: 0.1 m/s      Aortic Valve      Peak Velocity: 2.87 m/s            Mean Velocity: 1.95 m/s   Peak Gradient: 32.89 mmHg          Mean Gradient: 17.55 mmHg   Area (continuity): 1.15 cm^2   AV VTI: 62.82 cm                   Deceleration Time: 1226.6 msec   AI P1/2t: 355.7 msec   Cusp Separation: 1.1 cm      LVOT      Peak Velocity: 0.91 m/s              Mean Velocity: 0.55 m/s   Peak Gradient: 3.17 mmHg             Mean Gradient: 1.48 mmHg   LVOT Diameter: 1.89 cm               LVOT VTI: 25.71 cm     Structures      Left Atrium      LA Volume/Index: 44.03 ml /24 m^2             LA Area: 15.81 cm^2      Left Ventricle      LV EDV/LV EDV Index: 44.29 ml/25 m^2 LV ESV/LV ESV Index: 23.72 ml/13 m^2   EF Calculated: 46.4 %                LV Length: 6.04 cm      LVOT Diameter: 1.89 cm     Aorta/ Miscellaneous  Aorta      Aortic Root: 2.55 cm   LVOT Diameter: 1.89 cm         Specimen Collected: 12/16/22 10:09 EST Last Resulted: 12/17/22 08:29 EST             Assessment and plan:  Palliative care encounter: Patient and daughter felt that her symptoms are well managed by current provider. The patient and family was appreciative of the program information and possible benefits.  However, they will call if symptom burden increases or they wish to follow with the program.  Continue with current plan of care, no changes. Palliative care will sign off at this time. Please reconsult if patient or family have additional questions or concerns. Hospice discussion: Patient is not hospice eligible at this time but the patient does meet some criteria in the setting of declining functional status with PPS at 40 percent, FAST score 6d. However, this is not consistent with patient and family's goals of care. I have discussed/reviewed the patient's case with nursing staff.    -Advance Care Planning  Discussed goals of care with patient. Explained in extensive detail nuances between full code, DNR CCA and DNR CC. Patient has made the decision to be FULL CODE. Patient has healthcare power of  in place. Primary decision maker Sushil Robertson (see demographics). -Goals of Care Discussion:  Disease process and goals of treatment were discussed in basic terms. Alicia's goal is to optimize available comfort care measures and continue with current plan of care with aggressive treatments. We discussed the palliative care philosophy in light of those goals. We discussed all care options contingent on treatment response and QOL. Much active listening, presence, and emotional support were given.      -Patient is currently being treated for multiple medical conditions by other providers  Syncope possibly secondary to atrial fibrillation with differential of acute CVA versus orthostatic hypotension versus UTI  Acute cystitis  New onset atrial fibrillation with slow ventricular rate  A. fib with intermittent RVR  Rule out CVA  Left-sided pelvic pain   Multiple nondisplaced sacral fractures, status post fall  Hypokalemia  UTI  Thrombocytopenia  Moderate malnutrition  General weakness  History of lung nodule  History of hypertension  History of endometrial cancer    MDM: High    Electronically signed by YUSUF Valencia CNP on 12/19/2022 at 9:54 AM    Please note this report is partially produced by using speech recognition hardware. It may contain errors related to the system, including grammar, punctuation and spelling as well as words and phrases that may seem inaccurate. For any questions or concerns feel free to contact me for clarification. Thanks for the opportunity you have allowed us to provide palliative care to [facility]. We will be in touch as care progresses. Please feel free to reach out to us should you have any questions or requests.

## 2022-12-19 NOTE — PROGRESS NOTES
Progress Note  Patient: Norman [place]  Unit/Bed: G151/V568-95  YOB: 1935  MRN: [id number]  Acct: [de-identified]   Admitting Diagnosis: A-fib ([facility] [address] [I48.91]  General weakness [R53.1]  Urinary tract infection without hematuria, site unspecified [N39.0]  Syncope, unspecified syncope type [R55]  Atrial fibrillation, unspecified type [facility]) [I48.91]  Date:  12/15/2022  Hospital Day: 4    Chief Complaint:  Syncope/fall    Subjective        12/19/22: Resting quietly in bed in no acute distress. She is alert and oriented to self and knows that she is in the hospital but did not know which hospital.  She has slightly agitated and is lying in bed undressed with gown at the foot of the bed. She denies chest pain or shortness of breath. Has pure wick in place and draining dark-colored urine. A.m. labs reviewed. Potassium low normal at 3.4 so will be replaced. Renal function stable. Hemoglobin stable at 12.0. Thrombocytopenia resolved with platelet count now 569. On telemetry, she is sinus rhythm to sinus tach with heart rates 90s to 100s with frequent PACs and a sinus arrhythmia. Echocardiogram completed on 12/16/2022 revealed mildly reduced LV systolic function with EF of 40 to 17%, 1+ MR, diastolic dysfunction. 12/18/22: she is doing better this am. She is not crying she is not distressed wiit anxiety. Now she wants to go home. EKG:  today she is in AF rate 120    12/17/22: pt is panicking. Her feet hut she can't breath. She is on no O2. We placed pulse ox and she is at 98%. She is crying. EKG:  she has refused Tele but last recording was SR 77 w APC.     12/16/22: This is an 59-year-old  female with past medical history hypertension and dyslipidemia presented to ER yesterday with chief complaint of passing out. Patient is a very difficult historian and currently only alert and oriented to self so majority of history was obtained from patient's electronic medical record.   Per ER note, patient was pouring coffee at which time she became dizzy and subsequently passed out. She was brought to the emergency room for further evaluation. Presentation to the emergency room, blood pressure elevated 173/87, heart rate 88, respiratory rate 18, pulse ox 100%, temperature of 97.8 °F.  Sodium 138, potassium 3.7, chloride 100, total CO2 29, BUN 15, creatinine 0.77, GFR greater than 60, glucose elevated 132. Troponin negative at less than 0.010. TSH 1.10. WBC 10.8, hemoglobin 12.8, hematocrit 38.3, platelets low 034. Urinalysis consistent with UTI. Chest x-ray showed no evidence for acute cardiopulmonary process. CT brain showed no acute intracranial abnormality. CT abdomen pelvis showed 2 nodules seen in visualized bases of the lungs, hypodense lesion seen in the region of the left adrenal gland, no evidence of appendicitis, diverticulitis or bowel obstruction, presacral soft tissue density and stranding seen in the pelvis. 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 MRI of the pelvis revealed multiple nondisplaced sacral fractures with classic H shaped pattern suggestive of sacral insufficiency fracture. EKG was of poor quality and significant artifact noted with reading of A. fib however appears to be sinus. She was admitted for further evaluation. Cardiology consulted for new onset A. fib. Rapid response was called around 1930 yesterday evening when she was noted on telemetry to have tachycardia with heart rate in the 160s to 170s range. Patient was treated with 5 mg of IV Lopressor after which heart rate improved but remain elevated in the 110s to 120s range. Appeared to be A. fib RVR at that time. Currently on telemetry patient is sinus rhythm with heart rate 60s to 70s with occasional PVCs.   Telemetry alarms reviewed showed possible A. fib episodes early this morning versus sinus with frequent ectopy or possible ectopic atrial rhythm. At time evaluation today patient is resting comfortably and echo being performed at bedside. Patient is only alert and oriented to self at this time. She believes that she was in [facility] and thought the year was 1960s. She is unable to provide any reliable history at this time and states that she believes she came in due to abdominal pain and need for surgery. She denies any current complaints including chest pain, shortness of breath, nausea, vomiting, dizziness, abdominal pain. Review of Systems:   Review of Systems   Constitutional:  Negative for activity change and fever. HENT:  Negative for congestion. Respiratory:  Negative for chest tightness and shortness of breath. Cardiovascular:  Negative for chest pain, palpitations and leg swelling. Gastrointestinal:  Negative for abdominal distention, nausea and vomiting. Genitourinary:         Purewick catheter in place draining dark urine   Musculoskeletal:  Negative for arthralgias. Skin:  Negative for color change. Neurological:  Negative for syncope. Psychiatric/Behavioral:  Positive for confusion. Physical Examination:    /71   Pulse 80   Temp 98.2 °F (36.8 °C) (Oral)   Resp 20   Ht 5' 8\" (1.727 m)   Wt 139 lb 8 oz (63.3 kg)   LMP  (LMP Unknown)   SpO2 96%   BMI 21.21 kg/m²    Physical Exam  Constitutional:       General: She is not in acute distress. HENT:      Head: Normocephalic and atraumatic. Cardiovascular:      Rate and Rhythm: Normal rate and regular rhythm. Heart sounds: Murmur heard. Pulmonary:      Effort: Pulmonary effort is normal. No respiratory distress. Breath sounds: No wheezing, rhonchi or rales. Abdominal:      Palpations: Abdomen is soft. Tenderness: There is no abdominal tenderness. Musculoskeletal:         General: Normal range of motion.       Cervical back: Normal range of motion and neck supple. Right lower leg: No edema. Left lower leg: No edema. Skin:     General: Skin is warm and dry. Neurological:      General: No focal deficit present. Mental Status: She is alert. Cranial Nerves: No cranial nerve deficit.    Psychiatric:         Mood and Affect: Mood normal.       LABS:  CBC:   Lab Results   Component Value Date/Time    WBC 6.6 12/19/2022 05:05 AM    RBC 3.70 12/19/2022 05:05 AM    HGB 12.0 12/19/2022 05:05 AM    HCT 34.8 12/19/2022 05:05 AM    MCV 94.2 12/19/2022 05:05 AM    MCH 32.6 12/19/2022 05:05 AM    MCHC 34.6 12/19/2022 05:05 AM    RDW 14.5 12/19/2022 05:05 AM     12/19/2022 05:05 AM    MPV 8.6 02/14/2014 08:32 AM     CBC with Differential:   Lab Results   Component Value Date/Time    WBC 6.6 12/19/2022 05:05 AM    RBC 3.70 12/19/2022 05:05 AM    HGB 12.0 12/19/2022 05:05 AM    HCT 34.8 12/19/2022 05:05 AM     12/19/2022 05:05 AM    MCV 94.2 12/19/2022 05:05 AM    MCH 32.6 12/19/2022 05:05 AM    MCHC 34.6 12/19/2022 05:05 AM    RDW 14.5 12/19/2022 05:05 AM    LYMPHOPCT 10.6 12/19/2022 05:05 AM    MONOPCT 10.6 12/19/2022 05:05 AM    BASOPCT 1.0 12/19/2022 05:05 AM    MONOSABS 0.7 12/19/2022 05:05 AM    LYMPHSABS 0.7 12/19/2022 05:05 AM    EOSABS 0.1 12/19/2022 05:05 AM    BASOSABS 0.1 12/19/2022 05:05 AM     CMP:    Lab Results   Component Value Date/Time     12/19/2022 05:05 AM    K 3.4 12/19/2022 05:05 AM    K 3.3 12/16/2022 05:15 AM    CL 99 12/19/2022 05:05 AM    CO2 25 12/19/2022 05:05 AM    BUN 30 12/19/2022 05:05 AM    CREATININE 0.71 12/19/2022 05:05 AM    GFRAA >60.0 04/12/2022 09:23 AM    LABGLOM >60.0 12/19/2022 05:05 AM    GLUCOSE 102 12/19/2022 05:05 AM    GLUCOSE 87 12/27/2011 08:41 AM    PROT 6.6 12/15/2022 11:45 AM    LABALBU 3.9 12/15/2022 11:45 AM    LABALBU 4.3 12/27/2011 08:41 AM    CALCIUM 8.8 12/19/2022 05:05 AM    BILITOT 0.6 12/15/2022 11:45 AM    ALKPHOS 83 12/15/2022 11:45 AM    AST 19 12/15/2022 11:45 AM    ALT 17 12/15/2022 11:45 AM     BMP:    Lab Results   Component Value Date/Time     12/19/2022 05:05 AM    K 3.4 12/19/2022 05:05 AM    K 3.3 12/16/2022 05:15 AM    CL 99 12/19/2022 05:05 AM    CO2 25 12/19/2022 05:05 AM    BUN 30 12/19/2022 05:05 AM    LABALBU 3.9 12/15/2022 11:45 AM    LABALBU 4.3 12/27/2011 08:41 AM    CREATININE 0.71 12/19/2022 05:05 AM    CALCIUM 8.8 12/19/2022 05:05 AM    GFRAA >60.0 04/12/2022 09:23 AM    LABGLOM >60.0 12/19/2022 05:05 AM    GLUCOSE 102 12/19/2022 05:05 AM    GLUCOSE 87 12/27/2011 08:41 AM     Magnesium:    Lab Results   Component Value Date/Time    MG 2.1 12/19/2022 05:05 AM     Troponin:    Lab Results   Component Value Date/Time    TROPONINI <0.010 12/15/2022 11:45 AM       Radiology:  No results found. Echocardiogram 12/16/22:   Conclusions   Summary   Mitral annular calcification is present. 1+ MR   Left ventricular ejection fraction is visually estimated at 40-45%. E/A flow reversal noted. Suggestive of diastolic dysfunction. Signature   ----------------------------------------------------------------   Electronically signed by Mario Adams MD(Interpreting   physician) on 12/17/2022 08:29 AM   ----------------------------------------------------------------      EKG 12/15/22: ?SR with PACs vs A-fib (significant artifact), nonspecific ST changes, QTc 459ms     Telemetry 12/16/22: SR 60s-70s, occasional PVC  Telemetry 12/19/22: SR/ST 90s-100s, frequent PACs, sinus arrhythmia      Assessment:    Active Hospital Problems    Diagnosis Date Noted    General weakness [R53.1] 12/16/2022     Priority: High    A-fib [facility]) [I48.91] 12/15/2022     Priority: Medium     New onset PA-fib RVR--HR into 160s-180s yesterday evening, Currently SR with PACs  Syncope with fall  Multiple nondisplaced sacral fractures per MRI pelvis on 12/15/22  Hypokalemia--replace  UTI  HTN  Dyslipidemia  Thrombocytopenia--resolved.  Plts 131 on 12/19/22  Cardiomyopathy with EF 40-45% per echo 12/16/22  Mild 1+ MR per echo 12/16/22     Plan:  Continue current medications-aspirin 81 mg p.o. daily, Lopressor 50 mg p.o. twice daily, Digoxin 125mcg PO daily, Crestor 40 mg p.o. nightly, add Losartan 25mg PO daily given mild cardiomyopathy, Lovenox 1mg/Kg SQ BID for now  Questionable long-term oral anticoagulation candidate secondary to advanced age and fall risk  Cardiac diet recommended  Monitor on telemetry for any tachycardia or bradycardia arrhythmias  Maintain potassium greater than 4, magnesium greater than 2  GI/DVT prophylaxis  Hospitalist recommendations  Orthopedic surgery recommendations regarding mild nondisplaced sacral fractures  Conservative medical therapy from a cardiac standpoint at this time given advanced age and multiple nondisplaced sacral fractures. May consider noninvasive ischemic evaluation in future as outpatient  Further recommendations to follow        Electronically signed by JOSH Barlow on 12/19/2022 at 10:57 AM      Attending Supervising [de-identified] Attestation Statement  The patient is a 80 y.o. female. I have performed a history and physical examination of the patient. I discussed the case with the physician assistant. I reviewed the patient's Past Medical History, Past Surgical History, Medications, and Allergies.      Physical Exam:  Vitals:    12/19/22 1111 12/19/22 1240 12/19/22 1323 12/19/22 1432   BP:  136/62  (!) 136/56   Pulse:  65  61   Resp: 18 18 18 18   Temp:  97.5 °F (36.4 °C)  98.2 °F (36.8 °C)   TempSrc:  Oral  Oral   SpO2:  97%  97%   Weight:       Height:           Pulmonary/Chest: clear to auscultation bilaterally- no wheezes, rales or rhonchi, normal air movement, no respiratory distress  Cardiovascular: normal rate, normal S1 and S2, no gallops, intact distal pulses, and no carotid bruits  Abdomen: soft, non-tender, non-distended, normal bowel sounds, no masses or organomegaly    Active Hospital Problems    Diagnosis Date Noted    General weakness [R53.1] 12/16/2022     Priority: High    A-fib [facility]) [I48.91] 12/15/2022     Priority: Medium        I reviewed and agree with the findings and plan documented in her note . Impression     Paroxysmal atrial fibrillation-currently normal sinus rhythm  Syncope with fall  Multiple nondisplaced sacral fractures per MRI pelvis on 12/15/22  Hypokalemia--replace  UTI  HTN  Dyslipidemia  Thrombocytopenia  Cardiomyopathy with EF 40-45% per echo 12/16/22  Mild 1+ MR per echo 12/16/22     Plan      Questionable long-term oral anticoagulation candidate secondary to advanced age as well as fall risk. \Lovenox while in hospital for now   continue current medications-aspirin 81 mg p.o. daily, , Crestor 40 mg p.o. nightly, Rocephin 1000 mg IV every 24 hours x5 doses, L Lopressor 25 mg p.o. twice daily  Cardiac diet recommended  Monitor on telemetry for any tachycardia or bradycardia arrhythmias  Maintain potassium greater than 4, magnesium greater than 2  GI/DVT prophylaxis  Hospitalist recommendations  Orthopedic surgery recommendations regarding mild nondisplaced sacral fractures  Conservative medical therapy from a cardiac standpoint at this time given advanced age and multiple nondisplaced sacral fractures.   May consider noninvasive ischemic evaluation in future as outpatient  Further recommendations to follow          Electronically signed by Manasa Cadena DO on 12/19/22 at 4:40 PM EST

## 2022-12-19 NOTE — CONSULTS
Department of  Chronic Pain Managment  Attending Progress Note      SUBJECTIVE  Low back pain    OBJECTIVE Pt presented through ED with syncopy and fall with Pelvic fracture, On IN narcs foe pain. Medications  Current Facility-Administered Medications: losartan (COZAAR) tablet 25 mg, 25 mg, Oral, Daily  enoxaparin (LOVENOX) injection 60 mg, 1 mg/kg, SubCUTAneous, BID  metoprolol tartrate (LOPRESSOR) tablet 50 mg, 50 mg, Oral, BID  digoxin (LANOXIN) tablet 125 mcg, 125 mcg, Oral, Daily  hydrALAZINE (APRESOLINE) injection 5 mg, 5 mg, IntraVENous, Q6H PRN  sodium chloride flush 0.9 % injection 5-40 mL, 5-40 mL, IntraVENous, 2 times per day  sodium chloride flush 0.9 % injection 5-40 mL, 5-40 mL, IntraVENous, PRN  0.9 % sodium chloride infusion, , IntraVENous, PRN  ondansetron (ZOFRAN-ODT) disintegrating tablet 4 mg, 4 mg, Oral, Q8H PRN **OR** ondansetron (ZOFRAN) injection 4 mg, 4 mg, IntraVENous, Q6H PRN  acetaminophen (TYLENOL) tablet 650 mg, 650 mg, Oral, Q6H PRN **OR** acetaminophen (TYLENOL) suppository 650 mg, 650 mg, Rectal, Q6H PRN  polyethylene glycol (GLYCOLAX) packet 17 g, 17 g, Oral, Daily PRN  cefTRIAXone (ROCEPHIN) 1,000 mg in dextrose 5 % 50 mL IVPB mini-bag, 1,000 mg, IntraVENous, Q24H  metoprolol (LOPRESSOR) injection 5 mg, 5 mg, IntraVENous, Q6H PRN  aspirin EC tablet 81 mg, 81 mg, Oral, Daily **OR** aspirin suppository 300 mg, 300 mg, Rectal, Daily  rosuvastatin (CRESTOR) tablet 40 mg, 40 mg, Oral, Nightly  bisacodyl (DULCOLAX) EC tablet 5 mg, 5 mg, Oral, Daily PRN  morphine (PF) injection 1 mg, 1 mg, IntraVENous, Q3H PRN **OR** morphine (PF) injection 2 mg, 2 mg, IntraVENous, Q3H PRN  ROS: Positive for syncopy       Physical  VITALS:  /71   Pulse 80   Temp 98.2 °F (36.8 °C) (Oral)   Resp 18   Ht 5' 8\" (1.727 m)   Wt 139 lb 8 oz (63.3 kg)   LMP  (LMP Unknown)   SpO2 96%   BMI 21.21 kg/m²   Constitutional: Frail elderly adult female reclining in bed no acute distress.   Daughter at bedside. Head: NCAT  Eyes: PERRLA, EOMI. Arcus senilis bilaterally. ENT: Hearing grossly intact. No rhinorrhea. MMM. Neck: Trachea midline, phonation normal  Cardiovascular: Regular rhythm with occasional early beats, with controlled rate. No significant murmurs appreciated. No significant pretibial edema. Pulmonary: Normal rate and effort respiration on O2 by NC. Grossly CTAB. No coughing during exam.  Abdomen: Soft, nontense, nondistended. Neurologic: Alert, slightly disoriented. A&O 2-3. Unchanged mild generalized nonfocal weakness. MSK/integumentary: Generalized nonfocal pelvic discomfort, provoked with any movement. Small scattered ecchymoses of skin in various stages of healing. No other gross bony abnormalities appreciated.   Data  None    ASSESSMENT AND PLAN    Pelvic Fracture, Discussed Fracture pain and how it gets worse on movement and disappears on Rest, advised small dose Pain meds and Gentle PT.

## 2022-12-20 LAB
ANION GAP SERPL CALCULATED.3IONS-SCNC: 10 MEQ/L (ref 9–15)
BASOPHILS ABSOLUTE: 0.1 K/UL (ref 0–0.2)
BASOPHILS RELATIVE PERCENT: 0.7 %
BUN BLDV-MCNC: 21 MG/DL (ref 8–23)
CALCIUM SERPL-MCNC: 9.1 MG/DL (ref 8.5–9.9)
CHLORIDE BLD-SCNC: 100 MEQ/L (ref 95–107)
CO2: 26 MEQ/L (ref 20–31)
CREAT SERPL-MCNC: 0.65 MG/DL (ref 0.5–0.9)
EOSINOPHILS ABSOLUTE: 0.1 K/UL (ref 0–0.7)
EOSINOPHILS RELATIVE PERCENT: 1.6 %
GFR SERPL CREATININE-BSD FRML MDRD: >60 ML/MIN/{1.73_M2}
GLUCOSE BLD-MCNC: 108 MG/DL (ref 70–99)
HCT VFR BLD CALC: 34.7 % (ref 37–47)
HEMOGLOBIN: 11.9 G/DL (ref 12–16)
LYMPHOCYTES ABSOLUTE: 0.7 K/UL (ref 1–4.8)
LYMPHOCYTES RELATIVE PERCENT: 8.4 %
MAGNESIUM: 1.9 MG/DL (ref 1.7–2.4)
MCH RBC QN AUTO: 32.8 PG (ref 27–31.3)
MCHC RBC AUTO-ENTMCNC: 34.2 % (ref 33–37)
MCV RBC AUTO: 95.7 FL (ref 79.4–94.8)
MONOCYTES ABSOLUTE: 0.8 K/UL (ref 0.2–0.8)
MONOCYTES RELATIVE PERCENT: 10.3 %
NEUTROPHILS ABSOLUTE: 6.2 K/UL (ref 1.4–6.5)
NEUTROPHILS RELATIVE PERCENT: 79 %
PDW BLD-RTO: 14.1 % (ref 11.5–14.5)
PLATELET # BLD: 141 K/UL (ref 130–400)
POTASSIUM SERPL-SCNC: 3.8 MEQ/L (ref 3.4–4.9)
RBC # BLD: 3.63 M/UL (ref 4.2–5.4)
SODIUM BLD-SCNC: 136 MEQ/L (ref 135–144)
WBC # BLD: 7.8 K/UL (ref 4.8–10.8)

## 2022-12-20 PROCEDURE — 6360000002 HC RX W HCPCS: Performed by: INTERNAL MEDICINE

## 2022-12-20 PROCEDURE — 99232 SBSQ HOSP IP/OBS MODERATE 35: CPT | Performed by: INTERNAL MEDICINE

## 2022-12-20 PROCEDURE — APPSS30 APP SPLIT SHARED TIME 16-30 MINUTES: Performed by: PHYSICIAN ASSISTANT

## 2022-12-20 PROCEDURE — 6370000000 HC RX 637 (ALT 250 FOR IP): Performed by: PAIN MEDICINE

## 2022-12-20 PROCEDURE — 80048 BASIC METABOLIC PNL TOTAL CA: CPT

## 2022-12-20 PROCEDURE — 36415 COLL VENOUS BLD VENIPUNCTURE: CPT

## 2022-12-20 PROCEDURE — 6370000000 HC RX 637 (ALT 250 FOR IP): Performed by: PHYSICIAN ASSISTANT

## 2022-12-20 PROCEDURE — 2580000003 HC RX 258: Performed by: CLINICAL NURSE SPECIALIST

## 2022-12-20 PROCEDURE — 2060000000 HC ICU INTERMEDIATE R&B

## 2022-12-20 PROCEDURE — 83735 ASSAY OF MAGNESIUM: CPT

## 2022-12-20 PROCEDURE — 85025 COMPLETE CBC W/AUTO DIFF WBC: CPT

## 2022-12-20 PROCEDURE — 6370000000 HC RX 637 (ALT 250 FOR IP): Performed by: CLINICAL NURSE SPECIALIST

## 2022-12-20 PROCEDURE — 6370000000 HC RX 637 (ALT 250 FOR IP): Performed by: INTERNAL MEDICINE

## 2022-12-20 RX ADMIN — ASPIRIN 81 MG: 81 TABLET, COATED ORAL at 08:36

## 2022-12-20 RX ADMIN — Medication 10 ML: at 08:36

## 2022-12-20 RX ADMIN — LOSARTAN POTASSIUM 25 MG: 25 TABLET, FILM COATED ORAL at 08:36

## 2022-12-20 RX ADMIN — Medication 10 ML: at 20:12

## 2022-12-20 RX ADMIN — ENOXAPARIN SODIUM 60 MG: 100 INJECTION SUBCUTANEOUS at 08:36

## 2022-12-20 RX ADMIN — METOPROLOL TARTRATE 50 MG: 50 TABLET ORAL at 08:36

## 2022-12-20 RX ADMIN — MORPHINE SULFATE 2 MG: 2 INJECTION, SOLUTION INTRAMUSCULAR; INTRAVENOUS at 08:30

## 2022-12-20 RX ADMIN — ENOXAPARIN SODIUM 60 MG: 100 INJECTION SUBCUTANEOUS at 20:13

## 2022-12-20 RX ADMIN — METOPROLOL TARTRATE 50 MG: 50 TABLET ORAL at 20:13

## 2022-12-20 RX ADMIN — DIGOXIN 125 MCG: 125 TABLET ORAL at 08:36

## 2022-12-20 RX ADMIN — HYDROCODONE BITARTRATE AND ACETAMINOPHEN 1 TABLET: 10; 325 TABLET ORAL at 11:22

## 2022-12-20 RX ADMIN — ROSUVASTATIN CALCIUM 40 MG: 20 TABLET, FILM COATED ORAL at 20:13

## 2022-12-20 ASSESSMENT — PAIN DESCRIPTION - DESCRIPTORS
DESCRIPTORS: ACHING
DESCRIPTORS: ACHING

## 2022-12-20 ASSESSMENT — PAIN DESCRIPTION - ORIENTATION
ORIENTATION: MID;LOWER
ORIENTATION: RIGHT;LEFT

## 2022-12-20 ASSESSMENT — ENCOUNTER SYMPTOMS
SHORTNESS OF BREATH: 0
CHEST TIGHTNESS: 0
NAUSEA: 0
ABDOMINAL DISTENTION: 0
COLOR CHANGE: 0
VOMITING: 0

## 2022-12-20 ASSESSMENT — PAIN SCALES - GENERAL
PAINLEVEL_OUTOF10: 10
PAINLEVEL_OUTOF10: 8

## 2022-12-20 ASSESSMENT — PAIN DESCRIPTION - PAIN TYPE: TYPE: ACUTE PAIN

## 2022-12-20 ASSESSMENT — PAIN DESCRIPTION - LOCATION
LOCATION: BACK
LOCATION: BACK

## 2022-12-20 NOTE — PLAN OF CARE
Nutrition Problem #1: Moderate malnutrition, In context of social or environmental circumstances  Intervention: Food and/or Nutrient Delivery: Continue Current Diet (Continue General diet  Recommend starting a daily bowel regimen)

## 2022-12-20 NOTE — PROGRESS NOTES
Progress Note  Patient: Ryley Rivera  Unit/Bed: J634/T727-60  YOB: 1935  MRN: [id number]  Acct: [de-identified]   Admitting Diagnosis: A-fib ([address] [I48.91]  General weakness [R53.1]  Urinary tract infection without hematuria, site unspecified [N39.0]  Syncope, unspecified syncope type [R55]  Atrial fibrillation, unspecified type [facility]) [I48.91]  Date:  12/15/2022  Hospital Day: 5    Chief Complaint:  Syncope/fall    Subjective    12/20/22: Resting comfortably in bed in no acute distress. Denies chest pain or shortness of breath. She is hemodynamically stable. On telemetry she is sinus rhythm with heart rate 70s to 80s with frequent PACs/sinus arrhythmia. A.m. labs reviewed. Renal function and electrolytes stable. Hemoglobin stable 11.9. No A. fib noted on telemetry alarms in the past 24 to 48 hours. She remains on full dose Lovenox at 1 mg/kg SQ twice daily. She is also on rate control with Lopressor 50 mg p.o. twice daily and digoxin 125 mcg p.o. daily. 12/19/22: Resting quietly in bed in no acute distress. She is alert and oriented to self and knows that she is in the hospital but did not know which hospital.  She has slightly agitated and is lying in bed undressed with gown at the foot of the bed. She denies chest pain or shortness of breath. Has pure wick in place and draining dark-colored urine. A.m. labs reviewed. Potassium low normal at 3.4 so will be replaced. Renal function stable. Hemoglobin stable at 12.0. Thrombocytopenia resolved with platelet count now 383. On telemetry, she is sinus rhythm to sinus tach with heart rates 90s to 100s with frequent PACs and a sinus arrhythmia. Echocardiogram completed on 12/16/2022 revealed mildly reduced LV systolic function with EF of 40 to 58%, 1+ MR, diastolic dysfunction. 12/18/22: she is doing better this am. She is not crying she is not distressed wiit anxiety. Now she wants to go home.    EKG:  today she is in AF rate 120    12/17/22: pt is panicking. Her feet hut she can't breath. She is on no O2. We placed pulse ox and she is at 98%. She is crying. EKG:  she has refused Tele but last recording was SR 77 w APC.     12/16/22: This is an 29-year-old  female with past medical history hypertension and dyslipidemia presented to ER yesterday with chief complaint of passing out. Patient is a very difficult historian and currently only alert and oriented to self so majority of history was obtained from patient's electronic medical record. Per ER note, patient was pouring coffee at which time she became dizzy and subsequently passed out. She was brought to the emergency room for further evaluation. Presentation to the emergency room, blood pressure elevated 173/87, heart rate 88, respiratory rate 18, pulse ox 100%, temperature of 97.8 °F.  Sodium 138, potassium 3.7, chloride 100, total CO2 29, BUN 15, creatinine 0.77, GFR greater than 60, glucose elevated 132. Troponin negative at less than 0.010. TSH 1.10. WBC 10.8, hemoglobin 12.8, hematocrit 38.3, platelets low 443. Urinalysis consistent with UTI. Chest x-ray showed no evidence for acute cardiopulmonary process. CT brain showed no acute intracranial abnormality. CT abdomen pelvis showed 2 nodules seen in visualized bases of the lungs, hypodense lesion seen in the region of the left adrenal gland, no evidence of appendicitis, diverticulitis or bowel obstruction, presacral soft tissue density and stranding seen in the pelvis. 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 MRI of the pelvis revealed multiple nondisplaced sacral fractures with classic H shaped pattern suggestive of sacral insufficiency fracture. EKG was of poor quality and significant artifact noted with reading of A. fib however appears to be sinus.   She was admitted for further evaluation. Cardiology consulted for new onset A. fib. Rapid response was called around 1930 yesterday evening when she was noted on telemetry to have tachycardia with heart rate in the 160s to 170s range. Patient was treated with 5 mg of IV Lopressor after which heart rate improved but remain elevated in the 110s to 120s range. Appeared to be A. fib RVR at that time. Currently on telemetry patient is sinus rhythm with heart rate 60s to 70s with occasional PVCs. Telemetry alarms reviewed showed possible A. fib episodes early this morning versus sinus with frequent ectopy or possible ectopic atrial rhythm. At time evaluation today patient is resting comfortably and echo being performed at bedside. Patient is only alert and oriented to self at this time. She believes that she was in [facility] and thought the year was 1960s. She is unable to provide any reliable history at this time and states that she believes she came in due to abdominal pain and need for surgery. She denies any current complaints including chest pain, shortness of breath, nausea, vomiting, dizziness, abdominal pain. Review of Systems:   Review of Systems   Constitutional:  Negative for activity change and fever. HENT:  Negative for congestion. Respiratory:  Negative for chest tightness and shortness of breath. Cardiovascular:  Negative for chest pain, palpitations and leg swelling. Gastrointestinal:  Negative for abdominal distention, nausea and vomiting. Genitourinary:         Purewick catheter in place draining dark urine   Musculoskeletal:  Negative for arthralgias. Skin:  Negative for color change. Neurological:  Negative for syncope. Psychiatric/Behavioral:  Positive for confusion.         Physical Examination:    BP (!) 110/90   Pulse 71   Temp 97.5 °F (36.4 °C) (Oral)   Resp 18   Ht 5' 8\" (1.727 m)   Wt 139 lb 8 oz (63.3 kg)   LMP  (LMP Unknown)   SpO2 97%   BMI 21.21 kg/m²    Physical Exam  Constitutional:       General: She is not in acute distress. HENT:      Head: Normocephalic and atraumatic. Cardiovascular:      Rate and Rhythm: Normal rate and regular rhythm. Heart sounds: Murmur heard. Pulmonary:      Effort: Pulmonary effort is normal. No respiratory distress. Breath sounds: No wheezing, rhonchi or rales. Abdominal:      Palpations: Abdomen is soft. Tenderness: There is no abdominal tenderness. Musculoskeletal:         General: Normal range of motion. Cervical back: Normal range of motion and neck supple. Right lower leg: No edema. Left lower leg: No edema. Skin:     General: Skin is warm and dry. Neurological:      General: No focal deficit present. Mental Status: She is alert. Cranial Nerves: No cranial nerve deficit.    Psychiatric:         Mood and Affect: Mood normal.       LABS:  CBC:   Lab Results   Component Value Date/Time    WBC 7.8 12/20/2022 05:41 AM    RBC 3.63 12/20/2022 05:41 AM    HGB 11.9 12/20/2022 05:41 AM    HCT 34.7 12/20/2022 05:41 AM    MCV 95.7 12/20/2022 05:41 AM    MCH 32.8 12/20/2022 05:41 AM    MCHC 34.2 12/20/2022 05:41 AM    RDW 14.1 12/20/2022 05:41 AM     12/20/2022 05:41 AM    MPV 8.6 02/14/2014 08:32 AM     CBC with Differential:   Lab Results   Component Value Date/Time    WBC 7.8 12/20/2022 05:41 AM    RBC 3.63 12/20/2022 05:41 AM    HGB 11.9 12/20/2022 05:41 AM    HCT 34.7 12/20/2022 05:41 AM     12/20/2022 05:41 AM    MCV 95.7 12/20/2022 05:41 AM    MCH 32.8 12/20/2022 05:41 AM    MCHC 34.2 12/20/2022 05:41 AM    RDW 14.1 12/20/2022 05:41 AM    LYMPHOPCT 8.4 12/20/2022 05:41 AM    MONOPCT 10.3 12/20/2022 05:41 AM    BASOPCT 0.7 12/20/2022 05:41 AM    MONOSABS 0.8 12/20/2022 05:41 AM    LYMPHSABS 0.7 12/20/2022 05:41 AM    EOSABS 0.1 12/20/2022 05:41 AM    BASOSABS 0.1 12/20/2022 05:41 AM     CMP:    Lab Results   Component Value Date/Time     12/20/2022 05:41 AM    K 3.8 12/20/2022 05:41 AM    K 3.3 12/16/2022 05:15 AM     12/20/2022 05:41 AM    CO2 26 12/20/2022 05:41 AM    BUN 21 12/20/2022 05:41 AM    CREATININE 0.65 12/20/2022 05:41 AM    GFRAA >60.0 04/12/2022 09:23 AM    LABGLOM >60.0 12/20/2022 05:41 AM    GLUCOSE 108 12/20/2022 05:41 AM    GLUCOSE 87 12/27/2011 08:41 AM    PROT 6.6 12/15/2022 11:45 AM    LABALBU 3.9 12/15/2022 11:45 AM    LABALBU 4.3 12/27/2011 08:41 AM    CALCIUM 9.1 12/20/2022 05:41 AM    BILITOT 0.6 12/15/2022 11:45 AM    ALKPHOS 83 12/15/2022 11:45 AM    AST 19 12/15/2022 11:45 AM    ALT 17 12/15/2022 11:45 AM     BMP:    Lab Results   Component Value Date/Time     12/20/2022 05:41 AM    K 3.8 12/20/2022 05:41 AM    K 3.3 12/16/2022 05:15 AM     12/20/2022 05:41 AM    CO2 26 12/20/2022 05:41 AM    BUN 21 12/20/2022 05:41 AM    LABALBU 3.9 12/15/2022 11:45 AM    LABALBU 4.3 12/27/2011 08:41 AM    CREATININE 0.65 12/20/2022 05:41 AM    CALCIUM 9.1 12/20/2022 05:41 AM    GFRAA >60.0 04/12/2022 09:23 AM    LABGLOM >60.0 12/20/2022 05:41 AM    GLUCOSE 108 12/20/2022 05:41 AM    GLUCOSE 87 12/27/2011 08:41 AM     Magnesium:    Lab Results   Component Value Date/Time    MG 1.9 12/20/2022 05:41 AM     Troponin:    Lab Results   Component Value Date/Time    TROPONINI <0.010 12/15/2022 11:45 AM       Radiology:  No results found. Echocardiogram 12/16/22:   Conclusions   Summary   Mitral annular calcification is present. 1+ MR   Left ventricular ejection fraction is visually estimated at 40-45%. E/A flow reversal noted. Suggestive of diastolic dysfunction.       Signature   ----------------------------------------------------------------   Electronically signed by Sheldon Hazel MD(Interpreting   physician) on 12/17/2022 08:29 AM   ----------------------------------------------------------------      EKG 12/15/22: ?SR with PACs vs A-fib (significant artifact), nonspecific ST changes, QTc 459ms     Telemetry 12/16/22: SR 60s-70s, occasional PVC  Telemetry 12/19/22: SR/ST 90s-100s, frequent PACs, sinus arrhythmia  Telemetry 12/20/22: SR 70s-80s, frequent PACs/sinus arrhythmia      Assessment:    Active Hospital Problems    Diagnosis Date Noted    General weakness [R53.1] 12/16/2022     Priority: High    Palliative care encounter [Z51.5] 12/19/2022     Priority: Medium    Advanced care planning/counseling discussion [Z71.89] 12/19/2022     Priority: Medium    Goals of care, counseling/discussion [Z71.89] 12/19/2022     Priority: Medium    A-fib [facility]) [I48.91] 12/15/2022     Priority: Medium     New onset PA-fib RVR--HR into 160s-180s yesterday evening, Currently SR with PACs  Syncope with fall  Multiple nondisplaced sacral fractures per MRI pelvis on 12/15/22  Hypokalemia--resolved  UTI  HTN  Dyslipidemia  Thrombocytopenia--resolved. Plts 131 on 12/19/22  Cardiomyopathy with EF 40-45% per echo 12/16/22  Mild 1+ MR per echo 12/16/22     Plan:  Continue current medications-aspirin 81 mg p.o. daily, Lopressor 50 mg p.o. twice daily, Digoxin 125mcg PO daily, Crestor 40 mg p.o. nightly, Losartan 25mg PO daily, Lovenox 1mg/Kg SQ BID for now  Questionable long-term oral anticoagulation candidate secondary to advanced age and fall risk  Cardiac diet recommended  Monitor on telemetry for any tachycardia or bradycardia arrhythmias  Maintain potassium greater than 4, magnesium greater than 2  GI/DVT prophylaxis  Hospitalist recommendations  Orthopedic surgery recommendations regarding mild nondisplaced sacral fractures  Conservative medical therapy from a cardiac standpoint at this time given advanced age and multiple nondisplaced sacral fractures. May consider noninvasive ischemic evaluation in future as outpatient  Recommend outpatient follow-up with cardiology in 3 weeks upon discharge  No new changes from cardiac standpoint.         Electronically signed by JOSH Rockwell on 12/20/2022 at 11:01 AM      Attending Supervising Physician's Attestation Statement  The patient is a 80 y.o. female. I have performed a history and physical examination of the patient. I discussed the case with the physician assistant. I reviewed the patient's Past Medical History, Past Surgical History, Medications, and Allergies. Physical Exam:  Vitals:    12/20/22 0725 12/20/22 1050 12/20/22 1430 12/20/22 1455   BP: (!) 183/82 (!) 110/90  (!) 148/59   Pulse: 85 71  59   Resp: 18 18  18   Temp: 97.2 °F (36.2 °C) 97.5 °F (36.4 °C)  97.5 °F (36.4 °C)   TempSrc: Oral Oral  Oral   SpO2: 98% 97%  98%   Weight:   131 lb (59.4 kg)    Height:             Pulmonary/Chest: clear to auscultation bilaterally- no wheezes, rales or rhonchi, normal air movement, no respiratory distress  Cardiovascular: normal rate, normal S1 and S2, no gallops, intact distal pulses, and no carotid bruits  Abdomen: soft, non-tender, non-distended, normal bowel sounds, no masses or organomegaly    Active Hospital Problems    Diagnosis Date Noted    General weakness [R53.1] 12/16/2022     Priority: High    Palliative care encounter [Z51.5] 12/19/2022     Priority: Medium    Advanced care planning/counseling discussion [Z71.89] 12/19/2022     Priority: Medium    Goals of care, counseling/discussion [Z71.89] 12/19/2022     Priority: Medium    A-fib [facility]) [I48.91] 12/15/2022     Priority: Medium        I reviewed and agree with the findings and plan documented in her note . Impression     New onset paroxysmal atrial fibrillation-currently normal sinus rhythm  Syncope with fall  Multiple nondisplaced sacral fractures per MRI pelvis on 12/15/22  Hypokalemia--keep potassium between 4-5 and magnesium greater than 2  UTI  HTN  Dyslipidemia  Thrombocytopenia  Cardiomyopathy with EF 40-45% per echo 12/16/22  Mild 1+ MR per echo 12/16/22     Plan      Questionable long-term oral anticoagulation candidate secondary to advanced age as well as fall risk.   \Lovenox while in hospital for now   continue current medications-aspirin 81 mg p.o. daily, , Crestor 40 mg p.o. nightly, digoxin 125 mcg daily, losartan 25 mg daily Lopressor 25 mg p.o. twice daily  Cardiac diet recommended  Monitor on telemetry for any tachycardia or bradycardia arrhythmias  Maintain potassium greater than 4, magnesium greater than 2  GI/DVT prophylaxis  Hospitalist recommendations  Orthopedic surgery recommendations regarding mild nondisplaced sacral fractures  Conservative medical therapy from a cardiac standpoint at this time given advanced age and multiple nondisplaced sacral fractures.   May consider noninvasive ischemic evaluation in future as outpatient  Further recommendations to follow          Electronically signed by Loco Anaya DO on 12/20/22 at 7:16 PM EST

## 2022-12-20 NOTE — PROGRESS NOTES
Comprehensive Nutrition Assessment    Type and Reason for Visit:  Reassess    Nutrition Recommendations/Plan:   Continue General diet    Recommend starting a daily bowel regimen     Malnutrition Assessment:  Malnutrition Status: Moderate malnutrition (12/16/22 1516)    Context:  Social/Environmental Circumstances     Findings of the 6 clinical characteristics of malnutrition:  Energy Intake:  Less than 75% estimated energy requirements for 3 months or longer  Weight Loss:  Greater than 7.5% over 3 months     Body Fat Loss:  Mild body fat loss Triceps   Muscle Mass Loss:  Severe muscle mass loss Clavicles (pectoralis & deltoids), Calf (gastrocnemius)  Fluid Accumulation:  No significant fluid accumulation     Strength:  Not Performed    Nutrition Assessment:    No progress towards nutrition related goals, po intake remains fair-poor and declines oral nutrition supplements. Of note, pt has not had a BM since being admitted (5 days), recommend starting a daily bowel regimen    Nutrition Related Findings:    history of arthritis, endometrial cancer, HTN, hyperlipidemia, vit d deficiency. Admitted for syncope. complains of left sided hip pain. + sacral fracture (12/15) labs noted, meds reviewed, No BM documented, ' just not hungry' denies recent illness or medication adjustments, meds include prn norco, morphine, labs reviewed Wound Type: None       Current Nutrition Intake & Therapies:    Average Meal Intake: 1-25%  Average Supplements Intake: None Ordered  ADULT DIET;  Regular    Anthropometric Measures:  Height: 5' 8\" (172.7 cm)  Ideal Body Weight (IBW): 140 lbs (64 kg)    Admission Body Weight: 149 lb (67.6 kg)  Current Body Weight: 131 lb (59.4 kg) (12/19), Weight Source: Bed Scale  Current BMI (kg/m2): 19.9  Usual Body Weight: 158 lb (71.7 kg) ((1/2021) ; 150# ( 7/22) & ( 10/22))  % Weight Change (Calculated): -11.7                    BMI Categories: Underweight (BMI less than 22) age over 72    Estimated Daily Nutrient Needs:  Energy Requirements Based On: Kcal/kg  Weight Used for Energy Requirements: Current  Energy (kcal/day): 5558-5081 kcals @ 25-28 kcal/kg  Weight Used for Protein Requirements: Current  Protein (g/day): ~ 82 g protein @ 1.3 g/kg     Fluid (ml/day): ~1800    Nutrition Diagnosis:   Moderate malnutrition, In context of social or environmental circumstances related to inadequate protein-energy intake as evidenced by other (comment) (anorexia)    Nutrition Interventions:   Food and/or Nutrient Delivery: Continue Current Diet (Continue General diet  Recommend starting a daily bowel regimen)  Nutrition Education/Counseling: Education initiated  Coordination of Nutrition Care: Continue to monitor while inpatient       Goals:  Previous Goal Met: No Progress toward Goal(s)  Goals: PO intake 50% or greater, by next RD assessment       Nutrition Monitoring and Evaluation:   Behavioral-Environmental Outcomes: None Identified  Food/Nutrient Intake Outcomes: Food and Nutrient Intake  Physical Signs/Symptoms Outcomes: Meal Time Behavior, Weight    Discharge Planning:    No discharge needs at this time     Raymundo Markham, DELBERT, LD

## 2022-12-20 NOTE — PLAN OF CARE
Problem: Discharge Planning  Goal: Discharge to home or other facility with appropriate resources  Outcome: Progressing     Problem: Safety - Adult  Goal: Free from fall injury  Outcome: Progressing     Problem: ABCDS Injury Assessment  Goal: Absence of physical injury  Outcome: Progressing     Problem: Pain  Goal: Verbalizes/displays adequate comfort level or baseline comfort level  Outcome: Progressing     Problem: Nutrition Deficit:  Goal: Optimize nutritional status  Outcome: Progressing     Problem: Neurosensory - Adult  Goal: Achieves stable or improved neurological status  Outcome: Progressing  Flowsheets (Taken 12/19/2022 2200)  Achieves stable or improved neurological status: Assess for and report changes in neurological status  Goal: Achieves maximal functionality and self care  Outcome: Progressing  Flowsheets (Taken 12/19/2022 2200)  Achieves maximal functionality and self care: Monitor swallowing and airway patency with patient fatigue and changes in neurological status     Problem: Cardiovascular - Adult  Goal: Maintains optimal cardiac output and hemodynamic stability  Outcome: Progressing

## 2022-12-20 NOTE — PROGRESS NOTES
Progress Note  Date:2022       [facility]:[id number]  Patient Kenisha Lyons     YOB: 1935     Age:87 y.o. Subjective      Patient is not sure where she is; complaining of pain in her bottom from laying in bed; denies any other complaints at this time; AON x 1    Objective         Vitals Last 24 Hours:  TEMPERATURE:  Temp  Av.8 °F (36.6 °C)  Min: 97.2 °F (36.2 °C)  Max: 98.3 °F (36.8 °C)  RESPIRATIONS RANGE: Resp  Av  Min: 18  Max: 18  PULSE OXIMETRY RANGE: SpO2  Av.3 %  Min: 97 %  Max: 98 %  PULSE RANGE: Pulse  Av.5  Min: 61  Max: 85  BLOOD PRESSURE RANGE: Systolic (00EFC), AOI:339 , Min:136 , NFC:699   ; Diastolic (99RBD), SQK:63, Min:56, Max:86    I/O (24Hr): Intake/Output Summary (Last 24 hours) at 2022 1026  Last data filed at 2022 0305  Gross per 24 hour   Intake 695.6 ml   Output 600 ml   Net 95.6 ml       Objective:  Vital signs: (most recent): Blood pressure (!) 183/82, pulse 85, temperature 97.2 °F (36.2 °C), temperature source Oral, resp. rate 18, height 5' 8\" (1.727 m), weight 139 lb 8 oz (63.3 kg), SpO2 98 %, not currently breastfeeding. Constitutional: Frail elderly adult female reclining in bed no acute distress. Head: NCAT  Eyes: PERRLA,  Neck: Trachea midline  Cardiovascular: RRR  Pulmonary: Clear bilateral breath sounds. Abdomen: Soft, nontense, nondistended.     Neurologic: AON x 1; non focal.    Labs/Imaging/Diagnostics    Labs:  CBC:  Recent Labs     22  0458 22  0505 22  0541   WBC 7.2 6.6 7.8   RBC 3.30* 3.70* 3.63*   HGB 10.9* 12.0 11.9*   HCT 31.4* 34.8* 34.7*   MCV 95.0* 94.2 95.7*   RDW 14.7* 14.5 14.1   PLT 95* 131 141       CHEMISTRIES:  Recent Labs     22  0458 22  0505 22  0541   * 137 136   K 3.7 3.4 3.8   CL 96 99 100   CO2 25 25 26   BUN 31* 30* 21   CREATININE 0.89 0.71 0.65   GLUCOSE 98 102* 108*   MG 2.1 2.1 1.9       PT/INR:  No results for input(s): PROTIME, INR in the last 72 hours. APTT:No results for input(s): APTT in the last 72 hours. LIVER PROFILE:  No results for input(s): AST, ALT, BILIDIR, BILITOT, ALKPHOS in the last 72 hours. Imaging Last 24 Hours:  CT Head W/O Contrast    Result Date: 12/15/2022  EXAMINATION: CT OF THE HEAD WITHOUT CONTRAST  12/15/2022 12:06 pm TECHNIQUE: CT of the head was performed without the administration of intravenous contrast. Automated exposure control, iterative reconstruction, and/or weight based adjustment of the mA/kV was utilized to reduce the radiation dose to as low as reasonably achievable. COMPARISON: None. HISTORY: ORDERING SYSTEM PROVIDED HISTORY: syncope TECHNOLOGIST PROVIDED HISTORY: Reason for exam:->syncope Has a \"code stroke\" or \"stroke alert\" been called? ->No Decision Support Exception - unselect if not a suspected or confirmed emergency medical condition->Emergency Medical Condition (MA) What reading provider will be dictating this exam?->CRC FINDINGS: BRAIN/VENTRICLES: There is no acute intracranial hemorrhage, mass effect or midline shift. No abnormal extra-axial fluid collection. The gray-white differentiation is maintained without evidence of an acute infarct. There is no evidence of hydrocephalus. The ventricles, cisterns and sulci are prominent consistent with atrophy. There is decreased attenuation within the periventricular white matter consistent with periventricular leukomalacia. There are old lacunar infarcts seen within the right and left basal ganglia. ORBITS: The visualized portion of the orbits demonstrate no acute abnormality. SINUSES: The visualized paranasal sinuses and mastoid air cells demonstrate no acute abnormality. SOFT TISSUES/SKULL:  No acute abnormality of the visualized skull or soft tissues. 1. There is no acute intracranial abnormality. Specifically, there is no intracranial hemorrhage.  2. Atrophy and periventricular leukomalacia,     CT ABDOMEN PELVIS W IV CONTRAST Additional Contrast? None    Result Date: 12/15/2022  EXAMINATION: ONE XRAY VIEW OF THE CHEST; CT OF THE ABDOMEN AND PELVIS WITH CONTRAST 12/15/2022 12:22 pm COMPARISON: Chest x-ray, October 19 2022; CT abdomen pelvis, September 25, 2018 HISTORY: ORDERING SYSTEM PROVIDED HISTORY: sob TECHNOLOGIST PROVIDED HISTORY: Reason for exam:->sob What reading provider will be dictating this exam?->CRC FINDINGS: There also is a small amount of fluid. There is coarsening of the interstitium. The lungs are hyperinflated. The cardiac silhouette is within normal limits. Atherosclerotic calcifications are seen thoracic aorta. No consolidation, pneumothorax or pleural effusion seen. Degenerative changes are seen in the visualized portion of both shoulders and also spine. CT of the abdomen and pelvis: Lungs: In the visualized bases of the lungs a 5.7 mm pleural base nodule is seen posteriorly on the left (series 2, image 1). A 7.7 mm nodule is seen posteriorly in the right lower lobe that is pleural based as well. (Series 2, image 20). No consolidating pneumonia, pneumothorax or pleural effusion is seen. Changes of some emphysema are visualized. Organs: The liver, pancreas, right adrenal gland and liver are unremarkable. There are calcifications seen in the liver and also there is a calcified splenic artery again seen that is unchanged from previous study. In the area of the left adrenal gland there is a 1.7 cm hypodense thick wall cystic area. This was also seen on previous study. In the region of the tail of the pancreas a hypodense area is seen that measures 1.2 cm. This area measures fluid density on this examination. This could be an IPMN in. This was vaguely seen on previous study and also appears stable. Both kidneys show no evidence of hydronephrosis. No renal calculus is visualized. Prominent atherosclerotic calcifications are visualized. GI/bowel: No dilated loops of bowel are seen.   The appendix is visualized and is unremarkable. Pelvis: In the pre sacral region there is soft tissue density posterior to the bowel that is thickened. A superior there also is some stranding visualized. The uterus is not seen. There also is a small amount of fluid. The bladder distends normally. Bones: No destructive bony lesions are seen. Slight irregularity is seen in the lower sacral region on the left. 0.82 degenerative changes are seen in both hips and is most prominent on the right. Chest x-ray: No evidence for acute cardiopulmonary process. CT of the abdomen and pelvis: 1.  2 nodules are seen in the visualized bases of the lungs. The nodule in the right lower lobe was seen on previous study but was smaller in size. There are also emphysematous changes seen in the visualized bases. Numeral 2. Hypodense lesion is seen in the region of the left adrenal gland. This could be an adrenal nodule of the possibility is necrotic lymph node . It is slightly larger than what was seen on previous study. 3.  A stable hypodense area is seen in the tail the pancreas. This could represent IPMN. 4.  There is no evidence for appendicitis, diverticulitis or bowel obstruction 5. There is presacral soft tissue density and stranding seen in the pelvis. This is new when compared to previous study. Query if patient may have had any radiation treatment due to endometrial cancer. Recommend further evaluation there is also full small amount of fluid. RECOMMENDATION: 1. Low does CT of the lungs, non emergently 2. It presacral soft tissue seen also there is a small amount of fluid. Possibility the of hemorrhage or of the etiology could be related to patient's history of endometrial cancer and any possibility of prior radiation treatment. Recommend follow-up. Also there is some irregularity in the sacrum and occult fracture is not excluded. This was discussed with JOSH Gallo, and CT of the pelvis was recommended.      XR CHEST PORTABLE    Result Date: 12/15/2022  EXAMINATION: ONE XRAY VIEW OF THE CHEST; CT OF THE ABDOMEN AND PELVIS WITH CONTRAST 12/15/2022 12:22 pm COMPARISON: Chest x-ray, October 19 2022; CT abdomen pelvis, September 25, 2018 HISTORY: ORDERING SYSTEM PROVIDED HISTORY: sob TECHNOLOGIST PROVIDED HISTORY: Reason for exam:->sob What reading provider will be dictating this exam?->CRC FINDINGS: There also is a small amount of fluid. There is coarsening of the interstitium. The lungs are hyperinflated. The cardiac silhouette is within normal limits. Atherosclerotic calcifications are seen thoracic aorta. No consolidation, pneumothorax or pleural effusion seen. Degenerative changes are seen in the visualized portion of both shoulders and also spine. CT of the abdomen and pelvis: Lungs: In the visualized bases of the lungs a 5.7 mm pleural base nodule is seen posteriorly on the left (series 2, image 1). A 7.7 mm nodule is seen posteriorly in the right lower lobe that is pleural based as well. (Series 2, image 20). No consolidating pneumonia, pneumothorax or pleural effusion is seen. Changes of some emphysema are visualized. Organs: The liver, pancreas, right adrenal gland and liver are unremarkable. There are calcifications seen in the liver and also there is a calcified splenic artery again seen that is unchanged from previous study. In the area of the left adrenal gland there is a 1.7 cm hypodense thick wall cystic area. This was also seen on previous study. In the region of the tail of the pancreas a hypodense area is seen that measures 1.2 cm. This area measures fluid density on this examination. This could be an IPMN in. This was vaguely seen on previous study and also appears stable. Both kidneys show no evidence of hydronephrosis. No renal calculus is visualized. Prominent atherosclerotic calcifications are visualized. GI/bowel: No dilated loops of bowel are seen. The appendix is visualized and is unremarkable. Pelvis:  In the pre sacral region there is soft tissue density posterior to the bowel that is thickened. A superior there also is some stranding visualized. The uterus is not seen. There also is a small amount of fluid. The bladder distends normally. Bones: No destructive bony lesions are seen. Slight irregularity is seen in the lower sacral region on the left. 0.82 degenerative changes are seen in both hips and is most prominent on the right. Chest x-ray: No evidence for acute cardiopulmonary process. CT of the abdomen and pelvis: 1.  2 nodules are seen in the visualized bases of the lungs. The nodule in the right lower lobe was seen on previous study but was smaller in size. There are also emphysematous changes seen in the visualized bases. Numeral 2. Hypodense lesion is seen in the region of the left adrenal gland. This could be an adrenal nodule of the possibility is necrotic lymph node . It is slightly larger than what was seen on previous study. 3.  A stable hypodense area is seen in the tail the pancreas. This could represent IPMN. 4.  There is no evidence for appendicitis, diverticulitis or bowel obstruction 5. There is presacral soft tissue density and stranding seen in the pelvis. This is new when compared to previous study. Query if patient may have had any radiation treatment due to endometrial cancer. Recommend further evaluation there is also full small amount of fluid. RECOMMENDATION: 1. Low does CT of the lungs, non emergently 2. It presacral soft tissue seen also there is a small amount of fluid. Possibility the of hemorrhage or of the etiology could be related to patient's history of endometrial cancer and any possibility of prior radiation treatment. Recommend follow-up. Also there is some irregularity in the sacrum and occult fracture is not excluded. This was discussed with JOSH Gallo, and CT of the pelvis was recommended.      MRI BRAIN WO CONTRAST    Result Date: 12/15/2022  EXAMINATION: MRI OF THE BRAIN WITHOUT CONTRAST  12/15/2022 5:57 pm TECHNIQUE: Multiplanar multisequence MRI of the brain was performed without the administration of intravenous contrast. COMPARISON: Noncontrast CT head done earlier same day. HISTORY: ORDERING SYSTEM PROVIDED HISTORY: syncope atrial fibrillation TECHNOLOGIST PROVIDED HISTORY: Reason for exam:->syncope atrial fibrillation FINDINGS: INTRACRANIAL STRUCTURES/VENTRICLES: There is no acute infarct. No mass effect or midline shift. No evidence of an acute intracranial hemorrhage. Generalized cerebral and cerebellar volume loss. Diffuse ventricular prominence may relate to central volume loss. The sellar/suprasellar regions appear unremarkable. The normal signal voids within the major intracranial vessels appear maintained. The axial FLAIR images demonstrate pontine, bilateral periventricular and deep white matter signal hyperintensities which are nonspecific but commonly seen in the setting of chronic small vessel ischemic disease. 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 ORBITS: The visualized portion of the orbits demonstrate no acute abnormality. SINUSES: Small amount of nonspecific fluid in the bilateral mastoid air cells. The paranasal sinuses are clear. BONES/SOFT TISSUES: The bone marrow signal intensity appears normal. The soft tissues demonstrate no acute abnormality. Multilevel degenerative changes in the visualized spine. 1.  No acute intracranial abnormality per unenhanced brain MRI. No acute stroke. 2.  Moderate to severe chronic small vessel ischemic disease. 3.  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      MRI PELVIS WO CONTRAST    Result Date: 12/15/2022  EXAMINATION: MRI OF THE PELVIS WITHOUT CONTRAST, 12/15/2022 4:10 pm TECHNIQUE: Multiplanar multisequence MRI of the pelvis was performed without the administration of intravenous contrast. COMPARISON: CT abdomen and pelvis 12/15/2022 HISTORY: ORDERING SYSTEM PROVIDED HISTORY: endometrial thickening rule out sacral fracture TECHNOLOGIST PROVIDED HISTORY: Reason for exam:->endometrial thickening rule out sacral fracture What reading provider will be dictating this exam?->CRC FINDINGS: SOFT TISSUES: Small amount of presacral free fluid is identified, which is stable compared to the prior CT. No large hematoma is seen. BONE MARROW: Vertical fractures are identified through the bilateral sacral alae with associated transverse fracture of the sacral body. Fractures demonstrate classic H-shaped pattern. No hip fracture is identified. Joint alignment is intact. JOINTS: Moderate-severe bilateral hip degenerative changes noted with loss of the articular space. Small joint effusion is noted on the left hip. Multiple nondisplaced sacral fractures with a classic H-shaped pattern suggestive of sacral insufficiency fracture. Assessment//Plan           Hospital Problems             Last Modified POA    * (Principal) A-fib ([facility] [address] 12/15/2022 Yes    General weakness 12/16/2022 Yes    Palliative care encounter 12/19/2022 Yes    Advanced care planning/counseling discussion 12/19/2022 Yes    Goals of care, counseling/discussion 12/19/2022 Yes   Assessment & Plan    Acute problems:  Syncope versus ground-level fall  Urinary tract infection  New onset atrial fibrillation with intermittent RVR  Bilateral sacral fractures s/p fall  Moderate Malnutrition (per RD)     Chronic problems:  Hypertension  Lung nodules (stable since previous)  Hx endometrial cancer    Plan:  Admitted to inpatient status 12/15  CT head negative for acute bony injury  MRI brain negative for acute CVA  MRI pelvis positive for bilateral sacral \"H fracture\", for which orthopedic surgery was consulted  Pain control PRN. So far, no significant pelvic pain with relatively flat positioning, when not moving. New onset atrial fibrillation, with intermittent RVR since admission, for which cardiology is consulted.   Defer AC to cardiology in setting of both new onset A. fib and bilateral sacral fractures  Carotid Dopplers in setting of possible syncope  Pt unable to perform Orthostatic BP&P in setting of sacral fractures. Antibiotic for UTI. Culture pending. Previous cultures with E. coli. No lower extremity physical therapy, pending orthopedic surgery evaluation and recommendations  12/17 - No significant changes. Non-operative pelvic Fx treatment, per Orthopedics. Continues ABX for UTI, and pain meds for symptomatic pain control. Waxing/waning delirium. Encourage good day/night hygiene. Flipped back to NSR with PAC. Appreciate Cardiology recs regarding cardiac management. 12/18 - Continuing antibiotics for UTI. Continuing nonoperative pain management for bilateral sacral fractures. Did flip back into A. fib with mild RVR overnight, for which cardiology is adjusting cardiac medications this morning. Still in A. fib at time of exam.  Waxing/waning mentation during admission in setting of baseline dementia with superimposed UTI and pain of fractures on admission, mentating slightly more clearly at time of exam today,  but variable throughout the day per care staff.   12/19:  Cardiology is managing the A Fib; continue IV Abx for UTI; given the non operative fractures causing pain and pt being on IV pain meds will consult pain management for transition to PO and consult palliative care for overall recommendations and outpatient follow up given her dementia and pelvic  fractures  20/20:  Patient is complaining of pelvic pain requiring IV pain medications; still working on placement; ; still AON x [address]

## 2022-12-20 NOTE — PLAN OF CARE
Problem: Safety - Adult  Goal: Free from fall injury  12/20/2022 1101 by John Alcazar RN  Outcome: Progressing  12/20/2022 1100 by John Alcazar RN  Outcome: Progressing  12/20/2022 0510 by Rosana Sexton RN  Outcome: Progressing     Problem: Pain  Goal: Verbalizes/displays adequate comfort level or baseline comfort level  12/20/2022 1101 by John Alcazar RN  Outcome: Not Progressing  Flowsheets (Taken 12/20/2022 0725)  Verbalizes/displays adequate comfort level or baseline comfort level: Encourage patient to monitor pain and request assistance  12/20/2022 0510 by Rosana Sexton RN  Outcome: Progressing

## 2022-12-21 LAB
ANION GAP SERPL CALCULATED.3IONS-SCNC: 9 MEQ/L (ref 9–15)
BASOPHILS ABSOLUTE: 0.1 K/UL (ref 0–0.2)
BASOPHILS RELATIVE PERCENT: 0.7 %
BUN BLDV-MCNC: 25 MG/DL (ref 8–23)
CALCIUM SERPL-MCNC: 9 MG/DL (ref 8.5–9.9)
CHLORIDE BLD-SCNC: 97 MEQ/L (ref 95–107)
CO2: 28 MEQ/L (ref 20–31)
CREAT SERPL-MCNC: 0.71 MG/DL (ref 0.5–0.9)
EOSINOPHILS ABSOLUTE: 0.1 K/UL (ref 0–0.7)
EOSINOPHILS RELATIVE PERCENT: 1.5 %
GFR SERPL CREATININE-BSD FRML MDRD: >60 ML/MIN/{1.73_M2}
GLUCOSE BLD-MCNC: 103 MG/DL (ref 70–99)
HCT VFR BLD CALC: 32.4 % (ref 37–47)
HEMOGLOBIN: 11.1 G/DL (ref 12–16)
LYMPHOCYTES ABSOLUTE: 0.7 K/UL (ref 1–4.8)
LYMPHOCYTES RELATIVE PERCENT: 8.2 %
MAGNESIUM: 2 MG/DL (ref 1.7–2.4)
MCH RBC QN AUTO: 32.1 PG (ref 27–31.3)
MCHC RBC AUTO-ENTMCNC: 34.2 % (ref 33–37)
MCV RBC AUTO: 94.1 FL (ref 79.4–94.8)
MONOCYTES ABSOLUTE: 0.7 K/UL (ref 0.2–0.8)
MONOCYTES RELATIVE PERCENT: 8.2 %
NEUTROPHILS ABSOLUTE: 7.2 K/UL (ref 1.4–6.5)
NEUTROPHILS RELATIVE PERCENT: 81.4 %
PDW BLD-RTO: 14.1 % (ref 11.5–14.5)
PLATELET # BLD: 149 K/UL (ref 130–400)
POTASSIUM SERPL-SCNC: 3.9 MEQ/L (ref 3.4–4.9)
RBC # BLD: 3.44 M/UL (ref 4.2–5.4)
SODIUM BLD-SCNC: 134 MEQ/L (ref 135–144)
WBC # BLD: 8.9 K/UL (ref 4.8–10.8)

## 2022-12-21 PROCEDURE — 6370000000 HC RX 637 (ALT 250 FOR IP): Performed by: PHYSICIAN ASSISTANT

## 2022-12-21 PROCEDURE — 6370000000 HC RX 637 (ALT 250 FOR IP): Performed by: CLINICAL NURSE SPECIALIST

## 2022-12-21 PROCEDURE — 6370000000 HC RX 637 (ALT 250 FOR IP): Performed by: INTERNAL MEDICINE

## 2022-12-21 PROCEDURE — 97535 SELF CARE MNGMENT TRAINING: CPT

## 2022-12-21 PROCEDURE — 80048 BASIC METABOLIC PNL TOTAL CA: CPT

## 2022-12-21 PROCEDURE — 2580000003 HC RX 258: Performed by: CLINICAL NURSE SPECIALIST

## 2022-12-21 PROCEDURE — 6370000000 HC RX 637 (ALT 250 FOR IP): Performed by: PAIN MEDICINE

## 2022-12-21 PROCEDURE — 99232 SBSQ HOSP IP/OBS MODERATE 35: CPT | Performed by: INTERNAL MEDICINE

## 2022-12-21 PROCEDURE — 6360000002 HC RX W HCPCS: Performed by: INTERNAL MEDICINE

## 2022-12-21 PROCEDURE — 85025 COMPLETE CBC W/AUTO DIFF WBC: CPT

## 2022-12-21 PROCEDURE — 2060000000 HC ICU INTERMEDIATE R&B

## 2022-12-21 PROCEDURE — 97163 PT EVAL HIGH COMPLEX 45 MIN: CPT

## 2022-12-21 PROCEDURE — 83735 ASSAY OF MAGNESIUM: CPT

## 2022-12-21 PROCEDURE — 36415 COLL VENOUS BLD VENIPUNCTURE: CPT

## 2022-12-21 RX ADMIN — ROSUVASTATIN CALCIUM 40 MG: 20 TABLET, FILM COATED ORAL at 20:00

## 2022-12-21 RX ADMIN — METOPROLOL TARTRATE 50 MG: 50 TABLET ORAL at 20:00

## 2022-12-21 RX ADMIN — ENOXAPARIN SODIUM 60 MG: 100 INJECTION SUBCUTANEOUS at 19:59

## 2022-12-21 RX ADMIN — ASPIRIN 81 MG: 81 TABLET, COATED ORAL at 09:34

## 2022-12-21 RX ADMIN — DIGOXIN 125 MCG: 125 TABLET ORAL at 09:35

## 2022-12-21 RX ADMIN — LOSARTAN POTASSIUM 25 MG: 25 TABLET, FILM COATED ORAL at 09:34

## 2022-12-21 RX ADMIN — HYDROCODONE BITARTRATE AND ACETAMINOPHEN 1 TABLET: 10; 325 TABLET ORAL at 09:34

## 2022-12-21 RX ADMIN — METOPROLOL TARTRATE 50 MG: 50 TABLET ORAL at 09:35

## 2022-12-21 RX ADMIN — ENOXAPARIN SODIUM 60 MG: 100 INJECTION SUBCUTANEOUS at 09:34

## 2022-12-21 RX ADMIN — HYDROCODONE BITARTRATE AND ACETAMINOPHEN 1 TABLET: 10; 325 TABLET ORAL at 17:04

## 2022-12-21 RX ADMIN — Medication 10 ML: at 19:59

## 2022-12-21 ASSESSMENT — ENCOUNTER SYMPTOMS
ABDOMINAL DISTENTION: 0
NAUSEA: 0
COLOR CHANGE: 0
SHORTNESS OF BREATH: 0
VOMITING: 0
CHEST TIGHTNESS: 0

## 2022-12-21 NOTE — CARE COORDINATION
Call made to daughter, Shima Campos, to discuss patient's discharge plan. No answer so VM was left. Call back from patient's daughter. She said family is agreeable to SNF and are considering [facility] and [facility]. She stated she was on her way to the hospital to discuss options with family and will let LSW know what their choice is. Referral given to [facility] to review and check bed availability. LSW tried to give referral to [facility] but their admissions coordinator is out of the office until tomorrow 12/22.

## 2022-12-21 NOTE — PROGRESS NOTES
Physical Therapy Med Surg Initial Assessment  Facility/Department: [facility]  Room: A974/W122-29       NAME: Maddie Sanabria  : 1935 (80 y.o.)  MRN: [id number]  CODE STATUS: Full Code    Date of Service: 2022    Patient Diagnosis(es): A-fib [facility]) [I48.91]  General weakness [R53.1]  Urinary tract infection without hematuria, site unspecified [N39.0]  Syncope, unspecified syncope type [R55]  Atrial fibrillation, unspecified type [facility]) [I48.91]   Chief Complaint   Patient presents with    Loss of Consciousness     Pt states she felt dizzy and passed out, pt fell from standing, per EMS pt has new onset afib     Patient Active Problem List    Diagnosis Date Noted    General weakness 2022    Palliative care encounter 2022    Advanced care planning/counseling discussion 2022    Goals of care, counseling/discussion 2022    A-fib ([facility] 12/15/2022    Bronchitis 10/19/2022    Acute cough 10/19/2022    Nasal congestion 10/19/2022    At high risk for falls 2022    TIA (transient ischemic attack) 2018    Actinic keratoses     Dermatitis     Prurigo nodularis 2017    Age-related macular degeneration 2015    Arcus senilis of cornea 2015    Combined form of senile cataract 2015    Glaucoma suspect 2015    Hyperopic astigmatism 2014    Vitamin D deficiency     HTN (hypertension)     Hyperlipidemia     Sciatica         Past Medical History:   Diagnosis Date    Actinic keratoses     Arthritis     Dermatitis     Encounter for annual health examination 2011    Endometrial cancer ([facility] [address]     s/p hyst in  - was told clear by Dr. Rodri Barrios    History of bone density study 2011    History of mammography, screening 2011    HTN (hypertension)     Hyperlipidemia     Prurigo nodularis 2017    left lower leg    Sciatica     Vitamin D deficiency      Past Surgical History:   Procedure Laterality Date    CATARACT REMOVAL WITH IMPLANT HYSTERECTOMY (CERVIX STATUS UNKNOWN)  9/2009    post-menopausal bleeding       Patient assessed for rehabilitation services?: Yes  Family / Caregiver Present: No    Restrictions:  Restrictions/Precautions: Fall Risk, Weight Bearing  Lower Extremity Weight Bearing Restrictions  Right Lower Extremity Weight Bearing: Weight Bearing As Tolerated  Left Lower Extremity Weight Bearing: Weight Bearing As Tolerated  Position Activity Restriction  Other position/activity restrictions: per ortho note on 12/16 pt is WBAT B LEs     SUBJECTIVE:   Pain   Pt did not rate R hip pain but holding R LE during PROM R SOHAN- RN made aware    Prior Level of Function:  Social/Functional History  Lives With: Spouse  Type of Home: House  Home Layout: Two level, Performs ADL's on one level  Home Access: Ramped entrance  Bathroom Shower/Tub: Tub/Shower unit  [address]: Grab bars in shower, Hand-held shower, Shower chair  ADL Assistance: [address] [place] [address]: Needs assistance (Family provides meals and patinet has hired assist for heavy homemaking)  Homemaking Responsibilities: Yes  Ambulation Assistance: Independent  Transfer Assistance: Independent  Active :  Yes  Additional Comments: pt is questionable historian    OBJECTIVE:   Vision  Vision: Impaired  Vision Exceptions: Wears glasses at all times  Hearing: Within functional limits    Cognition:  Overall Orientation Status: Impaired  Orientation Level: Oriented to person  Follows Commands: Impaired (pt with difficulty following 1 step instructions)  Cognition Comment: Follows commands consistently    Observation/Palpation  Observation: pt c/o R hip pain with PROM R ASHLYN RN made aware    ROM:  RLE PROM: Exceptions (decreased ROM due to increased R hip pain)  LLE AROM : WFL    Strength:  Strength RLE  Comment: functionally 2+/5  Strength LLE  Comment: functionally 3+/5    Neuro:  Balance  Sitting - Static: Poor  Sitting - Dynamic:  (unable to safely assess due to high level of assistance for bed mobility)  Standing - Static:  (unable to safely assess due to high level of assistance for bed mobility)  Standing - Dynamic:  (unable to safely assess due to high level of assistance for bed mobility)     Bed mobility  Rolling to Left: Maximum assistance  Rolling to Right: Maximum assistance  Supine to Sit: Maximum assistance  Sit to Supine: Maximum assistance  Bed Mobility Comments: R hip pain is limiting pt participation in therapy session    Transfers  Comment: unable to safely assess due to high level of assistance for bed mobility    Ambulation  Comments: unable to safely assess due to high level of assistance for bed mobility    Activity Tolerance  Activity Tolerance: Patient limited by endurance; Patient limited by fatigue;Treatment limited secondary to decreased cognition;Patient limited by pain    Patient Education  Education Given To: Patient  Education Provided: Role of Therapy;Plan of Care  Education Method: Verbal  Education Outcome: Continued education needed       ASSESSMENT:   Body Structures, Functions, Activity Limitations Requiring Skilled Therapeutic Intervention: Decreased functional mobility ; Decreased ROM; Decreased strength;Decreased cognition;Decreased endurance;Decreased balance; Increased pain;Decreased posture  Decision Making: High Complexity  History: high  Exam: high  Clinical Presentation: high    Therapy Prognosis: Fair;Good    DISCHARGE RECOMMENDATIONS:  Discharge Recommendations: Continue to assess pending progress, Patient would benefit from continued therapy after discharge    Assessment: Pt is 80 y.o. female dx a-fib. At PLOF pt was amb without AD and completing own ADLs. Pt currently exhibits max A for bed mobility with inability to trial standing at this time due to the high level of assistance and increased R hip pain with functional mobility.   Continued PT is required to progress to highest level of indep for safe d/c home with spouse. Requires PT Follow-Up: Yes       PLAN OF CARE:  Physcial Therapy Plan  General Plan: 1 time a day 3-6 times a week  Current Treatment Recommendations: Strengthening, ROM, Balance training, Functional mobility training, Transfer training, Endurance training, Gait training, Stair training, Wheelchair mobility training, Neuromuscular re-education, Manual, Cognitive reorientation, Pain management, Home exercise program, Safety education & training, Patient/Caregiver education & training, Equipment evaluation, education, & procurement, Positioning, Therapeutic activities    Safety Devices  Type of Devices: Bed alarm in place, Call light within reach, Left in bed, Telesitter in use  Restraints  Restraints Initially in Place: No    Goals:  Short Term Goals  Short Term Goal 1: Pt will demonstrate HEP min A  Long Term Goals  Long Term Goal 1: Pt will demonstrate bed mobility mod A  Long Term Goal 2: Pt will demonstrate transfers max A  Long Term Goal 3: Pt will demonstrate sitting edge of bed CGA >/= 2 min to prepare for OOB activity    [facility] (6 CLICK) [address] Mobility Raw Score : 6     Therapy Time:   Individual   Time In 0841   Time Out 0851   Minutes 10    Eval X 10 min       Blanca Archer PT, 12/21/22 at 10:15 AM         Definitions for assistance levels  Independent = pt does not require any physical supervision or assistance from another person for activity completion. Device may be needed.   Stand by assistance = pt requires verbal cues or instructions from another person, close to but not touching, to perform the activity  Minimal assistance= pt performs 75% or more of the activity; assistance is required to complete the activity  Moderate assistance= pt performs 50% of the activity; assistance is required to complete the activity  Maximal assistance = pt performs 25% of the activity; assistance is required to complete the activity  Dependent = pt requires total physical assistance to accomplish the task

## 2022-12-21 NOTE — PROGRESS NOTES
[facility] MED SURG TREATMENT NOTE     Date: 2022  Patient Name: Nava Sahu        MRN: [id number]  Account: [de-identified]   : 1935  (80 y.o.)  [facility]    Chart Review:    Restrictions:Fall        Safety:  Safety Devices  Type of Devices: All fall risk precautions in place;Call light within reach; Left in bed;Nurse notified    Patient's birthday verified: Yes    Subjective:\"I think that you need to get a new occupation. \"            Pain at start of treatment: Yes: Pt. unable to rate pain-      Pain at end of treatment: Yes: 10 1/2/10    Location: back and hips  Nursing notified: Yes  RN:   Intervention: Repositioned    Objective:    ADL Status:  ADL  Grooming: Maximum assistance;Verbal cueing; Increased time to complete (Pt participated in brushing teeth, washing face and combing hair in reclined postion in bed.)    RENU Hose donned: No  If no - why  N/A    Therapy key for assistance levels -   Independent/Mod I = Pt. is able to perform task with no assistance but may require a device   Stand by assistance = Pt. does not perform task at an independent level but does not need physical assistance, requires verbal cues  Minimal, Moderate, Maximal Assistance = Pt. requires physical assistance (25%, 50%, 75% assist from helper) for task but is able to actively participate in task   Dependent = Pt. requires total assistance with task and is not able to actively participate with task completion    Orientation Status:       Observation:mild incline in supine position in bed       Cognition Status:follows one step commands with increased time and repetition Moderate       Perception Status:WFL       Vision and Hearing Status:Glasses and [place]      Bed Mobility       Seated and Standing Balance:       Functional Endurance:poor/poor+       Treatment consisted of:    ADL training    Assessment/Discharge Disposition:Pt limited in ADL performance secondary to pain          [employer]  How much help for putting on and taking off regular lower body clothing?: Total  How much help for Bathing?: A Lot  How much help for Toileting?: A Lot  How much help for putting on and taking off regular upper body clothing?: A Lot  How much help for taking care of personal grooming?: A Little  How much help for eating meals?: A Lot  AM-PAC Inpatient Daily Activity Raw Score: 12  AM-PAC Inpatient ADL T-Scale Score : 30.6  ADL Inpatient CMS 0-100% Score: 66.57  ADL Inpatient CMS G-Code Modifier : CL    Plan:    Continue OT per POC    Patient Education:       Equipment recommendations:       Goals/Plan of care addressed during this session:Pt participated in ADL acts        Patient Goal: Patient goals :  To return to home when ready    Improve [place] with ADLs    Therapy Time:   Individual Group Co-Treat   Time In 1535       Time Out 1550         Minutes 15                ADL/IADL training: 15 minutes    Electronically signed by:    BELLA Reyes    28/55/7763, 3:57 PM Electronically signed by BELLA Reyes on 73/36/23 at 4:01 PM EST

## 2022-12-21 NOTE — PROGRESS NOTES
She is not crying she is not distressed wiit anxiety. Now she wants to go home. EKG:  today she is in AF rate 120    12/17/22: pt is panicking. Her feet hut she can't breath. She is on no O2. We placed pulse ox and she is at 98%. She is crying. EKG:  she has refused Tele but last recording was SR 77 w APC.     12/16/22: This is an 41-year-old  female with past medical history hypertension and dyslipidemia presented to ER yesterday with chief complaint of passing out. Patient is a very difficult historian and currently only alert and oriented to self so majority of history was obtained from patient's electronic medical record. Per ER note, patient was pouring coffee at which time she became dizzy and subsequently passed out. She was brought to the emergency room for further evaluation. Presentation to the emergency room, blood pressure elevated 173/87, heart rate 88, respiratory rate 18, pulse ox 100%, temperature of 97.8 °F.  Sodium 138, potassium 3.7, chloride 100, total CO2 29, BUN 15, creatinine 0.77, GFR greater than 60, glucose elevated 132. Troponin negative at less than 0.010. TSH 1.10. WBC 10.8, hemoglobin 12.8, hematocrit 38.3, platelets low 503. Urinalysis consistent with UTI. Chest x-ray showed no evidence for acute cardiopulmonary process. CT brain showed no acute intracranial abnormality. CT abdomen pelvis showed 2 nodules seen in visualized bases of the lungs, hypodense lesion seen in the region of the left adrenal gland, no evidence of appendicitis, diverticulitis or bowel obstruction, presacral soft tissue density and stranding seen in the pelvis. 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 MRI of the pelvis revealed multiple nondisplaced sacral fractures with classic H shaped pattern suggestive of sacral insufficiency fracture.   EKG was of poor quality and significant artifact noted with reading of A. fib however appears to be sinus. She was admitted for further evaluation. Cardiology consulted for new onset A. fib. Rapid response was called around 1930 yesterday evening when she was noted on telemetry to have tachycardia with heart rate in the 160s to 170s range. Patient was treated with 5 mg of IV Lopressor after which heart rate improved but remain elevated in the 110s to 120s range. Appeared to be A. fib RVR at that time. Currently on telemetry patient is sinus rhythm with heart rate 60s to 70s with occasional PVCs. Telemetry alarms reviewed showed possible A. fib episodes early this morning versus sinus with frequent ectopy or possible ectopic atrial rhythm. At time evaluation today patient is resting comfortably and echo being performed at bedside. Patient is only alert and oriented to self at this time. She believes that she was in [facility] and thought the year was 1960s. She is unable to provide any reliable history at this time and states that she believes she came in due to abdominal pain and need for surgery. She denies any current complaints including chest pain, shortness of breath, nausea, vomiting, dizziness, abdominal pain. Review of Systems:   Review of Systems   Constitutional:  Negative for activity change and fever. HENT:  Negative for congestion. Respiratory:  Negative for chest tightness and shortness of breath. Cardiovascular:  Negative for chest pain, palpitations and leg swelling. Gastrointestinal:  Negative for abdominal distention, nausea and vomiting. Genitourinary:         Purewick catheter in place draining dark urine   Musculoskeletal:  Negative for arthralgias. Skin:  Negative for color change. Neurological:  Negative for syncope. Psychiatric/Behavioral:  Positive for confusion.         Physical Examination:    /71   Pulse 91   Temp 97.9 °F (36.6 °C) (Oral)   Resp 16   Ht 5' 8\" (1.727 m)   Wt 131 lb (59.4 kg)   LMP  (LMP Unknown)   SpO2 96%   BMI 19.92 kg/m²    Physical Exam  Constitutional:       General: She is not in acute distress. HENT:      Head: Normocephalic and atraumatic. Cardiovascular:      Rate and Rhythm: Normal rate and regular rhythm. Heart sounds: Murmur heard. Pulmonary:      Effort: Pulmonary effort is normal. No respiratory distress. Breath sounds: No wheezing, rhonchi or rales. Abdominal:      Palpations: Abdomen is soft. Tenderness: There is no abdominal tenderness. Musculoskeletal:         General: Normal range of motion. Cervical back: Normal range of motion and neck supple. Right lower leg: No edema. Left lower leg: No edema. Skin:     General: Skin is warm and dry. Neurological:      General: No focal deficit present. Mental Status: She is alert. Cranial Nerves: No cranial nerve deficit.    Psychiatric:         Mood and Affect: Mood normal.       LABS:  CBC:   Lab Results   Component Value Date/Time    WBC 8.9 12/21/2022 04:28 AM    RBC 3.44 12/21/2022 04:28 AM    HGB 11.1 12/21/2022 04:28 AM    HCT 32.4 12/21/2022 04:28 AM    MCV 94.1 12/21/2022 04:28 AM    MCH 32.1 12/21/2022 04:28 AM    MCHC 34.2 12/21/2022 04:28 AM    RDW 14.1 12/21/2022 04:28 AM     12/21/2022 04:28 AM    MPV 8.6 02/14/2014 08:32 AM     CBC with Differential:   Lab Results   Component Value Date/Time    WBC 8.9 12/21/2022 04:28 AM    RBC 3.44 12/21/2022 04:28 AM    HGB 11.1 12/21/2022 04:28 AM    HCT 32.4 12/21/2022 04:28 AM     12/21/2022 04:28 AM    MCV 94.1 12/21/2022 04:28 AM    MCH 32.1 12/21/2022 04:28 AM    MCHC 34.2 12/21/2022 04:28 AM    RDW 14.1 12/21/2022 04:28 AM    LYMPHOPCT 8.2 12/21/2022 04:28 AM    MONOPCT 8.2 12/21/2022 04:28 AM    BASOPCT 0.7 12/21/2022 04:28 AM    MONOSABS 0.7 12/21/2022 04:28 AM    LYMPHSABS 0.7 12/21/2022 04:28 AM    EOSABS 0.1 12/21/2022 04:28 AM    BASOSABS 0.1 12/21/2022 04:28 AM CMP:    Lab Results   Component Value Date/Time     12/21/2022 04:27 AM    K 3.9 12/21/2022 04:27 AM    K 3.3 12/16/2022 05:15 AM    CL 97 12/21/2022 04:27 AM    CO2 28 12/21/2022 04:27 AM    BUN 25 12/21/2022 04:27 AM    CREATININE 0.71 12/21/2022 04:27 AM    GFRAA >60.0 04/12/2022 09:23 AM    LABGLOM >60.0 12/21/2022 04:27 AM    GLUCOSE 103 12/21/2022 04:27 AM    GLUCOSE 87 12/27/2011 08:41 AM    PROT 6.6 12/15/2022 11:45 AM    LABALBU 3.9 12/15/2022 11:45 AM    LABALBU 4.3 12/27/2011 08:41 AM    CALCIUM 9.0 12/21/2022 04:27 AM    BILITOT 0.6 12/15/2022 11:45 AM    ALKPHOS 83 12/15/2022 11:45 AM    AST 19 12/15/2022 11:45 AM    ALT 17 12/15/2022 11:45 AM     BMP:    Lab Results   Component Value Date/Time     12/21/2022 04:27 AM    K 3.9 12/21/2022 04:27 AM    K 3.3 12/16/2022 05:15 AM    CL 97 12/21/2022 04:27 AM    CO2 28 12/21/2022 04:27 AM    BUN 25 12/21/2022 04:27 AM    LABALBU 3.9 12/15/2022 11:45 AM    LABALBU 4.3 12/27/2011 08:41 AM    CREATININE 0.71 12/21/2022 04:27 AM    CALCIUM 9.0 12/21/2022 04:27 AM    GFRAA >60.0 04/12/2022 09:23 AM    LABGLOM >60.0 12/21/2022 04:27 AM    GLUCOSE 103 12/21/2022 04:27 AM    GLUCOSE 87 12/27/2011 08:41 AM     Magnesium:    Lab Results   Component Value Date/Time    MG 2.0 12/21/2022 04:27 AM     Troponin:    Lab Results   Component Value Date/Time    TROPONINI <0.010 12/15/2022 11:45 AM       Radiology:  No results found. Echocardiogram 12/16/22:   Conclusions   Summary   Mitral annular calcification is present. 1+ MR   Left ventricular ejection fraction is visually estimated at 40-45%. E/A flow reversal noted. Suggestive of diastolic dysfunction.       Signature   ----------------------------------------------------------------   Electronically signed by Esteban Harris MD(Interpreting   physician) on 12/17/2022 08:29 AM   ----------------------------------------------------------------      EKG 12/15/22: ?SR with PACs vs A-fib (significant artifact), nonspecific ST changes, QTc 459ms     Telemetry 12/16/22: SR 60s-70s, occasional PVC  Telemetry 12/19/22: SR/ST 90s-100s, frequent PACs, sinus arrhythmia  Telemetry 12/20/22: SR 70s-80s, frequent PACs/sinus arrhythmia          Impression     New onset paroxysmal atrial fibrillation-currently normal sinus rhythm  Syncope with fall  Multiple nondisplaced sacral fractures per MRI pelvis on 12/15/22  Hypokalemia--keep potassium between 4-5 and magnesium greater than 2  UTI  HTN  Dyslipidemia  Thrombocytopenia  Cardiomyopathy with EF 40-45% per echo 12/16/22  Mild 1+ MR per echo 12/16/22     Plan      Questionable long-term oral anticoagulation candidate secondary to advanced age as well as fall risk. \Lovenox while in hospital for now   continue current medications-aspirin 81 mg p.o. daily, , Crestor 40 mg p.o. nightly, digoxin 125 mcg daily, losartan 25 mg daily Lopressor 25 mg p.o. twice daily  Cardiac diet recommended  Monitor on telemetry for any tachycardia or bradycardia arrhythmias  Maintain potassium greater than 4, magnesium greater than 2  GI/DVT prophylaxis  Orthopedic surgery recommendations regarding mild nondisplaced sacral fractures  Conservative medical therapy from a cardiac standpoint at this time given advanced age and multiple nondisplaced sacral fractures.   May consider noninvasive ischemic evaluation in future as outpatient  Stable from cardiology standpoint        Electronically signed by Beth Becerra DO on 12/21/2022 at 10:32 AM

## 2022-12-21 NOTE — FLOWSHEET NOTE
Pt refuses to eat more than a couple bites of her food for each meal after encouragement from staff and family.

## 2022-12-21 NOTE — PROGRESS NOTES
Progress Note  Date:2022       SVZZ:[id number]  Patient Juno Olivo     YOB: 1935     Age:87 y.o. Subjective      Patient is still in pain    Objective         Vitals Last 24 Hours:  TEMPERATURE:  Temp  Av °F (36.7 °C)  Min: 97.9 °F (36.6 °C)  Max: 98.2 °F (36.8 °C)  RESPIRATIONS RANGE: Resp  Av.3  Min: 16  Max: 18  PULSE OXIMETRY RANGE: SpO2  Av.3 %  Min: 95 %  Max: 98 %  PULSE RANGE: Pulse  Av.5  Min: 70  Max: 91  BLOOD PRESSURE RANGE: Systolic (18JIH), QQZ:734 , Min:116 , KPT:554   ; Diastolic (54JLB), CISCO:04, Min:64, Max:72    I/O (24Hr): No intake or output data in the 24 hours ending 22 8409    Objective:  Vital signs: (most recent): Blood pressure 116/64, pulse 70, temperature 98.2 °F (36.8 °C), temperature source Axillary, resp. rate 18, height 5' 8\" (1.727 m), weight 131 lb (59.4 kg), SpO2 95 %, not currently breastfeeding. Constitutional: Frail elderly adult female reclining in bed no acute distress. Head: NCAT  Eyes: PERRLA,  Neck: Trachea midline  Cardiovascular: RRR  Pulmonary: Clear bilateral breath sounds. Abdomen: Soft, nontense, nondistended. Neurologic: AON x 1; non focal.    Labs/Imaging/Diagnostics    Labs:  CBC:  Recent Labs     22  0505 22  0541 22  0428   WBC 6.6 7.8 8.9   RBC 3.70* 3.63* 3.44*   HGB 12.0 11.9* 11.1*   HCT 34.8* 34.7* 32.4*   MCV 94.2 95.7* 94.1   RDW 14.5 14.1 14.1    141 149       CHEMISTRIES:  Recent Labs     22  0505 22  0541 22  0427    136 134*   K 3.4 3.8 3.9   CL 99 100 97   CO2 25 26 28   BUN 30* 21 25*   CREATININE 0.71 0.65 0.71   GLUCOSE 102* 108* 103*   MG 2.1 1.9 2.0       PT/INR:  No results for input(s): PROTIME, INR in the last 72 hours. APTT:No results for input(s): APTT in the last 72 hours. LIVER PROFILE:  No results for input(s): AST, ALT, BILIDIR, BILITOT, ALKPHOS in the last 72 hours.       Imaging Last 24 Hours:  CT Head W/O Contrast    Result Date: 12/15/2022  EXAMINATION: CT OF THE HEAD WITHOUT CONTRAST  12/15/2022 12:06 pm TECHNIQUE: CT of the head was performed without the administration of intravenous contrast. Automated exposure control, iterative reconstruction, and/or weight based adjustment of the mA/kV was utilized to reduce the radiation dose to as low as reasonably achievable. COMPARISON: None. HISTORY: ORDERING SYSTEM PROVIDED HISTORY: syncope TECHNOLOGIST PROVIDED HISTORY: Reason for exam:->syncope Has a \"code stroke\" or \"stroke alert\" been called? ->No Decision Support Exception - unselect if not a suspected or confirmed emergency medical condition->Emergency Medical Condition (MA) What reading provider will be dictating this exam?->CRC FINDINGS: BRAIN/VENTRICLES: There is no acute intracranial hemorrhage, mass effect or midline shift. No abnormal extra-axial fluid collection. The gray-white differentiation is maintained without evidence of an acute infarct. There is no evidence of hydrocephalus. The ventricles, cisterns and sulci are prominent consistent with atrophy. There is decreased attenuation within the periventricular white matter consistent with periventricular leukomalacia. There are old lacunar infarcts seen within the right and left basal ganglia. ORBITS: The visualized portion of the orbits demonstrate no acute abnormality. SINUSES: The visualized paranasal sinuses and mastoid air cells demonstrate no acute abnormality. SOFT TISSUES/SKULL:  No acute abnormality of the visualized skull or soft tissues. 1. There is no acute intracranial abnormality. Specifically, there is no intracranial hemorrhage.  2. Atrophy and periventricular leukomalacia,     CT ABDOMEN PELVIS W IV CONTRAST Additional Contrast? None    Result Date: 12/15/2022  EXAMINATION: ONE XRAY VIEW OF THE CHEST; CT OF THE ABDOMEN AND PELVIS WITH CONTRAST 12/15/2022 12:22 pm COMPARISON: Chest x-ray, October 19 2022; CT abdomen pelvis, September 25, 2018 HISTORY: ORDERING SYSTEM PROVIDED HISTORY: sob TECHNOLOGIST PROVIDED HISTORY: Reason for exam:->sob What reading provider will be dictating this exam?->CRC FINDINGS: There also is a small amount of fluid. There is coarsening of the interstitium. The lungs are hyperinflated. The cardiac silhouette is within normal limits. Atherosclerotic calcifications are seen thoracic aorta. No consolidation, pneumothorax or pleural effusion seen. Degenerative changes are seen in the visualized portion of both shoulders and also spine. CT of the abdomen and pelvis: Lungs: In the visualized bases of the lungs a 5.7 mm pleural base nodule is seen posteriorly on the left (series 2, image 1). A 7.7 mm nodule is seen posteriorly in the right lower lobe that is pleural based as well. (Series 2, image 20). No consolidating pneumonia, pneumothorax or pleural effusion is seen. Changes of some emphysema are visualized. Organs: The liver, pancreas, right adrenal gland and liver are unremarkable. There are calcifications seen in the liver and also there is a calcified splenic artery again seen that is unchanged from previous study. In the area of the left adrenal gland there is a 1.7 cm hypodense thick wall cystic area. This was also seen on previous study. In the region of the tail of the pancreas a hypodense area is seen that measures 1.2 cm. This area measures fluid density on this examination. This could be an IPMN in. This was vaguely seen on previous study and also appears stable. Both kidneys show no evidence of hydronephrosis. No renal calculus is visualized. Prominent atherosclerotic calcifications are visualized. GI/bowel: No dilated loops of bowel are seen. The appendix is visualized and is unremarkable. Pelvis: In the pre sacral region there is soft tissue density posterior to the bowel that is thickened. A superior there also is some stranding visualized. The uterus is not seen.   There also is a small SYSTEM PROVIDED HISTORY: sob TECHNOLOGIST PROVIDED HISTORY: Reason for exam:->sob What reading provider will be dictating this exam?->CRC FINDINGS: There also is a small amount of fluid. There is coarsening of the interstitium. The lungs are hyperinflated. The cardiac silhouette is within normal limits. Atherosclerotic calcifications are seen thoracic aorta. No consolidation, pneumothorax or pleural effusion seen. Degenerative changes are seen in the visualized portion of both shoulders and also spine. CT of the abdomen and pelvis: Lungs: In the visualized bases of the lungs a 5.7 mm pleural base nodule is seen posteriorly on the left (series 2, image 1). A 7.7 mm nodule is seen posteriorly in the right lower lobe that is pleural based as well. (Series 2, image 20). No consolidating pneumonia, pneumothorax or pleural effusion is seen. Changes of some emphysema are visualized. Organs: The liver, pancreas, right adrenal gland and liver are unremarkable. There are calcifications seen in the liver and also there is a calcified splenic artery again seen that is unchanged from previous study. In the area of the left adrenal gland there is a 1.7 cm hypodense thick wall cystic area. This was also seen on previous study. In the region of the tail of the pancreas a hypodense area is seen that measures 1.2 cm. This area measures fluid density on this examination. This could be an IPMN in. This was vaguely seen on previous study and also appears stable. Both kidneys show no evidence of hydronephrosis. No renal calculus is visualized. Prominent atherosclerotic calcifications are visualized. GI/bowel: No dilated loops of bowel are seen. The appendix is visualized and is unremarkable. Pelvis: In the pre sacral region there is soft tissue density posterior to the bowel that is thickened. A superior there also is some stranding visualized. The uterus is not seen. There also is a small amount of fluid.  The bladder distends normally. Bones: No destructive bony lesions are seen. Slight irregularity is seen in the lower sacral region on the left. 0.82 degenerative changes are seen in both hips and is most prominent on the right. Chest x-ray: No evidence for acute cardiopulmonary process. CT of the abdomen and pelvis: 1.  2 nodules are seen in the visualized bases of the lungs. The nodule in the right lower lobe was seen on previous study but was smaller in size. There are also emphysematous changes seen in the visualized bases. Numeral 2. Hypodense lesion is seen in the region of the left adrenal gland. This could be an adrenal nodule of the possibility is necrotic lymph node . It is slightly larger than what was seen on previous study. 3.  A stable hypodense area is seen in the tail the pancreas. This could represent IPMN. 4.  There is no evidence for appendicitis, diverticulitis or bowel obstruction 5. There is presacral soft tissue density and stranding seen in the pelvis. This is new when compared to previous study. Query if patient may have had any radiation treatment due to endometrial cancer. Recommend further evaluation there is also full small amount of fluid. RECOMMENDATION: 1. Low does CT of the lungs, non emergently 2. It presacral soft tissue seen also there is a small amount of fluid. Possibility the of hemorrhage or of the etiology could be related to patient's history of endometrial cancer and any possibility of prior radiation treatment. Recommend follow-up. Also there is some irregularity in the sacrum and occult fracture is not excluded. This was discussed with JOSH Gallo, and CT of the pelvis was recommended.      MRI BRAIN WO CONTRAST    Result Date: 12/15/2022  EXAMINATION: MRI OF THE BRAIN WITHOUT CONTRAST  12/15/2022 5:57 pm TECHNIQUE: Multiplanar multisequence MRI of the brain was performed without the administration of intravenous contrast. COMPARISON: Noncontrast CT head done earlier same day. HISTORY: ORDERING SYSTEM PROVIDED HISTORY: syncope atrial fibrillation TECHNOLOGIST PROVIDED HISTORY: Reason for exam:->syncope atrial fibrillation FINDINGS: INTRACRANIAL STRUCTURES/VENTRICLES: There is no acute infarct. No mass effect or midline shift. No evidence of an acute intracranial hemorrhage. Generalized cerebral and cerebellar volume loss. Diffuse ventricular prominence may relate to central volume loss. The sellar/suprasellar regions appear unremarkable. The normal signal voids within the major intracranial vessels appear maintained. The axial FLAIR images demonstrate pontine, bilateral periventricular and deep white matter signal hyperintensities which are nonspecific but commonly seen in the setting of chronic small vessel ischemic disease. 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 ORBITS: The visualized portion of the orbits demonstrate no acute abnormality. SINUSES: Small amount of nonspecific fluid in the bilateral mastoid air cells. The paranasal sinuses are clear. BONES/SOFT TISSUES: The bone marrow signal intensity appears normal. The soft tissues demonstrate no acute abnormality. Multilevel degenerative changes in the visualized spine. 1.  No acute intracranial abnormality per unenhanced brain MRI. No acute stroke. 2.  Moderate to severe chronic small vessel ischemic disease. 3.  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      MRI PELVIS WO CONTRAST    Result Date: 12/15/2022  EXAMINATION: MRI OF THE PELVIS WITHOUT CONTRAST, 12/15/2022 4:10 pm TECHNIQUE: Multiplanar multisequence MRI of the pelvis was performed without the administration of intravenous contrast. COMPARISON: CT abdomen and pelvis 12/15/2022 HISTORY: ORDERING SYSTEM PROVIDED HISTORY: endometrial thickening rule out sacral fracture TECHNOLOGIST PROVIDED HISTORY: Reason for exam:->endometrial thickening rule out sacral fracture What reading provider will be dictating this exam?->CRC FINDINGS: Culture pending. Previous cultures with E. coli. No lower extremity physical therapy, pending orthopedic surgery evaluation and recommendations  12/17 - No significant changes. Non-operative pelvic Fx treatment, per Orthopedics. Continues ABX for UTI, and pain meds for symptomatic pain control. Waxing/waning delirium. Encourage good day/night hygiene. Flipped back to NSR with PAC. Appreciate Cardiology recs regarding cardiac management. 12/18 - Continuing antibiotics for UTI. Continuing nonoperative pain management for bilateral sacral fractures. Did flip back into A. fib with mild RVR overnight, for which cardiology is adjusting cardiac medications this morning. Still in A. fib at time of exam.  Waxing/waning mentation during admission in setting of baseline dementia with superimposed UTI and pain of fractures on admission, mentating slightly more clearly at time of exam today,  but variable throughout the day per care staff.   12/19:  Cardiology is managing the A Fib; continue IV Abx for UTI; given the non operative fractures causing pain and pt being on IV pain meds will consult pain management for transition to PO and consult palliative care for overall recommendations and outpatient follow up given her dementia and pelvic  fractures  12/20:  Patient is complaining of pelvic pain requiring IV pain medications; still working on placement; ; still AON x 1  12/21:  Patient is now on PO pain meds; has some dysphagia per RN so will get speech therapy to see her; discussed with sister bedside; awaiting precert    Ani Hernandez

## 2022-12-21 NOTE — PROGRESS NOTES
[facility] Occupational Therapy      Date: 2022  Patient Name: Rolanda Riedel        MRN: [id number]  Account: [de-identified]   : 1935  (80 y.o.)  Room: [place] [id number]    Chart reviewed, attempted OT at 1430 for treatment. Patient not seen 2° to: Other: Pt sleeping at this time. Will attempt again when able.     Electronically signed by BELLA Russo on  at 2:43 PM

## 2022-12-22 LAB
ANION GAP SERPL CALCULATED.3IONS-SCNC: 10 MEQ/L (ref 9–15)
BASOPHILS ABSOLUTE: 0.1 K/UL (ref 0–0.2)
BASOPHILS RELATIVE PERCENT: 0.6 %
BUN BLDV-MCNC: 36 MG/DL (ref 8–23)
CALCIUM SERPL-MCNC: 8.8 MG/DL (ref 8.5–9.9)
CHLORIDE BLD-SCNC: 97 MEQ/L (ref 95–107)
CO2: 27 MEQ/L (ref 20–31)
CREAT SERPL-MCNC: 0.89 MG/DL (ref 0.5–0.9)
EOSINOPHILS ABSOLUTE: 0.1 K/UL (ref 0–0.7)
EOSINOPHILS RELATIVE PERCENT: 1.1 %
GFR SERPL CREATININE-BSD FRML MDRD: >60 ML/MIN/{1.73_M2}
GLUCOSE BLD-MCNC: 106 MG/DL (ref 70–99)
HCT VFR BLD CALC: 29.4 % (ref 37–47)
HEMOGLOBIN: 10 G/DL (ref 12–16)
LYMPHOCYTES ABSOLUTE: 0.7 K/UL (ref 1–4.8)
LYMPHOCYTES RELATIVE PERCENT: 7.7 %
MAGNESIUM: 2.1 MG/DL (ref 1.7–2.4)
MCH RBC QN AUTO: 32.7 PG (ref 27–31.3)
MCHC RBC AUTO-ENTMCNC: 34.2 % (ref 33–37)
MCV RBC AUTO: 95.5 FL (ref 79.4–94.8)
MONOCYTES ABSOLUTE: 0.8 K/UL (ref 0.2–0.8)
MONOCYTES RELATIVE PERCENT: 8.5 %
NEUTROPHILS ABSOLUTE: 7.9 K/UL (ref 1.4–6.5)
NEUTROPHILS RELATIVE PERCENT: 82.1 %
PDW BLD-RTO: 14.2 % (ref 11.5–14.5)
PLATELET # BLD: 154 K/UL (ref 130–400)
POTASSIUM SERPL-SCNC: 3.9 MEQ/L (ref 3.4–4.9)
RBC # BLD: 3.08 M/UL (ref 4.2–5.4)
SODIUM BLD-SCNC: 134 MEQ/L (ref 135–144)
WBC # BLD: 9.6 K/UL (ref 4.8–10.8)

## 2022-12-22 PROCEDURE — 99232 SBSQ HOSP IP/OBS MODERATE 35: CPT | Performed by: INTERNAL MEDICINE

## 2022-12-22 PROCEDURE — 85025 COMPLETE CBC W/AUTO DIFF WBC: CPT

## 2022-12-22 PROCEDURE — 6360000002 HC RX W HCPCS: Performed by: INTERNAL MEDICINE

## 2022-12-22 PROCEDURE — 2580000003 HC RX 258: Performed by: CLINICAL NURSE SPECIALIST

## 2022-12-22 PROCEDURE — 6370000000 HC RX 637 (ALT 250 FOR IP): Performed by: PHYSICIAN ASSISTANT

## 2022-12-22 PROCEDURE — 6370000000 HC RX 637 (ALT 250 FOR IP): Performed by: CLINICAL NURSE SPECIALIST

## 2022-12-22 PROCEDURE — 6370000000 HC RX 637 (ALT 250 FOR IP): Performed by: INTERNAL MEDICINE

## 2022-12-22 PROCEDURE — 97535 SELF CARE MNGMENT TRAINING: CPT

## 2022-12-22 PROCEDURE — 80048 BASIC METABOLIC PNL TOTAL CA: CPT

## 2022-12-22 PROCEDURE — 36415 COLL VENOUS BLD VENIPUNCTURE: CPT

## 2022-12-22 PROCEDURE — 83735 ASSAY OF MAGNESIUM: CPT

## 2022-12-22 PROCEDURE — 6370000000 HC RX 637 (ALT 250 FOR IP): Performed by: PAIN MEDICINE

## 2022-12-22 PROCEDURE — 2060000000 HC ICU INTERMEDIATE R&B

## 2022-12-22 RX ADMIN — HYDROCODONE BITARTRATE AND ACETAMINOPHEN 1 TABLET: 10; 325 TABLET ORAL at 15:49

## 2022-12-22 RX ADMIN — LOSARTAN POTASSIUM 25 MG: 25 TABLET, FILM COATED ORAL at 09:44

## 2022-12-22 RX ADMIN — HYDROCODONE BITARTRATE AND ACETAMINOPHEN 1 TABLET: 10; 325 TABLET ORAL at 09:44

## 2022-12-22 RX ADMIN — Medication 10 ML: at 09:45

## 2022-12-22 RX ADMIN — ENOXAPARIN SODIUM 60 MG: 100 INJECTION SUBCUTANEOUS at 09:47

## 2022-12-22 RX ADMIN — METOPROLOL TARTRATE 50 MG: 50 TABLET ORAL at 09:44

## 2022-12-22 RX ADMIN — DIGOXIN 125 MCG: 125 TABLET ORAL at 09:44

## 2022-12-22 RX ADMIN — ASPIRIN 81 MG: 81 TABLET, COATED ORAL at 09:44

## 2022-12-22 ASSESSMENT — PAIN SCALES - PAIN ASSESSMENT IN ADVANCED DEMENTIA (PAINAD)
FACIALEXPRESSION: 2
CONSOLABILITY: 1
TOTALSCORE: 6
FACIALEXPRESSION: 2
CONSOLABILITY: 1
BREATHING: 1
BODYLANGUAGE: 0
NEGVOCALIZATION: 2
BREATHING: 1
BODYLANGUAGE: 0
TOTALSCORE: 5
NEGVOCALIZATION: 1

## 2022-12-22 ASSESSMENT — ENCOUNTER SYMPTOMS
ABDOMINAL DISTENTION: 0
VOMITING: 0
COLOR CHANGE: 0
CHEST TIGHTNESS: 0
NAUSEA: 0
SHORTNESS OF BREATH: 0

## 2022-12-22 ASSESSMENT — PAIN SCALES - GENERAL: PAINLEVEL_OUTOF10: 8

## 2022-12-22 NOTE — PROGRESS NOTES
[facility]   Facility/Department: [facility]  Speech Language Pathology    Mikel Claude  1935  [id number]    Date: 12/22/2022      Speech Therapy attempted to see Mikel Claude on this date for a/an:    Clinical Bedside Swallow Evaluation    Pt was unable to be seen due to: Other: Pt lying completely flat on back upon arrival. Despite encouragement, pt unable to maintain upright posture d/t pain secondary to sacral fracture. SLP to re-attempt as able.         Electronically signed by DEVON Martin on 12/22/22 at 1:18 PM EST

## 2022-12-22 NOTE — PROGRESS NOTES
Progress Note  Date:2022       Room:[place] [address]-  Patient Enriqueta Gerard     YOB: 1935     Age:87 y.o. Subjective      Patient is still in pain    Objective         Vitals Last 24 Hours:  TEMPERATURE:  Temp  Av.9 °F (36.6 °C)  Min: 97.7 °F (36.5 °C)  Max: 98.2 °F (36.8 °C)  RESPIRATIONS RANGE: Resp  Av  Min: 18  Max: 18  PULSE OXIMETRY RANGE: SpO2  Av %  Min: 94 %  Max: 96 %  PULSE RANGE: Pulse  Av.2  Min: 59  Max: 93  BLOOD PRESSURE RANGE: Systolic (05MSS), LZT:690 , Min:116 , VSE:777   ; Diastolic (16NWT), OXP:21, Min:56, Max:103    I/O (24Hr): No intake or output data in the 24 hours ending 22 1318    Objective:  Vital signs: (most recent): Blood pressure (!) 119/56, pulse 67, temperature 98.1 °F (36.7 °C), temperature source Axillary, resp. rate 18, height 5' 8\" (1.727 m), weight 131 lb (59.4 kg), SpO2 94 %, not currently breastfeeding. Constitutional: Frail elderly adult female reclining in bed no acute distress. Head: NCAT  Eyes: PERRLA,  Neck: Trachea midline  Cardiovascular: RRR  Pulmonary: Clear bilateral breath sounds. Abdomen: Soft, nontense, nondistended. Neurologic: AON x 1; non focal.    Labs/Imaging/Diagnostics    Labs:  CBC:  Recent Labs     22  0541 22   WBC 7.8 8.9 9.6   RBC 3.63* 3.44* 3.08*   HGB 11.9* 11.1* 10.0*   HCT 34.7* 32.4* 29.4*   MCV 95.7* 94.1 95.5*   RDW 14.1 14.1 14.2    149 154       CHEMISTRIES:  Recent Labs     22  0541 22    134* 134*   K 3.8 3.9 3.9    97 97   CO2 26 28 27   BUN 21 25* 36*   CREATININE 0.65 0.71 0.89   GLUCOSE 108* 103* 106*   MG 1.9 2.0 2.1       PT/INR:  No results for input(s): PROTIME, INR in the last 72 hours. APTT:No results for input(s): APTT in the last 72 hours. LIVER PROFILE:  No results for input(s): AST, ALT, BILIDIR, BILITOT, ALKPHOS in the last 72 hours.       Imaging Last 24 Hours:  CT Head W/O Contrast    Result Date: 12/15/2022  EXAMINATION: CT OF THE HEAD WITHOUT CONTRAST  12/15/2022 12:06 pm TECHNIQUE: CT of the head was performed without the administration of intravenous contrast. Automated exposure control, iterative reconstruction, and/or weight based adjustment of the mA/kV was utilized to reduce the radiation dose to as low as reasonably achievable. COMPARISON: None. HISTORY: ORDERING SYSTEM PROVIDED HISTORY: syncope TECHNOLOGIST PROVIDED HISTORY: Reason for exam:->syncope Has a \"code stroke\" or \"stroke alert\" been called? ->No Decision Support Exception - unselect if not a suspected or confirmed emergency medical condition->Emergency Medical Condition (MA) What reading provider will be dictating this exam?->CRC FINDINGS: BRAIN/VENTRICLES: There is no acute intracranial hemorrhage, mass effect or midline shift. No abnormal extra-axial fluid collection. The gray-white differentiation is maintained without evidence of an acute infarct. There is no evidence of hydrocephalus. The ventricles, cisterns and sulci are prominent consistent with atrophy. There is decreased attenuation within the periventricular white matter consistent with periventricular leukomalacia. There are old lacunar infarcts seen within the right and left basal ganglia. ORBITS: The visualized portion of the orbits demonstrate no acute abnormality. SINUSES: The visualized paranasal sinuses and mastoid air cells demonstrate no acute abnormality. SOFT TISSUES/SKULL:  No acute abnormality of the visualized skull or soft tissues. 1. There is no acute intracranial abnormality. Specifically, there is no intracranial hemorrhage.  2. Atrophy and periventricular leukomalacia,     CT ABDOMEN PELVIS W IV CONTRAST Additional Contrast? None    Result Date: 12/15/2022  EXAMINATION: ONE XRAY VIEW OF THE CHEST; CT OF THE ABDOMEN AND PELVIS WITH CONTRAST 12/15/2022 12:22 pm COMPARISON: Chest x-ray, October 19 2022; CT abdomen pelvis, September 25, 2018 HISTORY: ORDERING SYSTEM PROVIDED HISTORY: sob TECHNOLOGIST PROVIDED HISTORY: Reason for exam:->sob What reading provider will be dictating this exam?->CRC FINDINGS: There also is a small amount of fluid. There is coarsening of the interstitium. The lungs are hyperinflated. The cardiac silhouette is within normal limits. Atherosclerotic calcifications are seen thoracic aorta. No consolidation, pneumothorax or pleural effusion seen. Degenerative changes are seen in the visualized portion of both shoulders and also spine. CT of the abdomen and pelvis: Lungs: In the visualized bases of the lungs a 5.7 mm pleural base nodule is seen posteriorly on the left (series 2, image 1). A 7.7 mm nodule is seen posteriorly in the right lower lobe that is pleural based as well. (Series 2, image 20). No consolidating pneumonia, pneumothorax or pleural effusion is seen. Changes of some emphysema are visualized. Organs: The liver, pancreas, right adrenal gland and liver are unremarkable. There are calcifications seen in the liver and also there is a calcified splenic artery again seen that is unchanged from previous study. In the area of the left adrenal gland there is a 1.7 cm hypodense thick wall cystic area. This was also seen on previous study. In the region of the tail of the pancreas a hypodense area is seen that measures 1.2 cm. This area measures fluid density on this examination. This could be an IPMN in. This was vaguely seen on previous study and also appears stable. Both kidneys show no evidence of hydronephrosis. No renal calculus is visualized. Prominent atherosclerotic calcifications are visualized. GI/bowel: No dilated loops of bowel are seen. The appendix is visualized and is unremarkable. Pelvis: In the pre sacral region there is soft tissue density posterior to the bowel that is thickened. A superior there also is some stranding visualized. The uterus is not seen.   There also is a small amount of fluid. The bladder distends normally. Bones: No destructive bony lesions are seen. Slight irregularity is seen in the lower sacral region on the left. 0.82 degenerative changes are seen in both hips and is most prominent on the right. Chest x-ray: No evidence for acute cardiopulmonary process. CT of the abdomen and pelvis: 1.  2 nodules are seen in the visualized bases of the lungs. The nodule in the right lower lobe was seen on previous study but was smaller in size. There are also emphysematous changes seen in the visualized bases. Numeral 2. Hypodense lesion is seen in the region of the left adrenal gland. This could be an adrenal nodule of the possibility is necrotic lymph node . It is slightly larger than what was seen on previous study. 3.  A stable hypodense area is seen in the tail the pancreas. This could represent IPMN. 4.  There is no evidence for appendicitis, diverticulitis or bowel obstruction 5. There is presacral soft tissue density and stranding seen in the pelvis. This is new when compared to previous study. Query if patient may have had any radiation treatment due to endometrial cancer. Recommend further evaluation there is also full small amount of fluid. RECOMMENDATION: 1. Low does CT of the lungs, non emergently 2. It presacral soft tissue seen also there is a small amount of fluid. Possibility the of hemorrhage or of the etiology could be related to patient's history of endometrial cancer and any possibility of prior radiation treatment. Recommend follow-up. Also there is some irregularity in the sacrum and occult fracture is not excluded. This was discussed with JOSH Gallo, and CT of the pelvis was recommended.      XR CHEST PORTABLE    Result Date: 12/15/2022  EXAMINATION: ONE XRAY VIEW OF THE CHEST; CT OF THE ABDOMEN AND PELVIS WITH CONTRAST 12/15/2022 12:22 pm COMPARISON: Chest x-ray, October 19 2022; CT abdomen pelvis, September 25, 2018 HISTORY: ORDERING SYSTEM PROVIDED HISTORY: sob TECHNOLOGIST PROVIDED HISTORY: Reason for exam:->sob What reading provider will be dictating this exam?->CRC FINDINGS: There also is a small amount of fluid. There is coarsening of the interstitium. The lungs are hyperinflated. The cardiac silhouette is within normal limits. Atherosclerotic calcifications are seen thoracic aorta. No consolidation, pneumothorax or pleural effusion seen. Degenerative changes are seen in the visualized portion of both shoulders and also spine. CT of the abdomen and pelvis: Lungs: In the visualized bases of the lungs a 5.7 mm pleural base nodule is seen posteriorly on the left (series 2, image 1). A 7.7 mm nodule is seen posteriorly in the right lower lobe that is pleural based as well. (Series 2, image 20). No consolidating pneumonia, pneumothorax or pleural effusion is seen. Changes of some emphysema are visualized. Organs: The liver, pancreas, right adrenal gland and liver are unremarkable. There are calcifications seen in the liver and also there is a calcified splenic artery again seen that is unchanged from previous study. In the area of the left adrenal gland there is a 1.7 cm hypodense thick wall cystic area. This was also seen on previous study. In the region of the tail of the pancreas a hypodense area is seen that measures 1.2 cm. This area measures fluid density on this examination. This could be an IPMN in. This was vaguely seen on previous study and also appears stable. Both kidneys show no evidence of hydronephrosis. No renal calculus is visualized. Prominent atherosclerotic calcifications are visualized. GI/bowel: No dilated loops of bowel are seen. The appendix is visualized and is unremarkable. Pelvis: In the pre sacral region there is soft tissue density posterior to the bowel that is thickened. A superior there also is some stranding visualized. The uterus is not seen. There also is a small amount of fluid.  The bladder distends normally. Bones: No destructive bony lesions are seen. Slight irregularity is seen in the lower sacral region on the left. 0.82 degenerative changes are seen in both hips and is most prominent on the right. Chest x-ray: No evidence for acute cardiopulmonary process. CT of the abdomen and pelvis: 1.  2 nodules are seen in the visualized bases of the lungs. The nodule in the right lower lobe was seen on previous study but was smaller in size. There are also emphysematous changes seen in the visualized bases. Numeral 2. Hypodense lesion is seen in the region of the left adrenal gland. This could be an adrenal nodule of the possibility is necrotic lymph node . It is slightly larger than what was seen on previous study. 3.  A stable hypodense area is seen in the tail the pancreas. This could represent IPMN. 4.  There is no evidence for appendicitis, diverticulitis or bowel obstruction 5. There is presacral soft tissue density and stranding seen in the pelvis. This is new when compared to previous study. Query if patient may have had any radiation treatment due to endometrial cancer. Recommend further evaluation there is also full small amount of fluid. RECOMMENDATION: 1. Low does CT of the lungs, non emergently 2. It presacral soft tissue seen also there is a small amount of fluid. Possibility the of hemorrhage or of the etiology could be related to patient's history of endometrial cancer and any possibility of prior radiation treatment. Recommend follow-up. Also there is some irregularity in the sacrum and occult fracture is not excluded. This was discussed with JOSH Gallo, and CT of the pelvis was recommended.      MRI BRAIN WO CONTRAST    Result Date: 12/15/2022  EXAMINATION: MRI OF THE BRAIN WITHOUT CONTRAST  12/15/2022 5:57 pm TECHNIQUE: Multiplanar multisequence MRI of the brain was performed without the administration of intravenous contrast. COMPARISON: Noncontrast CT head done SOFT TISSUES: Small amount of presacral free fluid is identified, which is stable compared to the prior CT. No large hematoma is seen. BONE MARROW: Vertical fractures are identified through the bilateral sacral alae with associated transverse fracture of the sacral body. Fractures demonstrate classic H-shaped pattern. No hip fracture is identified. Joint alignment is intact. JOINTS: Moderate-severe bilateral hip degenerative changes noted with loss of the articular space. Small joint effusion is noted on the left hip. Multiple nondisplaced sacral fractures with a classic H-shaped pattern suggestive of sacral insufficiency fracture. Assessment//Plan           Hospital Problems             Last Modified POA    * (Principal) A-fib ([facility] [address] 12/15/2022 Yes    General weakness 12/16/2022 Yes    Palliative care encounter 12/19/2022 Yes    Advanced care planning/counseling discussion 12/19/2022 Yes    Goals of care, counseling/discussion 12/19/2022 Yes   Assessment & Plan    Acute problems:  Syncope versus ground-level fall  Urinary tract infection  New onset atrial fibrillation with intermittent RVR  Bilateral sacral fractures s/p fall  Moderate Malnutrition (per RD)     Chronic problems:  Hypertension  Lung nodules (stable since previous)  Hx endometrial cancer    Plan:  Admitted to inpatient status 12/15  CT head negative for acute bony injury  MRI brain negative for acute CVA  MRI pelvis positive for bilateral sacral \"H fracture\", for which orthopedic surgery was consulted  Pain control PRN. So far, no significant pelvic pain with relatively flat positioning, when not moving. New onset atrial fibrillation, with intermittent RVR since admission, for which cardiology is consulted. Defer AC to cardiology in setting of both new onset A. fib and bilateral sacral fractures  Carotid Dopplers in setting of possible syncope  Pt unable to perform Orthostatic BP&P in setting of sacral fractures. Antibiotic for UTI. Culture pending. Previous cultures with E. coli. No lower extremity physical therapy, pending orthopedic surgery evaluation and recommendations  12/17 - No significant changes. Non-operative pelvic Fx treatment, per Orthopedics. Continues ABX for UTI, and pain meds for symptomatic pain control. Waxing/waning delirium. Encourage good day/night hygiene. Flipped back to NSR with PAC. Appreciate Cardiology recs regarding cardiac management. 12/18 - Continuing antibiotics for UTI. Continuing nonoperative pain management for bilateral sacral fractures. Did flip back into A. fib with mild RVR overnight, for which cardiology is adjusting cardiac medications this morning. Still in A. fib at time of exam.  Waxing/waning mentation during admission in setting of baseline dementia with superimposed UTI and pain of fractures on admission, mentating slightly more clearly at time of exam today,  but variable throughout the day per care staff.   12/19:  Cardiology is managing the A Fib; continue IV Abx for UTI; given the non operative fractures causing pain and pt being on IV pain meds will consult pain management for transition to PO and consult palliative care for overall recommendations and outpatient follow up given her dementia and pelvic  fractures  12/20:  Patient is complaining of pelvic pain requiring IV pain medications; still working on placement; ; still AON x 1  12/21:  Patient is now on PO pain meds; has some dysphagia per RN so will get speech therapy to see her; discussed with sister bedside; awaiting precert  84/69:  Awaiting precert and bedside speech eval    82 Cathy Ko

## 2022-12-22 NOTE — CARE COORDINATION
Referral given to Haydee Montoya at [facility]. Spoke with Haydee Montoya and they are able to accept patient but will not have a bed available until tomorrow 12/23. Rolly Cunningham informed that [facility] is able to accept and are family's first choice.

## 2022-12-22 NOTE — PROGRESS NOTES
Progress Note  Patient: Maria Antonia Quijano  Unit/Bed: E850/V961-07  YOB: 1935  MRN: [id number]  Acct: [de-identified]   Admitting Diagnosis: A-fib ([address] [I48.91]  General weakness [R53.1]  Urinary tract infection without hematuria, site unspecified [N39.0]  Syncope, unspecified syncope type [R55]  Atrial fibrillation, unspecified type [facility]) [I48.91]  Date:  12/15/2022  Hospital Day: 7    Chief Complaint:  Syncope/fall    Subjective    12/22/2022: Normal sinus rhythm on telemetry. 12-21-22: NSR at 70-90's. Resting comfortably. On full dose Lovenox. No signs of bleeding. Hemodynamically stable    12/20/22: Resting comfortably in bed in no acute distress. Denies chest pain or shortness of breath. She is hemodynamically stable. On telemetry she is sinus rhythm with heart rate 70s to 80s with frequent PACs/sinus arrhythmia. A.m. labs reviewed. Renal function and electrolytes stable. Hemoglobin stable 11.9. No A. fib noted on telemetry alarms in the past 24 to 48 hours. She remains on full dose Lovenox at 1 mg/kg SQ twice daily. She is also on rate control with Lopressor 50 mg p.o. twice daily and digoxin 125 mcg p.o. daily. 12/19/22: Resting quietly in bed in no acute distress. She is alert and oriented to self and knows that she is in the hospital but did not know which hospital.  She has slightly agitated and is lying in bed undressed with gown at the foot of the bed. She denies chest pain or shortness of breath. Has pure wick in place and draining dark-colored urine. A.m. labs reviewed. Potassium low normal at 3.4 so will be replaced. Renal function stable. Hemoglobin stable at 12.0. Thrombocytopenia resolved with platelet count now 679. On telemetry, she is sinus rhythm to sinus tach with heart rates 90s to 100s with frequent PACs and a sinus arrhythmia.     Echocardiogram completed on 12/16/2022 revealed mildly reduced LV systolic function with EF of 40 to 18%, 1+ MR, diastolic dysfunction. 12/18/22: she is doing better this am. She is not crying she is not distressed wiit anxiety. Now she wants to go home. EKG:  today she is in AF rate 120    12/17/22: pt is panicking. Her feet hut she can't breath. She is on no O2. We placed pulse ox and she is at 98%. She is crying. EKG:  she has refused Tele but last recording was SR 77 w APC.     12/16/22: This is an 27-year-old  female with past medical history hypertension and dyslipidemia presented to ER yesterday with chief complaint of passing out. Patient is a very difficult historian and currently only alert and oriented to self so majority of history was obtained from patient's electronic medical record. Per ER note, patient was pouring coffee at which time she became dizzy and subsequently passed out. She was brought to the emergency room for further evaluation. Presentation to the emergency room, blood pressure elevated 173/87, heart rate 88, respiratory rate 18, pulse ox 100%, temperature of 97.8 °F.  Sodium 138, potassium 3.7, chloride 100, total CO2 29, BUN 15, creatinine 0.77, GFR greater than 60, glucose elevated 132. Troponin negative at less than 0.010. TSH 1.10. WBC 10.8, hemoglobin 12.8, hematocrit 38.3, platelets low 153. Urinalysis consistent with UTI. Chest x-ray showed no evidence for acute cardiopulmonary process. CT brain showed no acute intracranial abnormality. CT abdomen pelvis showed 2 nodules seen in visualized bases of the lungs, hypodense lesion seen in the region of the left adrenal gland, no evidence of appendicitis, diverticulitis or bowel obstruction, presacral soft tissue density and stranding seen in the pelvis. 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   MRI of the pelvis revealed multiple nondisplaced sacral fractures with classic H shaped pattern suggestive of sacral insufficiency fracture. EKG was of poor quality and significant artifact noted with reading of A. fib however appears to be sinus. She was admitted for further evaluation. Cardiology consulted for new onset A. fib. Rapid response was called around 1930 yesterday evening when she was noted on telemetry to have tachycardia with heart rate in the 160s to 170s range. Patient was treated with 5 mg of IV Lopressor after which heart rate improved but remain elevated in the 110s to 120s range. Appeared to be A. fib RVR at that time. Currently on telemetry patient is sinus rhythm with heart rate 60s to 70s with occasional PVCs. Telemetry alarms reviewed showed possible A. fib episodes early this morning versus sinus with frequent ectopy or possible ectopic atrial rhythm. At time evaluation today patient is resting comfortably and echo being performed at bedside. Patient is only alert and oriented to self at this time. She believes that she was in [facility] and thought the year was 1960s. She is unable to provide any reliable history at this time and states that she believes she came in due to abdominal pain and need for surgery. She denies any current complaints including chest pain, shortness of breath, nausea, vomiting, dizziness, abdominal pain. Review of Systems:   Review of Systems   Constitutional:  Negative for activity change and fever. HENT:  Negative for congestion. Respiratory:  Negative for chest tightness and shortness of breath. Cardiovascular:  Negative for chest pain, palpitations and leg swelling. Gastrointestinal:  Negative for abdominal distention, nausea and vomiting. Genitourinary:         Purewick catheter in place draining dark urine   Musculoskeletal:  Negative for arthralgias. Skin:  Negative for color change. Neurological:  Negative for syncope. Psychiatric/Behavioral:  Positive for confusion.         Physical Examination:    /64   Pulse 93 Temp 97.7 °F (36.5 °C) (Oral)   Resp 18   Ht 5' 8\" (1.727 m)   Wt 131 lb (59.4 kg)   LMP  (LMP Unknown)   SpO2 95%   BMI 19.92 kg/m²    Physical Exam  Constitutional:       General: She is not in acute distress. HENT:      Head: Normocephalic and atraumatic. Cardiovascular:      Rate and Rhythm: Normal rate and regular rhythm. Heart sounds: Murmur heard. Pulmonary:      Effort: Pulmonary effort is normal. No respiratory distress. Breath sounds: No wheezing, rhonchi or rales. Abdominal:      Palpations: Abdomen is soft. Tenderness: There is no abdominal tenderness. Musculoskeletal:         General: Normal range of motion. Cervical back: Normal range of motion and neck supple. Right lower leg: No edema. Left lower leg: No edema. Skin:     General: Skin is warm and dry. Neurological:      General: No focal deficit present. Mental Status: She is alert. Cranial Nerves: No cranial nerve deficit.    Psychiatric:         Mood and Affect: Mood normal.       LABS:  CBC:   Lab Results   Component Value Date/Time    WBC 9.6 12/22/2022 04:28 AM    RBC 3.08 12/22/2022 04:28 AM    HGB 10.0 12/22/2022 04:28 AM    HCT 29.4 12/22/2022 04:28 AM    MCV 95.5 12/22/2022 04:28 AM    MCH 32.7 12/22/2022 04:28 AM    MCHC 34.2 12/22/2022 04:28 AM    RDW 14.2 12/22/2022 04:28 AM     12/22/2022 04:28 AM    MPV 8.6 02/14/2014 08:32 AM     CBC with Differential:   Lab Results   Component Value Date/Time    WBC 9.6 12/22/2022 04:28 AM    RBC 3.08 12/22/2022 04:28 AM    HGB 10.0 12/22/2022 04:28 AM    HCT 29.4 12/22/2022 04:28 AM     12/22/2022 04:28 AM    MCV 95.5 12/22/2022 04:28 AM    MCH 32.7 12/22/2022 04:28 AM    MCHC 34.2 12/22/2022 04:28 AM    RDW 14.2 12/22/2022 04:28 AM    LYMPHOPCT 7.7 12/22/2022 04:28 AM    MONOPCT 8.5 12/22/2022 04:28 AM    BASOPCT 0.6 12/22/2022 04:28 AM    MONOSABS 0.8 12/22/2022 04:28 AM    LYMPHSABS 0.7 12/22/2022 04:28 AM    EOSABS 0.1 12/22/2022 04:28 AM    BASOSABS 0.1 12/22/2022 04:28 AM     CMP:    Lab Results   Component Value Date/Time     12/22/2022 04:28 AM    K 3.9 12/22/2022 04:28 AM    K 3.3 12/16/2022 05:15 AM    CL 97 12/22/2022 04:28 AM    CO2 27 12/22/2022 04:28 AM    BUN 36 12/22/2022 04:28 AM    CREATININE 0.89 12/22/2022 04:28 AM    GFRAA >60.0 04/12/2022 09:23 AM    LABGLOM >60.0 12/22/2022 04:28 AM    GLUCOSE 106 12/22/2022 04:28 AM    GLUCOSE 87 12/27/2011 08:41 AM    PROT 6.6 12/15/2022 11:45 AM    LABALBU 3.9 12/15/2022 11:45 AM    LABALBU 4.3 12/27/2011 08:41 AM    CALCIUM 8.8 12/22/2022 04:28 AM    BILITOT 0.6 12/15/2022 11:45 AM    ALKPHOS 83 12/15/2022 11:45 AM    AST 19 12/15/2022 11:45 AM    ALT 17 12/15/2022 11:45 AM     BMP:    Lab Results   Component Value Date/Time     12/22/2022 04:28 AM    K 3.9 12/22/2022 04:28 AM    K 3.3 12/16/2022 05:15 AM    CL 97 12/22/2022 04:28 AM    CO2 27 12/22/2022 04:28 AM    BUN 36 12/22/2022 04:28 AM    LABALBU 3.9 12/15/2022 11:45 AM    LABALBU 4.3 12/27/2011 08:41 AM    CREATININE 0.89 12/22/2022 04:28 AM    CALCIUM 8.8 12/22/2022 04:28 AM    GFRAA >60.0 04/12/2022 09:23 AM    LABGLOM >60.0 12/22/2022 04:28 AM    GLUCOSE 106 12/22/2022 04:28 AM    GLUCOSE 87 12/27/2011 08:41 AM     Magnesium:    Lab Results   Component Value Date/Time    MG 2.1 12/22/2022 04:28 AM     Troponin:    Lab Results   Component Value Date/Time    TROPONINI <0.010 12/15/2022 11:45 AM       Radiology:  No results found. Echocardiogram 12/16/22:   Conclusions   Summary   Mitral annular calcification is present. 1+ MR   Left ventricular ejection fraction is visually estimated at 40-45%. E/A flow reversal noted. Suggestive of diastolic dysfunction.       Signature   ----------------------------------------------------------------   Electronically signed by Luz Morin MD(Interpreting   physician) on 12/17/2022 08:29 AM ----------------------------------------------------------------      EKG 12/15/22: ?SR with PACs vs A-fib (significant artifact), nonspecific ST changes, QTc 459ms     Telemetry 12/16/22: SR 60s-70s, occasional PVC  Telemetry 12/19/22: SR/ST 90s-100s, frequent PACs, sinus arrhythmia  Telemetry 12/20/22: SR 70s-80s, frequent PACs/sinus arrhythmia          Impression     New onset paroxysmal atrial fibrillation-currently normal sinus rhythm  Syncope with fall  Multiple nondisplaced sacral fractures per MRI pelvis on 12/15/22  Hypokalemia--keep potassium between 4-5 and magnesium greater than 2  UTI  HTN  Dyslipidemia  Thrombocytopenia  Cardiomyopathy with EF 40-45% per echo 12/16/22  Mild 1+ MR per echo 12/16/22     Plan     Poor long-term oral anticoagulation candidate secondary to advanced age as well as fall risk. Lovenox while in hospital for now  continue current medications-aspirin 81 mg p.o. daily, , Crestor 40 mg p.o. nightly, digoxin 125 mcg daily, losartan 25 mg daily Lopressor 25 mg p.o. twice daily  Cardiac diet recommended  Monitor on telemetry for any tachycardia or bradycardia arrhythmias  Maintain potassium greater than 4, magnesium greater than 2  GI/DVT prophylaxis  Orthopedic surgery recommendations regarding mild nondisplaced sacral fractures  Conservative medical therapy from a cardiac standpoint at this time given advanced age and multiple nondisplaced sacral fractures. May consider noninvasive ischemic evaluation in future as outpatient  Palliative care consulted.   Stable from cardiology standpoint        Electronically signed by Howard Gaxiola DO on 12/22/2022 at 9:38 AM

## 2022-12-22 NOTE — PROGRESS NOTES
[address]   Facility/Department: [facility]  Speech Language Pathology    Antony Alvarez  1935  [id number]    Date: 12/22/2022      Speech Therapy attempted to see Antony Alvarez on this date for a/an:    Clinical Bedside Swallow Evaluation    Pt was unable to be seen due to:   Patient refused. Pt refusing PO this date and not allowed SLP to raise HOB secondary to pain in back. SLP to re-attempt as able. Informed RN - Andrea Cronin.         Electronically signed by DEVON Lainez on 12/22/22 at 9:41 AM EST

## 2022-12-22 NOTE — PROGRESS NOTES
Physical Therapy Med Surg Daily Treatment Note  Facility/Department[facility]  [facility]       NAME: Joao Espinoza  : 1935 (80 y.o.)  MRN: [id number]  CODE STATUS: Full Code    Date of Service: 2022    Patient Diagnosis(es): A-fib [facility]) [I48.91]  General weakness [R53.1]  Urinary tract infection without hematuria, site unspecified [N39.0]  Syncope, unspecified syncope type [R55]  Atrial fibrillation, unspecified type [facility]) [I48.91]   Chief Complaint   Patient presents with    Loss of Consciousness     Pt states she felt dizzy and passed out, pt fell from standing, per EMS pt has new onset afib     Patient Active Problem List    Diagnosis Date Noted    General weakness 2022    Palliative care encounter 2022    Advanced care planning/counseling discussion 2022    Goals of care, counseling/discussion 2022    A-fib ([facility] 12/15/2022    Bronchitis 10/19/2022    Acute cough 10/19/2022    Nasal congestion 10/19/2022    At high risk for falls 2022    TIA (transient ischemic attack) 2018    Actinic keratoses     Dermatitis     Prurigo nodularis 2017    Age-related macular degeneration 2015    Arcus senilis of cornea 2015    Combined form of senile cataract 2015    Glaucoma suspect 2015    Hyperopic astigmatism 2014    Vitamin D deficiency     HTN (hypertension)     Hyperlipidemia     Sciatica         Past Medical History:   Diagnosis Date    Actinic keratoses     Arthritis     Dermatitis     Encounter for annual health examination 2011    Endometrial cancer ([facility]     s/p hyst in  - was told clear by Dr. Ron Campos    History of bone density study 2011    History of mammography, screening 2011    HTN (hypertension)     Hyperlipidemia     Prurigo nodularis 2017    left lower leg    Sciatica     Vitamin D deficiency      Past Surgical History:   Procedure Laterality Date    CATARACT REMOVAL WITH IMPLANT HYSTERECTOMY (CERVIX STATUS UNKNOWN)  9/2009    post-menopausal bleeding       Patient assessed for rehabilitation services?: Yes  Family / Caregiver Present: No    Restrictions:  Restrictions/Precautions: Fall Risk;Weight Bearing  Lower Extremity Weight Bearing Restrictions  Right Lower Extremity Weight Bearing: Weight Bearing As Tolerated  Left Lower Extremity Weight Bearing: Weight Bearing As Tolerated  Position Activity Restriction  Other position/activity restrictions: per ortho note on 12/16 pt is WBAT B LEs    SUBJECTIVE:   Subjective: \"I dont know what to do. \"    Pain   8-9/10 (nsg entered room and will be giving patient medications for pain.)    OBJECTIVE:        Bed mobility  Rolling to Left: Maximum assistance  Rolling to Right: Maximum assistance  Supine to Sit: Maximum assistance;2 Person assistance  Sit to Supine: Maximum assistance;2 Person assistance  Bed Mobility Comments: Limited by hip pain. Transfers  Comment: unable to safely assess due to high level of assistance for bed mobility and increased pain    Ambulation  Comments: unable to safely assess due to high level of assistance for bed mobility                              Activity Tolerance  Activity Tolerance: Patient limited by endurance; Patient limited by fatigue;Treatment limited secondary to decreased cognition;Patient limited by pain          ASSESSMENT   Assessment: Patient require MaxA +2 this date for bed mobility d/t increased pain in b hips. Patient heavily guard during transfers d/t increased pain. Unable perform sit to stand tranfers d/t high level of assistance and increased pain.      Discharge Recommendations:  Continue to assess pending progress, Patient would benefit from continued therapy after discharge         Goals  Short Term Goals  Short Term Goal 1: Pt will demonstrate HEP min A  Long Term Goals  Long Term Goal 1: Pt will demonstrate bed mobility mod A  Long Term Goal 2: Pt will demonstrate transfers max A  Long Term Goal 3: Pt will demonstrate sitting edge of bed CGA >/= 2 min to prepare for OOB activity    PLAN    General Plan: 1 time a day 3-6 times a week  Safety Devices  Type of Devices: All fall risk precautions in place, Call light within reach, Left in bed, Bed alarm in place  Restraints  Restraints Initially in Place: No     AMPAC (6 CLICK) Azucena Calvillo 28 Inpatient Mobility Raw Score : 6     Therapy Time   Individual   Time In 1500   Time Out 1520   Minutes 20     Timed Code Treatment Minutes: 20 Minutes   Bm: Moisés 68, PTA, 12/22/22 at 3:29 PM         Definitions for assistance levels  Independent = pt does not require any physical supervision or assistance from another person for activity completion. Device may be needed.   Stand by assistance = pt requires verbal cues or instructions from another person, close to but not touching, to perform the activity  Minimal assistance= pt performs 75% or more of the activity; assistance is required to complete the activity  Moderate assistance= pt performs 50% of the activity; assistance is required to complete the activity  Maximal assistance = pt performs 25% of the activity; assistance is required to complete the activity  Dependent = pt requires total physical assistance to accomplish the task

## 2022-12-22 NOTE — DISCHARGE INSTR - COC
Continuity of Care Form    Patient Name: Carlos Dalal   :  1935  MRN:  [id number]    Admit date:  12/15/2022  Discharge date:  22    Code Status Order: Full Code   Advance Directives:     Admitting Physician:  Marelyn Meigs, DO  PCP: YUSUF Peterson CNP    Discharging Nurse: Ross Lopes 23 Unit/Room#: N974/W584-40  Discharging Unit Phone Number: [phone]    Emergency Contact:   Extended Emergency Contact Information  Primary Emergency Contact: Rosa Renee  Mobile Phone: [phone]  Relation: Child  Preferred language: English  Secondary Emergency Contact: ArashAmeena  Mobile Phone: [phone]  Relation: Child  Preferred language: English   needed?  No    Past Surgical History:  Past Surgical History:   Procedure Laterality Date    CATARACT REMOVAL WITH IMPLANT      HYSTERECTOMY (CERVIX STATUS UNKNOWN)  2009    post-menopausal bleeding       Immunization History:   Immunization History   Administered Date(s) Administered    COVID-19, PFIZER PURPLE top, DILUTE for use, (age 15 y+), 30mcg/0.3mL 2021, 2021, 2021    Influenza Vaccine, unspecified formulation 10/28/2016    Influenza Virus Vaccine 2011, 10/23/2015, 10/28/2016, 10/30/2020    Influenza, FLUAD, (age 72 y+), Adjuvanted, 0.5mL 10/28/2020, 10/29/2020    Influenza, FLUARIX, FLULAVAL, FLUZONE (age 10 mo+) AND AFLURIA, (age 1 y+), PF, 0.5mL 01/15/2014    Influenza, FLUZONE (age 72 y+), High Dose, 0.7mL 2021    Influenza, High Dose (Fluzone 65 yrs and older) 10/23/2015, 2017, 10/22/2018, 10/04/2019    Pneumococcal Conjugate 13-valent (Kghbfrd02) 2016    Pneumococcal Conjugate 7-valent (Prevnar7) 10/09/2005    Pneumococcal Polysaccharide (Tgweokeby25) 03/15/2017       Active Problems:  Patient Active Problem List   Diagnosis Code    HTN (hypertension) I10    Hyperlipidemia E78.5    Sciatica M54.30    Vitamin D deficiency E55.9    Age-related macular degeneration H35.30    Arcus senilis of cornea H18.419    Combined form of senile cataract H25.819    Glaucoma suspect H40.009    Hyperopic astigmatism H52.219, H52.00    Prurigo nodularis L28.1    Actinic keratoses L57.0    Dermatitis L30.9    TIA (transient ischemic attack) G45.9    At high risk for falls Z91.81    Bronchitis J40    Acute cough R05.1    Nasal congestion R09.81    A-fib ([facility]) I48.91    General weakness R53.1    Palliative care encounter Z51.5    Advanced care planning/counseling discussion Z71.89    Goals of care, counseling/discussion Z71.89       Isolation/Infection:   Isolation            No Isolation          Patient Infection Status       None to display            Nurse Assessment:  Last Vital Signs: BP (!) 119/56   Pulse 67   Temp 98.1 °F (36.7 °C) (Axillary)   Resp 16   Ht 5' 8\" (1.727 m)   Wt 131 lb (59.4 kg)   LMP  (LMP Unknown)   SpO2 94%   BMI 19.92 kg/m²     Last documented pain score (0-10 scale): Pain Level: 8  Last Weight:   Wt Readings from Last 1 Encounters:   12/20/22 131 lb (59.4 kg)     Mental Status:  disoriented, alert, and oriented to self. IV Access:  - None    Nursing Mobility/ADLs:  Walking   Assisted  Transfer  Assisted  Bathing  Dependent  Dressing  Assisted  Toileting  Dependent  Feeding  Dependent  Med Admin  Assisted  Med Delivery   whole    Wound Care Documentation and Therapy:        Elimination:  Continence: Bowel: No  Bladder: intermittent incontinence. Was using a external catheter   Urinary Catheter: None   Colostomy/Ileostomy/Ileal Conduit: No       Date of Last BM: 12/21/22    No intake or output data in the 24 hours ending 12/22/22 1617  No intake/output data recorded.     Safety Concerns:     Sundowners Sundrome, History of Falls (last 30 days), and Aspiration Risk    Impairments/Disabilities:      Pelvic fracture    Nutrition Therapy:  Current Nutrition Therapy:   - Oral Diet:  Dysphagia 2 mechanically altered    Routes of Feeding: Oral  Liquids: Thin Liquids  Daily Fluid Restriction: no  Last Modified Barium Swallow with Video (Video Swallowing Test): not done bed side swallow done 12/23/22    Treatments at the Time of Hospital Discharge:   Respiratory Treatments: no  Oxygen Therapy:   wears 2L NC as needed and at HS  Ventilator:    - No ventilator support    Rehab Therapies: Physical Therapy and Occupational Therapy  Weight Bearing Status/Restrictions: No weight bearing restrictions WBAT to lower extremities  Other Medical Equipment (for information only, NOT a DME order):  {EQUIPMENT:[id number]}  Other Treatments: ***    Patient's personal belongings (please select all that are sent with patient):  Glasses    RN SIGNATURE:  Electronically signed by Teresa Wolf RN on 12/23/22 at 7:14 PM EST    CASE MANAGEMENT/SOCIAL WORK SECTION    Inpatient Status Date: 12/15/22    Readmission Risk Assessment Score:  Readmission Risk              Risk of Unplanned Readmission:  13           Discharging to Facility/ Agency   Name: [facility]  Address:  Phone: [phone]  Fax:        / signature: Electronically signed by GAVINO Contreras on 12/22/22 at 4:18 PM EST    PHYSICIAN SECTION    Prognosis: Guarded    Condition at Discharge: Stable    Rehab Potential (if transferring to Rehab):     Recommended Labs or Other Treatments After Discharge: Palliative follow up    Physician Certification: I certify the above information and transfer of Nate David  is necessary for the continuing treatment of the diagnosis listed and that she requires [facility] for less 30 days.      Update Admission H&P: No change in H&P    PHYSICIAN SIGNATURE:  Electronically signed by Cheri Mcgrath MD on 12/23/22 at 1:15 PM EST

## 2022-12-23 VITALS
SYSTOLIC BLOOD PRESSURE: 139 MMHG | HEART RATE: 49 BPM | OXYGEN SATURATION: 95 % | TEMPERATURE: 97.5 F | DIASTOLIC BLOOD PRESSURE: 84 MMHG | RESPIRATION RATE: 16 BRPM | WEIGHT: 131 LBS | HEIGHT: 68 IN | BODY MASS INDEX: 19.85 KG/M2

## 2022-12-23 LAB
ANION GAP SERPL CALCULATED.3IONS-SCNC: 14 MEQ/L (ref 9–15)
BASOPHILS ABSOLUTE: 0 K/UL (ref 0–0.2)
BASOPHILS RELATIVE PERCENT: 0.4 %
BUN BLDV-MCNC: 38 MG/DL (ref 8–23)
CALCIUM SERPL-MCNC: 8.8 MG/DL (ref 8.5–9.9)
CHLORIDE BLD-SCNC: 96 MEQ/L (ref 95–107)
CO2: 24 MEQ/L (ref 20–31)
CREAT SERPL-MCNC: 0.74 MG/DL (ref 0.5–0.9)
EOSINOPHILS ABSOLUTE: 0.1 K/UL (ref 0–0.7)
EOSINOPHILS RELATIVE PERCENT: 0.6 %
GFR SERPL CREATININE-BSD FRML MDRD: >60 ML/MIN/{1.73_M2}
GLUCOSE BLD-MCNC: 113 MG/DL (ref 70–99)
HCT VFR BLD CALC: 29.5 % (ref 37–47)
HEMOGLOBIN: 10.4 G/DL (ref 12–16)
LYMPHOCYTES ABSOLUTE: 0.5 K/UL (ref 1–4.8)
LYMPHOCYTES RELATIVE PERCENT: 4.4 %
MAGNESIUM: 2.2 MG/DL (ref 1.7–2.4)
MCH RBC QN AUTO: 33.2 PG (ref 27–31.3)
MCHC RBC AUTO-ENTMCNC: 35.3 % (ref 33–37)
MCV RBC AUTO: 94.1 FL (ref 79.4–94.8)
MONOCYTES ABSOLUTE: 0.8 K/UL (ref 0.2–0.8)
MONOCYTES RELATIVE PERCENT: 8.1 %
NEUTROPHILS ABSOLUTE: 9 K/UL (ref 1.4–6.5)
NEUTROPHILS RELATIVE PERCENT: 86.5 %
PDW BLD-RTO: 14.2 % (ref 11.5–14.5)
PLATELET # BLD: 179 K/UL (ref 130–400)
POTASSIUM SERPL-SCNC: 3.8 MEQ/L (ref 3.4–4.9)
RBC # BLD: 3.13 M/UL (ref 4.2–5.4)
SODIUM BLD-SCNC: 134 MEQ/L (ref 135–144)
WBC # BLD: 10.4 K/UL (ref 4.8–10.8)

## 2022-12-23 PROCEDURE — 92610 EVALUATE SWALLOWING FUNCTION: CPT

## 2022-12-23 PROCEDURE — 80048 BASIC METABOLIC PNL TOTAL CA: CPT

## 2022-12-23 PROCEDURE — 97535 SELF CARE MNGMENT TRAINING: CPT

## 2022-12-23 PROCEDURE — 85025 COMPLETE CBC W/AUTO DIFF WBC: CPT

## 2022-12-23 PROCEDURE — 6370000000 HC RX 637 (ALT 250 FOR IP): Performed by: CLINICAL NURSE SPECIALIST

## 2022-12-23 PROCEDURE — 6370000000 HC RX 637 (ALT 250 FOR IP): Performed by: PHYSICIAN ASSISTANT

## 2022-12-23 PROCEDURE — 6370000000 HC RX 637 (ALT 250 FOR IP): Performed by: INTERNAL MEDICINE

## 2022-12-23 PROCEDURE — 36415 COLL VENOUS BLD VENIPUNCTURE: CPT

## 2022-12-23 PROCEDURE — 6370000000 HC RX 637 (ALT 250 FOR IP): Performed by: PAIN MEDICINE

## 2022-12-23 PROCEDURE — 6360000002 HC RX W HCPCS: Performed by: INTERNAL MEDICINE

## 2022-12-23 PROCEDURE — 99232 SBSQ HOSP IP/OBS MODERATE 35: CPT | Performed by: INTERNAL MEDICINE

## 2022-12-23 PROCEDURE — 83735 ASSAY OF MAGNESIUM: CPT

## 2022-12-23 PROCEDURE — 2500000003 HC RX 250 WO HCPCS

## 2022-12-23 RX ORDER — METOPROLOL TARTRATE 50 MG/1
50 TABLET, FILM COATED ORAL 2 TIMES DAILY
Qty: 60 TABLET | Refills: 3 | DISCHARGE
Start: 2022-12-23

## 2022-12-23 RX ORDER — ASPIRIN 81 MG/1
81 TABLET ORAL DAILY
Qty: 30 TABLET | Refills: 3 | DISCHARGE
Start: 2022-12-24

## 2022-12-23 RX ORDER — METOPROLOL TARTRATE 5 MG/5ML
2.5 INJECTION INTRAVENOUS EVERY 5 MIN PRN
Status: DISCONTINUED | OUTPATIENT
Start: 2022-12-23 | End: 2022-12-23 | Stop reason: HOSPADM

## 2022-12-23 RX ORDER — HYDROCODONE BITARTRATE AND ACETAMINOPHEN 10; 325 MG/1; MG/1
1 TABLET ORAL EVERY 6 HOURS PRN
Qty: 12 TABLET | Refills: 0 | Status: SHIPPED | OUTPATIENT
Start: 2022-12-23 | End: 2022-12-26

## 2022-12-23 RX ORDER — ROSUVASTATIN CALCIUM 40 MG/1
40 TABLET, COATED ORAL NIGHTLY
Qty: 30 TABLET | Refills: 3 | DISCHARGE
Start: 2022-12-23

## 2022-12-23 RX ORDER — LOSARTAN POTASSIUM 25 MG/1
25 TABLET ORAL DAILY
Qty: 30 TABLET | Refills: 3 | DISCHARGE
Start: 2022-12-24

## 2022-12-23 RX ORDER — DIGOXIN 125 MCG
125 TABLET ORAL DAILY
Qty: 30 TABLET | Refills: 3 | DISCHARGE
Start: 2022-12-24

## 2022-12-23 RX ORDER — METOPROLOL TARTRATE 5 MG/5ML
INJECTION INTRAVENOUS
Status: COMPLETED
Start: 2022-12-23 | End: 2022-12-23

## 2022-12-23 RX ADMIN — METOPROLOL TARTRATE 2.5 MG: 5 INJECTION INTRAVENOUS at 02:44

## 2022-12-23 RX ADMIN — ASPIRIN 81 MG: 81 TABLET, COATED ORAL at 08:41

## 2022-12-23 RX ADMIN — DIGOXIN 125 MCG: 125 TABLET ORAL at 08:41

## 2022-12-23 RX ADMIN — METOPROLOL TARTRATE 2.5 MG: 5 INJECTION, SOLUTION INTRAVENOUS at 02:44

## 2022-12-23 RX ADMIN — ENOXAPARIN SODIUM 60 MG: 100 INJECTION SUBCUTANEOUS at 08:44

## 2022-12-23 RX ADMIN — HYDROCODONE BITARTRATE AND ACETAMINOPHEN 1 TABLET: 10; 325 TABLET ORAL at 08:41

## 2022-12-23 RX ADMIN — LOSARTAN POTASSIUM 25 MG: 25 TABLET, FILM COATED ORAL at 08:41

## 2022-12-23 RX ADMIN — METOPROLOL TARTRATE 50 MG: 50 TABLET ORAL at 08:41

## 2022-12-23 ASSESSMENT — ENCOUNTER SYMPTOMS
CHEST TIGHTNESS: 0
VOMITING: 0
COLOR CHANGE: 0
NAUSEA: 0
ABDOMINAL DISTENTION: 0
SHORTNESS OF BREATH: 0

## 2022-12-23 ASSESSMENT — PAIN SCALES - GENERAL: PAINLEVEL_OUTOF10: 10

## 2022-12-23 ASSESSMENT — PAIN DESCRIPTION - LOCATION: LOCATION: BACK

## 2022-12-23 NOTE — FLOWSHEET NOTE
Spoke with patients daughter, Macho Willams, [facility] and pending  at 230pm. Per Macho Willams, she will contact her father with an update. This RN to contact [facility] when physicians ambulance arrives to transport patient.      Electronically signed by Delio Chavez RN on 12/23/2022 at 11:12 AM

## 2022-12-23 NOTE — PLAN OF CARE
Problem: SLP Adult - Impaired Swallowing  Goal: By Discharge: Advance to least restrictive diet without signs or symptoms of aspiration for planned discharge setting. See evaluation for individualized goals.   12/23/2022 1035 by DEVON Adkins  Outcome: Progressing

## 2022-12-23 NOTE — CARE COORDINATION
RECEIVED CALL FROM [facility] CHARGE NURSE STATING THEY ARE UNABLE TO ACCEPT THE PATIENT D/T STAFFING. UNABLE TO SAY IF THEY WOULD BE ABLE TO TAKE HER TOMORROW. CM AWAITING CALL BACK FROM [facility]  [address] MESSAGE LEFT FOR KO FOR POSSIBLE ADMITTANCE IF [facility] UNABLE TO.     1050  SPOKE WITH [address] BE ABLE TO TAKE PATIENT TODAY. SPOKE CASH AT [facility], CANNOT ACCEPT TODAY AND CM WILL NEED TO CALL TOMORROW TO SEE IF PT COULD GO. CM SPOKE WITH PTS DTR, DTR WOULD LIKE PT TO GO TO [facility] TODAY. [id number] [facility] IS THE PLAN. [facility] ARRANGED FOR 1730PM TODAY. RN UPDATED, TO CALL FAMILY ONCE PICKED UP D/T WEATHER. [address]

## 2022-12-23 NOTE — PROGRESS NOTES
Progress Note  Patient: Hebert Byrd  Unit/Bed: Y539/C333-22  YOB: 1935  MRN: [id number]  Acct: [de-identified]   Admitting Diagnosis: A-fib ([address] [I48.91]  General weakness [R53.1]  Urinary tract infection without hematuria, site unspecified [N39.0]  Syncope, unspecified syncope type [R55]  Atrial fibrillation, unspecified type [facility]) [I48.91]  Date:  12/15/2022  Hospital Day: 8    Chief Complaint:  Syncope/fall    Subjective    12/23/2022: NDeveloped atrial fibrillation with rapid ventricle response and given IV Lopressor converted to normal sinus rhythm. Going to nursing home today. 12/22/2022: Normal sinus rhythm on telemetry. 12-21-22: NSR at 70-90's. Resting comfortably. On full dose Lovenox. No signs of bleeding. Hemodynamically stable    12/20/22: Resting comfortably in bed in no acute distress. Denies chest pain or shortness of breath. She is hemodynamically stable. On telemetry she is sinus rhythm with heart rate 70s to 80s with frequent PACs/sinus arrhythmia. A.m. labs reviewed. Renal function and electrolytes stable. Hemoglobin stable 11.9. No A. fib noted on telemetry alarms in the past 24 to 48 hours. She remains on full dose Lovenox at 1 mg/kg SQ twice daily. She is also on rate control with Lopressor 50 mg p.o. twice daily and digoxin 125 mcg p.o. daily. 12/19/22: Resting quietly in bed in no acute distress. She is alert and oriented to self and knows that she is in the hospital but did not know which hospital.  She has slightly agitated and is lying in bed undressed with gown at the foot of the bed. She denies chest pain or shortness of breath. Has pure wick in place and draining dark-colored urine. A.m. labs reviewed. Potassium low normal at 3.4 so will be replaced. Renal function stable. Hemoglobin stable at 12.0. Thrombocytopenia resolved with platelet count now 590.   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 MRI of the pelvis revealed multiple nondisplaced sacral fractures with classic H shaped pattern suggestive of sacral insufficiency fracture. EKG was of poor quality and significant artifact noted with reading of A. fib however appears to be sinus. She was admitted for further evaluation. Cardiology consulted for new onset A. fib. Rapid response was called around 1930 yesterday evening when she was noted on telemetry to have tachycardia with heart rate in the 160s to 170s range. Patient was treated with 5 mg of IV Lopressor after which heart rate improved but remain elevated in the 110s to 120s range. Appeared to be A. fib RVR at that time. Currently on telemetry patient is sinus rhythm with heart rate 60s to 70s with occasional PVCs. Telemetry alarms reviewed showed possible A. fib episodes early this morning versus sinus with frequent ectopy or possible ectopic atrial rhythm. At time evaluation today patient is resting comfortably and echo being performed at bedside. Patient is only alert and oriented to self at this time. She believes that she was in [facility] and thought the year was 1960s. She is unable to provide any reliable history at this time and states that she believes she came in due to abdominal pain and need for surgery. She denies any current complaints including chest pain, shortness of breath, nausea, vomiting, dizziness, abdominal pain. Review of Systems:   Review of Systems   Constitutional:  Negative for activity change and fever. HENT:  Negative for congestion. Respiratory:  Negative for chest tightness and shortness of breath. Cardiovascular:  Negative for chest pain, palpitations and leg swelling. Gastrointestinal:  Negative for abdominal distention, nausea and vomiting. Genitourinary:         Purewick catheter in place draining dark urine   Musculoskeletal:  Negative for arthralgias.    Skin:  Negative for color change. Neurological:  Negative for syncope. Psychiatric/Behavioral:  Positive for confusion. Physical Examination:    /66   Pulse 82   Temp 98.2 °F (36.8 °C) (Oral)   Resp 16   Ht 5' 8\" (1.727 m)   Wt 131 lb (59.4 kg)   LMP  (LMP Unknown)   SpO2 100%   BMI 19.92 kg/m²    Physical Exam  Constitutional:       General: She is not in acute distress. HENT:      Head: Normocephalic and atraumatic. Cardiovascular:      Rate and Rhythm: Normal rate and regular rhythm. Heart sounds: Murmur heard. Pulmonary:      Effort: Pulmonary effort is normal. No respiratory distress. Breath sounds: No wheezing, rhonchi or rales. Abdominal:      Palpations: Abdomen is soft. Tenderness: There is no abdominal tenderness. Musculoskeletal:         General: Normal range of motion. Cervical back: Normal range of motion and neck supple. Right lower leg: No edema. Left lower leg: No edema. Skin:     General: Skin is warm and dry. Neurological:      General: No focal deficit present. Mental Status: She is alert. Cranial Nerves: No cranial nerve deficit.    Psychiatric:         Mood and Affect: Mood normal.       LABS:  CBC:   Lab Results   Component Value Date/Time    WBC 10.4 12/23/2022 04:53 AM    RBC 3.13 12/23/2022 04:53 AM    HGB 10.4 12/23/2022 04:53 AM    HCT 29.5 12/23/2022 04:53 AM    MCV 94.1 12/23/2022 04:53 AM    MCH 33.2 12/23/2022 04:53 AM    MCHC 35.3 12/23/2022 04:53 AM    RDW 14.2 12/23/2022 04:53 AM     12/23/2022 04:53 AM    MPV 8.6 02/14/2014 08:32 AM     CBC with Differential:   Lab Results   Component Value Date/Time    WBC 10.4 12/23/2022 04:53 AM    RBC 3.13 12/23/2022 04:53 AM    HGB 10.4 12/23/2022 04:53 AM    HCT 29.5 12/23/2022 04:53 AM     12/23/2022 04:53 AM    MCV 94.1 12/23/2022 04:53 AM    MCH 33.2 12/23/2022 04:53 AM    MCHC 35.3 12/23/2022 04:53 AM    RDW 14.2 12/23/2022 04:53 AM    LYMPHOPCT 4.4 12/23/2022 04:53 AM MONOPCT 8.1 12/23/2022 04:53 AM    BASOPCT 0.4 12/23/2022 04:53 AM    MONOSABS 0.8 12/23/2022 04:53 AM    LYMPHSABS 0.5 12/23/2022 04:53 AM    EOSABS 0.1 12/23/2022 04:53 AM    BASOSABS 0.0 12/23/2022 04:53 AM     CMP:    Lab Results   Component Value Date/Time     12/23/2022 04:53 AM    K 3.8 12/23/2022 04:53 AM    K 3.3 12/16/2022 05:15 AM    CL 96 12/23/2022 04:53 AM    CO2 24 12/23/2022 04:53 AM    BUN 38 12/23/2022 04:53 AM    CREATININE 0.74 12/23/2022 04:53 AM    GFRAA >60.0 04/12/2022 09:23 AM    LABGLOM >60.0 12/23/2022 04:53 AM    GLUCOSE 113 12/23/2022 04:53 AM    GLUCOSE 87 12/27/2011 08:41 AM    PROT 6.6 12/15/2022 11:45 AM    LABALBU 3.9 12/15/2022 11:45 AM    LABALBU 4.3 12/27/2011 08:41 AM    CALCIUM 8.8 12/23/2022 04:53 AM    BILITOT 0.6 12/15/2022 11:45 AM    ALKPHOS 83 12/15/2022 11:45 AM    AST 19 12/15/2022 11:45 AM    ALT 17 12/15/2022 11:45 AM     BMP:    Lab Results   Component Value Date/Time     12/23/2022 04:53 AM    K 3.8 12/23/2022 04:53 AM    K 3.3 12/16/2022 05:15 AM    CL 96 12/23/2022 04:53 AM    CO2 24 12/23/2022 04:53 AM    BUN 38 12/23/2022 04:53 AM    LABALBU 3.9 12/15/2022 11:45 AM    LABALBU 4.3 12/27/2011 08:41 AM    CREATININE 0.74 12/23/2022 04:53 AM    CALCIUM 8.8 12/23/2022 04:53 AM    GFRAA >60.0 04/12/2022 09:23 AM    LABGLOM >60.0 12/23/2022 04:53 AM    GLUCOSE 113 12/23/2022 04:53 AM    GLUCOSE 87 12/27/2011 08:41 AM     Magnesium:    Lab Results   Component Value Date/Time    MG 2.2 12/23/2022 04:53 AM     Troponin:    Lab Results   Component Value Date/Time    TROPONINI <0.010 12/15/2022 11:45 AM       Radiology:  No results found. Echocardiogram 12/16/22:   Conclusions   Summary   Mitral annular calcification is present. 1+ MR   Left ventricular ejection fraction is visually estimated at 40-45%. E/A flow reversal noted. Suggestive of diastolic dysfunction.       Signature   ---------------------------------------------------------------- Electronically signed by Zuleika Gautam MD(Interpreting   physician) on 12/17/2022 08:29 AM   ----------------------------------------------------------------      EKG 12/15/22: ?SR with PACs vs A-fib (significant artifact), nonspecific ST changes, QTc 459ms     Telemetry 12/16/22: SR 60s-70s, occasional PVC  Telemetry 12/19/22: SR/ST 90s-100s, frequent PACs, sinus arrhythmia  Telemetry 12/20/22: SR 70s-80s, frequent PACs/sinus arrhythmia          Impression     New onset paroxysmal atrial fibrillation-currently normal sinus rhythm  Syncope with fall  Multiple nondisplaced sacral fractures per MRI pelvis on 12/15/22  Hypokalemia--keep potassium between 4-5 and magnesium greater than 2  UTI  HTN  Dyslipidemia  Thrombocytopenia  Cardiomyopathy with EF 40-45% per echo 12/16/22  Mild 1+ MR per echo 12/16/22     Plan     Poor long-term oral anticoagulation candidate secondary to advanced age as well as fall risk. Lovenox while in hospital for now  continue current medications-aspirin 81 mg p.o. daily, , Crestor 40 mg p.o. nightly, digoxin 125 mcg daily, losartan 25 mg daily Lopressor 25 mg p.o. twice daily  Cardiac diet recommended  Monitor on telemetry for any tachycardia or bradycardia arrhythmias  Maintain potassium greater than 4, magnesium greater than 2  GI/DVT prophylaxis  Orthopedic surgery recommendations regarding mild nondisplaced sacral fractures  Conservative medical therapy from a cardiac standpoint at this time given advanced age and multiple nondisplaced sacral fractures.   May consider noninvasive ischemic evaluation in future as outpatient  Palliative care   Stable from cardiology standpoint for transfer to rehab/nursing home        Electronically signed by Beaat Rodriguez DO on 12/23/2022 at 1:35 PM

## 2022-12-23 NOTE — DISCHARGE SUMMARY
Hospital Medicine Discharge Summary    Rosita Ngo  :  1935  MRN:  [id number]    Admit date:  12/15/2022  Discharge date:  2022    Admitting Physician:  Kiera Saunders DO  Primary Care Physician:  Kat Anderson, APRN - CNP      Discharge Diagnoses:    Dementia; A Fib with RVR    Chief Complaint   Patient presents with    Loss of Consciousness     Pt states she felt dizzy and passed out, pt fell from standing, per EMS pt has new onset [address] Course:   Patient with dementia and a UTI was found to have new onset A Fib with RVR as well as pelvic fractures. Evaluated by ortho who recommended conservative care for the pelvic fractures. She was evaluated by cardiology who managed her A fib and are advising no long term anticoagulation due to advanced age and falls risk. They started her on dig and lopressor which controlled her A Fib. Exam on discharge:   /66   Pulse 82   Temp 98.2 °F (36.8 °C) (Oral)   Resp 16   Ht 5' 8\" (1.727 m)   Wt 131 lb (59.4 kg)   LMP  (LMP Unknown)   SpO2 100%   BMI 19.92 kg/m²   Constitutional: Frail elderly adult female reclining in bed no acute distress. Head: NCAT  Eyes: PERRLA,  Neck: Trachea midline  Cardiovascular: RRR  Pulmonary: Clear bilateral breath sounds. Abdomen: Soft, nontense, nondistended.     Neurologic: AON x 1; non focal.        Patient was seen by the following consultants   Consults:  IP CONSULT TO CARDIOLOGY  IP CONSULT TO ORTHOPEDIC SURGERY  IP CONSULT TO DIETITIAN  IP CONSULT TO PAIN MANAGEMENT  IP CONSULT TO PALLIATIVE CARE  IP CONSULT TO DIETITIAN    Significant Diagnostic Studies:    Refer to chart     Please refer to chart if no studies are shown here    CT Head W/O Contrast    Result Date: 12/15/2022  EXAMINATION: CT OF THE HEAD WITHOUT CONTRAST  12/15/2022 12:06 pm TECHNIQUE: CT of the head was performed without the administration of intravenous contrast. Automated exposure control, iterative reconstruction, and/or weight based adjustment of the mA/kV was utilized to reduce the radiation dose to as low as reasonably achievable. COMPARISON: None. HISTORY: ORDERING SYSTEM PROVIDED HISTORY: syncope TECHNOLOGIST PROVIDED HISTORY: Reason for exam:->syncope Has a \"code stroke\" or \"stroke alert\" been called? ->No Decision Support Exception - unselect if not a suspected or confirmed emergency medical condition->Emergency Medical Condition (MA) What reading provider will be dictating this exam?->CRC FINDINGS: BRAIN/VENTRICLES: There is no acute intracranial hemorrhage, mass effect or midline shift. No abnormal extra-axial fluid collection. The gray-white differentiation is maintained without evidence of an acute infarct. There is no evidence of hydrocephalus. The ventricles, cisterns and sulci are prominent consistent with atrophy. There is decreased attenuation within the periventricular white matter consistent with periventricular leukomalacia. There are old lacunar infarcts seen within the right and left basal ganglia. ORBITS: The visualized portion of the orbits demonstrate no acute abnormality. SINUSES: The visualized paranasal sinuses and mastoid air cells demonstrate no acute abnormality. SOFT TISSUES/SKULL:  No acute abnormality of the visualized skull or soft tissues. 1. There is no acute intracranial abnormality. Specifically, there is no intracranial hemorrhage. 2. Atrophy and periventricular leukomalacia,     CT ABDOMEN PELVIS W IV CONTRAST Additional Contrast? None    Result Date: 12/15/2022  EXAMINATION: ONE XRAY VIEW OF THE CHEST; CT OF THE ABDOMEN AND PELVIS WITH CONTRAST 12/15/2022 12:22 pm COMPARISON: Chest x-ray, October 19 2022; CT abdomen pelvis, September 25, 2018 HISTORY: ORDERING SYSTEM PROVIDED HISTORY: sob TECHNOLOGIST PROVIDED HISTORY: Reason for exam:->sob What reading provider will be dictating this exam?->CRC FINDINGS: There also is a small amount of fluid. There is coarsening of the interstitium. The lungs are hyperinflated. The cardiac silhouette is within normal limits. Atherosclerotic calcifications are seen thoracic aorta. No consolidation, pneumothorax or pleural effusion seen. Degenerative changes are seen in the visualized portion of both shoulders and also spine. CT of the abdomen and pelvis: Lungs: In the visualized bases of the lungs a 5.7 mm pleural base nodule is seen posteriorly on the left (series 2, image 1). A 7.7 mm nodule is seen posteriorly in the right lower lobe that is pleural based as well. (Series 2, image 20). No consolidating pneumonia, pneumothorax or pleural effusion is seen. Changes of some emphysema are visualized. Organs: The liver, pancreas, right adrenal gland and liver are unremarkable. There are calcifications seen in the liver and also there is a calcified splenic artery again seen that is unchanged from previous study. In the area of the left adrenal gland there is a 1.7 cm hypodense thick wall cystic area. This was also seen on previous study. In the region of the tail of the pancreas a hypodense area is seen that measures 1.2 cm. This area measures fluid density on this examination. This could be an IPMN in. This was vaguely seen on previous study and also appears stable. Both kidneys show no evidence of hydronephrosis. No renal calculus is visualized. Prominent atherosclerotic calcifications are visualized. GI/bowel: No dilated loops of bowel are seen. The appendix is visualized and is unremarkable. Pelvis: In the pre sacral region there is soft tissue density posterior to the bowel that is thickened. A superior there also is some stranding visualized. The uterus is not seen. There also is a small amount of fluid. The bladder distends normally. Bones: No destructive bony lesions are seen. Slight irregularity is seen in the lower sacral region on the left. 0.82 degenerative changes are seen in both hips and is most prominent on the right.      Chest x-ray: No evidence for acute cardiopulmonary process. CT of the abdomen and pelvis: 1.  2 nodules are seen in the visualized bases of the lungs. The nodule in the right lower lobe was seen on previous study but was smaller in size. There are also emphysematous changes seen in the visualized bases. Numeral 2. Hypodense lesion is seen in the region of the left adrenal gland. This could be an adrenal nodule of the possibility is necrotic lymph node . It is slightly larger than what was seen on previous study. 3.  A stable hypodense area is seen in the tail the pancreas. This could represent IPMN. 4.  There is no evidence for appendicitis, diverticulitis or bowel obstruction 5. There is presacral soft tissue density and stranding seen in the pelvis. This is new when compared to previous study. Query if patient may have had any radiation treatment due to endometrial cancer. Recommend further evaluation there is also full small amount of fluid. RECOMMENDATION: 1. Low does CT of the lungs, non emergently 2. It presacral soft tissue seen also there is a small amount of fluid. Possibility the of hemorrhage or of the etiology could be related to patient's history of endometrial cancer and any possibility of prior radiation treatment. Recommend follow-up. Also there is some irregularity in the sacrum and occult fracture is not excluded. This was discussed with JOSH Gallo, and CT of the pelvis was recommended. XR CHEST PORTABLE    Result Date: 12/15/2022  EXAMINATION: ONE XRAY VIEW OF THE CHEST; CT OF THE ABDOMEN AND PELVIS WITH CONTRAST 12/15/2022 12:22 pm COMPARISON: Chest x-ray, October 19 2022; CT abdomen pelvis, September 25, 2018 HISTORY: ORDERING SYSTEM PROVIDED HISTORY: sob TECHNOLOGIST PROVIDED HISTORY: Reason for exam:->sob What reading provider will be dictating this exam?->CRC FINDINGS: There also is a small amount of fluid. There is coarsening of the interstitium. The lungs are hyperinflated. The cardiac silhouette is within normal limits. Atherosclerotic calcifications are seen thoracic aorta. No consolidation, pneumothorax or pleural effusion seen. Degenerative changes are seen in the visualized portion of both shoulders and also spine. CT of the abdomen and pelvis: Lungs: In the visualized bases of the lungs a 5.7 mm pleural base nodule is seen posteriorly on the left (series 2, image 1). A 7.7 mm nodule is seen posteriorly in the right lower lobe that is pleural based as well. (Series 2, image 20). No consolidating pneumonia, pneumothorax or pleural effusion is seen. Changes of some emphysema are visualized. Organs: The liver, pancreas, right adrenal gland and liver are unremarkable. There are calcifications seen in the liver and also there is a calcified splenic artery again seen that is unchanged from previous study. In the area of the left adrenal gland there is a 1.7 cm hypodense thick wall cystic area. This was also seen on previous study. In the region of the tail of the pancreas a hypodense area is seen that measures 1.2 cm. This area measures fluid density on this examination. This could be an IPMN in. This was vaguely seen on previous study and also appears stable. Both kidneys show no evidence of hydronephrosis. No renal calculus is visualized. Prominent atherosclerotic calcifications are visualized. GI/bowel: No dilated loops of bowel are seen. The appendix is visualized and is unremarkable. Pelvis: In the pre sacral region there is soft tissue density posterior to the bowel that is thickened. A superior there also is some stranding visualized. The uterus is not seen. There also is a small amount of fluid. The bladder distends normally. Bones: No destructive bony lesions are seen. Slight irregularity is seen in the lower sacral region on the left. 0.82 degenerative changes are seen in both hips and is most prominent on the right.      Chest x-ray: No evidence for acute cardiopulmonary process. CT of the abdomen and pelvis: 1.  2 nodules are seen in the visualized bases of the lungs. The nodule in the right lower lobe was seen on previous study but was smaller in size. There are also emphysematous changes seen in the visualized bases. Numeral 2. Hypodense lesion is seen in the region of the left adrenal gland. This could be an adrenal nodule of the possibility is necrotic lymph node . It is slightly larger than what was seen on previous study. 3.  A stable hypodense area is seen in the tail the pancreas. This could represent IPMN. 4.  There is no evidence for appendicitis, diverticulitis or bowel obstruction 5. There is presacral soft tissue density and stranding seen in the pelvis. This is new when compared to previous study. Query if patient may have had any radiation treatment due to endometrial cancer. Recommend further evaluation there is also full small amount of fluid. RECOMMENDATION: 1. Low does CT of the lungs, non emergently 2. It presacral soft tissue seen also there is a small amount of fluid. Possibility the of hemorrhage or of the etiology could be related to patient's history of endometrial cancer and any possibility of prior radiation treatment. Recommend follow-up. Also there is some irregularity in the sacrum and occult fracture is not excluded. This was discussed with JOSH Gallo, and CT of the pelvis was recommended. MRI BRAIN WO CONTRAST    Result Date: 12/15/2022  EXAMINATION: MRI OF THE BRAIN WITHOUT CONTRAST  12/15/2022 5:57 pm TECHNIQUE: Multiplanar multisequence MRI of the brain was performed without the administration of intravenous contrast. COMPARISON: Noncontrast CT head done earlier same day. HISTORY: ORDERING SYSTEM PROVIDED HISTORY: syncope atrial fibrillation TECHNOLOGIST PROVIDED HISTORY: Reason for exam:->syncope atrial fibrillation FINDINGS: INTRACRANIAL STRUCTURES/VENTRICLES: There is no acute infarct.  No mass effect or midline shift. No evidence of an acute intracranial hemorrhage. Generalized cerebral and cerebellar volume loss. Diffuse ventricular prominence may relate to central volume loss. The sellar/suprasellar regions appear unremarkable. The normal signal voids within the major intracranial vessels appear maintained. The axial FLAIR images demonstrate pontine, bilateral periventricular and deep white matter signal hyperintensities which are nonspecific but commonly seen in the setting of chronic small vessel ischemic disease. 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 ORBITS: The visualized portion of the orbits demonstrate no acute abnormality. SINUSES: Small amount of nonspecific fluid in the bilateral mastoid air cells. The paranasal sinuses are clear. BONES/SOFT TISSUES: The bone marrow signal intensity appears normal. The soft tissues demonstrate no acute abnormality. Multilevel degenerative changes in the visualized spine. 1.  No acute intracranial abnormality per unenhanced brain MRI. No acute stroke. 2.  Moderate to severe chronic small vessel ischemic disease. 3.  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 MRI PELVIS WO CONTRAST    Result Date: 12/15/2022  EXAMINATION: MRI OF THE PELVIS WITHOUT CONTRAST, 12/15/2022 4:10 pm TECHNIQUE: Multiplanar multisequence MRI of the pelvis was performed without the administration of intravenous contrast. COMPARISON: CT abdomen and pelvis 12/15/2022 HISTORY: ORDERING SYSTEM PROVIDED HISTORY: endometrial thickening rule out sacral fracture TECHNOLOGIST PROVIDED HISTORY: Reason for exam:->endometrial thickening rule out sacral fracture What reading provider will be dictating this exam?->CRC FINDINGS: SOFT TISSUES: Small amount of presacral free fluid is identified, which is stable compared to the prior CT. No large hematoma is seen.  BONE MARROW: Vertical fractures are identified through the bilateral sacral alae with associated transverse fracture of the sacral body. Fractures demonstrate classic H-shaped pattern. No hip fracture is identified. Joint alignment is intact. JOINTS: Moderate-severe bilateral hip degenerative changes noted with loss of the articular space. Small joint effusion is noted on the left hip. Multiple nondisplaced sacral fractures with a classic H-shaped pattern suggestive of sacral insufficiency fracture. Discharge Medications:         Medication List        START taking these medications      aspirin 81 MG EC tablet  Take 1 tablet by mouth daily  Start taking on: December 24, 2022     bisacodyl 5 MG EC tablet  Commonly known as: DULCOLAX  Take 1 tablet by mouth daily as needed for Constipation     digoxin 125 MCG tablet  Commonly known as: LANOXIN  Take 1 tablet by mouth daily  Start taking on: December 24, 2022     HYDROcodone-acetaminophen  MG per tablet  Commonly known as: NORCO  Take 1 tablet by mouth every 6 hours as needed for Pain for up to 3 days.  Max Daily Amount: 4 tablets     losartan 25 MG tablet  Commonly known as: COZAAR  Take 1 tablet by mouth daily  Start taking on: December 24, 2022     metoprolol tartrate 50 MG tablet  Commonly known as: LOPRESSOR  Take 1 tablet by mouth 2 times daily     rosuvastatin 40 MG tablet  Commonly known as: CRESTOR  Take 1 tablet by mouth nightly            CONTINUE taking these medications      donepezil 5 MG tablet  Commonly known as: ARICEPT  Take 1 tablet by mouth nightly     MULTI-VITAMIN DAILY PO     TURMERIC PO     vitamin D 50 MCG (2000 UT) Caps capsule               Where to Get Your Medications        You can get these medications from any pharmacy    Bring a paper prescription for each of these medications  HYDROcodone-acetaminophen  MG per tablet       Information about where to get these medications is not yet available    Ask your nurse or doctor about these medications  aspirin 81 MG EC tablet  bisacodyl 5 MG EC tablet  digoxin 125 MCG tablet  losartan 25 MG tablet  metoprolol tartrate 50 MG tablet  rosuvastatin 40 MG tablet         Disposition:   If discharged to Home, Any [facility] needs that were indicated and/or required as been addressed and set up by Social Work. Condition at discharge: good     Activity: activity as tolerated    Total time taken for discharging this patient: 40 minutes. Greater than 70% of time was spent focused exclusively on this patient. Time was taken to review chart, discuss plans with consultants, reconciling medications, discussing plan answering questions with patient.      Signed:  Alice Renae MD  12/23/2022, 11:32 AM  ----------------------------------------------------------------------------------------------------------------------    Shaq Herrera

## 2022-12-23 NOTE — PROGRESS NOTES
[facility]   Facility/Department: [facility]  Speech Language Pathology  Clinical Bedside Swallow Evaluation    Teo Chin  : 1935 (80 y.o.)   [x]   confirmed    MRN: [id number]  [facility]  ADMISSION DATE: 12/15/2022  PATIENT DIAGNOSIS(ES): A-fib ([facility] [address] [I48.91]  General weakness [R53.1]  Urinary tract infection without hematuria, site unspecified [N39.0]  Syncope, unspecified syncope type [R55]  Atrial fibrillation, unspecified type [facility]) [I48.91]  Chief Complaint   Patient presents with    Loss of Consciousness     Pt states she felt dizzy and passed out, pt fell from standing, per EMS pt has new onset afib     Patient Active Problem List    Diagnosis Date Noted    General weakness 2022    Palliative care encounter 2022    Advanced care planning/counseling discussion 2022    Goals of care, counseling/discussion 2022    A-fib ([facility] [address] 12/15/2022    Bronchitis 10/19/2022    Acute cough 10/19/2022    Nasal congestion 10/19/2022    At high risk for falls 2022    TIA (transient ischemic attack) 2018    Actinic keratoses     Dermatitis     Prurigo nodularis 2017    Age-related macular degeneration 2015    Arcus senilis of cornea 2015    Combined form of senile cataract 2015    Glaucoma suspect 2015    Hyperopic astigmatism 2014    Vitamin D deficiency     HTN (hypertension)     Hyperlipidemia     Sciatica      Past Medical History:   Diagnosis Date    Actinic keratoses     Arthritis     Dermatitis     Encounter for annual health examination 2011    Endometrial cancer ([facility] [address]     s/p hyst in  - was told clear by Dr. Aisha Henry    History of bone density study 2011    History of mammography, screening 2011    HTN (hypertension)     Hyperlipidemia     Prurigo nodularis 2017    left lower leg    Sciatica     Vitamin D deficiency      Past Surgical History:   Procedure Laterality Date    CATARACT REMOVAL WITH IMPLANT      HYSTERECTOMY (CERVIX STATUS UNKNOWN)  9/2009    post-menopausal bleeding     Allergies   Allergen Reactions    Bactrim [Sulfamethoxazole-Trimethoprim] Hives    Benazepril Other (See Comments)     cough    Codeine     Sulfa Antibiotics        DATE ONSET: 12/15/2022    Date of Evaluation: 12/23/2022   Evaluating Therapist: DEVON Martinez    Dysphagia Diagnosis  Dysphagia Diagnosis: Mild oral stage dysphagia  Dysphagia Impression : Patient presents with mild oral dysphagia, likely acute due to generalized weakness. A modified diet of soft and bite size is recommended at this time. Patient demonstrated good prognosis for improvement d/t motivation, family support, and PLOF. An instrumental swallow study is not recommended at this time but should be re-assessed on an ongoing basis. Recommended Diet  Recommendations: Dysphagia treatment  Diet Solids Recommendation: Soft & Bite Sized  Liquid Consistency Recommendation: Thin  Recommended Form of Meds: PO  Compensatory Swallowing Strategies : Alternate solids and liquids;Small bites/sips;Upright as possible for all oral intake      Reason for Referral  Antony Alvarez was referred for a bedside swallow evaluation to assess the efficiency of her swallow function, identify signs and symptoms of aspiration, identify risk factors, and make recommendations regarding safe dietary consistencies, effective compensatory strategies, and safe eating environment. General  Chart Reviewed: Yes  Subjective  Subjective: Patient was seen for a swallow evaluation while in room. Patient lying at ~45 degree angle due to sacral fracture. Patient oriented to self only this date. Patient repeatedly asking where her  was. Patient stating that she drove herself to ER today and her  has not been in. SLP provided education on orientation and arrival in ED ~1 week ago. Patient arrived in ED on 12/15 for generalized weakness.  MRI and CT completed on 12/15 were clear of new abnormalities. 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 Patient currently placed on soft and NTL. It is unclear the reasoning behind diet as patient was not on thickened liquids at home. SLP inquired with ANGELINE Perdomo who was not aware of reasoning. Behavior/Cognition: Alert; Cooperative;Confused  Respiratory Status: Room air  O2 Device: None (Room air)  Communication Observation: Functional  Follows Directions: Simple  Dentition: Adequate  Patient Positioning: Partially reclined  Baseline Vocal Quality: Normal  Volitional Cough: Strong  Prior Dysphagia History: Patient denies hx of dysphagia. Consistencies Administered: Pureed;Regular; Thin    Vision and Hearing  Vision  Vision: Impaired  Vision Exceptions: Wears glasses for reading  Hearing  Hearing: Within functional limits    Current Diet level  Current Diet : Soft and Bite-Sized  Current Liquid Diet : Mildly Thick    Oral Motor  Labial: No impairment  Dentition: Intact  Oral Hygiene: Clean  Lingual: No impairment  Velum: Unable to visualize    Oral/Pharyngeal Phase  Oral Phase - Comment: Patient demonstrated mild oral dysphagia characterized by impaired mastication and mild lingual residue with regular solids. Patient was able to clear residue with SLP cues to utilize liquid wash. Pharyngeal Phase: Patient presents with suspected functional pharyngeal phase of the swallow.     PO Trials  Neuromuscular Estim Used: No  Assessment Method(s): Observation;Palpation  Patient Position: Partially reclined in bed d/t sacral fracture  Vocal Quality: No Impairment  Consistency Presented: Pureed  How Presented: SLP-fed/Presented  How much presented: 5 (teaspoons)  Bolus Acceptance: No impairment  Bolus Formation/Control: No impairment  Propulsion: No impairment  Oral Residue: None  Initiation of Swallow: No impairment  Laryngeal Elevation: Functional  Aspiration Signs/Symptoms: None  Pharyngeal Phase Characteristics: No impairment, issues, or problems  Additional PO Trials: 2      PO Trials 2  Consistency Presented: Regular  How Presented: Self-fed/presented  How much presented: 1 (cracker)  Bolus Acceptance: No impairment  Bolus Formation/Control: Impaired  Type of Impairment: Mastication  Propulsion: No impairment  Oral Residue: Lingual;Less than 10% of bolus  Initiation of Swallow: No impairment  Aspiration Signs/Symptoms: None  Pharyngeal Phase Characteristics: No impairment, issues, or problems      PO Trials 3  Consistency Presented: Thin  How Presented: SLP-fed/Presented;Straw  How much presented: 6 (3 single sips, 3 consecutive sips)  Bolus Acceptance: No impairment  Bolus Formation/Control: Impaired  Type of Impairment: Oral holding  Propulsion: No impairment  Oral Residue: None  Initiation of Swallow: No impairment  Aspiration Signs/Symptoms: None  Pharyngeal Phase Characteristics: No impairment, issues, or problems  Comments: Patient demonstrated oral holding with all liquids. Patient would do so with all trials while appearing to think while staring at tv. This appeared behavioral vs. an impairment. Dysphagia Diagnosis  Dysphagia Diagnosis: Mild oral stage dysphagia  Dysphagia Impression : Patient presents with mild oral dysphagia, likely acute due to generalized weakness. A modified diet of soft and bite size is recommended at this time. Patient demonstrated good prognosis for improvement d/t motivation, family support, and PLOF. An instrumental swallow study is not recommended at this time but should be re-assessed on an ongoing basis. Dysphagia Outcome Severity Scale: Level 5: Mild dysphagia- Distant supervision. May need one diet consistency restricted     Recommendations  Requires SLP Intervention: Yes  Recommendations: Dysphagia treatment  D/C Recommendations: Ongoing speech therapy is recommended during this hospitalization  Diet Solids Recommendation: Soft & Bite Sized  Liquid Consistency Recommendation: Thin  Compensatory Swallowing Strategies :  Alternate solids and liquids;Small bites/sips;Upright as possible for all oral intake  Recommended Form of Meds: PO  Therapeutic Interventions: Therapeutic PO trials with SLP; Diet tolerance monitoring;Oral motor exercises  Duration of Treatment: LOS or until goals met  Frequency of Treatment: 1-2x's/week    Prognosis  Speech Therapy Prognosis  Prognosis: Good  Prognosis Considerations: Previous Level of Function; Family/Community Support; Motivation    Education  Individuals consulted  Consulted and agree with results and recommendations: Patient;RN  RN Name: Valentín Jacki    Treatment/Goals  Short-term Goals  Timeframe for Short-term Goals: 1-2 weeks  Goal 1: Patient will tolerate recommended diet with use of strategies (i.e. upright positioning, small bites/sips, alternating solids/liquids) with adequate mastication, oral clearance, and no s/s of aspiration in 5/5 trials. Goal 2: Pt will complete base of tongue exercises (tongue press, tongue pullback) exercise with 80% accuracy, given cues as needed, to increase base of tongue strength. Goal 3: Patient will participate in therapeutic trials of regular solids with adequate mastication, oral clearance, and no s/s of aspiration. Long-term Goals  Timeframe for Long-term Goals: 1-2 weeks for LOS or until goals met  Goal 1: Patient will tolerate least restrictive diet with adequate mastication, oral clearance, and no s/s of aspiration in all observed trials. Safety Devices  Safety Devices  Safety Devices in place: Yes  Type of devices: Bed alarm in place;Call light within reach; Telesitter in use  Restraints Initially in Place: No    Pain Assessment  Patient c/o pain: Location and pain level: Level 10 in back  Pt able to proceed with session.     Pain Re-assessment  Patient c/o pain: RN notified  Described as same as above      Therapy Time  SLP Individual Minutes  Time In: 0900  Time Out: 1899  Minutes: 25        Signature: Electronically signed by DEVON Lee on 12/23/2022 at 10:34 AM

## 2022-12-23 NOTE — PROGRESS NOTES
Physical Therapy Med Surg Daily Treatment Note  Facility/Department: [facility] TELEMETRY  [facility]       NAME: Cody Perdomo  : 1935 (80 y.o.)  MRN: [id number]  CODE STATUS: Full Code    Date of Service: 2022    Patient Diagnosis(es): A-fib [facility]) [I48.91]  General weakness [R53.1]  Urinary tract infection without hematuria, site unspecified [N39.0]  Syncope, unspecified syncope type [R55]  Atrial fibrillation, unspecified type [facility]) [I48.91]   Chief Complaint   Patient presents with    Loss of Consciousness     Pt states she felt dizzy and passed out, pt fell from standing, per EMS pt has new onset afib     Patient Active Problem List    Diagnosis Date Noted    General weakness 2022    Palliative care encounter 2022    Advanced care planning/counseling discussion 2022    Goals of care, counseling/discussion 2022    A-fib ([facility] [address] 12/15/2022    Bronchitis 10/19/2022    Acute cough 10/19/2022    Nasal congestion 10/19/2022    At high risk for falls 2022    TIA (transient ischemic attack) 2018    Actinic keratoses     Dermatitis     Prurigo nodularis 2017    Age-related macular degeneration 2015    Arcus senilis of cornea 2015    Combined form of senile cataract 2015    Glaucoma suspect 2015    Hyperopic astigmatism 2014    Vitamin D deficiency     HTN (hypertension)     Hyperlipidemia     Sciatica         Past Medical History:   Diagnosis Date    Actinic keratoses     Arthritis     Dermatitis     Encounter for annual health examination 2011    Endometrial cancer ([facility] [address]     s/p hyst in  - was told clear by  [facility]    History of bone density study 2011    History of mammography, screening 2011    HTN (hypertension)     Hyperlipidemia     Prurigo nodularis 2017    left lower leg    Sciatica     Vitamin D deficiency      Past Surgical History:   Procedure Laterality Date    CATARACT REMOVAL WITH IMPLANT HYSTERECTOMY (CERVIX STATUS UNKNOWN)  9/2009    post-menopausal bleeding       Restrictions:  Restrictions/Precautions: Fall Risk;Weight Bearing  Lower Extremity Weight Bearing Restrictions  Right Lower Extremity Weight Bearing: Weight Bearing As Tolerated  Left Lower Extremity Weight Bearing: Weight Bearing As Tolerated  Position Activity Restriction  Other position/activity restrictions: per ortho note on 12/16 pt is WBAT B LEs    SUBJECTIVE:   Subjective: Pt agreeable to sit  EOB though states she can't do a lot because her \"pelvis is fractured. \"    Pain   Unable to rate appropriately     OBJECTIVE:   Bed mobility  Rolling to Left: Maximum assistance  Rolling to Right: Maximum assistance  Supine to Sit: 2 Person assistance; Moderate assistance  Sit to Supine: Maximum assistance;2 Person assistance    Transfers  Comment: unable to safely assess due to secondary agitation and safety at this time    Ambulation  Comments: unable to safely assess due to high level of assistance for bed mobility and secondary agitation    PT Exercises  A/AROM Exercises: Ankle pumps, Quad sets -poor carryover     ASSESSMENT   Assessment: Pt conts to require +2 assist for bed mobility. Pt became agitated upon sitting EOB with increased posterior push and stating to \"let her go. \" Pt required max VC's for safety and to miladis back down approrpaitely. Cont to be unable to perform transfers or gait secondary to safety at this time.   Therapy Prognosis: Fair;Good     Discharge Recommendations:  Continue to assess pending progress, Patient would benefit from continued therapy after discharge    Goals  Short Term Goals  Short Term Goal 1: Pt will demonstrate HEP min A  Long Term Goals  Long Term Goal 1: Pt will demonstrate bed mobility mod A  Long Term Goal 2: Pt will demonstrate transfers max A  Long Term Goal 3: Pt will demonstrate sitting edge of bed CGA >/= 2 min to prepare for OOB activity    PLAN    General Plan: 1 time a day 3-6 times a week      [facility] (6 CLICK) [address] [facility] Inpatient Mobility Raw Score : 8     Therapy Time   Individual   Time In 1023   Time Out 1035   Minutes 12     Timed Code Treatment Minutes: 12 Minutes  Bed mobility 9 minutes   There Ex 3      Lenny Vernon, PT, 12/23/22 at 10:42 AM     Definitions for assistance levels  Independent = pt does not require any physical supervision or assistance from another person for activity completion. Device may be needed.   Stand by assistance = pt requires verbal cues or instructions from another person, close to but not touching, to perform the activity  Minimal assistance= pt performs 75% or more of the activity; assistance is required to complete the activity  Moderate assistance= pt performs 50% of the activity; assistance is required to complete the activity  Maximal assistance = pt performs 25% of the activity; assistance is required to complete the activity  Dependent = pt requires total physical assistance to accomplish the task

## 2022-12-26 NOTE — PROGRESS NOTES
Physical Therapy  Facility/Department: [facility] [id number]  Physical Therapy Discharge      NAME: Cody Perdomo    : 1935 (46 y.o.)  MRN: [id number]    Account: [de-identified]  Gender: female      Patient has been discharged from acute care hospital. DC patient from current PT program.      Electronically signed by Torri Kingston PT on 22 at 12:39 PM EST

## 2022-12-27 ENCOUNTER — OFFICE VISIT (OUTPATIENT)
Dept: GERIATRIC MEDICINE | Age: 87
End: 2022-12-27

## 2022-12-27 VITALS
SYSTOLIC BLOOD PRESSURE: 164 MMHG | HEART RATE: 98 BPM | RESPIRATION RATE: 18 BRPM | OXYGEN SATURATION: 98 % | DIASTOLIC BLOOD PRESSURE: 98 MMHG | TEMPERATURE: 98 F

## 2022-12-27 DIAGNOSIS — I10 PRIMARY HYPERTENSION: ICD-10-CM

## 2022-12-27 DIAGNOSIS — R26.2 DIFFICULTY IN WALKING: ICD-10-CM

## 2022-12-27 DIAGNOSIS — R53.1 GENERAL WEAKNESS: ICD-10-CM

## 2022-12-27 DIAGNOSIS — I48.91 ATRIAL FIBRILLATION, UNSPECIFIED TYPE (HCC): Primary | ICD-10-CM

## 2022-12-27 LAB
ANION GAP SERPL CALCULATED.3IONS-SCNC: 11 MEQ/L (ref 9–15)
BUN BLDV-MCNC: 57 MG/DL (ref 8–23)
CALCIUM SERPL-MCNC: 9 MG/DL (ref 8.5–9.9)
CHLORIDE BLD-SCNC: 105 MEQ/L (ref 95–107)
CO2: 27 MEQ/L (ref 20–31)
CREAT SERPL-MCNC: 1.43 MG/DL (ref 0.5–0.9)
EKG ATRIAL RATE: 156 BPM
EKG Q-T INTERVAL: 308 MS
EKG QRS DURATION: 90 MS
EKG QTC CALCULATION (BAZETT): 478 MS
EKG R AXIS: -23 DEGREES
EKG T AXIS: 144 DEGREES
EKG VENTRICULAR RATE: 145 BPM
GFR SERPL CREATININE-BSD FRML MDRD: 35.4 ML/MIN/{1.73_M2}
GLUCOSE BLD-MCNC: 98 MG/DL (ref 70–99)
HCT VFR BLD CALC: 29.4 % (ref 37–47)
HEMOGLOBIN: 10 G/DL (ref 12–16)
MCH RBC QN AUTO: 32.4 PG (ref 27–31.3)
MCHC RBC AUTO-ENTMCNC: 33.9 % (ref 33–37)
MCV RBC AUTO: 95.6 FL (ref 79.4–94.8)
PDW BLD-RTO: 14.2 % (ref 11.5–14.5)
PLATELET # BLD: 180 K/UL (ref 130–400)
POTASSIUM SERPL-SCNC: 4 MEQ/L (ref 3.4–4.9)
RBC # BLD: 3.07 M/UL (ref 4.2–5.4)
SODIUM BLD-SCNC: 143 MEQ/L (ref 135–144)
WBC # BLD: 6.8 K/UL (ref 4.8–10.8)

## 2022-12-27 RX ORDER — HYDROCODONE BITARTRATE AND ACETAMINOPHEN 10; 325 MG/1; MG/1
1 TABLET ORAL EVERY 6 HOURS PRN
COMMUNITY
End: 2022-12-30 | Stop reason: DRUGHIGH

## 2022-12-27 NOTE — PROGRESS NOTES
Name: Clemente Blade: [facility]  [address]   Dulce Maria gomez  Date: 12/27/2022     Subjective:     Chief Complaint   Patient presents with    Follow-up    New Patient       SUSAN Singleton is a 80 y.o. female being seen today for evaluation of acute rehab progress, generalized muscle weakness, difficulty walking, new onset atrial fibrillation, and hypertension. Resident was treated and released from [facility] on 12/23/2022 for new onset atrial fibrillation with rapid ventricular ventricular response and a urinary tract infection. She had a syncopal episode and fell at home, sustained a sacral fracture not requiring any surgical intervention. She was also treated for acute cystitis with IV antibiotics, CVA ruled out, no anticoagulation due to falls, advanced age, and high risk of bleeding due to thrombocytopenia. Resident was admitted to [facility] for acute rehab secondary to debility and deconditioning. Resident has been participating PT and OT, slowly making progress. Continues to have upper and lower extremity weakness, occasional loss of balance with ambulation, requires assistance with ADLs mobility and transfers. Dietitian evaluated and revealed multicore criteria for protein calorie malnutrition, she has had weight loss and muscle wasting, is on protein supplementation. Labs done today are within acceptable range, potassium 4.0 and BUN/creatinine 57/1.43 with GFR 35.4. H&H 10.0 and 29.4. Heart rate is 98, on metoprolol and digoxin for A. fib. Resident denies complaints of chest pain chest palpitations irregular heartbeat lightheadedness dizziness headache nausea vomiting diarrhea constipation orthopnea dyspnea. Blood pressure 164/98 today heart rate 98, no fever, O2 saturation 98%, need to continue to monitor. Denies urinary symptoms such as hematuria dysuria frequency. On Aricept for dementia. She is ANO x1.   On Norco for pain control. States pain is mild to moderate, unable to quantify due to dementia. Past Medical History:   Diagnosis Date    Actinic keratoses     Arthritis     Dermatitis     Encounter for annual health examination 12/19/2011    Endometrial cancer [facility])     s/p hyst in 2009 - was told clear by Dr. 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    left lower leg    Sciatica     Vitamin D deficiency          Allergies:  Reviewed in nursing facility records  Medications:  Reviewed and reconciled in nursing facility records    Review of Systems  Pertinent positives as noted in HPI. Objective:   BP (!) 164/98   Pulse 98   Temp 98 °F (36.7 °C)   Resp 18   LMP  (LMP Unknown)   SpO2 98%     Physical Exam  Constitutional: Laying in bed, well-developed, in no acute distess  Pulmonary/Chest:  breathing unlabored, chest excursion symmetrical, no use of accessory muscles, lung sounds clear, no shortness of breath at rest, dyspnea with activity  Cardiovascular:  S1-S2 regular, no murmur, 2+ DP x 2, no edema  Abd/GI:  abdomen soft, round, non-distended, non-tender, no masses, active bowel sounds x 4 quadrants  : no SP tenderness,no CVA tenderness, no hematuria  MS/Extremities:  WELLER, ROM limited, abnormal gait, no clubbing, no cyanosis  Psych:  A/O x 1, cooperative with care, no anxiety, appropriate mood and affect  Skin:  warm and dry, color normal, no lesions, no rashes      Diagnosis Orders   1. Atrial fibrillation, unspecified type ([address]        2. General weakness        3. Difficulty in walking        4. Primary hypertension            Assessment and Plan:      1. Atrial fibrillation, unspecified type ([address]  Heart rate is on the high side of normal, denies palpitations chest pain or irregular heartbeat. On metoprolol for rate control, continue as ordered.   Continue digoxin as ordered. 2. General weakness/Difficulty in walking  Participating in PT and OT, continue current treatment plan with a focus on balance gait strengthening ADLs. 3. Primary hypertension  Blood pressures have been within acceptable range, continue Cozaar and metoprolol as ordered. Reviewed labs:  Yes    CBC  Lab Results   Component Value Date/Time    WBC 6.8 12/27/2022 06:16 AM    RBC 3.07 12/27/2022 06:16 AM    HGB 10.0 12/27/2022 06:16 AM    HCT 29.4 12/27/2022 06:16 AM    MCV 95.6 12/27/2022 06:16 AM    MCH 32.4 12/27/2022 06:16 AM    MCHC 33.9 12/27/2022 06:16 AM    RDW 14.2 12/27/2022 06:16 AM     12/27/2022 06:16 AM      BMP  Lab Results   Component Value Date/Time     12/27/2022 06:16 AM    K 4.0 12/27/2022 06:16 AM    K 3.3 12/16/2022 05:15 AM     12/27/2022 06:16 AM    CO2 27 12/27/2022 06:16 AM    BUN 57 12/27/2022 06:16 AM    CREATININE 1.43 12/27/2022 06:16 AM    GLUCOSE 98 12/27/2022 06:16 AM    GLUCOSE 87 12/27/2011 08:41 AM    CALCIUM 9.0 12/27/2022 06:16 AM      HgBA1c:    Lab Results   Component Value Date/Time    LABA1C 4.9 12/16/2022 05:15 AM     TSH:  Lab Results   Component Value Date/Time    TSH 1.140 08/22/2018 09:28 AM     Lipids:  Lab Results   Component Value Date/Time    CHOL 182 12/16/2022 05:15 AM    HDL 94 12/16/2022 05:15 AM    TRIG 51 12/16/2022 05:15 AM       Time based visit:  time spent 25 minutes>50% spent with counseling and coordination of care. Reviewed with the patient: current clinicalstatus, medications, activities and diet. Side effects, adverse effects of the medication prescribed, as well as treatment plan and result expectations. Discussed diagnostic results, other suggested diagnostic testing, prognosis, risk and benefits of treatment options, importance of compliance with treatment options with resident and nursing staff. Reviewed therapy plan and progress with therapists.   Reviewed medical record, labs, medications, etc.    I have reviewed the patient's medical history in detail and updated the computerized patient record. This note was partially generated using Dragon voice recognition system, and there may be some incorrect words, spellings, punctuation that were not noticed in checking the note before saving.     YUSUF Valenzuela-CNP

## 2022-12-30 ENCOUNTER — OFFICE VISIT (OUTPATIENT)
Dept: GERIATRIC MEDICINE | Age: 87
End: 2022-12-30

## 2022-12-30 DIAGNOSIS — R52 PAIN: Primary | ICD-10-CM

## 2022-12-30 DIAGNOSIS — F03.B2 MODERATE DEMENTIA WITH PSYCHOTIC DISTURBANCE, UNSPECIFIED DEMENTIA TYPE: ICD-10-CM

## 2022-12-30 RX ORDER — HYDROCODONE BITARTRATE AND ACETAMINOPHEN 5; 325 MG/1; MG/1
1 TABLET ORAL 3 TIMES DAILY
Qty: 42 TABLET | Refills: 0 | Status: SHIPPED | OUTPATIENT
Start: 2022-12-30 | End: 2023-01-13

## 2022-12-31 VITALS
SYSTOLIC BLOOD PRESSURE: 108 MMHG | HEART RATE: 72 BPM | TEMPERATURE: 97.6 F | OXYGEN SATURATION: 95 % | DIASTOLIC BLOOD PRESSURE: 62 MMHG | RESPIRATION RATE: 16 BRPM

## 2022-12-31 PROBLEM — F03.B2 MODERATE DEMENTIA WITH PSYCHOTIC DISTURBANCE: Status: ACTIVE | Noted: 2022-01-01

## 2022-12-31 PROBLEM — R52 PAIN: Status: ACTIVE | Noted: 2022-12-31

## 2022-12-31 NOTE — PROGRESS NOTES
Name: Soco Petty: [address] [facility]  [address]   Dulce Maria gomez  Date: 12/30/2022     Subjective:     Chief Complaint   Patient presents with    Follow-up       HPI  Carlos Dalal is a 80 y.o. female being seen today for evaluation of acute rehab progress, pain, and dementia. Resident is lying in bed, well-developed, no acute distress. Her daughter and granddaughter are here visiting and are concerned about the level of pain she is having. Resident has moderate to severe dementia and does not have the wherewithal to request pain medication needed. Resident was participating in physical therapy this afternoon and was unable to perform exercises due to the pain, pain level via faces scale was 7-9. When resident is still she does not have significant pain but when she attempts to move or perform exercises or when she is being repositioned is when she exhibits pain. Location of pain is mostly in the right hip and the pelvic area, denies rib pain shoulder pain and lower extremity pain. Resident has an order for Norco 10/325 every 6 hours as needed, this dose is very sedating, will make adjustments. Additionally family are requesting routine dose of pain medication since she does not asking for it. Resident is more awake today, family states that it was difficult arousing her yesterday. She engaged in conversation with her daughter and staff, long-term memory is much better than short-term memory.     Past Medical History:   Diagnosis Date    Actinic keratoses     Arthritis     Dermatitis     Encounter for annual health examination 12/19/2011    Endometrial cancer [facility])     s/p hyst in 2009 - was told clear by Dr. 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 Negative for appetite change and fever. HENT: Negative. Respiratory:  Negative for cough, chest tightness, shortness of breath and wheezing. Cardiovascular:  Negative for chest pain, palpitations and leg swelling. Gastrointestinal:  Negative for abdominal distention, abdominal pain, constipation, diarrhea, nausea and vomiting. Genitourinary:  Positive for pelvic pain. Negative for difficulty urinating, dysuria, flank pain, frequency and hematuria. Musculoskeletal:  Positive for arthralgias and gait problem. Negative for joint swelling. Skin:  Negative for color change and wound. Neurological:  Positive for weakness. Negative for dizziness and light-headedness. Psychiatric/Behavioral:  Positive for confusion. Negative for behavioral problems. The patient is not nervous/anxious. Objective:   /62   Pulse 72   Temp 97.6 °F (36.4 °C)   Resp 16   LMP  (LMP Unknown)   SpO2 95%     Physical Exam  Vitals and nursing note reviewed. Constitutional:       General: She is not in acute distress. Appearance: Normal appearance. She is well-developed. She is not ill-appearing or toxic-appearing. HENT:      Head: Normocephalic. Mouth/Throat:      Mouth: Mucous membranes are moist.      Pharynx: Oropharynx is clear. Eyes:      Conjunctiva/sclera: Conjunctivae normal.      Pupils: Pupils are equal, round, and reactive to light. Neck:      Thyroid: No thyromegaly. Vascular: No JVD. Trachea: No tracheal deviation. Cardiovascular:      Rate and Rhythm: Normal rate and regular rhythm. Pulses: Normal pulses. Heart sounds: Normal heart sounds. Pulmonary:      Effort: Pulmonary effort is normal. No respiratory distress. Breath sounds: Normal breath sounds. No wheezing, rhonchi or rales. Abdominal:      General: Bowel sounds are normal. There is no distension. Palpations: Abdomen is soft. There is no mass. Tenderness: There is no abdominal tenderness. There is no guarding. Musculoskeletal:         General: Normal range of motion. Cervical back: Normal range of motion and neck supple. Right lower leg: No edema. Left lower leg: No edema. Skin:     General: Skin is warm and dry. Neurological:      Mental Status: She is alert. Mental status is at baseline. She is disoriented. Cranial Nerves: No cranial nerve deficit. Motor: Weakness present. Gait: Gait abnormal.   Psychiatric:         Attention and Perception: Attention normal.         Mood and Affect: Mood normal.         Speech: Speech is delayed. Behavior: Behavior normal. Behavior is not agitated. Behavior is cooperative. Cognition and Memory: Cognition is impaired. Memory is impaired. Judgment: Judgment is impulsive. Diagnosis Orders   1. Pain        2. Moderate dementia with psychotic disturbance, unspecified dementia type              Assessment and Plan:      1. Pain  DC hydrocodone/APAP 10/325. Start hydrocodone/APAP 5/325 1 tablet by mouth 3 times a day routinely. Extra strength Tylenol 500 mg 1 tablet by mouth in a.m. and at at bedtime daily. 2. Moderate dementia with psychotic disturbance, unspecified dementia type  On Aricept, dementia symptoms are at baseline, no psychotic disturbance today. Continue medications as ordered.       Reviewed labs:  Yes    CBC  Lab Results   Component Value Date/Time    WBC 6.8 12/27/2022 06:16 AM    RBC 3.07 12/27/2022 06:16 AM    HGB 10.0 12/27/2022 06:16 AM    HCT 29.4 12/27/2022 06:16 AM    MCV 95.6 12/27/2022 06:16 AM    MCH 32.4 12/27/2022 06:16 AM    MCHC 33.9 12/27/2022 06:16 AM    RDW 14.2 12/27/2022 06:16 AM     12/27/2022 06:16 AM      BMP  Lab Results   Component Value Date/Time     12/27/2022 06:16 AM K 4.0 12/27/2022 06:16 AM    K 3.3 12/16/2022 05:15 AM     12/27/2022 06:16 AM    CO2 27 12/27/2022 06:16 AM    BUN 57 12/27/2022 06:16 AM    CREATININE 1.43 12/27/2022 06:16 AM    GLUCOSE 98 12/27/2022 06:16 AM    GLUCOSE 87 12/27/2011 08:41 AM    CALCIUM 9.0 12/27/2022 06:16 AM      HgBA1c:    Lab Results   Component Value Date/Time    LABA1C 4.9 12/16/2022 05:15 AM     TSH:  Lab Results   Component Value Date/Time    TSH 1.140 08/22/2018 09:28 AM     Lipids:  Lab Results   Component Value Date/Time    CHOL 182 12/16/2022 05:15 AM    HDL 94 12/16/2022 05:15 AM    TRIG 51 12/16/2022 05:15 AM       Time based visit:  time spent 25 minutes>50% spent with counseling and coordination of care. Reviewed with the patient, daughter, and granddaughter: current clinicalstatus, medications, activities and diet. Family members were present during the therapy session, concerned about resident's pain and requesting routine pain medication. Discussed side effects, adverse effects of the medication prescribed, as well as treatment plan and result expectations. Family informed that the higher dose of [address] was initiated in the hospital because the resident was screaming constantly in pain, discussed changing the dose and increasing the frequency to routine, they agree with this plan of care. Discussed diagnostic results, other suggested diagnostic testing, prognosis, risk and benefits of treatment options, importance of compliance with treatment options with resident and nursing staff. Reviewed therapy plan and progress with therapists. Reviewed medical record, labs, medications, etc.      I have reviewed the patient's medical history in detail and updated the computerized patient record. This note was partially generated using Dragon voice recognition system, and there may be some incorrect words, spellings, punctuation that were not noticed in checking the note before saving.     Germán Luna, APRN-CNP

## 2023-01-01 ENCOUNTER — TELEPHONE (OUTPATIENT)
Dept: CARDIOLOGY CLINIC | Age: 88
End: 2023-01-01

## 2023-01-01 ENCOUNTER — OFFICE VISIT (OUTPATIENT)
Dept: GERIATRIC MEDICINE | Age: 88
End: 2023-01-01
Payer: MEDICARE

## 2023-01-01 ENCOUNTER — OFFICE VISIT (OUTPATIENT)
Dept: GERIATRIC MEDICINE | Age: 88
End: 2023-01-01

## 2023-01-01 DIAGNOSIS — J40 BRONCHITIS: ICD-10-CM

## 2023-01-01 DIAGNOSIS — I48.91 ATRIAL FIBRILLATION, UNSPECIFIED TYPE (HCC): Primary | ICD-10-CM

## 2023-01-01 DIAGNOSIS — R53.1 GENERAL WEAKNESS: ICD-10-CM

## 2023-01-01 DIAGNOSIS — I10 PRIMARY HYPERTENSION: ICD-10-CM

## 2023-01-01 DIAGNOSIS — N17.9 AKI (ACUTE KIDNEY INJURY) (HCC): ICD-10-CM

## 2023-01-01 DIAGNOSIS — F03.B2 MODERATE DEMENTIA WITH PSYCHOTIC DISTURBANCE, UNSPECIFIED DEMENTIA TYPE: ICD-10-CM

## 2023-01-01 DIAGNOSIS — U07.1 COVID-19: Primary | ICD-10-CM

## 2023-01-01 DIAGNOSIS — R26.2 DIFFICULTY IN WALKING: ICD-10-CM

## 2023-01-01 PROCEDURE — 99310 SBSQ NF CARE HIGH MDM 45: CPT | Performed by: NURSE PRACTITIONER

## 2023-01-01 PROCEDURE — G8484 FLU IMMUNIZE NO ADMIN: HCPCS | Performed by: NURSE PRACTITIONER

## 2023-01-01 PROCEDURE — G9692 HOSP RECD BY PT DUR MSMT PER: HCPCS | Performed by: NURSE PRACTITIONER

## 2023-01-03 LAB
ANION GAP SERPL CALCULATED.3IONS-SCNC: 22 MEQ/L (ref 9–15)
BUN BLDV-MCNC: 89 MG/DL (ref 8–23)
CALCIUM SERPL-MCNC: 8.7 MG/DL (ref 8.5–9.9)
CHLORIDE BLD-SCNC: 107 MEQ/L (ref 95–107)
CO2: 20 MEQ/L (ref 20–31)
CREAT SERPL-MCNC: 2.3 MG/DL (ref 0.5–0.9)
GFR SERPL CREATININE-BSD FRML MDRD: 20 ML/MIN/{1.73_M2}
GLUCOSE BLD-MCNC: 95 MG/DL (ref 70–99)
HCT VFR BLD CALC: 32.5 % (ref 37–47)
HEMOGLOBIN: 11.1 G/DL (ref 12–16)
MCH RBC QN AUTO: 32.4 PG (ref 27–31.3)
MCHC RBC AUTO-ENTMCNC: 34.2 % (ref 33–37)
MCV RBC AUTO: 94.7 FL (ref 79.4–94.8)
PDW BLD-RTO: 14.3 % (ref 11.5–14.5)
PLATELET # BLD: 221 K/UL (ref 130–400)
POTASSIUM SERPL-SCNC: 4 MEQ/L (ref 3.4–4.9)
RBC # BLD: 3.43 M/UL (ref 4.2–5.4)
SODIUM BLD-SCNC: 149 MEQ/L (ref 135–144)
WBC # BLD: 10.6 K/UL (ref 4.8–10.8)

## 2023-01-04 ENCOUNTER — APPOINTMENT (OUTPATIENT)
Dept: GENERAL RADIOLOGY | Age: 88
DRG: 871 | End: 2023-01-04
Payer: MEDICARE

## 2023-01-04 ENCOUNTER — APPOINTMENT (OUTPATIENT)
Dept: CT IMAGING | Age: 88
DRG: 871 | End: 2023-01-04
Payer: MEDICARE

## 2023-01-04 ENCOUNTER — HOSPITAL ENCOUNTER (INPATIENT)
Age: 88
LOS: 2 days | Discharge: HOSPICE | DRG: 871 | End: 2023-01-06
Attending: EMERGENCY MEDICINE | Admitting: INTERNAL MEDICINE
Payer: MEDICARE

## 2023-01-04 DIAGNOSIS — N17.9 AKI (ACUTE KIDNEY INJURY) (HCC): Primary | ICD-10-CM

## 2023-01-04 DIAGNOSIS — R77.8 ELEVATED TROPONIN: ICD-10-CM

## 2023-01-04 DIAGNOSIS — I48.91 ATRIAL FIBRILLATION WITH RVR (HCC): ICD-10-CM

## 2023-01-04 DIAGNOSIS — J96.01 ACUTE RESPIRATORY FAILURE WITH HYPOXIA (HCC): ICD-10-CM

## 2023-01-04 DIAGNOSIS — U07.1 COVID: ICD-10-CM

## 2023-01-04 DIAGNOSIS — R79.89 POSITIVE D DIMER: ICD-10-CM

## 2023-01-04 LAB
ADENOVIRUS BY PCR: NOT DETECTED
ALBUMIN SERPL-MCNC: 3.5 G/DL (ref 3.5–4.6)
ALP BLD-CCNC: 231 U/L (ref 40–130)
ALT SERPL-CCNC: 22 U/L (ref 0–33)
ANION GAP SERPL CALCULATED.3IONS-SCNC: 18 MEQ/L (ref 9–15)
APTT: 29.5 SEC (ref 24.4–36.8)
AST SERPL-CCNC: 35 U/L (ref 0–35)
BACTERIA: NEGATIVE /HPF
BASE EXCESS ARTERIAL: -4 (ref -3–3)
BASOPHILS ABSOLUTE: 0 K/UL (ref 0–0.2)
BASOPHILS RELATIVE PERCENT: 0.3 %
BILIRUB SERPL-MCNC: 0.8 MG/DL (ref 0.2–0.7)
BILIRUBIN URINE: NEGATIVE
BLOOD, URINE: NEGATIVE
BORDETELLA PARAPERTUSSIS BY PCR: NOT DETECTED
BORDETELLA PERTUSSIS BY PCR: NOT DETECTED
BUN BLDV-MCNC: 104 MG/DL (ref 8–23)
CALCIUM IONIZED: 1.05 MMOL/L (ref 1.12–1.32)
CALCIUM SERPL-MCNC: 8.7 MG/DL (ref 8.5–9.9)
CHLAMYDOPHILIA PNEUMONIAE BY PCR: NOT DETECTED
CHLORIDE BLD-SCNC: 109 MEQ/L (ref 95–107)
CHP ED QC CHECK: YES
CLARITY: ABNORMAL
CO2: 20 MEQ/L (ref 20–31)
COLOR: YELLOW
CORONAVIRUS 229E BY PCR: NOT DETECTED
CORONAVIRUS HKU1 BY PCR: NOT DETECTED
CORONAVIRUS NL63 BY PCR: NOT DETECTED
CORONAVIRUS OC43 BY PCR: NOT DETECTED
CREAT SERPL-MCNC: 2.57 MG/DL (ref 0.5–0.9)
DIGOXIN LEVEL: 0.9 NG/ML (ref 0.8–2)
EOSINOPHILS ABSOLUTE: 0.1 K/UL (ref 0–0.7)
EOSINOPHILS RELATIVE PERCENT: 0.5 %
EPITHELIAL CELLS, UA: NORMAL /HPF (ref 0–5)
GFR SERPL CREATININE-BSD FRML MDRD: 17 ML/MIN/{1.73_M2}
GFR SERPL CREATININE-BSD FRML MDRD: 17.5 ML/MIN/{1.73_M2}
GLOBULIN: 2.9 G/DL (ref 2.3–3.5)
GLUCOSE BLD-MCNC: 119 MG/DL (ref 70–99)
GLUCOSE BLD-MCNC: 122 MG/DL
GLUCOSE BLD-MCNC: 122 MG/DL (ref 70–99)
GLUCOSE URINE: NEGATIVE MG/DL
HCO3 ARTERIAL: 16 MMOL/L (ref 21–29)
HCT VFR BLD CALC: 32.8 % (ref 37–47)
HEMOGLOBIN: 10.9 G/DL (ref 12–16)
HEMOGLOBIN: 10.9 GM/DL (ref 12–16)
HUMAN METAPNEUMOVIRUS BY PCR: NOT DETECTED
HUMAN RHINOVIRUS/ENTEROVIRUS BY PCR: NOT DETECTED
HYALINE CASTS: NORMAL /HPF (ref 0–5)
INFLUENZA A BY PCR: NOT DETECTED
INFLUENZA B BY PCR: NOT DETECTED
INR BLD: 1.2
KETONES, URINE: NEGATIVE MG/DL
LACTATE: 3.05 MMOL/L (ref 0.4–2)
LACTIC ACID: 2.7 MMOL/L (ref 0.5–2.2)
LEUKOCYTE ESTERASE, URINE: NEGATIVE
LIPASE: 69 U/L (ref 12–95)
LYMPHOCYTES ABSOLUTE: 0.7 K/UL (ref 1–4.8)
LYMPHOCYTES RELATIVE PERCENT: 6.4 %
MAGNESIUM: 3.2 MG/DL (ref 1.7–2.4)
MCH RBC QN AUTO: 31.6 PG (ref 27–31.3)
MCHC RBC AUTO-ENTMCNC: 33.2 % (ref 33–37)
MCV RBC AUTO: 95.3 FL (ref 79.4–94.8)
MONOCYTES ABSOLUTE: 0.8 K/UL (ref 0.2–0.8)
MONOCYTES RELATIVE PERCENT: 6.8 %
MYCOPLASMA PNEUMONIAE BY PCR: NOT DETECTED
NEUTROPHILS ABSOLUTE: 10 K/UL (ref 1.4–6.5)
NEUTROPHILS RELATIVE PERCENT: 86 %
NITRITE, URINE: NEGATIVE
O2 SAT, ARTERIAL: 95 % (ref 93–100)
PARAINFLUENZA VIRUS 1 BY PCR: NOT DETECTED
PARAINFLUENZA VIRUS 2 BY PCR: NOT DETECTED
PARAINFLUENZA VIRUS 3 BY PCR: NOT DETECTED
PARAINFLUENZA VIRUS 4 BY PCR: NOT DETECTED
PCO2 ARTERIAL: 14 MM HG (ref 35–45)
PDW BLD-RTO: 14.6 % (ref 11.5–14.5)
PERFORMED ON: ABNORMAL
PH ARTERIAL: 7.67 (ref 7.35–7.45)
PH UA: 5 (ref 5–9)
PLATELET # BLD: 251 K/UL (ref 130–400)
PO2 ARTERIAL: 53 MM HG (ref 75–108)
POC CHLORIDE: 123 MEQ/L (ref 99–110)
POC CREATININE WHOLE BLOOD: 3
POC CREATININE: 2.7 MG/DL (ref 0.6–1.2)
POC HEMATOCRIT: 32 % (ref 36–48)
POC POTASSIUM: 3.8 MEQ/L (ref 3.5–5.1)
POC SAMPLE TYPE: ABNORMAL
POC SODIUM: 150 MEQ/L (ref 136–145)
POTASSIUM SERPL-SCNC: 4.4 MEQ/L (ref 3.4–4.9)
PRO-BNP: 2261 PG/ML
PROTEIN UA: 30 MG/DL
PROTHROMBIN TIME: 15.4 SEC (ref 12.3–14.9)
RBC # BLD: 3.44 M/UL (ref 4.2–5.4)
RBC UA: NORMAL /HPF (ref 0–5)
REASON FOR REJECTION: NORMAL
REJECTED TEST: NORMAL
RESPIRATORY SYNCYTIAL VIRUS BY PCR: NOT DETECTED
SARS-COV-2, PCR: DETECTED
SODIUM BLD-SCNC: 147 MEQ/L (ref 135–144)
SPECIFIC GRAVITY UA: 1.02 (ref 1–1.03)
TCO2 ARTERIAL: 17 MMOL/L (ref 21–32)
TOTAL PROTEIN: 6.4 G/DL (ref 6.3–8)
TROPONIN: 0.05 NG/ML (ref 0–0.01)
TSH REFLEX: 0.72 UIU/ML (ref 0.44–3.86)
URINE REFLEX TO CULTURE: ABNORMAL
UROBILINOGEN, URINE: 1 E.U./DL
WBC # BLD: 11.6 K/UL (ref 4.8–10.8)
WBC UA: NORMAL /HPF (ref 0–5)

## 2023-01-04 PROCEDURE — 0202U NFCT DS 22 TRGT SARS-COV-2: CPT

## 2023-01-04 PROCEDURE — 2500000003 HC RX 250 WO HCPCS: Performed by: EMERGENCY MEDICINE

## 2023-01-04 PROCEDURE — 85730 THROMBOPLASTIN TIME PARTIAL: CPT

## 2023-01-04 PROCEDURE — 85610 PROTHROMBIN TIME: CPT

## 2023-01-04 PROCEDURE — 81001 URINALYSIS AUTO W/SCOPE: CPT

## 2023-01-04 PROCEDURE — 85014 HEMATOCRIT: CPT

## 2023-01-04 PROCEDURE — 83880 ASSAY OF NATRIURETIC PEPTIDE: CPT

## 2023-01-04 PROCEDURE — 2000000000 HC ICU R&B

## 2023-01-04 PROCEDURE — 71045 X-RAY EXAM CHEST 1 VIEW: CPT

## 2023-01-04 PROCEDURE — 82565 ASSAY OF CREATININE: CPT

## 2023-01-04 PROCEDURE — 85025 COMPLETE CBC W/AUTO DIFF WBC: CPT

## 2023-01-04 PROCEDURE — 83690 ASSAY OF LIPASE: CPT

## 2023-01-04 PROCEDURE — 80053 COMPREHEN METABOLIC PANEL: CPT

## 2023-01-04 PROCEDURE — 99285 EMERGENCY DEPT VISIT HI MDM: CPT

## 2023-01-04 PROCEDURE — 51701 INSERT BLADDER CATHETER: CPT

## 2023-01-04 PROCEDURE — 84443 ASSAY THYROID STIM HORMONE: CPT

## 2023-01-04 PROCEDURE — 82435 ASSAY OF BLOOD CHLORIDE: CPT

## 2023-01-04 PROCEDURE — 83735 ASSAY OF MAGNESIUM: CPT

## 2023-01-04 PROCEDURE — 6360000002 HC RX W HCPCS: Performed by: EMERGENCY MEDICINE

## 2023-01-04 PROCEDURE — 80162 ASSAY OF DIGOXIN TOTAL: CPT

## 2023-01-04 PROCEDURE — 96375 TX/PRO/DX INJ NEW DRUG ADDON: CPT

## 2023-01-04 PROCEDURE — 87040 BLOOD CULTURE FOR BACTERIA: CPT

## 2023-01-04 PROCEDURE — 36415 COLL VENOUS BLD VENIPUNCTURE: CPT

## 2023-01-04 PROCEDURE — 84484 ASSAY OF TROPONIN QUANT: CPT

## 2023-01-04 PROCEDURE — 70450 CT HEAD/BRAIN W/O DYE: CPT

## 2023-01-04 PROCEDURE — 84132 ASSAY OF SERUM POTASSIUM: CPT

## 2023-01-04 PROCEDURE — 82803 BLOOD GASES ANY COMBINATION: CPT

## 2023-01-04 PROCEDURE — 36600 WITHDRAWAL OF ARTERIAL BLOOD: CPT

## 2023-01-04 PROCEDURE — 96374 THER/PROPH/DIAG INJ IV PUSH: CPT

## 2023-01-04 PROCEDURE — 84295 ASSAY OF SERUM SODIUM: CPT

## 2023-01-04 PROCEDURE — 6370000000 HC RX 637 (ALT 250 FOR IP): Performed by: EMERGENCY MEDICINE

## 2023-01-04 PROCEDURE — 2580000003 HC RX 258: Performed by: EMERGENCY MEDICINE

## 2023-01-04 PROCEDURE — 83605 ASSAY OF LACTIC ACID: CPT

## 2023-01-04 PROCEDURE — 93005 ELECTROCARDIOGRAM TRACING: CPT

## 2023-01-04 PROCEDURE — 82330 ASSAY OF CALCIUM: CPT

## 2023-01-04 RX ORDER — ASPIRIN 300 MG/1
300 SUPPOSITORY RECTAL ONCE
Status: COMPLETED | OUTPATIENT
Start: 2023-01-04 | End: 2023-01-04

## 2023-01-04 RX ORDER — DEXAMETHASONE SODIUM PHOSPHATE 10 MG/ML
6 INJECTION INTRAMUSCULAR; INTRAVENOUS ONCE
Status: COMPLETED | OUTPATIENT
Start: 2023-01-04 | End: 2023-01-04

## 2023-01-04 RX ORDER — 0.9 % SODIUM CHLORIDE 0.9 %
500 INTRAVENOUS SOLUTION INTRAVENOUS ONCE
Status: COMPLETED | OUTPATIENT
Start: 2023-01-04 | End: 2023-01-05

## 2023-01-04 RX ORDER — DILTIAZEM HYDROCHLORIDE 5 MG/ML
10 INJECTION INTRAVENOUS ONCE
Status: COMPLETED | OUTPATIENT
Start: 2023-01-04 | End: 2023-01-04

## 2023-01-04 RX ORDER — LORAZEPAM 2 MG/ML
0.5 INJECTION INTRAMUSCULAR ONCE
Status: COMPLETED | OUTPATIENT
Start: 2023-01-04 | End: 2023-01-04

## 2023-01-04 RX ADMIN — DEXAMETHASONE SODIUM PHOSPHATE 6 MG: 10 INJECTION INTRAMUSCULAR; INTRAVENOUS at 21:04

## 2023-01-04 RX ADMIN — ASPIRIN 300 MG: 300 SUPPOSITORY RECTAL at 23:45

## 2023-01-04 RX ADMIN — DILTIAZEM HYDROCHLORIDE 10 MG: 5 INJECTION INTRAVENOUS at 22:05

## 2023-01-04 RX ADMIN — SODIUM CHLORIDE 500 ML: 9 INJECTION, SOLUTION INTRAVENOUS at 22:03

## 2023-01-04 RX ADMIN — LORAZEPAM 0.5 MG: 2 INJECTION INTRAMUSCULAR; INTRAVENOUS at 23:39

## 2023-01-05 ENCOUNTER — TELEPHONE (OUTPATIENT)
Dept: PALLATIVE CARE | Age: 88
End: 2023-01-05

## 2023-01-05 PROBLEM — Z71.89 ENCOUNTER FOR HOSPICE CARE DISCUSSION: Status: ACTIVE | Noted: 2023-01-01

## 2023-01-05 PROBLEM — R57.9 SHOCK (HCC): Status: ACTIVE | Noted: 2023-01-01

## 2023-01-05 PROBLEM — N17.9 AKI (ACUTE KIDNEY INJURY) (HCC): Status: ACTIVE | Noted: 2023-01-01

## 2023-01-05 LAB
ALBUMIN SERPL-MCNC: 3.1 G/DL (ref 3.5–4.6)
ALP BLD-CCNC: 206 U/L (ref 40–130)
ALT SERPL-CCNC: 20 U/L (ref 0–33)
ANION GAP SERPL CALCULATED.3IONS-SCNC: 18 MEQ/L (ref 9–15)
ANION GAP SERPL CALCULATED.3IONS-SCNC: 20 MEQ/L (ref 9–15)
ANION GAP SERPL CALCULATED.3IONS-SCNC: 25 MEQ/L (ref 9–15)
ANTI-XA UNFRAC HEPARIN: 1.1 IU/ML
ANTI-XA UNFRAC HEPARIN: <0.1 IU/ML
ANTI-XA UNFRAC HEPARIN: <0.1 IU/ML
AST SERPL-CCNC: 31 U/L (ref 0–35)
BASOPHILS ABSOLUTE: 0.1 K/UL (ref 0–0.2)
BASOPHILS RELATIVE PERCENT: 0.7 %
BILIRUB SERPL-MCNC: 0.7 MG/DL (ref 0.2–0.7)
BUN BLDV-MCNC: 100 MG/DL (ref 8–23)
BUN BLDV-MCNC: 104 MG/DL (ref 8–23)
BUN BLDV-MCNC: 98 MG/DL (ref 8–23)
C-REACTIVE PROTEIN: 52.4 MG/L (ref 0–5)
CALCIUM SERPL-MCNC: 8.5 MG/DL (ref 8.5–9.9)
CALCIUM SERPL-MCNC: 8.7 MG/DL (ref 8.5–9.9)
CALCIUM SERPL-MCNC: 8.8 MG/DL (ref 8.5–9.9)
CHLORIDE BLD-SCNC: 107 MEQ/L (ref 95–107)
CHLORIDE BLD-SCNC: 113 MEQ/L (ref 95–107)
CHLORIDE BLD-SCNC: 114 MEQ/L (ref 95–107)
CO2: 17 MEQ/L (ref 20–31)
CO2: 18 MEQ/L (ref 20–31)
CO2: 21 MEQ/L (ref 20–31)
CREAT SERPL-MCNC: 2.4 MG/DL (ref 0.5–0.9)
CREAT SERPL-MCNC: 2.54 MG/DL (ref 0.5–0.9)
CREAT SERPL-MCNC: 2.65 MG/DL (ref 0.5–0.9)
D DIMER: 10.21 MG/L FEU (ref 0–0.5)
D DIMER: 9.65 MG/L FEU (ref 0–0.5)
EKG ATRIAL RATE: 375 BPM
EKG Q-T INTERVAL: 308 MS
EKG QRS DURATION: 80 MS
EKG QTC CALCULATION (BAZETT): 458 MS
EKG R AXIS: -36 DEGREES
EKG T AXIS: 174 DEGREES
EKG VENTRICULAR RATE: 133 BPM
EOSINOPHILS ABSOLUTE: 0 K/UL (ref 0–0.7)
EOSINOPHILS RELATIVE PERCENT: 0 %
GFR SERPL CREATININE-BSD FRML MDRD: 16.9 ML/MIN/{1.73_M2}
GFR SERPL CREATININE-BSD FRML MDRD: 17.8 ML/MIN/{1.73_M2}
GFR SERPL CREATININE-BSD FRML MDRD: 19 ML/MIN/{1.73_M2}
GLOBULIN: 2.9 G/DL (ref 2.3–3.5)
GLUCOSE BLD-MCNC: 112 MG/DL (ref 70–99)
GLUCOSE BLD-MCNC: 115 MG/DL (ref 70–99)
GLUCOSE BLD-MCNC: 128 MG/DL (ref 70–99)
GLUCOSE BLD-MCNC: 157 MG/DL (ref 70–99)
HCT VFR BLD CALC: 32.4 % (ref 37–47)
HEMOGLOBIN: 10.7 G/DL (ref 12–16)
LACTIC ACID, SEPSIS: 2.8 MMOL/L (ref 0.5–1.9)
LACTIC ACID, SEPSIS: 3.7 MMOL/L (ref 0.5–1.9)
LACTIC ACID, SEPSIS: 4.3 MMOL/L (ref 0.5–1.9)
LACTIC ACID, SEPSIS: 7.5 MMOL/L (ref 0.5–1.9)
LV EF: 40 %
LVEF MODALITY: NORMAL
LYMPHOCYTES ABSOLUTE: 0.3 K/UL (ref 1–4.8)
LYMPHOCYTES RELATIVE PERCENT: 2.6 %
MCH RBC QN AUTO: 31.8 PG (ref 27–31.3)
MCHC RBC AUTO-ENTMCNC: 33 % (ref 33–37)
MCV RBC AUTO: 96.5 FL (ref 79.4–94.8)
MONOCYTES ABSOLUTE: 0.3 K/UL (ref 0.2–0.8)
MONOCYTES RELATIVE PERCENT: 2.8 %
NEUTROPHILS ABSOLUTE: 11.5 K/UL (ref 1.4–6.5)
NEUTROPHILS RELATIVE PERCENT: 93.9 %
PDW BLD-RTO: 14.9 % (ref 11.5–14.5)
PERFORMED ON: ABNORMAL
PLATELET # BLD: 220 K/UL (ref 130–400)
POTASSIUM REFLEX MAGNESIUM: 4.2 MEQ/L (ref 3.4–4.9)
POTASSIUM REFLEX MAGNESIUM: 4.5 MEQ/L (ref 3.4–4.9)
POTASSIUM SERPL-SCNC: 3.8 MEQ/L (ref 3.4–4.9)
PROCALCITONIN: 0.18 NG/ML (ref 0–0.15)
RBC # BLD: 3.35 M/UL (ref 4.2–5.4)
REASON FOR REJECTION: NORMAL
REJECTED TEST: NORMAL
SODIUM BLD-SCNC: 149 MEQ/L (ref 135–144)
SODIUM BLD-SCNC: 150 MEQ/L (ref 135–144)
SODIUM BLD-SCNC: 154 MEQ/L (ref 135–144)
TOTAL PROTEIN: 6 G/DL (ref 6.3–8)
TROPONIN: 0.03 NG/ML (ref 0–0.01)
TROPONIN: 0.04 NG/ML (ref 0–0.01)
TROPONIN: 0.04 NG/ML (ref 0–0.01)
WBC # BLD: 12.3 K/UL (ref 4.8–10.8)

## 2023-01-05 PROCEDURE — 36415 COLL VENOUS BLD VENIPUNCTURE: CPT

## 2023-01-05 PROCEDURE — 99223 1ST HOSP IP/OBS HIGH 75: CPT | Performed by: INTERNAL MEDICINE

## 2023-01-05 PROCEDURE — 84145 PROCALCITONIN (PCT): CPT

## 2023-01-05 PROCEDURE — 80053 COMPREHEN METABOLIC PANEL: CPT

## 2023-01-05 PROCEDURE — 99223 1ST HOSP IP/OBS HIGH 75: CPT | Performed by: NURSE PRACTITIONER

## 2023-01-05 PROCEDURE — 85520 HEPARIN ASSAY: CPT

## 2023-01-05 PROCEDURE — 86140 C-REACTIVE PROTEIN: CPT

## 2023-01-05 PROCEDURE — 2500000003 HC RX 250 WO HCPCS

## 2023-01-05 PROCEDURE — 6360000002 HC RX W HCPCS: Performed by: INTERNAL MEDICINE

## 2023-01-05 PROCEDURE — 2580000003 HC RX 258: Performed by: INTERNAL MEDICINE

## 2023-01-05 PROCEDURE — 2500000003 HC RX 250 WO HCPCS: Performed by: INTERNAL MEDICINE

## 2023-01-05 PROCEDURE — 2700000000 HC OXYGEN THERAPY PER DAY

## 2023-01-05 PROCEDURE — 85379 FIBRIN DEGRADATION QUANT: CPT

## 2023-01-05 PROCEDURE — 2580000003 HC RX 258

## 2023-01-05 PROCEDURE — 2000000000 HC ICU R&B

## 2023-01-05 PROCEDURE — 83605 ASSAY OF LACTIC ACID: CPT

## 2023-01-05 PROCEDURE — 51702 INSERT TEMP BLADDER CATH: CPT

## 2023-01-05 PROCEDURE — 84484 ASSAY OF TROPONIN QUANT: CPT

## 2023-01-05 PROCEDURE — 51798 US URINE CAPACITY MEASURE: CPT

## 2023-01-05 PROCEDURE — 99291 CRITICAL CARE FIRST HOUR: CPT | Performed by: INTERNAL MEDICINE

## 2023-01-05 PROCEDURE — 6360000002 HC RX W HCPCS: Performed by: EMERGENCY MEDICINE

## 2023-01-05 PROCEDURE — 93308 TTE F-UP OR LMTD: CPT

## 2023-01-05 PROCEDURE — 85025 COMPLETE CBC W/AUTO DIFF WBC: CPT

## 2023-01-05 RX ORDER — HEPARIN SODIUM 1000 [USP'U]/ML
80 INJECTION, SOLUTION INTRAVENOUS; SUBCUTANEOUS PRN
Status: DISCONTINUED | OUTPATIENT
Start: 2023-01-05 | End: 2023-01-06 | Stop reason: HOSPADM

## 2023-01-05 RX ORDER — GUAIFENESIN/DEXTROMETHORPHAN 100-10MG/5
5 SYRUP ORAL EVERY 4 HOURS PRN
Status: DISCONTINUED | OUTPATIENT
Start: 2023-01-05 | End: 2023-01-06 | Stop reason: HOSPADM

## 2023-01-05 RX ORDER — ONDANSETRON 4 MG/1
4 TABLET, ORALLY DISINTEGRATING ORAL EVERY 8 HOURS PRN
Status: DISCONTINUED | OUTPATIENT
Start: 2023-01-05 | End: 2023-01-06 | Stop reason: HOSPADM

## 2023-01-05 RX ORDER — METOPROLOL TARTRATE 5 MG/5ML
5 INJECTION INTRAVENOUS EVERY 6 HOURS
Status: DISCONTINUED | OUTPATIENT
Start: 2023-01-06 | End: 2023-01-06 | Stop reason: HOSPADM

## 2023-01-05 RX ORDER — 0.9 % SODIUM CHLORIDE 0.9 %
500 INTRAVENOUS SOLUTION INTRAVENOUS ONCE
Status: COMPLETED | OUTPATIENT
Start: 2023-01-05 | End: 2023-01-05

## 2023-01-05 RX ORDER — FAMOTIDINE 20 MG/1
20 TABLET, FILM COATED ORAL DAILY
COMMUNITY

## 2023-01-05 RX ORDER — SODIUM CHLORIDE, SODIUM LACTATE, POTASSIUM CHLORIDE, CALCIUM CHLORIDE 600; 310; 30; 20 MG/100ML; MG/100ML; MG/100ML; MG/100ML
INJECTION, SOLUTION INTRAVENOUS
Status: COMPLETED
Start: 2023-01-05 | End: 2023-01-05

## 2023-01-05 RX ORDER — DEXTROSE AND SODIUM CHLORIDE 5; .45 G/100ML; G/100ML
INJECTION, SOLUTION INTRAVENOUS CONTINUOUS
Status: DISCONTINUED | OUTPATIENT
Start: 2023-01-05 | End: 2023-01-06 | Stop reason: HOSPADM

## 2023-01-05 RX ORDER — POLYETHYLENE GLYCOL 3350 17 G/17G
17 POWDER, FOR SOLUTION ORAL DAILY PRN
Status: DISCONTINUED | OUTPATIENT
Start: 2023-01-05 | End: 2023-01-06 | Stop reason: HOSPADM

## 2023-01-05 RX ORDER — HEPARIN SODIUM 1000 [USP'U]/ML
80 INJECTION, SOLUTION INTRAVENOUS; SUBCUTANEOUS ONCE
Status: COMPLETED | OUTPATIENT
Start: 2023-01-05 | End: 2023-01-05

## 2023-01-05 RX ORDER — SODIUM CHLORIDE, SODIUM LACTATE, POTASSIUM CHLORIDE, AND CALCIUM CHLORIDE .6; .31; .03; .02 G/100ML; G/100ML; G/100ML; G/100ML
500 INJECTION, SOLUTION INTRAVENOUS ONCE
Status: DISCONTINUED | OUTPATIENT
Start: 2023-01-05 | End: 2023-01-06

## 2023-01-05 RX ORDER — DIGOXIN 0.25 MG/ML
125 INJECTION INTRAMUSCULAR; INTRAVENOUS DAILY
Status: DISCONTINUED | OUTPATIENT
Start: 2023-01-05 | End: 2023-01-06 | Stop reason: HOSPADM

## 2023-01-05 RX ORDER — METOPROLOL TARTRATE 5 MG/5ML
5 INJECTION INTRAVENOUS ONCE
Status: COMPLETED | OUTPATIENT
Start: 2023-01-05 | End: 2023-01-05

## 2023-01-05 RX ORDER — ATROPINE SULFATE 0.1 MG/ML
INJECTION INTRAVENOUS
Status: DISPENSED
Start: 2023-01-05 | End: 2023-01-05

## 2023-01-05 RX ORDER — ONDANSETRON 2 MG/ML
4 INJECTION INTRAMUSCULAR; INTRAVENOUS EVERY 6 HOURS PRN
Status: DISCONTINUED | OUTPATIENT
Start: 2023-01-05 | End: 2023-01-06 | Stop reason: HOSPADM

## 2023-01-05 RX ORDER — ACETAMINOPHEN 325 MG/1
650 TABLET ORAL EVERY 6 HOURS PRN
Status: DISCONTINUED | OUTPATIENT
Start: 2023-01-05 | End: 2023-01-06 | Stop reason: HOSPADM

## 2023-01-05 RX ORDER — DEXTROSE MONOHYDRATE 100 MG/ML
INJECTION, SOLUTION INTRAVENOUS CONTINUOUS PRN
Status: DISCONTINUED | OUTPATIENT
Start: 2023-01-05 | End: 2023-01-06 | Stop reason: HOSPADM

## 2023-01-05 RX ORDER — HEPARIN SODIUM 1000 [USP'U]/ML
40 INJECTION, SOLUTION INTRAVENOUS; SUBCUTANEOUS PRN
Status: DISCONTINUED | OUTPATIENT
Start: 2023-01-05 | End: 2023-01-06 | Stop reason: HOSPADM

## 2023-01-05 RX ORDER — SODIUM CHLORIDE, SODIUM LACTATE, POTASSIUM CHLORIDE, AND CALCIUM CHLORIDE .6; .31; .03; .02 G/100ML; G/100ML; G/100ML; G/100ML
500 INJECTION, SOLUTION INTRAVENOUS ONCE
Status: COMPLETED | OUTPATIENT
Start: 2023-01-05 | End: 2023-01-05

## 2023-01-05 RX ORDER — ACETAMINOPHEN 650 MG/1
650 SUPPOSITORY RECTAL EVERY 6 HOURS PRN
Status: DISCONTINUED | OUTPATIENT
Start: 2023-01-05 | End: 2023-01-06 | Stop reason: HOSPADM

## 2023-01-05 RX ORDER — HEPARIN SODIUM 10000 [USP'U]/100ML
5-30 INJECTION, SOLUTION INTRAVENOUS CONTINUOUS
Status: DISCONTINUED | OUTPATIENT
Start: 2023-01-05 | End: 2023-01-06 | Stop reason: HOSPADM

## 2023-01-05 RX ORDER — DIGOXIN 0.25 MG/ML
125 INJECTION INTRAMUSCULAR; INTRAVENOUS 2 TIMES DAILY
Status: CANCELLED | OUTPATIENT
Start: 2023-01-05

## 2023-01-05 RX ORDER — DEXAMETHASONE SODIUM PHOSPHATE 4 MG/ML
6 INJECTION, SOLUTION INTRA-ARTICULAR; INTRALESIONAL; INTRAMUSCULAR; INTRAVENOUS; SOFT TISSUE EVERY 24 HOURS
Status: DISCONTINUED | OUTPATIENT
Start: 2023-01-05 | End: 2023-01-06

## 2023-01-05 RX ORDER — INSULIN LISPRO 100 [IU]/ML
0-8 INJECTION, SOLUTION INTRAVENOUS; SUBCUTANEOUS EVERY 4 HOURS
Status: DISCONTINUED | OUTPATIENT
Start: 2023-01-05 | End: 2023-01-06 | Stop reason: HOSPADM

## 2023-01-05 RX ORDER — SODIUM CHLORIDE, SODIUM LACTATE, POTASSIUM CHLORIDE, AND CALCIUM CHLORIDE .6; .31; .03; .02 G/100ML; G/100ML; G/100ML; G/100ML
500 INJECTION, SOLUTION INTRAVENOUS ONCE
Status: DISCONTINUED | OUTPATIENT
Start: 2023-01-05 | End: 2023-01-05

## 2023-01-05 RX ADMIN — SODIUM CHLORIDE 500 ML: 9 INJECTION, SOLUTION INTRAVENOUS at 02:50

## 2023-01-05 RX ADMIN — HEPARIN SODIUM 4750 UNITS: 1000 INJECTION INTRAVENOUS; SUBCUTANEOUS at 16:08

## 2023-01-05 RX ADMIN — HEPARIN SODIUM 18 UNITS/KG/HR: 10000 INJECTION, SOLUTION INTRAVENOUS at 02:32

## 2023-01-05 RX ADMIN — SODIUM CHLORIDE, SODIUM LACTATE, POTASSIUM CHLORIDE, AND CALCIUM CHLORIDE 500 ML: .6; .31; .03; .02 INJECTION, SOLUTION INTRAVENOUS at 06:15

## 2023-01-05 RX ADMIN — HEPARIN SODIUM 23 UNITS/KG/HR: 10000 INJECTION, SOLUTION INTRAVENOUS at 20:17

## 2023-01-05 RX ADMIN — SODIUM CHLORIDE 0.2 MCG/KG/HR: 9 INJECTION, SOLUTION INTRAVENOUS at 02:10

## 2023-01-05 RX ADMIN — Medication 5 MCG/MIN: at 04:35

## 2023-01-05 RX ADMIN — SODIUM CHLORIDE 0.2 MCG/KG/HR: 9 INJECTION, SOLUTION INTRAVENOUS at 23:28

## 2023-01-05 RX ADMIN — SODIUM CHLORIDE, POTASSIUM CHLORIDE, SODIUM LACTATE AND CALCIUM CHLORIDE 500 ML: 600; 310; 30; 20 INJECTION, SOLUTION INTRAVENOUS at 08:34

## 2023-01-05 RX ADMIN — SODIUM CHLORIDE, SODIUM LACTATE, POTASSIUM CHLORIDE, AND CALCIUM CHLORIDE 500 ML: .6; .31; .03; .02 INJECTION, SOLUTION INTRAVENOUS at 04:19

## 2023-01-05 RX ADMIN — HEPARIN SODIUM 4750 UNITS: 1000 INJECTION INTRAVENOUS; SUBCUTANEOUS at 20:18

## 2023-01-05 RX ADMIN — DEXAMETHASONE SODIUM PHOSPHATE 6 MG: 4 INJECTION, SOLUTION INTRAMUSCULAR; INTRAVENOUS at 10:44

## 2023-01-05 RX ADMIN — DIGOXIN 125 MCG: 0.25 INJECTION INTRAMUSCULAR; INTRAVENOUS at 20:08

## 2023-01-05 RX ADMIN — SODIUM CHLORIDE, POTASSIUM CHLORIDE, SODIUM LACTATE AND CALCIUM CHLORIDE 500 ML: 600; 310; 30; 20 INJECTION, SOLUTION INTRAVENOUS at 04:19

## 2023-01-05 RX ADMIN — DILTIAZEM HYDROCHLORIDE 5 MG/HR: 5 INJECTION, SOLUTION INTRAVENOUS at 02:51

## 2023-01-05 RX ADMIN — HEPARIN SODIUM 4750 UNITS: 1000 INJECTION INTRAVENOUS; SUBCUTANEOUS at 02:30

## 2023-01-05 RX ADMIN — DEXTROSE AND SODIUM CHLORIDE: 5; 450 INJECTION, SOLUTION INTRAVENOUS at 10:49

## 2023-01-05 RX ADMIN — METOPROLOL TARTRATE 5 MG: 5 INJECTION INTRAVENOUS at 18:00

## 2023-01-05 ASSESSMENT — PAIN SCALES - PAIN ASSESSMENT IN ADVANCED DEMENTIA (PAINAD)
BREATHING: 1
NEGVOCALIZATION: 1
CONSOLABILITY: 1
TOTALSCORE: 4
FACIALEXPRESSION: 1
CONSOLABILITY: 1
FACIALEXPRESSION: 1
NEGVOCALIZATION: 1
CONSOLABILITY: 1
BODYLANGUAGE: 0
CONSOLABILITY: 1
BREATHING: 0
FACIALEXPRESSION: 1
CONSOLABILITY: 1
FACIALEXPRESSION: 1
BREATHING: 0
CONSOLABILITY: 1
NEGVOCALIZATION: 1
FACIALEXPRESSION: 1
BREATHING: 1
BODYLANGUAGE: 0
FACIALEXPRESSION: 1
FACIALEXPRESSION: 1
BODYLANGUAGE: 0
TOTALSCORE: 3
NEGVOCALIZATION: 1
FACIALEXPRESSION: 1
TOTALSCORE: 3
FACIALEXPRESSION: 1
NEGVOCALIZATION: 1
FACIALEXPRESSION: 1
FACIALEXPRESSION: 1
BODYLANGUAGE: 0
TOTALSCORE: 4
TOTALSCORE: 4
BODYLANGUAGE: 0
FACIALEXPRESSION: 1
CONSOLABILITY: 1
NEGVOCALIZATION: 2
BREATHING: 1
CONSOLABILITY: 1
NEGVOCALIZATION: 1
BODYLANGUAGE: 0
BODYLANGUAGE: 0
BREATHING: 1
NEGVOCALIZATION: 1
NEGVOCALIZATION: 2
BREATHING: 1
NEGVOCALIZATION: 1
BODYLANGUAGE: 0
BODYLANGUAGE: 0
NEGVOCALIZATION: 1
NEGVOCALIZATION: 1
TOTALSCORE: 6
TOTALSCORE: 4
BODYLANGUAGE: 0
TOTALSCORE: 4
CONSOLABILITY: 1
NEGVOCALIZATION: 2
TOTALSCORE: 4
CONSOLABILITY: 1
NEGVOCALIZATION: 2
FACIALEXPRESSION: 2
BREATHING: 0
FACIALEXPRESSION: 2
BODYLANGUAGE: 0
BODYLANGUAGE: 0
CONSOLABILITY: 1
CONSOLABILITY: 1
TOTALSCORE: 4
BODYLANGUAGE: 0
BREATHING: 1
BREATHING: 1
BODYLANGUAGE: 0
BREATHING: 1
BREATHING: 1
BODYLANGUAGE: 0
TOTALSCORE: 3
BREATHING: 0
CONSOLABILITY: 1
CONSOLABILITY: 1
TOTALSCORE: 6
BREATHING: 1
TOTALSCORE: 6
TOTALSCORE: 6
NEGVOCALIZATION: 1
NEGVOCALIZATION: 1
TOTALSCORE: 3
BODYLANGUAGE: 0
FACIALEXPRESSION: 2
CONSOLABILITY: 1
BREATHING: 1
TOTALSCORE: 4
CONSOLABILITY: 1
BREATHING: 1
FACIALEXPRESSION: 2

## 2023-01-05 ASSESSMENT — PAIN SCALES - GENERAL
PAINLEVEL_OUTOF10: 0

## 2023-01-05 NOTE — CONSULTS
Pulmonary and Critical Care Medicine  Consult Note  Encounter Date: 2023 8:31 AM    Ms. Maddie Sanabria is a 80 y.o. female  : 1935  Requesting Provider: Alberto Hester MD    Reason for request: ICU management            HISTORY OF PRESENT ILLNESS:    Patient is 80 y.o. presents with worsening shortness of breath, patient has dementia, she is awake, complains of being cold otherwise could not provide any meaningful history. Per notes patient has history of pelvic fracture, subsequently she was placed at [facility], she tested positive for COVID yesterday, reported shortness of breath, she has dementia, per family her mental status did not come back to baseline since the fall. She was noted to be in respiratory distress yesterday, with decreased saturation, she is on multiple pain medication. Patient is currently on Levophed at 5 mcg/min, family declined central line  On 3 L O2 saturation 94%. Past Medical History:        Diagnosis Date    Actinic keratoses     Arthritis     Dermatitis     Encounter for annual health examination 2011    Endometrial cancer [facility])     s/p hyst in  - was told clear by Dr. Rodri Barrios    History of bone density study 2011    History of mammography, screening 2011    HTN (hypertension)     Hyperlipidemia     Prurigo nodularis 2017    left lower leg    Sciatica     Vitamin D deficiency        Past Surgical History:        Procedure Laterality Date    CATARACT REMOVAL WITH IMPLANT      HYSTERECTOMY (CERVIX STATUS UNKNOWN)  2009    post-menopausal bleeding       Social History:     reports that she has never smoked. She has never used smokeless tobacco. She reports that she does not drink alcohol and does not use drugs.     Family History:       Problem Relation Age of Onset    Coronary Art Dis Father     Other Father         glaucoma    Coronary Art Dis Brother        Allergies:  Bactrim [sulfamethoxazole-trimethoprim], Benazepril, Codeine, and Sulfa antibiotics        MEDICATIONS during current hospitalization:    Continuous Infusions:   [Held by provider] dilTIAZem Stopped (01/05/23 1249)    heparin (PORCINE) Infusion 18 Units/kg/hr (01/05/23 0736)    [Held by provider] dexmedetomidine (PRECEDEX) IV infusion Stopped (01/05/23 0603)    norepinephrine 5 mcg/min (01/05/23 0736)       Scheduled Meds:   atropine        lactated ringers bolus  500 mL IntraVENous Once    lactated ringers bolus  500 mL IntraVENous Once       PRN Meds:ondansetron **OR** ondansetron, polyethylene glycol, acetaminophen **OR** acetaminophen, heparin (porcine), heparin (porcine), guaiFENesin-dextromethorphan        REVIEW OF SYSTEMS:   Not obtainable due to confusion    PHYSICAL EXAM:    Vitals:  BP (!) 135/57   Pulse 92   Temp 97.8 °F (36.6 °C) (Oral)   Resp 19   Ht 5' 8\" (1.727 m)   Wt 131 lb (59.4 kg)   LMP  (LMP Unknown)   SpO2 95%   BMI 19.92 kg/m²     General: Pleasantly confused  Head: normocephalic, atraumatic  Eyes:No gross abnormalities. ENT:  MMM no lesions  Neck:  supple and no masses  Chest : Diminished air movement, no wheezing, no rales, nontender, tympanic  Heart[de-identified] Heart sounds are normal.  Regular rate and rhythm without murmur, gallop or rub. ABD:  symmetric, soft, non-tender, no guarding or rebound  Musculoskeletal : no cyanosis, no clubbing, and no edema  Neuro:   Confused, moves all 4 extremities  Skin: No rashes or nodules noted.   Lymph node:  no cervical nodes  Urology: Yes Lam   Psychiatric: appropriate        Data Review  Recent Labs     01/03/23 0724 01/04/23 2057 01/05/23  0705   WBC 10.6 11.6* 12.3*   HGB 11.1* 10.9*  10.9* 10.7*   HCT 32.5* 32.8* 32.4*    251 220      Recent Labs     01/03/23  0724 01/04/23  1945 01/04/23 2057 01/04/23 2113 01/05/23  0221   * 147*  --   --  149*   K 4.0 4.4  --   --  3.8    109*  --   --  107   CO2 20 20  --   --  17*   BUN 89* 104*  --   --  100*   CREATININE 2.30* 2.57* 2.7*  --  2.65* GLUCOSE 95 119*  --  122 112*       MV Settings: ABGs:   Recent Labs     01/04/23 2057   PHART 7.671*   XSW6DWQ 14*   PO2ART 53*   YWL4DIJ 16.0*   BEART -4*   L3NNAJSV 95*   XOT2JZU 17*     O2 Device: Nasal cannula  O2 Flow Rate (L/min): 3 L/min  Lab Results   Component Value Date/Time    LACTTORIBIO 2.7 01/04/2023 07:45 PM       Radiology  I personally reviewed imaging studies and no acute infiltrate        Assessment, plan: This is a critically ill patient at risk of deterioration / death , needing close ICU monitoring and intervention due to below noted problems       Shock, etiology is not clear need to rule out obstructive etiology  COVID-19 infection, no infiltrate on chest x-ray  Sepsis, secondary to COVID-19 infection rule out bacterial coinfection  TOMMY  Increased troponin, likely demand ischemia  Hypernatremia     Recommendation  Continue Levophed target mean arterial pressure 65-70  Central line if family agrees  If needed will consider Precedex due to agitation  ECHO   Bilateral lower extremity ultrasound   Continue heparin drip  Watch volume status avoid overload  Gentle hydration  Corpak if unable to tolerate oral diet  Target blood sugar 140-180  Monitor blood sugar  BMP at 6 PM today, adjust IV fluid if needed  Check procalcitonin    Thank you for consultation      Due to the immediate potential for life-threatening deterioration due to shock, I spent 35 minutes providing critical care. This time is excluding time spent performing procedures.     Electronically signed by America Mckeon MD, [facility],  on 1/5/2023 at 8:31 AM

## 2023-01-05 NOTE — PROGRESS NOTES
Hospitalist Progress Note      PCP: Kat Anderson, APRN - CNP    Date of Admission: 1/4/2023    Chief Complaint:  patient remains hypotensive requiring Norepinephrine support, off Cardizem and Precedex infusion, on 2-3 liters of O2    Medications:  Reviewed    Infusion Medications    [Held by provider] dilTIAZem Stopped (01/05/23 0605)    heparin (PORCINE) Infusion 18 Units/kg/hr (01/05/23 0736)    [Held by provider] dexmedetomidine (PRECEDEX) IV infusion Stopped (01/05/23 0603)    norepinephrine 5 mcg/min (01/05/23 0736)     Scheduled Medications    atropine        lactated ringers bolus  500 mL IntraVENous Once    lactated ringers bolus  500 mL IntraVENous Once     PRN Meds: ondansetron **OR** ondansetron, polyethylene glycol, acetaminophen **OR** acetaminophen, heparin (porcine), heparin (porcine), guaiFENesin-dextromethorphan      Intake/Output Summary (Last 24 hours) at 1/5/2023 0759  Last data filed at 1/5/2023 0736  Gross per 24 hour   Intake 1625.3 ml   Output 1250 ml   Net 375.3 ml       Exam:    BP (!) 135/57   Pulse 92   Temp 97.8 °F (36.6 °C) (Oral)   Resp 19   Ht 5' 8\" (1.727 m)   Wt 131 lb (59.4 kg)   LMP  (LMP Unknown)   SpO2 95%   BMI 19.92 kg/m²     General appearance: confused, ill-appearing. Respiratory:  diminished bilaterally. Cardiovascular: irregular rate and rhythm, S1/S2. Abdomen: Soft, active bowel sounds. Musculoskeletal: No edema bilaterally.          Labs:   Recent Labs     01/03/23 0724 01/04/23 2057 01/05/23 0705   WBC 10.6 11.6* 12.3*   HGB 11.1* 10.9*  10.9* 10.7*   HCT 32.5* 32.8* 32.4*    251 220     Recent Labs     01/03/23  0724 01/04/23 1945 01/04/23 2057 01/05/23 0221   * 147*  --  149*   K 4.0 4.4  --  3.8    109*  --  107   CO2 20 20  --  17*   BUN 89* 104*  --  100*   CREATININE 2.30* 2.57* 2.7* 2.65*   CALCIUM 8.7 8.7  --  8.8     Recent Labs     01/04/23 1945   AST 35   ALT 22   BILITOT 0.8*   ALKPHOS 231*     Recent Labs 01/04/23  2155   INR 1.2     Recent Labs     01/04/23  1945 01/04/23  2215 01/05/23  0221   TROPONINI 0.046* 0.033* 0.045*       Urinalysis:      Lab Results   Component Value Date/Time    NITRU Negative 01/04/2023 07:45 PM    WBCUA 0-2 01/04/2023 07:45 PM    BACTERIA Negative 01/04/2023 07:45 PM    RBCUA 0-2 01/04/2023 07:45 PM    BLOODU Negative 01/04/2023 07:45 PM    SPECGRAV 1.017 01/04/2023 07:45 PM    GLUCOSEU Negative 01/04/2023 07:45 PM       Radiology:  CT HEAD WO CONTRAST   Final Result   1. There is no acute intracranial abnormality. Specifically, there is no   intracranial hemorrhage. 2. Atrophy and periventricular leukomalacia,         XR CHEST PORTABLE   Final Result   Stable nonacute chest.                 Assessment/Plan:    81 y/o female with history of HTN, lacunar CVAs, dementia, OA who was recently managed at [facility] [place] for new onset PAF and pelvic fractures s/p fall who presented from NH with:     Septic shock with hypotension, leukocytosis and lactic acidosis  - in the setting of COVID infection  - requiring Norepinephrine support  - lactate is improving with fluid boluses  - continue Decadron  - follow blood cultures  - further management per critical care service    AFIB with RVR / elevated troponins  - requiring Cardizem infusion  - ECG showed TWI in inferior and anterolateral leads  - recent TTE showed LVEF of 40-45%, DD  - on Heparin infusion  - resume home meds when patient able to take PO  - cardiology consulted  - monitor on telemetry    Acute encephalopathy  - in the setting of COVID infection, underlying dementia  - requiring restraints and Precedex infusion  - CT head was negative  - monitor mental status    Elevated D-dimer  - in the setting of COVID infection  - no CTA of chest due to TOMMY  - on Heparin infusion    TOMMY  - continue IVFs  - monitor renal function    Hypernatremia   - follow trend       Diet: Diet NPO Exceptions are: Other (Specify);  Specify Other Exceptions: oral swabs    Code Status: DNR-CCA / DNI, hospice consulted, palliative care following          Electronically signed by Norma Jackson MD on 1/5/2023 at 7:59 AM

## 2023-01-05 NOTE — ED PROVIDER NOTES
[address] [facility] ED  EMERGENCY DEPARTMENT ENCOUNTER      Pt Name: aMrah Murphy  MRN: [id number]  [place] 1935  Date of evaluation: 1/4/2023  Provider: Jared Goldberg MD    CHIEF COMPLAINT       Chief Complaint   Patient presents with    Respiratory Distress         HISTORY OF PRESENT ILLNESS   (Location/Symptom, Timing/Onset, Context/Setting, Quality, Duration, Modifying Factors, Severity)  Note limiting factors. Marah Murphy is a 80 y.o. female who presents to the emergency department   Via EMS for shortness of breath. History of atrial fibrillation on aspirin and digoxin, dementia, hypertension, hyperlipidemia. Was reported diagnosed with COVID yesterday at [facility]. Home O2 dependent. History comes from EMS per her facility. Patient was reportedly having difficulty breathing, currently on 4 L nasal cannula. UNKNOWN LKW, UNKOWN BASELINE  Patient is awake and alert to person. Can follow simple commands. Secondary to dementia unable to get further history, patient unreliable historian. Only stating her name. HPI  Chart review shows patient was recently admitted secondary to dementia, UTI, pelvic fractures, evaluated by cardiology for atrial fibrillation-not on anticoagulation  Per paperwork from facility patient is a full code  Nursing Notes were reviewed. REVIEW OF SYSTEMS    (2-9 systems for level 4, 10 or more for level 5)     Review of Systems   Unable to perform ROS: Dementia     Except as noted above the remainder of the review of systems was reviewed and negative.        PAST MEDICAL HISTORY     Past Medical History:   Diagnosis Date    Actinic keratoses     Arthritis     Dermatitis     Encounter for annual health examination 12/19/2011    Endometrial cancer [facility])     s/p hyst in 2009 - was told clear by Dr. 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    Sciatica     Vitamin D deficiency          SURGICAL HISTORY       Past Surgical History:   Procedure Laterality Date    CATARACT REMOVAL WITH IMPLANT      HYSTERECTOMY (CERVIX STATUS UNKNOWN)  9/2009    post-menopausal bleeding         CURRENT MEDICATIONS       Previous Medications    ASPIRIN 81 MG EC TABLET    Take 1 tablet by mouth daily    BISACODYL (DULCOLAX) 5 MG EC TABLET    Take 1 tablet by mouth daily as needed for Constipation    CHOLECALCIFEROL (VITAMIN D) 2000 UNITS CAPS CAPSULE    Take 2,000 Units by mouth daily Indications: Nutritional Support    DIGOXIN (LANOXIN) 125 MCG TABLET    Take 1 tablet by mouth daily    DONEPEZIL (ARICEPT) 5 MG TABLET    Take 1 tablet by mouth nightly    HYDROCODONE-ACETAMINOPHEN (NORCO) 5-325 MG PER TABLET    Take 1 tablet by mouth 3 times daily for 14 days. Intended supply: 7 days.  Take lowest dose possible to manage pain Max Daily Amount: 3 tablets    LOSARTAN (COZAAR) 25 MG TABLET    Take 1 tablet by mouth daily    METOPROLOL TARTRATE (LOPRESSOR) 50 MG TABLET    Take 1 tablet by mouth 2 times daily    MULTIPLE VITAMIN (MULTI-VITAMIN DAILY PO)    Take 1 tablet by mouth daily Indications: Nutritional Support    ROSUVASTATIN (CRESTOR) 40 MG TABLET    Take 1 tablet by mouth nightly    TURMERIC PO    Take 1 capsule by mouth daily Indications: Nutritional Support       ALLERGIES     Bactrim [sulfamethoxazole-trimethoprim], Benazepril, Codeine, and Sulfa antibiotics    FAMILY HISTORY       Family History   Problem Relation Age of Onset    Coronary Art Dis Father     Other Father         glaucoma    Coronary Art Dis Brother           SOCIAL HISTORY       Social History     Socioeconomic History    Marital status:    Tobacco Use    Smoking status: Never    Smokeless tobacco: Never   Substance and Sexual Activity    Alcohol use: No    Drug use: No     Social Determinants of Health     Financial Resource Strain: Low Risk     Difficulty of Paying Living Expenses: Not hard at all   Food Insecurity: No Food Insecurity    Worried About 1505 lettrs in the Last Year: Never true    Ran Out of Food in the Last Year: Never true       SCREENINGS         [place] Coma Scale  Eye Opening: Spontaneous  Best Verbal Response: Confused  Best Motor Response: Localizes pain  Sanjana Coma Scale Score: 13                     CIWA Assessment  BP: 88/73  Heart Rate: (!) 137                 PHYSICAL EXAM    (up to 7 for level 4, 8 or more for level 5)     ED Triage Vitals   BP Temp Temp src Pulse Resp SpO2 Height Weight   -- -- -- -- -- -- -- --       Physical Exam  Vitals reviewed. Constitutional:       Appearance: She is ill-appearing. She is not toxic-appearing or diaphoretic. Comments: Patient laying in bed, moaning. Nasal cannula in place. She will open her eyes on command, tracking me, follow simple commands such as squeezing with the right hand and lifting her right arm up. She will not squeeze on the left or lift her legs out of bed. She has no signs of acute head injury. No chest wall deformity or tenderness. No midline vertebral tenderness or step-off. Pelvis stable. Able to wiggle bilateral toes, sensation intact, slightly withdraws from noxious stimuli lower extremities. HENT:      Head: Normocephalic and atraumatic. Nose: Nose normal.      Mouth/Throat:      Mouth: Mucous membranes are dry. Eyes:      General: No scleral icterus. Extraocular Movements: Extraocular movements intact. Pupils: Pupils are equal, round, and reactive to light. Cardiovascular:      Rate and Rhythm: Tachycardia present. Heart sounds: No friction rub. Comments: Irregularly irregular  Pulmonary:      Effort: Pulmonary effort is normal.      Breath sounds: No stridor. No wheezing. Comments: Protecting airway, controlling secretions. Slightly diminished breath sounds  Abdominal:      General: There is no distension. Tenderness:  There is no abdominal tenderness. There is no guarding or rebound. Musculoskeletal:         General: No tenderness. Cervical back: No tenderness. Right lower leg: No edema. Left lower leg: No edema. Skin:     Capillary Refill: Capillary refill takes less than 2 seconds. Findings: No rash. Neurological:      Mental Status: She is alert. Motor: Weakness present.    Psychiatric:      Comments: Confused, oriented to person, not to place, time or situation       DIAGNOSTIC RESULTS     EKG: All EKG's are interpreted by the Emergency Department Physician who either signs or Co-signs this chart in the absence of a cardiologist.    Atrial fibrillation RVR, rate 153, left axis deviation, Qtc 408, diffuse ST and T wave change, possibly rate related  SIMILAR TO PREVIOUS    RADIOLOGY:   Non-plain film images such as CT, Ultrasound and MRI are read by the radiologist. Plain radiographic images are visualized and preliminarily interpreted by the emergency physician with the below findings:    No acute intracranial abnormality    Interpretation per the Radiologist below, if available at the time of this note:    XR CHEST PORTABLE   Final Result   Stable nonacute chest.         CT HEAD WO CONTRAST    (Results Pending)         ED BEDSIDE ULTRASOUND:   Performed by ED Physician - none    LABS:  Labs Reviewed   RESPIRATORY PANEL, MOLECULAR, WITH COVID-19 - Abnormal; Notable for the following components:       Result Value    SARS-CoV-2, PCR DETECTED (*)     All other components within normal limits    Narrative:     CALL  [facility] tel. [phone], NOT NEEDED   COMPREHENSIVE METABOLIC PANEL - Abnormal; Notable for the following components:    Sodium 147 (*)     Chloride 109 (*)     Anion Gap 18 (*)     Glucose 119 (*)      (*)     Creatinine 2.57 (*)     Est, Glom Filt Rate 17.5 (*)     Total Bilirubin 0.8 (*)     Alkaline Phosphatase 231 (*)     All other components within normal limits    Narrative: CALL  [facility] tel. [phone],  Chemistry results called to and read back by kalin, 01/04/2023 21:27, by Leora [address] - Abnormal; Notable for the following components:    Magnesium 3.2 (*)     All other components within normal limits    Narrative:     Selam Maharaj tel. [phone],  Chemistry results called to and read back by kalin, 01/04/2023 21:27, by VAISHALI   TROPONIN - Abnormal; Notable for the following components:    Troponin 0.046 (*)     All other components within normal limits    Narrative:     CALL  [facility] tel. [phone],  Chemistry results called to and read back by kalin, 01/04/2023 21:27, by [address]  Chemistry results called to and read back by kalin, 01/04/2023 21:27, by VAISHALI   LACTIC ACID - Abnormal; Notable for the following components:    Lactic Acid 2.7 (*)     All other components within normal limits   URINALYSIS WITH REFLEX TO CULTURE - Abnormal; Notable for the following components:    Clarity, UA CLOUDY (*)     Protein, UA 30 (*)     All other components within normal limits   CBC WITH AUTO DIFFERENTIAL - Abnormal; Notable for the following components:    WBC 11.6 (*)     RBC 3.44 (*)     Hemoglobin 10.9 (*)     Hematocrit 32.8 (*)     MCV 95.3 (*)     MCH 31.6 (*)     RDW 14.6 (*)     Neutrophils Absolute 10.0 (*)     Lymphocytes Absolute 0.7 (*)     All other components within normal limits   PROTIME-INR - Abnormal; Notable for the following components:    Protime 15.4 (*)     All other components within normal limits   POCT ARTERIAL - Abnormal; Notable for the following components:    POC Sodium 150 (*)     POC Chloride 123 (*)     POC Glucose 122 (*)     POC Creatinine 2.7 (*)     Est, Glom Filt Rate 17 (*)     Calcium, Ionized 1.05 (*)     pH, Arterial 7.671 (*)     pCO2, Arterial 14 (*)     pO2, Arterial 53 (*)     HCO3, Arterial 16.0 (*)     Base Excess, Arterial -4 (*)     O2 Sat, Arterial 95 (*)     TCO2, Arterial 17 (*)     Lactate 3.05 (*)     POC Hematocrit 32 (*) Hemoglobin 10.9 (*)     All other components within normal limits   POCT GLUCOSE - Normal   POCT CREATININE - Normal   CULTURE, BLOOD 1   CULTURE, BLOOD 1   LIPASE    Narrative:     [facility] Solaress tel. [phone],  Chemistry results called to and read back by kalin, 01/04/2023 21:27, by [address]    Narrative:     Leiamary Zhoudenzel tel. [phone],  Chemistry results called to and read back by kalin, 01/04/2023 21:27, by [address]  Chemistry results called to and read back by kalin, 01/04/2023 21:27, by [address]   TSH WITH REFLEX    Narrative:     Leiamary Zhoudenzel tel. [phone],  Chemistry results called to and read back by kalin, 01/04/2023 21:27, by [address]  Chemistry results called to and read back by kalin, 01/04/2023 21:27, by [address]   APTT   TROPONIN   POCT EPOC BLOOD GAS, LACTIC ACID, ICA       All other labs were within normal range or not returned as of this dictation. EMERGENCY DEPARTMENT COURSE and DIFFERENTIAL DIAGNOSIS/MDM:   Vitals:    Vitals:    01/04/23 1919 01/04/23 2056 01/04/23 2130 01/04/23 2230   BP: 106/78  (!) 112/56 88/73   Pulse: (!) 117 (!) 138 (!) 124 (!) 137   Resp:  26 18 (!) 37   Temp: (!) 96.1 °F (35.6 °C)      TempSrc: Temporal      SpO2: 100%      Weight: 131 lb (59.4 kg)      Height: 5' 8\" (1.727 m)            No significant leukocytosis. Chronic anemia noted. Patient ABG shows hyperventilation, no resp acidemia. This was done on room air, placed on nasal cannula, saturating well on nasal cannula. No initial indication for BiPAP or high flow. No transaminitis. Elevated troponin, this may possibly be secondary to atrial fibrillation RVR and rate, elevated BNP and renal insufficiency. Will trend troponin, she denies chest pain currently, no absolute indication for emergent cardiac intervention. Medical Decision Making  Amount and/or Complexity of Data Reviewed  Labs: ordered.   Radiology: ordered. ECG/medicine tests: ordered. Risk  Prescription drug management. Decision regarding hospitalization. Patient presents to the emergency department with shortness of breath, noted to be hypoxic, placed on nasal cannula, requiring a diltiazem for rate control for known atrial fibrillation, RVR.    2 daughters and  present to the emergency department. Lengthy discussion. They see her at bedside. They state that they have been having conversations about her and with her given her recent decompensation, dementia. They voiced that they would like her to be a DNR CCA, no invasive management, CPR or intubation. They currently request that we do Kelvin Gale hospitalist accepts to ICU  Patient agitated, family request medication administration    CONSULTS:  None    PROCEDURES:  Unless otherwise noted below, none     Procedures      FINAL IMPRESSION      1. TOMMY (acute kidney injury) ([facility] [address]    2. Acute respiratory failure with hypoxia (HCC)    3. Elevated troponin    4. Atrial fibrillation with RVR ([facility] [address]    5. COVID          DISPOSITION/PLAN   DISPOSITION Admitted 01/04/2023 10:44:19 PM      PATIENT REFERRED TO:  No follow-up provider specified. DISCHARGE MEDICATIONS:  New Prescriptions    No medications on file     Controlled Substances Monitoring:     No flowsheet data found.     (Please note that portions of this note were completed with a voice recognition program.  Efforts were made to edit the dictations but occasionally words are mis-transcribed.)    Judy Balbuena MD (electronically signed)  Attending Emergency Physician            Judy Balbuena MD  01/04/23 6539

## 2023-01-05 NOTE — CARE COORDINATION
Quality round completed with care management team. Pt currently in isolation because pt tested positive with COVID-19.   LSW spoke with JAQUELIN Nguyen. Per Wendy pt is able to come back to [facility] when medically cleared.   LSW spoke with pt's daughter Rosa. Rosa agree with DC plan returning to [facility] when medically cleared.     LSW will follow.     Electronically signed by GAVINO Maher on 1/5/2023 at 10:13 AM    LSW was notify by Siria NP that pt's daughter would like a Hospice informational meeting. LSW called Daughter, Rosa to offer freedom of choice.   Rosa is good with [facility].     LSW will follow.     Electronically signed by GAVINO Maher on 1/5/2023 at 10:45 AM

## 2023-01-05 NOTE — PROGRESS NOTES
Patient remains fluid responsive but has needed Levophed. I called family for consent for central line who declined any further procedures and want to just continue with fluid resuscitation and pressor support peripherally for the time being. Family notes that the patient apparently has had a poor outlook since she broke her hip on the 15th and family think she is given up family will meet today to discuss goals of care. Precedex and Cardizem held for bradycardia patient remains on a Levophed infusion along with a heparin drip and IV fluid resuscitation.

## 2023-01-05 NOTE — PROGRESS NOTES
01/05/23    From:   JAQUELIN for skilled but no precert needed, OK with Covid      Admit: covid 19       PMH:htn, hld, 2009 cleared of endometrial ca      Anticipated Discharge Disposition: [facility] skilled, they are watching her      Patient Mobility or PT/OT ordered:    Consults: Pall Care, Cardio, CC      Clinical: CHEST VEST RT, 3L O2, LEVO AND  HEP GTT      Barriers to Discharge: ICU NEEDS      Assessments: NH NOTES

## 2023-01-05 NOTE — PROGRESS NOTES
PHARMACY NOTE:   ICU Rounds Attended (10-15 minutes in patient room):    Pt diagnosis: COVID    IV Fluids: none   Renal:   Recent Labs     01/04/23 2057 01/05/23 0221 01/05/23  0705   CREATININE 2.7* 2.65* 2.54*    Estimated Creatinine Clearance: 15 mL/min (A) (based on SCr of 2.54 mg/dL (H)).         Antimicrobial Therapy:   none    Recent Labs     01/03/23 0724 01/04/23 2057 01/05/23 0705   WBC 10.6 11.6* 12.3*     Pressors:   norepinephrine  OFF on rounds    Insulin Therapy (goal: 140-180): added per below    Recent Labs     01/04/23 2113 01/05/23 0221 01/05/23 0705   GLUCOSE 122 112* 157*       Steroid Therapy: decadron day 2/10  Stress Ulcer Prophylaxis:   none    DVT Prophylaxis/Anticoagulant Therapy: heparin drip for elevated d-dimer  Recent Labs     01/03/23 0724 01/04/23 2057 01/05/23 0705    251 220     Recent Labs     01/04/23  2155   INR 1.2       Bowel Regimen:   Miralax prn    Follow up/Changes:   -D5/0.45 @ 75 cc/hr added per verbal  -medium SSI q4h added per verbal  -dc cardizem and precedex that are held per verbal    -follow up SUP  -follow up home meds from [facility]

## 2023-01-05 NOTE — CONSULTS
Palliative Care Consult Note  Patient: Camelia Eng  Gender: female  YOB: 1935  Unit/Bed: IC05/IC05-01  CodeStatus: DNR-CCA  Inpatient Treatment Team: Treatment Team: Attending Provider: Brandon Palacios MD; Consulting Physician: Di Moreau DO; Consulting Physician: Cordella Cowden, MD; Registered Nurse: Nel Turk RN; : Mary Rodriguez RN; Utilization Reviewer: Melina Leyav RN  Admit Date:  1/4/2023    Chief Complaint: Shortness of breath    History of Presenting Illness:      Camelia Eng is a 80 y.o. female on hospital day 1 with a history of new onset afib, recent pelvic fracture, OA, endometrial cancer in remission, HTN, HLD. Patient presented to the ER from [facility] with complaints of SOB and new COVID diagnosis. Patient was at [facility] s/p fall with pelvic fracture. Patient was admitted for sepsis secondary to COVID-19. Palliative care consulted for goals of care, code status discussion, and family support. Per notes, patient has not been able to return to her baseline functional status which was up independent. She also has not been eating well. Patient is currently alert and oriented to person and month only. Denies pain or SOB. Currently on RA. On heparin gtt. Spoke with daughter, Shabbir Grissom. She reports family has considered hospice, but they still need time to make a decision. Was refusing therapy and eating while at SNF. Has not ambulated post fall with fracture. Most recent notable labs: Lactic acid 4.3, Na+ 150, Creat. 2.54, GFR 17.8, Albumin 3.1, Alk Phos 206. Review of Systems:       Review of Systems   Unable to perform ROS: Dementia     Physical Examination:       BP (!) 135/57   Pulse 92   Temp 97.8 °F (36.6 °C) (Oral)   Resp 19   Ht 5' 8\" (1.727 m)   Wt 131 lb (59.4 kg)   LMP  (LMP Unknown)   SpO2 95%   BMI 19.92 kg/m²    Physical Exam  Constitutional:       General: She is not in acute distress. Appearance: She is ill-appearing. HENT:      Head: Normocephalic and atraumatic. Nose: No rhinorrhea. Eyes:      General: No scleral icterus. Right eye: No discharge. Left eye: No discharge. Conjunctiva/sclera: Conjunctivae normal.   Cardiovascular:      Rate and Rhythm: Normal rate. Rhythm irregular. Pulmonary:      Effort: Pulmonary effort is normal.      Breath sounds: Decreased breath sounds present. No wheezing or rales. Abdominal:      General: Bowel sounds are normal. There is no distension. Palpations: Abdomen is soft. Tenderness: There is abdominal tenderness. Genitourinary:     Comments: Lam with clear, yellow urine. Musculoskeletal:      Cervical back: Neck supple. Right lower leg: No edema. Left lower leg: No edema. Skin:     General: Skin is dry. Coloration: Skin is pale. Neurological:      Mental Status: She is alert. She is disoriented. Motor: Weakness present. Comments: See HPI   Psychiatric:         Mood and Affect: Mood normal.         Behavior: Behavior normal.         Cognition and Memory: Cognition is impaired. Allergies:       Allergies   Allergen Reactions    Bactrim [Sulfamethoxazole-Trimethoprim] Hives    Benazepril Other (See Comments)     cough    Codeine     Sulfa Antibiotics        Medications:      Current Facility-Administered Medications   Medication Dose Route Frequency Provider Last Rate Last Admin    ondansetron (ZOFRAN-ODT) disintegrating tablet 4 mg  4 mg Oral Q8H PRN YUSUF Schilling CNP        Or    ondansetron (ZOFRAN) injection 4 mg  4 mg IntraVENous Q6H PRN YUSUF Schilling CNP        polyethylene glycol (GLYCOLAX) packet 17 g  17 g Oral Daily PRN YUSUF Harrison CNP        acetaminophen (TYLENOL) tablet 650 mg  650 mg Oral Q6H PRN YUSUF Schilling CNP        Or    acetaminophen (TYLENOL) suppository 650 mg  650 mg Rectal Q6H PRN YUSUF Schilling CNP        heparin (porcine) injection 4,750 Units  80 Units/kg IntraVENous PRN Jaziel Christianson MD        heparin (porcine) injection 2,380 Units  40 Units/kg IntraVENous PRN Jaziel Christianson MD        heparin 25,000 units in dextrose 5% 250 mL (premix) infusion  5-30 Units/kg/hr IntraVENous Continuous Jaziel Christianson MD 10.7 mL/hr at 01/05/23 0736 18 Units/kg/hr at 01/05/23 0736    norepinephrine (LEVOPHED) 16 mg in dextrose 5% 250 mL infusion  1-100 mcg/min IntraVENous Continuous Hesham Hirschar, DO 4.7 mL/hr at 01/05/23 0736 5 mcg/min at 01/05/23 0736    guaiFENesin-dextromethorphan (ROBITUSSIN DM) 100-10 MG/5ML syrup 5 mL  5 mL Oral Q4H PRN YUSUF Schilling - CNP        atropine 1 MG/10ML injection             lactated ringers bolus  500 mL IntraVENous Once Hesham Hirschar, DO        dexamethasone (DECADRON) injection 6 mg  6 mg IntraVENous Q24H Alan Najera MD        dextrose 5 % and 0.45 % sodium chloride infusion   IntraVENous Continuous Lydia Florentino MD        insulin lispro (HUMALOG) injection vial 0-8 Units  0-8 Units SubCUTAneous Q4H Lydia Florentino MD        glucose chewable tablet 16 g  4 tablet Oral PRN Lydia Florentino MD        dextrose bolus 10% 125 mL  125 mL IntraVENous PRN Lydia Florentino MD        Or    dextrose bolus 10% 250 mL  250 mL IntraVENous PRN Lydia Florentino MD        glucagon (rDNA) injection 1 mg  1 mg SubCUTAneous PRN Lydia Florentino MD        dextrose 10 % infusion   IntraVENous Continuous PRN Lydia Florentino MD           History:      PMHx:  Past Medical History:   Diagnosis Date    Actinic keratoses     Arthritis     Dermatitis     Encounter for annual health examination 12/19/2011    Endometrial cancer (HCC)     s/p hyst in 2009 - was told clear by Dr. 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    left lower leg    Sciatica     Vitamin D deficiency        PSHx:  Past Surgical History:   Procedure  Laterality Date    CATARACT REMOVAL WITH IMPLANT      HYSTERECTOMY (CERVIX STATUS UNKNOWN)  9/2009    post-menopausal bleeding       Social Hx:  Social History     Socioeconomic History    Marital status:    Tobacco Use    Smoking status: Never    Smokeless tobacco: Never   Substance and Sexual Activity    Alcohol use: No    Drug use: No     Social Determinants of Health     Financial Resource Strain: Low Risk     Difficulty of Paying Living Expenses: Not hard at all   Food Insecurity: No Food Insecurity    Worried About Running Out of Food in the Last Year: Never true    Ran Out of Food in the Last Year: Never true       Family Hx:  Family History   Problem Relation Age of Onset    Coronary Art Dis Father     Other Father         glaucoma    Coronary Art Dis Brother        LABS: Reviewed     CBC:  Lab Results   Component Value Date/Time    WBC 12.3 01/05/2023 07:05 AM    RBC 3.35 01/05/2023 07:05 AM    HGB 10.7 01/05/2023 07:05 AM    HCT 32.4 01/05/2023 07:05 AM    MCV 96.5 01/05/2023 07:05 AM    MCH 31.8 01/05/2023 07:05 AM    MCHC 33.0 01/05/2023 07:05 AM    RDW 14.9 01/05/2023 07:05 AM     01/05/2023 07:05 AM    MPV 8.6 02/14/2014 08:32 AM     CBC with Differential:   Lab Results   Component Value Date/Time    WBC 12.3 01/05/2023 07:05 AM    RBC 3.35 01/05/2023 07:05 AM    HGB 10.7 01/05/2023 07:05 AM    HCT 32.4 01/05/2023 07:05 AM     01/05/2023 07:05 AM    MCV 96.5 01/05/2023 07:05 AM    MCH 31.8 01/05/2023 07:05 AM    MCHC 33.0 01/05/2023 07:05 AM    RDW 14.9 01/05/2023 07:05 AM    LYMPHOPCT 2.6 01/05/2023 07:05 AM    MONOPCT 2.8 01/05/2023 07:05 AM    BASOPCT 0.7 01/05/2023 07:05 AM    MONOSABS 0.3 01/05/2023 07:05 AM    LYMPHSABS 0.3 01/05/2023 07:05 AM    EOSABS 0.0 01/05/2023 07:05 AM    BASOSABS 0.1 01/05/2023 07:05 AM     CMP:    Lab Results   Component Value Date/Time     01/05/2023 02:21 AM    K 3.8 01/05/2023 02:21 AM    K 3.3 12/16/2022 05:15 AM     01/05/2023 02:21 AM    CO2 17 01/05/2023 02:21 AM     01/05/2023 02:21 AM    CREATININE 2.65 01/05/2023 02:21 AM    GFRAA >60.0 04/12/2022 09:23 AM    LABGLOM 16.9 01/05/2023 02:21 AM    GLUCOSE 112 01/05/2023 02:21 AM    GLUCOSE 87 12/27/2011 08:41 AM    PROT 6.4 01/04/2023 07:45 PM    LABALBU 3.5 01/04/2023 07:45 PM    LABALBU 4.3 12/27/2011 08:41 AM    CALCIUM 8.8 01/05/2023 02:21 AM    BILITOT 0.8 01/04/2023 07:45 PM    ALKPHOS 231 01/04/2023 07:45 PM    AST 35 01/04/2023 07:45 PM    ALT 22 01/04/2023 07:45 PM     BMP:    Lab Results   Component Value Date/Time     01/05/2023 02:21 AM    K 3.8 01/05/2023 02:21 AM    K 3.3 12/16/2022 05:15 AM     01/05/2023 02:21 AM    CO2 17 01/05/2023 02:21 AM     01/05/2023 02:21 AM    LABALBU 3.5 01/04/2023 07:45 PM    LABALBU 4.3 12/27/2011 08:41 AM    CREATININE 2.65 01/05/2023 02:21 AM    CALCIUM 8.8 01/05/2023 02:21 AM    GFRAA >60.0 04/12/2022 09:23 AM    LABGLOM 16.9 01/05/2023 02:21 AM    GLUCOSE 112 01/05/2023 02:21 AM    GLUCOSE 87 12/27/2011 08:41 AM     TSH:   Lab Results   Component Value Date/Time    TSH 1.140 08/22/2018 09:28 AM     Urinalysis:   Lab Results   Component Value Date/Time    NITRU Negative 01/04/2023 07:45 PM    WBCUA 0-2 01/04/2023 07:45 PM    BACTERIA Negative 01/04/2023 07:45 PM    RBCUA 0-2 01/04/2023 07:45 PM    BLOODU Negative 01/04/2023 07:45 PM    SPECGRAV 1.017 01/04/2023 07:45 PM    GLUCOSEU Negative 01/04/2023 07:45 PM     Albumin:   Lab Results   Component Value Date    LABALBU 3.5 01/04/2023     Weight Trends  Wt Readings from Last 10 Encounters:   01/04/23 131 lb (59.4 kg)   12/20/22 131 lb (59.4 kg)   12/15/22 149 lb (67.6 kg)   10/19/22 150 lb (68 kg)   10/10/22 150 lb (68 kg)   07/08/22 150 lb (68 kg)   04/15/22 152 lb 12.8 oz (69.3 kg)   01/25/21 154 lb (69.9 kg)   01/19/21 154 lb (69.9 kg)   01/12/21 158 lb (71.7 kg)          FUNCTIONAL ADL´S:     Independent: [  ] Eating, [   ] Dressing, [   ] Transferring, [   ] Toileting, [   ] Bathing, [   ] Continence  Dependent   : [ x ] Eating, [ x ] Dressing, [ x ] Transferring, [ x ] Toileting, [ x ] Bathing, [ x ] Continence  W. assistant : [  ] Eating, [   ] Dressing, [   ] Transferring, [   ] Huber Okeefe, [   ] Stoddard Sake, [   ] Continence    Radiology: Reviewed      CT HEAD WO CONTRAST    Result Date: 1/5/2023  EXAMINATION: CT OF THE HEAD WITHOUT CONTRAST  1/4/2023 9:32 pm TECHNIQUE: CT of the head was performed without the administration of intravenous contrast. Automated exposure control, iterative reconstruction, and/or weight based adjustment of the mA/kV was utilized to reduce the radiation dose to as low as reasonably achievable. COMPARISON: None. HISTORY: ORDERING SYSTEM PROVIDED HISTORY: AMS TECHNOLOGIST PROVIDED HISTORY: Reason for exam:->AMS Has a \"code stroke\" or \"stroke alert\" been called? ->No Decision Support Exception - unselect if not a suspected or confirmed emergency medical condition->Emergency Medical Condition (MA) What reading provider will be dictating this exam?->CRC FINDINGS: BRAIN/VENTRICLES: There is no acute intracranial hemorrhage, mass effect or midline shift. No abnormal extra-axial fluid collection. The gray-white differentiation is maintained without evidence of an acute infarct. There is no evidence of hydrocephalus. The ventricles, cisterns and sulci are prominent consistent with atrophy. There is decreased attenuation within the periventricular white matter consistent with periventricular leukomalacia. ORBITS: The visualized portion of the orbits demonstrate no acute abnormality. SINUSES: The visualized paranasal sinuses and mastoid air cells demonstrate no acute abnormality. SOFT TISSUES/SKULL:  No acute abnormality of the visualized skull or soft tissues. 1. There is no acute intracranial abnormality. Specifically, there is no intracranial hemorrhage.  2. Atrophy and periventricular leukomalacia,     XR CHEST PORTABLE    Result Date: 1/4/2023  EXAMINATION: ONE XRAY VIEW OF THE CHEST 1/4/2023 8:22 pm COMPARISON: December 15, 2022 HISTORY: ORDERING SYSTEM PROVIDED HISTORY: SOB TECHNOLOGIST PROVIDED HISTORY: Reason for exam:->SOB What reading provider will be dictating this exam?->CRC FINDINGS: Heart and the mediastinal structures are normal.  Lungs are free of acute infiltrate or pleural effusion. Stable nonacute chest.          Assessment and plan:  Palliative care encounter  Encounter for hospice care discussion    Patient is hospice eligible due to dementia with significant functional decline refusing therapy, poor appetite with hypoalbuminemia at 3.1, dehydration, TOMMY, new afib, history of CAD, shock, and sepsis secondary to COVID-19. Family would like informational meeting. Will notify hospice intake. D/W intensivist aprn and RN. Will continue to follow. -Advance Care Planning  Discussed goals of care with patient. Explained in extensive detail nuances between full code, DNR CCA and DNR CC. Patient's HPOA has made the decision for patient to be [facility] with NO INTUBATION. HPOA in place: Daughter, Bob Hernandez), is patient's first agent.      -Goals of Care Discussion:  Disease process and goals of treatment were discussed in basic terms. Alicia's goal is to optimize available comfort care measures. We discussed the palliative care philosophy in light of those goals. We discussed all care options contingent on treatment response and QOL. Much active listening, presence, and emotional support were given.      -Patient is currently being treated for multiple medical conditions by other providers    Sepsis secondary to COVID-19  Afib  Dehydration with worsening TOMMY  HTN  Dementia  CAD  Shock  Increasing troponin  Hypernatremia    MDM: High    Electronically signed by YUSUF Salvador CNP on 1/5/2023 at 9:29 AM

## 2023-01-05 NOTE — ED NOTES
Pt agitated and confused. Pt continues to pull on monitoring equipment. Unable to redirect pt. Pt pulled out secured line. New IV placed into left AC.       Abdirizak Frost  01/05/23 0148

## 2023-01-05 NOTE — H&P
[place] [id number] MEDICINE    HISTORY AND PHYSICAL EXAM    PATIENT NAME:  Do Hsu    MRN:  [id number]  SERVICE DATE:  1/4/2023   SERVICE TIME:  11:56 PM    Primary Care Physician: YUSUF Voss CNP     SUBJECTIVE  CHIEF COMPLAINT:  Respiratory Distress       HPI:     Do Hsu is a 80 y.o. female presented to the emergency department with complaints of shortness of breath. She was currently living at [facility] temporarily (2 weeks) after a pelvic fracture and was diagnosed with Covid yesterday. She lives with her spouse normally. Family states she has not gotten back to baseline since the fall. She was independent at home prior to the fall. [facility] called daughter and told her that she was in respiratory distress and her oxygen was low. She was transported to ED via EMS. Per family patient has not been eating as well as prior to the fall. Though previous notes stated that the patient was on oxygen in the nursing home, family states that the patient was never seen with oxygen RN. When I reviewed charts from [facility], she did have an order for 2 L nasal cannula at night and as needed. Per nursing home notes, the patient was having pain and the provider took her hydrocodone/APAP 10/325 and decreased it to 5/325 3 times a day and added extra strength Tylenol in the morning and evening. Patient was recently seen for new onset Afib, but not started on anticoagulation. Do Hsu is a 80 y.o. 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 is being admitted for COVID-19.        PAST MEDICAL HISTORY:    Past Medical History:   Diagnosis Date    Actinic keratoses     Arthritis Dermatitis     Encounter for annual health examination 12/19/2011    Endometrial cancer [facility])     s/p hyst in 2009 - was told clear by Dr. Everton Veronica    History of bone density study 1/11/2011    History of mammography, screening 1/11/2011    HTN (hypertension)     Hyperlipidemia     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    left lower leg    Sciatica     Vitamin D deficiency      PAST SURGICAL HISTORY:    Past Surgical History:   Procedure Laterality Date    CATARACT REMOVAL WITH IMPLANT      HYSTERECTOMY (CERVIX STATUS UNKNOWN)  9/2009    post-menopausal bleeding     FAMILY HISTORY:    Family History   Problem Relation Age of Onset    Coronary Art Dis Father     Other Father         glaucoma    Coronary Art Dis Brother      SOCIAL HISTORY:    Social History     Socioeconomic History    Marital status:      Spouse name: Not on file    Number of children: Not on file    Years of education: Not on file    Highest education level: Not on file   Occupational History    Not on file   Tobacco Use    Smoking status: Never    Smokeless tobacco: Never   Substance and Sexual Activity    Alcohol use: No    Drug use: No    Sexual activity: Not on file   Other Topics Concern    Not on file   Social History Narrative    Not on file     Social Determinants of Health     Financial Resource Strain: Low Risk     Difficulty of Paying Living Expenses: Not hard at all   Food Insecurity: No Food Insecurity    Worried About Running Out of Food in the Last Year: Never true    Ran Out of Food in the Last Year: Never true   Transportation Needs: Not on file   Physical Activity: Not on file   Stress: Not on file   Social Connections: Not on file   Intimate Partner Violence: Not on file   Housing Stability: Not on file     MEDICATIONS:   Prior to Admission medications    Medication Sig Start Date End Date Taking? Authorizing Provider   HYDROcodone-acetaminophen (NORCO) 5-325 MG per tablet Take 1 tablet by mouth 3 times daily for 14 days. Intended supply: 7 days.  Take lowest dose possible to manage pain Max Daily Amount: 3 tablets 12/30/22 1/13/23  YUSUF Butts CNP   aspirin 81 MG EC tablet Take 1 tablet by mouth daily  Patient taking differently: Take 81 mg by mouth daily Indications: High Amount of Fats in the Blood, High Blood Pressure Disorder 12/24/22   Lester Cook MD   digoxin (LANOXIN) 125 MCG tablet Take 1 tablet by mouth daily  Patient taking differently: Take 125 mcg by mouth daily Indications: Atrial Fibrillation 12/24/22   Lester Cook MD   losartan (COZAAR) 25 MG tablet Take 1 tablet by mouth daily  Patient taking differently: Take 25 mg by mouth daily Indications: High Blood Pressure Disorder 12/24/22   Lester Cook MD   metoprolol tartrate (LOPRESSOR) 50 MG tablet Take 1 tablet by mouth 2 times daily  Patient taking differently: Take 50 mg by mouth 2 times daily Indications: High Blood Pressure Disorder 12/23/22   Lester Cook MD   rosuvastatin (CRESTOR) 40 MG tablet Take 1 tablet by mouth nightly  Patient taking differently: Take 40 mg by mouth nightly Indications: High Amount of Fats in the Blood 12/23/22   Lester Cook MD   bisacodyl (DULCOLAX) 5 MG EC tablet Take 1 tablet by mouth daily as needed for Constipation 12/23/22   Lester Cook MD   Multiple Vitamin (MULTI-VITAMIN DAILY PO) Take 1 tablet by mouth daily Indications: Nutritional Support    Historical Provider, MD   TURMERIC PO Take 1 capsule by mouth daily Indications: Nutritional Support    Historical Provider, MD   donepezil (ARICEPT) 5 MG tablet Take 1 tablet by mouth nightly  Patient taking differently: Take 5 mg by mouth nightly Indications: Decline in Cognition due to a Brain Disease 4/15/22   YUSUF Aguayo - CNP   Cholecalciferol (VITAMIN D) 2000 units CAPS capsule Take 2,000 Units by mouth daily Indications: Nutritional Support    Historical Provider, MD       ALLERGIES: Bactrim [sulfamethoxazole-trimethoprim], Benazepril, Codeine, and Sulfa antibiotics    REVIEW OF SYSTEM:   Review of Systems   Unable to perform ROS: Dementia   All other systems reviewed and are negative. OBJECTIVE  PHYSICAL EXAM: BP (!) 110/53   Pulse (!) 142   Temp (!) 96.1 °F (35.6 °C) (Temporal)   Resp 24   Ht 5' 8\" (1.727 m)   Wt 131 lb (59.4 kg)   LMP  (LMP Unknown)   SpO2 100%   BMI 19.92 kg/m²   Physical Exam  Vitals and nursing note reviewed. Constitutional:       General: She is awake. She is not in acute distress. Appearance: Normal appearance. She is well-developed and underweight. She is not ill-appearing, toxic-appearing or diaphoretic. Interventions: Nasal cannula in place. HENT:      Head: Normocephalic and atraumatic. Nose: Nose normal. No congestion or rhinorrhea. Mouth/Throat:      Lips: Pink. Mouth: Mucous membranes are dry. Tongue: Tongue does not deviate from midline. Pharynx: Oropharynx is clear. No oropharyngeal exudate or posterior oropharyngeal erythema. Eyes:      General: Lids are normal. No visual field deficit or scleral icterus. Extraocular Movements: Extraocular movements intact. Right eye: No nystagmus. Left eye: No nystagmus. Conjunctiva/sclera: Conjunctivae normal.   Neck:      Thyroid: No thyromegaly. Vascular: No JVD. Trachea: Trachea and phonation normal.   Cardiovascular:      Rate and Rhythm: Normal rate. Rhythm irregular. Pulses:           Radial pulses are 2+ on the right side and 2+ on the left side. Dorsalis pedis pulses are 2+ on the right side and 2+ on the left side. Posterior tibial pulses are 1+ on the right side and 1+ on the left side. Heart sounds: Normal heart sounds, S1 normal and S2 normal. No murmur heard. Pulmonary:      Effort: Pulmonary effort is normal. No respiratory distress. Breath sounds: Normal air entry. No stridor or decreased air movement. Rhonchi present.  No decreased breath sounds, wheezing or rales. Chest:      Chest wall: No tenderness. Abdominal:      General: Abdomen is flat. Bowel sounds are normal. There is no distension. Palpations: Abdomen is soft. Tenderness: There is no abdominal tenderness. There is no guarding. Musculoskeletal:         General: No swelling, tenderness, deformity or signs of injury. Normal range of motion. Cervical back: Normal range of motion and neck supple. No rigidity or tenderness. Right lower leg: No edema. Left lower le+ Edema present. Comments: BLE warm, left calf is swelling greater than right, bulge felt in back of left calf as well    Feet:      Right foot:      Skin integrity: Skin integrity normal.      Left foot:      Skin integrity: Skin integrity normal.   Skin:     General: Skin is warm and dry. Capillary Refill: Capillary refill takes less than 2 seconds. Coloration: Skin is pale. Skin is not jaundiced. Findings: Bruising present. No erythema, lesion or rash. Nails: There is no clubbing. Neurological:      Mental Status: She is alert and easily aroused. She is confused. Sensory: Sensation is intact. Motor: Motor function is intact. No weakness. Psychiatric:         Attention and Perception: She is inattentive. Mood and Affect: Affect normal.         Speech: Speech normal.      Comments: Mood fluctuates between angry to tearful and back to happy very quickly, she does get agitated at times and will pull away until redirected, hallucinations possible as she was talking about people that were not there. She does have good long term memory, though it is disorganized. She is impulsive when she gets agitated. She did not stop talking the entire time I was there. When she was happy she was cooperative, when she was agitated, she was uncooperative. Neurologic Exam     Mental Status   Oriented to person. Disoriented to place. Disoriented to time.    Attention: decreased. Concentration: decreased. Speech: speech is normal   Level of consciousness: alert     DATA:     Diagnostic tests reviewed for today's visit:    Most recent labs and imaging results reviewed.      LABS:    Recent Results (from the past 24 hour(s))   CMP    Collection Time: 01/04/23  7:45 PM   Result Value Ref Range    Sodium 147 (H) 135 - 144 mEq/L    Potassium 4.4 3.4 - 4.9 mEq/L    Chloride 109 (H) 95 - 107 mEq/L    CO2 20 20 - 31 mEq/L    Anion Gap 18 (H) 9 - 15 mEq/L    Glucose 119 (H) 70 - 99 mg/dL     (HH) 8 - 23 mg/dL    Creatinine 2.57 (H) 0.50 - 0.90 mg/dL    Est, Glom Filt Rate 17.5 (L) >60    Calcium 8.7 8.5 - 9.9 mg/dL    Total Protein 6.4 6.3 - 8.0 g/dL    Albumin 3.5 3.5 - 4.6 g/dL    Total Bilirubin 0.8 (H) 0.2 - 0.7 mg/dL    Alkaline Phosphatase 231 (H) 40 - 130 U/L    ALT 22 0 - 33 U/L    AST 35 0 - 35 U/L    Globulin 2.9 2.3 - 3.5 g/dL   Lipase    Collection Time: 01/04/23  7:45 PM   Result Value Ref Range    Lipase 69 12 - 95 U/L   Magnesium    Collection Time: 01/04/23  7:45 PM   Result Value Ref Range    Magnesium 3.2 (H) 1.7 - 2.4 mg/dL   Troponin    Collection Time: 01/04/23  7:45 PM   Result Value Ref Range    Troponin 0.046 (HH) 0.000 - 0.010 ng/mL   Brain Natriuretic Peptide    Collection Time: 01/04/23  7:45 PM   Result Value Ref Range    Pro-BNP 2,261 pg/mL   TSH with Reflex    Collection Time: 01/04/23  7:45 PM   Result Value Ref Range    TSH 0.723 0.440 - 3.860 uIU/mL   Lactic Acid    Collection Time: 01/04/23  7:45 PM   Result Value Ref Range    Lactic Acid 2.7 (H) 0.5 - 2.2 mmol/L   Urinalysis with Reflex to Culture    Collection Time: 01/04/23  7:45 PM    Specimen: Urine   Result Value Ref Range    Color, UA Yellow Straw/Yellow    Clarity, UA CLOUDY (A) Clear    Glucose, Ur Negative Negative mg/dL    Bilirubin Urine Negative Negative    Ketones, Urine Negative Negative mg/dL    Specific Gravity, UA 1.017 1.005 - 1.030    Blood, Urine Negative Negative    pH, UA 5.0 5.0 - 9.0    Protein, UA 30 (A) Negative mg/dL    Urobilinogen, Urine 1.0 <2.0 E.U./dL    Nitrite, Urine Negative Negative    Leukocyte Esterase, Urine Negative Negative    Urine Reflex to Culture Not Indicated    Digoxin Level    Collection Time: 01/04/23  7:45 PM   Result Value Ref Range    Digoxin Lvl 0.9 0.8 - 2.0 ng/mL   Microscopic Urinalysis    Collection Time: 01/04/23  7:45 PM   Result Value Ref Range    Bacteria, UA Negative Negative /HPF    Hyaline Casts, UA 3-5 0 - 5 /HPF    WBC, UA 0-2 0 - 5 /HPF    RBC, UA 0-2 0 - 5 /HPF    Epithelial Cells, UA 0-2 0 - 5 /HPF   Respiratory Panel, Molecular, with COVID-19 (Restricted: peds pts or suitable admitted adults)    Collection Time: 01/04/23  7:56 PM    Specimen: Nasopharyngeal   Result Value Ref Range    Adenovirus by PCR Not Detected Not Detected    Bordetella parapertussis by PCR Not Detected Not Detected    Bordetella pertussis by PCR Not Detected Not Detected    Chlamydophilia pneumoniae by PCR Not Detected Not Detected    Coronavirus 229E by PCR Not Detected Not Detected    Coronavirus HKU1 by PCR Not Detected Not Detected    Coronavirus NL63 by PCR Not Detected Not Detected    Coronavirus OC43 by PCR Not Detected Not Detected    SARS-CoV-2, PCR DETECTED (A) Not Detected    Human Metapneumovirus by PCR Not Detected Not Detected    Human Rhinovirus/Enterovirus by PCR Not Detected Not Detected    Influenza A by PCR Not Detected Not Detected    Influenza B by PCR Not Detected Not Detected    Mycoplasma pneumoniae by PCR Not Detected Not Detected    Parainfluenza Virus 1 by PCR Not Detected Not Detected    Parainfluenza Virus 2 by PCR Not Detected Not Detected    Parainfluenza Virus 3 by PCR Not Detected Not Detected    Parainfluenza Virus 4 by PCR Not Detected Not Detected    Respiratory Syncytial Virus by PCR Not Detected Not Detected   POCT CREATININE    Collection Time: 01/04/23  8:28 PM   Result Value Ref Range    POC CREATININE WHOLE BLOOD 3    SPECIMEN REJECTION    Collection Time: 01/04/23  8:32 PM   Result Value Ref Range    Rejected Test cbcwd     Reason for Rejection see below    CBC with Auto Differential    Collection Time: 01/04/23  8:57 PM   Result Value Ref Range    WBC 11.6 (H) 4.8 - 10.8 K/uL    RBC 3.44 (L) 4.20 - 5.40 M/uL    Hemoglobin 10.9 (L) 12.0 - 16.0 g/dL    Hematocrit 32.8 (L) 37.0 - 47.0 %    MCV 95.3 (H) 79.4 - 94.8 fL    MCH 31.6 (H) 27.0 - 31.3 pg    MCHC 33.2 33.0 - 37.0 %    RDW 14.6 (H) 11.5 - 14.5 %    Platelets 260 509 - 032 K/uL    Neutrophils % 86.0 %    Lymphocytes % 6.4 %    Monocytes % 6.8 %    Eosinophils % 0.5 %    Basophils % 0.3 %    Neutrophils Absolute 10.0 (H) 1.4 - 6.5 K/uL    Lymphocytes Absolute 0.7 (L) 1.0 - 4.8 K/uL    Monocytes Absolute 0.8 0.2 - 0.8 K/uL    Eosinophils Absolute 0.1 0.0 - 0.7 K/uL    Basophils Absolute 0.0 0.0 - 0.2 K/uL   POCT Arterial    Collection Time: 01/04/23  8:57 PM   Result Value Ref Range    POC Sodium 150 (H) 136 - 145 mEq/L    POC Potassium 3.8 3.5 - 5.1 mEq/L    POC Chloride 123 (H) 99 - 110 mEq/L    POC Glucose 122 (H) 70 - 99 mg/dl    POC Creatinine 2.7 (H) 0.6 - 1.2 mg/dL    Est, Glom Filt Rate 17 (A) >60    Calcium, Ionized 1.05 (L) 1.12 - 1.32 mmol/L    pH, Arterial 7.671 (HH) 7.350 - 7.450    pCO2, Arterial 14 (LL) 35 - 45 mm Hg    pO2, Arterial 53 (HH) 75 - 108 mm Hg    HCO3, Arterial 16.0 (L) 21.0 - 29.0 mmol/L    Base Excess, Arterial -4 (L) -3 - 3    O2 Sat, Arterial 95 (HH) 93 - 100 %    TCO2, Arterial 17 (L) 21 - 32 mmol/L    Lactate 3.05 (H) 0.40 - 2.00 mmol/L    POC Hematocrit 32 (L) 36 - 48 %    Hemoglobin 10.9 (L) 12.0 - 16.0 gm/dL    Sample Type ART     Performed on SEE BELOW    POCT Glucose    Collection Time: 01/04/23  9:13 PM   Result Value Ref Range    Glucose 122 mg/dL    QC OK?  yes    Protime-INR    Collection Time: 01/04/23  9:55 PM   Result Value Ref Range    Protime 15.4 (H) 12.3 - 14.9 sec    INR 1.2    APTT    Collection Time: 01/04/23  9:55 PM   Result Value Ref Range    aPTT 29.5 24.4 - 36.8 sec       IMAGING:   XR CHEST PORTABLE    Result Date: 1/4/2023  Stable nonacute chest.        VTE Prophylaxis: SCD     ASSESSMENT AND PLAN    Sepsis secondary to covid 19  Temp 96.1, , RR 24, lactic acid 2.7, covid positive, respiratory distress, admitting ABG Arterial Blood Gas result:  pO2 53 ; pCO2 14; pH 7.671;  HCO3 16.0, %O2 Sat 95, noted dehydration, electrolyte imbalance, uncontrolled Afib. UA negative, BC pending, CXR negative, WBC 11.6. Admit to ICU  Stat BMP to determine fluid needs  Stat D.dimer  Chest vest  droplet isolation  Robitussin  daily CMP, CBC procalcitonin, CRP, and d.dimer. Consult intensivist  Soft diet to advance when swallow screen passed  Trend lactic acid  Intake and output  PT/INR  Afib  New onset Afib as of last visit, patient sent home on rate control with lopressor and digoxin. Dig level 0.9  Cardizem drip  Cardiology consult  Cycle troponin  Started heparin gtt  Will resume home meds, as tolerated, when home med list is completed by nursing. Dehydration with worsening TOMMY  Decreased oral intake at nursing home, lactic acid elevated at 2.7, electrolytes reflect dehydration, no UTI noted, specific gravity 1.017, dry mucous membranes. Patient received 500 cc bolus due to renal failure, no continued IV fluids at this time, SCR 2.65 continues to raise since 12/23/22. Stat BMP went to the floor to determine the need for continued IV fluids. Give 500 cc iv boluses to titrate to electrolytes  Monitor SCR  HTN  /53, takes losartan and lopressor  Hold losartan  Restart lopressor when BP can tolerate. Dementia  Takes aricept, alert to self only  Will resume home meds, as tolerated, when home med list is completed by nursing. Consider safety sitter if needed  CAD  On ASA, BB, statin at home  Will resume home meds, as tolerated, when home med list is completed by nursing.     Plan of care discussed with: patient, family, spouse, and adult child    SIGNATURE: YUSUF Mims - CNP  DATE: January 4, 2023  TIME: 11:56 PM     Anastasia Munoz DO - supervising physician    Total amount of critical care time including chart review, patient assessment, family discussion, questions, development of a plan and documentation was greater than 60 minutes

## 2023-01-05 NOTE — ED TRIAGE NOTES
Pt presents to ER via EMS from [facility]. Pt was dx with Covid yesterday and presents with SOB. Pt has h/o dementia but seems more agitated and combative than normal. Pt is A&Ox1 at this time.

## 2023-01-05 NOTE — PROGRESS NOTES
Patient arrived to ICU bed 5 from ED around 0150. Patient is agitated and confused. Pulling on monitoring equipment, pulling out IV's, and trying to get out of bed. Patient is not redirectable. Message sent to Dr. Jimenez- see new orders for precedex and BL soft wrist restraints. Skin assessment done with Silvia Padgett RN, skin is intact. Slight bruising noted on R Hip. Patient bladder scanned around 0230. New orders for padgett given- patient had 1250 of annette urine out. Dr. Jimenez and Ngozi WILSON at bedside from [id number] for IV placements. Precedex and Levo titrated per verbal orders.   Electronically signed by Joseline Kebede RN on 1/5/2023 at 7:37 AM

## 2023-01-05 NOTE — PROGRESS NOTES
Spiritual Care Services     Summary of Visit:   visited patient in ICU. Patient currently in Covid precautions. Patient confused and  offered a blessing and support. Family not present but are supposed to be at the hospital this afternoon to meet with medical staff. Patient's HPOA is in [address] Encounter Summary  Encounter Overview/Reason : Initial Encounter  Service Provided For[de-identified] Patient  Referral/Consult From[de-identified] Rounding  Support System: Spouse, Children, Family members  Complexity of Encounter: Low  Begin Time: 1000  End Time : 4397  Total Time Calculated: 15 min  Encounter   Type: Initial Screen/Assessment     Spiritual/Emotional needs  Type: Spiritual Support                      Spiritual Assessment/Intervention/Outcomes:    Assessment: Unable to assess (Patient confused)    Intervention: Sustaining Presence/Ministry of presence    Outcome: Did not respond      Care Plan:    Plan and Referrals  Plan/Referrals: Continue Support (comment)    Provide support as needed or requested      Spiritual Care Services   Electronically signed by Chaplain Monica on 1/5/2023 at 10:22 AM.    To reach a  for emotional and spiritual support, place an [facility] consult request.   If a  is needed immediately, dial 0 and ask to page the on-call .

## 2023-01-06 ENCOUNTER — APPOINTMENT (OUTPATIENT)
Dept: ULTRASOUND IMAGING | Age: 88
DRG: 871 | End: 2023-01-06
Payer: MEDICARE

## 2023-01-06 VITALS
TEMPERATURE: 98.6 F | HEIGHT: 68 IN | OXYGEN SATURATION: 100 % | DIASTOLIC BLOOD PRESSURE: 59 MMHG | HEART RATE: 96 BPM | WEIGHT: 134.26 LBS | RESPIRATION RATE: 19 BRPM | BODY MASS INDEX: 20.35 KG/M2 | SYSTOLIC BLOOD PRESSURE: 104 MMHG

## 2023-01-06 PROBLEM — J96.00 RESPIRATORY FAILURE, ACUTE (HCC): Status: ACTIVE | Noted: 2023-01-01

## 2023-01-06 LAB
ALBUMIN SERPL-MCNC: 3.1 G/DL (ref 3.5–4.6)
ALP BLD-CCNC: 176 U/L (ref 40–130)
ALT SERPL-CCNC: 20 U/L (ref 0–33)
ANION GAP SERPL CALCULATED.3IONS-SCNC: 13 MEQ/L (ref 9–15)
ANTI-XA UNFRAC HEPARIN: 1.31 IU/ML
ANTI-XA UNFRAC HEPARIN: 1.43 IU/ML
ANTI-XA UNFRAC HEPARIN: 1.95 IU/ML
AST SERPL-CCNC: 32 U/L (ref 0–35)
BASOPHILS ABSOLUTE: 0 K/UL (ref 0–0.2)
BASOPHILS RELATIVE PERCENT: 0.4 %
BILIRUB SERPL-MCNC: 0.7 MG/DL (ref 0.2–0.7)
BLOOD CULTURE, ROUTINE: NORMAL
BLOOD CULTURE, ROUTINE: NORMAL
BUN BLDV-MCNC: 92 MG/DL (ref 8–23)
C-REACTIVE PROTEIN: 29.1 MG/L (ref 0–5)
CALCIUM SERPL-MCNC: 8.6 MG/DL (ref 8.5–9.9)
CHLORIDE BLD-SCNC: 121 MEQ/L (ref 95–107)
CO2: 22 MEQ/L (ref 20–31)
CREAT SERPL-MCNC: 2.05 MG/DL (ref 0.5–0.9)
D DIMER: 8.06 MG/L FEU (ref 0–0.5)
EOSINOPHILS ABSOLUTE: 0 K/UL (ref 0–0.7)
EOSINOPHILS RELATIVE PERCENT: 0 %
GFR SERPL CREATININE-BSD FRML MDRD: 23 ML/MIN/{1.73_M2}
GLOBULIN: 2.5 G/DL (ref 2.3–3.5)
GLUCOSE BLD-MCNC: 109 MG/DL (ref 70–99)
GLUCOSE BLD-MCNC: 124 MG/DL (ref 70–99)
GLUCOSE BLD-MCNC: 130 MG/DL (ref 70–99)
GLUCOSE BLD-MCNC: 149 MG/DL (ref 70–99)
GLUCOSE BLD-MCNC: 162 MG/DL (ref 70–99)
GLUCOSE BLD-MCNC: 85 MG/DL (ref 70–99)
HCT VFR BLD CALC: 26.7 % (ref 37–47)
HEMOGLOBIN: 9 G/DL (ref 12–16)
INR BLD: 1.5
LYMPHOCYTES ABSOLUTE: 0.4 K/UL (ref 1–4.8)
LYMPHOCYTES RELATIVE PERCENT: 4.2 %
MCH RBC QN AUTO: 32.4 PG (ref 27–31.3)
MCHC RBC AUTO-ENTMCNC: 33.9 % (ref 33–37)
MCV RBC AUTO: 95.8 FL (ref 79.4–94.8)
MONOCYTES ABSOLUTE: 0.6 K/UL (ref 0.2–0.8)
MONOCYTES RELATIVE PERCENT: 6.2 %
NEUTROPHILS ABSOLUTE: 9.1 K/UL (ref 1.4–6.5)
NEUTROPHILS RELATIVE PERCENT: 89.2 %
PDW BLD-RTO: 14.6 % (ref 11.5–14.5)
PERFORMED ON: ABNORMAL
PERFORMED ON: NORMAL
PLATELET # BLD: 179 K/UL (ref 130–400)
POTASSIUM REFLEX MAGNESIUM: 4.1 MEQ/L (ref 3.4–4.9)
PROCALCITONIN: 0.17 NG/ML (ref 0–0.15)
PROTHROMBIN TIME: 18.5 SEC (ref 12.3–14.9)
RBC # BLD: 2.79 M/UL (ref 4.2–5.4)
SODIUM BLD-SCNC: 156 MEQ/L (ref 135–144)
TOTAL PROTEIN: 5.6 G/DL (ref 6.3–8)
WBC # BLD: 10.3 K/UL (ref 4.8–10.8)

## 2023-01-06 PROCEDURE — 85025 COMPLETE CBC W/AUTO DIFF WBC: CPT

## 2023-01-06 PROCEDURE — 85610 PROTHROMBIN TIME: CPT

## 2023-01-06 PROCEDURE — 2500000003 HC RX 250 WO HCPCS: Performed by: INTERNAL MEDICINE

## 2023-01-06 PROCEDURE — 6370000000 HC RX 637 (ALT 250 FOR IP): Performed by: INTERNAL MEDICINE

## 2023-01-06 PROCEDURE — 6360000002 HC RX W HCPCS: Performed by: INTERNAL MEDICINE

## 2023-01-06 PROCEDURE — 86140 C-REACTIVE PROTEIN: CPT

## 2023-01-06 PROCEDURE — 85520 HEPARIN ASSAY: CPT

## 2023-01-06 PROCEDURE — APPSS30 APP SPLIT SHARED TIME 16-30 MINUTES: Performed by: PHYSICIAN ASSISTANT

## 2023-01-06 PROCEDURE — 2580000003 HC RX 258: Performed by: INTERNAL MEDICINE

## 2023-01-06 PROCEDURE — 6360000002 HC RX W HCPCS: Performed by: EMERGENCY MEDICINE

## 2023-01-06 PROCEDURE — 80053 COMPREHEN METABOLIC PANEL: CPT

## 2023-01-06 PROCEDURE — 85379 FIBRIN DEGRADATION QUANT: CPT

## 2023-01-06 PROCEDURE — 99233 SBSQ HOSP IP/OBS HIGH 50: CPT | Performed by: INTERNAL MEDICINE

## 2023-01-06 PROCEDURE — 93970 EXTREMITY STUDY: CPT

## 2023-01-06 PROCEDURE — 84145 PROCALCITONIN (PCT): CPT

## 2023-01-06 PROCEDURE — 36415 COLL VENOUS BLD VENIPUNCTURE: CPT

## 2023-01-06 RX ORDER — LORAZEPAM 1 MG/1
2 TABLET ORAL ONCE
Status: DISCONTINUED | OUTPATIENT
Start: 2023-01-06 | End: 2023-01-06 | Stop reason: HOSPADM

## 2023-01-06 RX ORDER — LORAZEPAM 1 MG/1
1 TABLET ORAL ONCE
Status: COMPLETED | OUTPATIENT
Start: 2023-01-06 | End: 2023-01-06

## 2023-01-06 RX ADMIN — HEPARIN SODIUM 16 UNITS/KG/HR: 10000 INJECTION, SOLUTION INTRAVENOUS at 10:03

## 2023-01-06 RX ADMIN — DEXTROSE AND SODIUM CHLORIDE: 5; 450 INJECTION, SOLUTION INTRAVENOUS at 01:53

## 2023-01-06 RX ADMIN — LORAZEPAM 1 MG: 1 TABLET ORAL at 20:12

## 2023-01-06 RX ADMIN — METOPROLOL TARTRATE 5 MG: 5 INJECTION, SOLUTION INTRAVENOUS at 00:45

## 2023-01-06 RX ADMIN — DEXAMETHASONE SODIUM PHOSPHATE 6 MG: 4 INJECTION, SOLUTION INTRAMUSCULAR; INTRAVENOUS at 08:20

## 2023-01-06 RX ADMIN — METOPROLOL TARTRATE 5 MG: 5 INJECTION, SOLUTION INTRAVENOUS at 06:40

## 2023-01-06 ASSESSMENT — PAIN SCALES - PAIN ASSESSMENT IN ADVANCED DEMENTIA (PAINAD)
CONSOLABILITY: 1
FACIALEXPRESSION: 1
BODYLANGUAGE: 0
BREATHING: 0
CONSOLABILITY: 1
FACIALEXPRESSION: 1
BREATHING: 0
NEGVOCALIZATION: 1
FACIALEXPRESSION: 1
NEGVOCALIZATION: 1
BODYLANGUAGE: 0
CONSOLABILITY: 1
TOTALSCORE: 3
FACIALEXPRESSION: 1
BODYLANGUAGE: 0
TOTALSCORE: 3
BREATHING: 0
TOTALSCORE: 3
NEGVOCALIZATION: 1
NEGVOCALIZATION: 1
BODYLANGUAGE: 0
BREATHING: 0
FACIALEXPRESSION: 1
NEGVOCALIZATION: 1
BREATHING: 0
NEGVOCALIZATION: 1
BREATHING: 0
NEGVOCALIZATION: 1
FACIALEXPRESSION: 1
CONSOLABILITY: 1
FACIALEXPRESSION: 1
FACIALEXPRESSION: 1
BREATHING: 0
FACIALEXPRESSION: 1
BREATHING: 0
NEGVOCALIZATION: 1
TOTALSCORE: 3
CONSOLABILITY: 1
NEGVOCALIZATION: 1
TOTALSCORE: 3
BODYLANGUAGE: 0
NEGVOCALIZATION: 1
NEGVOCALIZATION: 1
BODYLANGUAGE: 0
BODYLANGUAGE: 0
CONSOLABILITY: 1
TOTALSCORE: 3
CONSOLABILITY: 1
FACIALEXPRESSION: 1
BODYLANGUAGE: 0
CONSOLABILITY: 1
TOTALSCORE: 3
CONSOLABILITY: 1
BODYLANGUAGE: 0
BREATHING: 0
BODYLANGUAGE: 0
TOTALSCORE: 3
BREATHING: 0
FACIALEXPRESSION: 1
BREATHING: 0
TOTALSCORE: 3
CONSOLABILITY: 1
FACIALEXPRESSION: 1
TOTALSCORE: 3
BREATHING: 0
NEGVOCALIZATION: 1
BODYLANGUAGE: 0
NEGVOCALIZATION: 1
FACIALEXPRESSION: 1
CONSOLABILITY: 1
CONSOLABILITY: 1
NEGVOCALIZATION: 1
TOTALSCORE: 3
TOTALSCORE: 3
BODYLANGUAGE: 0
BREATHING: 0
TOTALSCORE: 3
CONSOLABILITY: 1
NEGVOCALIZATION: 1
BREATHING: 0
FACIALEXPRESSION: 1
BODYLANGUAGE: 0
CONSOLABILITY: 1
CONSOLABILITY: 1
BREATHING: 0
NEGVOCALIZATION: 1
NEGVOCALIZATION: 1
TOTALSCORE: 3
TOTALSCORE: 3
NEGVOCALIZATION: 1
BODYLANGUAGE: 0
NEGVOCALIZATION: 1
TOTALSCORE: 3
FACIALEXPRESSION: 1
BODYLANGUAGE: 0
TOTALSCORE: 3
CONSOLABILITY: 1
BREATHING: 0
NEGVOCALIZATION: 1
CONSOLABILITY: 1
BODYLANGUAGE: 0
TOTALSCORE: 3
BODYLANGUAGE: 0
TOTALSCORE: 3
TOTALSCORE: 3
FACIALEXPRESSION: 1
CONSOLABILITY: 1
TOTALSCORE: 3
BREATHING: 0
BREATHING: 0
NEGVOCALIZATION: 1
CONSOLABILITY: 1
BREATHING: 0
BREATHING: 0
FACIALEXPRESSION: 1
BREATHING: 0

## 2023-01-06 ASSESSMENT — PAIN SCALES - GENERAL: PAINLEVEL_OUTOF10: 0

## 2023-01-06 NOTE — PLAN OF CARE
Problem: Discharge Planning  Goal: Discharge to home or other facility with appropriate resources  Outcome: Progressing  Flowsheets (Taken 1/5/2023 2000)  Discharge to home or other facility with appropriate resources: Identify barriers to discharge with patient and caregiver     Problem: Safety - Medical Restraint  Goal: Remains free of injury from restraints (Restraint for Interference with Medical Device)  Description: INTERVENTIONS:  1. Determine that other, less restrictive measures have been tried or would not be effective before applying the restraint  2. Evaluate the patient's condition at the time of restraint application  3. Inform patient/family regarding the reason for restraint  4. Q2H: Monitor safety, psychosocial status, comfort, nutrition and hydration  Outcome: Progressing  Flowsheets  Taken 1/5/2023 1800 by Geovani Murcia RN  Remains free of injury from restraints (restraint for interference with medical device): Every 2 hours: Monitor safety, psychosocial status, comfort, nutrition and hydration  Taken 1/5/2023 1600 by Geovani Murcia RN  Remains free of injury from restraints (restraint for interference with medical device): Every 2 hours: Monitor safety, psychosocial status, comfort, nutrition and hydration  Taken 1/5/2023 1200 by Geovani Murcia RN  Remains free of injury from restraints (restraint for interference with medical device): Every 2 hours: Monitor safety, psychosocial status, comfort, nutrition and hydration     Problem: Skin/Tissue Integrity  Goal: Absence of new skin breakdown  Description: 1. Monitor for areas of redness and/or skin breakdown  2. Assess vascular access sites hourly  3. Every 4-6 hours minimum:  Change oxygen saturation probe site  4. Every 4-6 hours:  If on nasal continuous positive airway pressure, respiratory therapy assess nares and determine need for appliance change or resting period.   Outcome: Progressing     Problem: Safety - Adult  Goal: Free from fall injury  Outcome: Progressing     Problem: ABCDS Injury Assessment  Goal: Absence of physical injury  Outcome: Progressing     Problem: Neurosensory - Adult  Goal: Achieves stable or improved neurological status  Outcome: Progressing  Flowsheets (Taken 1/5/2023 2000)  Achieves stable or improved neurological status: Assess for and report changes in neurological status  Goal: Absence of seizures  Outcome: Progressing  Flowsheets (Taken 1/5/2023 2000)  Absence of seizures: Monitor for seizure activity.   If seizure occurs, document type and location of movements and any associated apnea  Goal: Remains free of injury related to seizures activity  Outcome: Progressing  Flowsheets (Taken 1/5/2023 2000)  Remains free of injury related to seizure activity: Maintain airway, patient safety  and administer oxygen as ordered  Goal: Achieves maximal functionality and self care  Outcome: Progressing  Flowsheets (Taken 1/5/2023 2000)  Achieves maximal functionality and self care: Monitor swallowing and airway patency with patient fatigue and changes in neurological status     Problem: Cardiovascular - Adult  Goal: Maintains optimal cardiac output and hemodynamic stability  Outcome: Progressing  Flowsheets (Taken 1/5/2023 2000)  Maintains optimal cardiac output and hemodynamic stability: Monitor blood pressure and heart rate  Goal: Absence of cardiac dysrhythmias or at baseline  Outcome: Progressing  Flowsheets (Taken 1/5/2023 2000)  Absence of cardiac dysrhythmias or at baseline: Monitor cardiac rate and rhythm     Problem: Skin/Tissue Integrity - Adult  Goal: Skin integrity remains intact  Outcome: Progressing  Flowsheets (Taken 1/5/2023 2000)  Skin Integrity Remains Intact: Monitor for areas of redness and/or skin breakdown  Goal: Incisions, wounds, or drain sites healing without S/S of infection  Outcome: Progressing  Flowsheets (Taken 1/5/2023 2000)  Incisions, Wounds, or Drain Sites Healing Without Sign and Symptoms of Infection: TWICE DAILY: Assess and document skin integrity  Goal: Oral mucous membranes remain intact  Outcome: Progressing  Flowsheets (Taken 1/5/2023 2000)  Oral Mucous Membranes Remain Intact: Assess oral mucosa and hygiene practices     Problem: Musculoskeletal - Adult  Goal: Return mobility to safest level of function  Outcome: Progressing  Flowsheets (Taken 1/5/2023 2000)  Return Mobility to Safest Level of Function: Assess patient stability and activity tolerance for standing, transferring and ambulating with or without assistive devices  Goal: Maintain proper alignment of affected body part  Outcome: Progressing  Flowsheets (Taken 1/5/2023 2000)  Maintain proper alignment of affected body part: Support and protect limb and body alignment per provider's orders  Goal: Return ADL status to a safe level of function  Outcome: Progressing  Flowsheets (Taken 1/5/2023 2000)  Return ADL Status to a Safe Level of Function: Administer medication as ordered     Problem: Gastrointestinal - Adult  Goal: Minimal or absence of nausea and vomiting  Outcome: Progressing  Flowsheets (Taken 1/5/2023 2000)  Minimal or absence of nausea and vomiting: Administer IV fluids as ordered to ensure adequate hydration  Goal: Maintains or returns to baseline bowel function  Outcome: Progressing  Flowsheets (Taken 1/5/2023 2000)  Maintains or returns to baseline bowel function: Assess bowel function  Goal: Maintains adequate nutritional intake  Outcome: Progressing  Flowsheets (Taken 1/5/2023 2000)  Maintains adequate nutritional intake: Monitor percentage of each meal consumed  Goal: Establish and maintain optimal ostomy function  Outcome: Progressing  Flowsheets (Taken 1/5/2023 2000)  Establish and maintain optimal ostomy function: Monitor output from ostomies     Problem: Genitourinary - Adult  Goal: Absence of urinary retention  Outcome: Progressing  Flowsheets (Taken 1/5/2023 2000)  Absence of urinary retention: Assess patients ability to void and empty bladder  Goal: Urinary catheter remains patent  Outcome: Progressing  Flowsheets (Taken 1/5/2023 2000)  Urinary catheter remains patent: Assess patency of urinary catheter

## 2023-01-06 NOTE — CARE COORDINATION
Quality round completed with care management team. Pt remain in isolation due to COVID positive. Pt is from [address] [place] [address]. Family is good with pt returning to [facility] for Rehab. Will need PT/OT when able. Family did request Hospice informational meeting yesterday. Have not heard any additional information at this time. DC plan: Return to [facility]. JULIOCESARW will follow.      Electronically signed by GAVINO Hooker on 1/6/2023 at 10:18 AM

## 2023-01-06 NOTE — PROGRESS NOTES
PHARMACY NOTE:   Interdisciplinary Rounds Completed     Changes made today by Pharmacy:   No changes  Plan continue heparin drip while inpatient  Continue levo for now BP labile.  Metoprolol held      Additional information:   D5/0.45 @ 75 cc/hr  Steroid: decadron 6mg day 2/10  Insulin coverage: medium sliding scale with Humalog, utilized 0 units per sliding scale over the past 24 hours  Pressors: levo  Sedation: precedex  Antimicrobial therapy: none  Core measures assessed/met    CARRIE Neal [facility] [place] PharmD None known

## 2023-01-06 NOTE — PROGRESS NOTES
Progress Note  Patient: Khadra Arias  Unit/Bed: 05/IC05-01  YOB: 1935  MRN: [id number]  Acct: [de-identified]   Admitting Diagnosis: Positive D dimer [R79.89]  Elevated troponin [R77.8]  TOMMY (acute kidney injury) ([facility] [facility] [N17.9]  Atrial fibrillation with RVR ([facility] [facility] [I48.91]  Acute respiratory failure with hypoxia ([facility] [address] [facility] [J96.01]  COVID [U07.1]  COVID-19 [U07.1]  Date:  1/4/2023  Hospital Day: 2    Chief Complaint:  Shortness of breath    Subjective    1/6/23: Remains in [facility] isolation. On Precedex for agitation. Patient opens eyes but is otherwise unresponsive to commands. Only moaning. Maintaining adequate SPO2 on room air. Per nursing staff, patient has had multiple IVs infiltrate and currently has only 1 IV in her left AC. Currently on telemetry she is A. fib with heart rates 50s to 60s with multiple 1.5 to approximately 2-second pauses this morning. Her IV Lopressor was placed on hold by critical care. She is also on digoxin 125 mcg IV every 24 hours with parameters to hold if heart rate less than 80. Bilateral lower extremity venous duplex ultrasound completed this morning showed DVT in right femoral vein as well as DVT in left common femoral vein, left superficial femoral vein and left popliteal veins. She remains on full dose anticoagulation with heparin drip. Repeat echocardiogram completed yesterday showed EF of 40% with prior EF of 40 to 45% per echo on 12/16/2022. Hospice evaluation pending. 1/5/23: Patient is a 80 y.o. female who presents with a chief complaint of shortness of breath. Patient is followed on a regular basis by Dr. Haley Landeros, APRN - CNP. Patient is currently seen intensive care unit, she is in COVID isolation. Most of the information was taken from the staff as well as the chart. Patient currently residing in nursing home after pelvic fracture.   Patient noted to be in atrial fibrillation with rapid ventricle response was placed on IV Cardizem drip as well as heparin drip. Apparently was on pressors and currently weaned off. N  Patient noted to be in Pafib during prior admission. She was felt to be a poor long-term oral anticoagulation secondary to advanced age, comorbidity as well as fall risk. Review of Systems:   Review of Systems   Unable to perform ROS: Dementia       Physical Examination:    BP (!) 90/53   Pulse 82   Temp 97.7 °F (36.5 °C) (Bladder)   Resp 21   Ht 5' 8\" (1.727 m)   Wt 134 lb 4.2 oz (60.9 kg)   LMP  (LMP Unknown)   SpO2 100%   BMI 20.41 kg/m²    Physical Exam  Constitutional:       Comments: Nonverbal/unresponsive   HENT:      Head: Normocephalic and atraumatic. Cardiovascular:      Rate and Rhythm: Normal rate. Rhythm irregularly irregular. Pulmonary:      Effort: Pulmonary effort is normal. No respiratory distress. Breath sounds: No wheezing, rhonchi or rales. Abdominal:      Palpations: Abdomen is soft. Musculoskeletal:      Cervical back: Normal range of motion. Right lower leg: No edema. Left lower leg: No edema. Comments: SCDs in place on bilateral LE   Skin:     General: Skin is warm. Neurological:      Mental Status: She is alert.       Comments: Awake/opens eyes but unresponsive to commands, moaning       LABS:  CBC:   Lab Results   Component Value Date/Time    WBC 10.3 01/06/2023 06:46 AM    RBC 2.79 01/06/2023 06:46 AM    HGB 9.0 01/06/2023 06:46 AM    HCT 26.7 01/06/2023 06:46 AM    MCV 95.8 01/06/2023 06:46 AM    MCH 32.4 01/06/2023 06:46 AM    MCHC 33.9 01/06/2023 06:46 AM    RDW 14.6 01/06/2023 06:46 AM     01/06/2023 06:46 AM    MPV 8.6 02/14/2014 08:32 AM     CBC with Differential:   Lab Results   Component Value Date/Time    WBC 10.3 01/06/2023 06:46 AM    RBC 2.79 01/06/2023 06:46 AM    HGB 9.0 01/06/2023 06:46 AM    HCT 26.7 01/06/2023 06:46 AM     01/06/2023 06:46 AM    MCV 95.8 01/06/2023 06:46 AM    MCH 32.4 01/06/2023 06:46 AM    MCHC 33.9 01/06/2023 06:46 AM    RDW 14.6 01/06/2023 06:46 AM    LYMPHOPCT 4.2 01/06/2023 06:46 AM    MONOPCT 6.2 01/06/2023 06:46 AM    BASOPCT 0.4 01/06/2023 06:46 AM    MONOSABS 0.6 01/06/2023 06:46 AM    LYMPHSABS 0.4 01/06/2023 06:46 AM    EOSABS 0.0 01/06/2023 06:46 AM    BASOSABS 0.0 01/06/2023 06:46 AM     CMP:    Lab Results   Component Value Date/Time     01/05/2023 06:14 PM    K 4.2 01/05/2023 06:14 PM     01/05/2023 06:14 PM    CO2 21 01/05/2023 06:14 PM    BUN 98 01/05/2023 06:14 PM    CREATININE 2.40 01/05/2023 06:14 PM    GFRAA >60.0 04/12/2022 09:23 AM    LABGLOM 19.0 01/05/2023 06:14 PM    GLUCOSE 128 01/05/2023 06:14 PM    GLUCOSE 87 12/27/2011 08:41 AM    PROT 6.0 01/05/2023 07:05 AM    LABALBU 3.1 01/05/2023 07:05 AM    LABALBU 4.3 12/27/2011 08:41 AM    CALCIUM 8.7 01/05/2023 06:14 PM    BILITOT 0.7 01/05/2023 07:05 AM    ALKPHOS 206 01/05/2023 07:05 AM    AST 31 01/05/2023 07:05 AM    ALT 20 01/05/2023 07:05 AM     BMP:    Lab Results   Component Value Date/Time     01/05/2023 06:14 PM    K 4.2 01/05/2023 06:14 PM     01/05/2023 06:14 PM    CO2 21 01/05/2023 06:14 PM    BUN 98 01/05/2023 06:14 PM    LABALBU 3.1 01/05/2023 07:05 AM    LABALBU 4.3 12/27/2011 08:41 AM    CREATININE 2.40 01/05/2023 06:14 PM    CALCIUM 8.7 01/05/2023 06:14 PM    GFRAA >60.0 04/12/2022 09:23 AM    LABGLOM 19.0 01/05/2023 06:14 PM    GLUCOSE 128 01/05/2023 06:14 PM    GLUCOSE 87 12/27/2011 08:41 AM     Magnesium:    Lab Results   Component Value Date/Time    MG 3.2 01/04/2023 07:45 PM     Troponin:    Lab Results   Component Value Date/Time    TROPONINI 0.041 01/05/2023 07:05 AM       Radiology:  CT HEAD WO CONTRAST    Result Date: 1/5/2023  EXAMINATION: CT OF THE HEAD WITHOUT CONTRAST  1/4/2023 9:32 pm TECHNIQUE: CT of the head was performed without the administration of intravenous contrast. Automated exposure control, iterative reconstruction, and/or weight based adjustment of the mA/kV was utilized to reduce the radiation dose to as low as reasonably achievable. COMPARISON: None. HISTORY: ORDERING SYSTEM PROVIDED HISTORY: AMS TECHNOLOGIST PROVIDED HISTORY: Reason for exam:->AMS Has a \"code stroke\" or \"stroke alert\" been called? ->No Decision Support Exception - unselect if not a suspected or confirmed emergency medical condition->Emergency Medical Condition (MA) What reading provider will be dictating this exam?->CRC FINDINGS: BRAIN/VENTRICLES: There is no acute intracranial hemorrhage, mass effect or midline shift. No abnormal extra-axial fluid collection. The gray-white differentiation is maintained without evidence of an acute infarct. There is no evidence of hydrocephalus. The ventricles, cisterns and sulci are prominent consistent with atrophy. There is decreased attenuation within the periventricular white matter consistent with periventricular leukomalacia. ORBITS: The visualized portion of the orbits demonstrate no acute abnormality. SINUSES: The visualized paranasal sinuses and mastoid air cells demonstrate no acute abnormality. SOFT TISSUES/SKULL:  No acute abnormality of the visualized skull or soft tissues. 1. There is no acute intracranial abnormality. Specifically, there is no intracranial hemorrhage. 2. Atrophy and periventricular leukomalacia,     XR CHEST PORTABLE    Result Date: 1/4/2023  EXAMINATION: ONE XRAY VIEW OF THE CHEST 1/4/2023 8:22 pm COMPARISON: December 15, 2022 HISTORY: ORDERING SYSTEM PROVIDED HISTORY: SOB TECHNOLOGIST PROVIDED HISTORY: Reason for exam:->SOB What reading provider will be dictating this exam?->CRC FINDINGS: Heart and the mediastinal structures are normal.  Lungs are free of acute infiltrate or pleural effusion.      Stable nonacute chest.     US DUP LOWER EXTREMITIES BILATERAL VENOUS    Result Date: 1/6/2023  EXAMINATION: DUPLEX VENOUS ULTRASOUND OF THE BILATERAL LOWER EXTREMITIES1/6/2023 6:51 am TECHNIQUE: Duplex ultrasound using B-mode/gray scaled imaging, Doppler spectral analysis and color flow Doppler was obtained of the deep venous structures of the lower bilateral extremities. COMPARISON: None. HISTORY: ORDERING SYSTEM PROVIDED HISTORY: swelling TECHNOLOGIST PROVIDED HISTORY: Reason for exam:->swelling What reading provider will be dictating this exam?->CRC FINDINGS: Hyperechoic clot visualized in the deep femoral vein extending across the bifurcation into right common femoral vein as visualized on series 1, image 9/49, the right deep femoral vein is noncompressible and demonstrates no flow on color imaging. The right superficial femoral vein, popliteal vein and right call full veins demonstrate no evidence of deep venous thrombosis. Hyperechoic clot visualized within the left common femoral vein seen on series 1, image 27/49, the common femoral vein is noncompressible within the no flow visualized on color flow imaging findings consistent with deep venous thrombosis the is visualized extending into the proximal, mid and distal femoral vein segments into the popliteal vein. Deep vein thrombosis visualized in the right deep femoral vein. Deep vein thrombosis visualized in the left common femoral vein, left superficial femoral vein and left popliteal veins.         EKG 1/4/23: A-fib 133, ST depression with T wave inversion anterolateral leads, QTc 458ms    Telemetry 1/6/23: A-fib 50s-60s, multiple 1.5-2 sec pauses this morning      Assessment:    Active Hospital Problems    Diagnosis Date Noted    TOMMY (acute kidney injury) ([facility] [address] [N17.9] 01/05/2023     Priority: High    Shock ([facility] [address] [R57.9] 01/05/2023     Priority: Medium    Encounter for hospice care discussion [Z71.89] 01/05/2023     Priority: Medium    COVID-19 [U07.1] 01/04/2023     Priority: Medium     A-fib RVR--currently rate controlled/ryan, likely has SSS  Elevated troponin  Hx cardiomyopathy with EF 40-45% per echo 12/16/22--EF 40% per echo 1/5/23  TOMMY   Sepsis/septic shock--resolved  COVID 19 infection  Hypernatremia  Hx HTN--currently borderline hypotensive  Dyslipidemia  Dementia  Recent fall with pelvic fracture in 12/2022     Plan:  Continue current medications-digoxin 125 mcg IV daily, Decadron 6 mg IV every 24 hours, insulin as directed, heparin drip for now  IV Lopressor placed on hold this morning by critical care  Poor long-term oral anticoagulation candidate secondary to advanced age, dementia and fall risk  Plan to resume prior cardiac medications in future when feasible  No ACE inhibitor/angiotensin receptor blocker or other nephrotoxic agents at this time secondary to TOMMY  N.p.o. at this time  Monitor on telemetry for any tachycardia or bradyarrhythmias  Maintain potassium greater than 4, magnesium greater than 2  GI/DVT prophylaxis  Conservative medical therapy from a cardiac standpoint secondary to multiple comorbidities  Hospice consultation is pending  Pulmonary/critical care recommendations  Will follow          Electronically signed by JOSH Marie on 1/6/2023 at 10:43 AM    Attending Supervising [de-identified] Attestation Statement  The patient is a [place] 67 y.o. female. I have performed a history and physical examination of the patient. I discussed the case with the physician assistant. I reviewed the patient's Past Medical History, Past Surgical History, Medications, and Allergies.      Physical Exam:  Vitals:    01/06/23 1100 01/06/23 1127 01/06/23 1200 01/06/23 1316   BP: (!) 81/53 (!) 106/48 (!) 92/42 (!) 100/53   Pulse: 65 61 64 84   Resp: 12 16 13 19   Temp: (!) 95.5 °F (35.3 °C)  (!) 95.7 °F (35.4 °C)    TempSrc:   Axillary    SpO2: 100%  98% 100%   Weight:       Height:               Active Hospital Problems    Diagnosis Date Noted    TOMMY (acute kidney injury) ([facility] [address] [N17.9] 01/05/2023     Priority: High    Shock ([address] [R57.9] 01/05/2023     Priority: Medium    Encounter for hospice care discussion [Z71.89] 01/05/2023     Priority: Medium    COVID-19 [U07.1] 01/04/2023     Priority: Medium        I reviewed and agree with the findings and plan documented in her note . ASSESSMENT:           Active Hospital Problems     Diagnosis Date Noted    Shock [facility]) [R57.9] 01/05/2023       Priority: Medium    Encounter for hospice care discussion [Z71.89] 01/05/2023       Priority: Medium    COVID-19 [U07.1] 01/04/2023       Priority: Medium      New onset atrial fibrillation with CVR now. Elevated cardiac enzyme likely demand ischemia. Elevated BNP-clinically appears dehydrated  Hx of CMP EF of 40-45% PER ECHO 12/2022  COVID-19  Possible sepsis  Respiratory insufficiency  Essential hypertension  dyslipidemia  Baseline dementia  Acute kidney injury  Status post recent syncope/fall. PLAN:       ICU supportive care and management  As always, aggressive risk factor modification is strongly recommended. We should adhere to the JNC VIII guidelines for HTN management and the NCEPATP III guidelines for LDL-C management. Cont with IV heparin for now while inpatient. Poor long-term oral anticoagulation secondary to advanced age, comorbidity as well as fall risk. HR control  IV lopressor  No ACE inhibitor/angiotensin receptor blocker or other nephrotoxic agents at this time secondary to TOMMY  Cardioprotective medications when blood pressure allows. Maintain potassium between 4-5 magnesium greater than 2  Hospice consulted  Cardio to sign off, please call with questions. Thank you for allowing me to participate in the care of your patient, please don't hesitate to contact me if you have any further questions.     Electronically signed by Bernie Farisa DO on 1/6/23 at 3:30 PM EST

## 2023-01-06 NOTE — FLOWSHEET NOTE
Pt combative removing IV and Lines. Npt oriented to self. Spoke to family about need of CVC line or PICC due to lack of IV access , no response from family. IV access lost able to obtain later Heparin gtt off for considerable amount of time. Restarted when access was obtained.

## 2023-01-06 NOTE — DISCHARGE SUMMARY
Hospital Medicine Discharge Summary    Khadra Arias  :  1935  MRN:  [id number]    Admit date:  2023  Discharge date:  2023    Admitting Physician:  Gaby Alexander DO  Primary Care Physician:  Haley Landeros, APRN - CNP      Discharge Diagnoses:    Principal Problem:    COVID-19  Active Problems:    TOMMY (acute kidney injury) ([facility] [address]    Shock ([facility] [address]    Encounter for hospice care discussion  Resolved Problems:    * No resolved hospital problems. *      Hospital Course:   Khadra Arias is a 80 y.o. female that was admitted and treated at [facility] for the following medical issues:     Septic shock with hypotension, leukocytosis and lactic acidosis  - in the setting of COVID infection, AFIB with RVR  - off Norepinephrine support  - blood cultures no growth  - treated with Decadron  - managed by critical care service    AFIB with RVR / elevated troponins  - requiring Cardizem, Heparin infusion, IV digoxin and IV metoprolol  - managed by cardiology     Acute encephalopathy  - in the setting of COVID infection, underlying dementia  - requiring restraints and Precedex infusion    Elevated D-dimer  - doppler u/s showed bilateral LE DVT  - in the setting of COVID infection  - treated with Heparin infusion    TOMMY / hypernatremia  - treated with IVFs      Code Status: DNR-CCA / DNI, family met with hospice today and decided to go with Inpatient hospice, code status changed to [facility].     Disposition - Hospice facility    Patient was seen by the following consultants while admitted to [facility]:   Consults:  [address] TO CRITICAL CARE  IP CONSULT TO PALLIATIVE CARE  IP CONSULT TO HOSPICE  IP CONSULT TO PHARMACY    Significant Diagnostic Studies:    CT HEAD WO CONTRAST    Result Date: 2023  EXAMINATION: CT OF THE HEAD WITHOUT CONTRAST  2023 9:32 pm TECHNIQUE: CT of the head was performed without the administration of intravenous contrast. Automated exposure control, iterative reconstruction, and/or weight based adjustment of the mA/kV was utilized to reduce the radiation dose to as low as reasonably achievable. COMPARISON: None. HISTORY: ORDERING SYSTEM PROVIDED HISTORY: AMS TECHNOLOGIST PROVIDED HISTORY: Reason for exam:->AMS Has a \"code stroke\" or \"stroke alert\" been called? ->No Decision Support Exception - unselect if not a suspected or confirmed emergency medical condition->Emergency Medical Condition (MA) What reading provider will be dictating this exam?->CRC FINDINGS: BRAIN/VENTRICLES: There is no acute intracranial hemorrhage, mass effect or midline shift. No abnormal extra-axial fluid collection. The gray-white differentiation is maintained without evidence of an acute infarct. There is no evidence of hydrocephalus. The ventricles, cisterns and sulci are prominent consistent with atrophy. There is decreased attenuation within the periventricular white matter consistent with periventricular leukomalacia. ORBITS: The visualized portion of the orbits demonstrate no acute abnormality. SINUSES: The visualized paranasal sinuses and mastoid air cells demonstrate no acute abnormality. SOFT TISSUES/SKULL:  No acute abnormality of the visualized skull or soft tissues. 1. There is no acute intracranial abnormality. Specifically, there is no intracranial hemorrhage. 2. Atrophy and periventricular leukomalacia,     XR CHEST PORTABLE    Result Date: 1/4/2023  EXAMINATION: ONE XRAY VIEW OF THE CHEST 1/4/2023 8:22 pm COMPARISON: December 15, 2022 HISTORY: ORDERING SYSTEM PROVIDED HISTORY: SOB TECHNOLOGIST PROVIDED HISTORY: Reason for exam:->SOB What reading provider will be dictating this exam?->CRC FINDINGS: Heart and the mediastinal structures are normal.  Lungs are free of acute infiltrate or pleural effusion. Stable nonacute chest.       Disposition: Discharged to Hospice.          Signed:  Marcille Simmonds, MD  1/6/2023, 3:51 PM  ----------------------------------------------------------------------------------------------------------------------

## 2023-01-06 NOTE — INTERDISCIPLINARY ROUNDS
participated in interdisciplinary rounds with ICU staff. Patient in Covid isolation and her family is meeting with hospice this afternoon.  to provide support as needed or requested.

## 2023-01-06 NOTE — PROGRESS NOTES
0800 pt assessment completed, pt resting, restraints in place. Pt repositioned for comfort. 1200 pt repositioned, restraints in place, will continue to monitor  1300 Pt family at bedside. Hospice meeting at 1330.   1500 pt removed iv. Dressing applied. Pt bathed and oral care done.

## 2023-01-06 NOTE — PROGRESS NOTES
Physician Progress Note      Arun Zaragoza  CSN #:                  [id number]  :                       1935  ADMIT DATE:       2023 7:15 PM  [address] [place] DATE:  RESPONDING  PROVIDER #:        Shabbir Welch MD          QUERY TEXT:    Pt admitted with COVID-19 with sepsis and septic shock. Acute respiratory   failure with hypoxia noted by ED provider. ABG showed PO2 53% on RA. Pt was   placed on 3-4L NC, RR 18-37. If possible, please document in the progress   notes and discharge summary if respiratory failure was: The medical record reflects the following:  Risk Factors: COVID-19 with sepsis/septic shock,  Clinical Indicators: Pt presented to ED on 4L NC O2. ABG showed pH 7.671 pCo2   14 pO2 53 on room air. RR 18-37, -138. Pt was decreased to 3L NC,   currently on RA. Pt uses 2L O2 prn and at hs per H&P. Treatment: supplemental O2, Decadron, ABG, ICU monitoring    Thank you, Pat Cox RN BSN [facility]  [phone]  Options provided:  -- Acute respiratory failure with hypoxia confirmed after study  -- Acute respiratory failure with hypoxia treated and resolved  -- Acute respiratory failure with hypoxia ruled out after study  -- Other - I will add my own diagnosis  -- Disagree - Not applicable / Not valid  -- Disagree - Clinically unable to determine / Unknown  -- Refer to Clinical Documentation Reviewer    PROVIDER RESPONSE TEXT:    Provider is clinically unable to determine a response to this query.     Query created by: Paul Cruz on 2023 3:03 PM      Electronically signed by:  Shabbir Welch MD 2023 3:28 PM

## 2023-01-06 NOTE — PROGRESS NOTES
Pulmonary & Critical Care Medicine ICU Progress Note  Chief complaint : ICU management     Subjunctive/24 hour events :   Patient seen and examined during multidisciplinary rounds with RN, charge nurse, RT, pharmacy, dietitian, and social service. Patient had an uneventful night. She remains confused and agitated at times. Remains on precedex. She is on room air and O2 sats are 100%. Remains off levophed. No fever overnight and urine output 925 overnight. Family is meeting with hospice later today. Family declined a central line yesterday. If further treatments are needed she would benefit from a picc line. Social History     Tobacco Use    Smoking status: Never    Smokeless tobacco: Never   Substance Use Topics    Alcohol use: No         Problem Relation Age of Onset    Coronary Art Dis Father     Other Father         glaucoma    Coronary Art Dis Brother        Recent Labs     01/04/23 2057   PHART 7.671*   AUP9PXD 14*   PO2ART 53*       MV Settings:     / / /            IV:   heparin (PORCINE) Infusion 16 Units/kg/hr (01/06/23 1003)    norepinephrine Stopped (01/05/23 0835)    dextrose 5 % and 0.45 % NaCl 75 mL/hr at 01/06/23 0440    dextrose      dexmedetomidine (PRECEDEX) IV infusion 0.4 mcg/kg/hr (01/06/23 0440)       Vitals:  BP (!) 90/53   Pulse 82   Temp 97.7 °F (36.5 °C) (Bladder)   Resp 21   Ht 5' 8\" (1.727 m)   Wt 134 lb 4.2 oz (60.9 kg)   LMP  (LMP Unknown)   SpO2 100%   BMI 20.41 kg/m²    Tmax:       Intake/Output Summary (Last 24 hours) at 1/6/2023 1058  Last data filed at 1/6/2023 0645  Gross per 24 hour   Intake 2291.89 ml   Output 925 ml   Net 1366.89 ml       EXAM:    General: uncooperative, distracted  Head: normocephalic, atraumatic  Eyes:No gross abnormalities. ENT:  MMM no lesions  Neck:  supple and no masses  Chest : Fair air movement bibasilar rales no wheezes   Heart[de-identified] Heart sounds are normal.  Regular rate and rhythm without murmur, gallop or rub.   ABD:  bowel sounds normal, soft, non-tender  Musculoskeletal : no cyanosis, no clubbing, and no edema  Neuro:  Confused   Skin: No rashes or nodules noted.   Lymph node:  no cervical nodes  Urology: No Lam   Psychiatric: Agitated     Medications:  Scheduled Meds:   dexamethasone  6 mg IntraVENous Q24H    insulin lispro  0-8 Units SubCUTAneous Q4H    digoxin  125 mcg IntraVENous Daily    [Held by provider] metoprolol  5 mg IntraVENous Q6H       PRN Meds:  ondansetron **OR** ondansetron, polyethylene glycol, acetaminophen **OR** acetaminophen, heparin (porcine), heparin (porcine), guaiFENesin-dextromethorphan, glucose, dextrose bolus **OR** dextrose bolus, glucagon (rDNA), dextrose    Results: reviewed by me   CBC:   Recent Labs     01/04/23 2057 01/05/23  0705 01/06/23  0646   WBC 11.6* 12.3* 10.3   HGB 10.9*  10.9* 10.7* 9.0*   HCT 32.8* 32.4* 26.7*   MCV 95.3* 96.5* 95.8*    220 179     BMP:   Recent Labs     01/05/23  0221 01/05/23  0705 01/05/23  1814   * 150* 154*   K 3.8 4.5 4.2    114* 113*   CO2 17* 18* 21   * 104* 98*   CREATININE 2.65* 2.54* 2.40*     LIVER PROFILE:   Recent Labs     01/04/23 1945 01/05/23  0705   AST 35 31   ALT 22 20   LIPASE 69  --    BILITOT 0.8* 0.7   ALKPHOS 231* 206*     PT/INR:   Recent Labs     01/04/23 2155 01/06/23  0646   PROTIME 15.4* 18.5*   INR 1.2 1.5     APTT:   Recent Labs     01/04/23 2155   APTT 29.5     UA:  Recent Labs     01/04/23 1945   COLORU Yellow   PHUR 5.0   WBCUA 0-2   RBCUA 0-2   BACTERIA Negative   CLARITYU CLOUDY*   SPECGRAV 1.017   LEUKOCYTESUR Negative   UROBILINOGEN 1.0   BILIRUBINUR Negative   BLOODU Negative   GLUCOSEU Negative       Cultures:    CT HEAD WO CONTRAST    Result Date: 1/5/2023  EXAMINATION: CT OF THE HEAD WITHOUT CONTRAST  1/4/2023 9:32 pm TECHNIQUE: CT of the head was performed without the administration of intravenous contrast. Automated exposure control, iterative reconstruction, and/or weight based adjustment of the mA/kV was utilized to reduce the radiation dose to as low as reasonably achievable. COMPARISON: None. HISTORY: ORDERING SYSTEM PROVIDED HISTORY: AMS TECHNOLOGIST PROVIDED HISTORY: Reason for exam:->AMS Has a \"code stroke\" or \"stroke alert\" been called? ->No Decision Support Exception - unselect if not a suspected or confirmed emergency medical condition->Emergency Medical Condition (MA) What reading provider will be dictating this exam?->CRC FINDINGS: BRAIN/VENTRICLES: There is no acute intracranial hemorrhage, mass effect or midline shift. No abnormal extra-axial fluid collection. The gray-white differentiation is maintained without evidence of an acute infarct. There is no evidence of hydrocephalus. The ventricles, cisterns and sulci are prominent consistent with atrophy. There is decreased attenuation within the periventricular white matter consistent with periventricular leukomalacia. ORBITS: The visualized portion of the orbits demonstrate no acute abnormality. SINUSES: The visualized paranasal sinuses and mastoid air cells demonstrate no acute abnormality. SOFT TISSUES/SKULL:  No acute abnormality of the visualized skull or soft tissues. 1. There is no acute intracranial abnormality. Specifically, there is no intracranial hemorrhage. 2. Atrophy and periventricular leukomalacia,     CT Head W/O Contrast    Result Date: 12/15/2022  EXAMINATION: CT OF THE HEAD WITHOUT CONTRAST  12/15/2022 12:06 pm TECHNIQUE: CT of the head was performed without the administration of intravenous contrast. Automated exposure control, iterative reconstruction, and/or weight based adjustment of the mA/kV was utilized to reduce the radiation dose to as low as reasonably achievable. COMPARISON: None. HISTORY: ORDERING SYSTEM PROVIDED HISTORY: syncope TECHNOLOGIST PROVIDED HISTORY: Reason for exam:->syncope Has a \"code stroke\" or \"stroke alert\" been called? ->No Decision Support Exception - unselect if not a suspected or confirmed emergency medical condition->Emergency Medical Condition (MA) What reading provider will be dictating this exam?->CRC FINDINGS: BRAIN/VENTRICLES: There is no acute intracranial hemorrhage, mass effect or midline shift. No abnormal extra-axial fluid collection. The gray-white differentiation is maintained without evidence of an acute infarct. There is no evidence of hydrocephalus. The ventricles, cisterns and sulci are prominent consistent with atrophy. There is decreased attenuation within the periventricular white matter consistent with periventricular leukomalacia. There are old lacunar infarcts seen within the right and left basal ganglia. ORBITS: The visualized portion of the orbits demonstrate no acute abnormality. SINUSES: The visualized paranasal sinuses and mastoid air cells demonstrate no acute abnormality. SOFT TISSUES/SKULL:  No acute abnormality of the visualized skull or soft tissues. 1. There is no acute intracranial abnormality. Specifically, there is no intracranial hemorrhage. 2. Atrophy and periventricular leukomalacia,     CT ABDOMEN PELVIS W IV CONTRAST Additional Contrast? None    Result Date: 12/15/2022  EXAMINATION: ONE XRAY VIEW OF THE CHEST; CT OF THE ABDOMEN AND PELVIS WITH CONTRAST 12/15/2022 12:22 pm COMPARISON: Chest x-ray, October 19 2022; CT abdomen pelvis, September 25, 2018 HISTORY: ORDERING SYSTEM PROVIDED HISTORY: sob TECHNOLOGIST PROVIDED HISTORY: Reason for exam:->sob What reading provider will be dictating this exam?->CRC FINDINGS: There also is a small amount of fluid. There is coarsening of the interstitium. The lungs are hyperinflated. The cardiac silhouette is within normal limits. Atherosclerotic calcifications are seen thoracic aorta. No consolidation, pneumothorax or pleural effusion seen. Degenerative changes are seen in the visualized portion of both shoulders and also spine. CT of the abdomen and pelvis: Lungs:  In the visualized bases of the lungs a 5.7 mm pleural base nodule is seen posteriorly on the left (series 2, image 1). A 7.7 mm nodule is seen posteriorly in the right lower lobe that is pleural based as well. (Series 2, image 20). No consolidating pneumonia, pneumothorax or pleural effusion is seen. Changes of some emphysema are visualized. Organs: The liver, pancreas, right adrenal gland and liver are unremarkable. There are calcifications seen in the liver and also there is a calcified splenic artery again seen that is unchanged from previous study. In the area of the left adrenal gland there is a 1.7 cm hypodense thick wall cystic area. This was also seen on previous study. In the region of the tail of the pancreas a hypodense area is seen that measures 1.2 cm. This area measures fluid density on this examination. This could be an IPMN in. This was vaguely seen on previous study and also appears stable. Both kidneys show no evidence of hydronephrosis. No renal calculus is visualized. Prominent atherosclerotic calcifications are visualized. GI/bowel: No dilated loops of bowel are seen. The appendix is visualized and is unremarkable. Pelvis: In the pre sacral region there is soft tissue density posterior to the bowel that is thickened. A superior there also is some stranding visualized. The uterus is not seen. There also is a small amount of fluid. The bladder distends normally. Bones: No destructive bony lesions are seen. Slight irregularity is seen in the lower sacral region on the left. 0.82 degenerative changes are seen in both hips and is most prominent on the right. Chest x-ray: No evidence for acute cardiopulmonary process. CT of the abdomen and pelvis: 1.  2 nodules are seen in the visualized bases of the lungs. The nodule in the right lower lobe was seen on previous study but was smaller in size. There are also emphysematous changes seen in the visualized bases. Numeral 2.   Hypodense lesion is seen in the region of the left adrenal gland. This could be an adrenal nodule of the possibility is necrotic lymph node . It is slightly larger than what was seen on previous study. 3.  A stable hypodense area is seen in the tail the pancreas. This could represent IPMN. 4.  There is no evidence for appendicitis, diverticulitis or bowel obstruction 5. There is presacral soft tissue density and stranding seen in the pelvis. This is new when compared to previous study. Query if patient may have had any radiation treatment due to endometrial cancer. Recommend further evaluation there is also full small amount of fluid. RECOMMENDATION: 1. Low does CT of the lungs, non emergently 2. It presacral soft tissue seen also there is a small amount of fluid. Possibility the of hemorrhage or of the etiology could be related to patient's history of endometrial cancer and any possibility of prior radiation treatment. Recommend follow-up. Also there is some irregularity in the sacrum and occult fracture is not excluded. This was discussed with JOSH Gallo, and CT of the pelvis was recommended. XR CHEST PORTABLE    Result Date: 1/4/2023  EXAMINATION: ONE XRAY VIEW OF THE CHEST 1/4/2023 8:22 pm COMPARISON: December 15, 2022 HISTORY: ORDERING SYSTEM PROVIDED HISTORY: SOB TECHNOLOGIST PROVIDED HISTORY: Reason for exam:->SOB What reading provider will be dictating this exam?->CRC FINDINGS: Heart and the mediastinal structures are normal.  Lungs are free of acute infiltrate or pleural effusion. Stable nonacute chest.     XR CHEST PORTABLE    Result Date: 12/15/2022  EXAMINATION: ONE XRAY VIEW OF THE CHEST; CT OF THE ABDOMEN AND PELVIS WITH CONTRAST 12/15/2022 12:22 pm COMPARISON: Chest x-ray, October 19 2022; CT abdomen pelvis, September 25, 2018 HISTORY: ORDERING SYSTEM PROVIDED HISTORY: sob TECHNOLOGIST PROVIDED HISTORY: Reason for exam:->sob What reading provider will be dictating this exam?->CRC FINDINGS: There also is a small amount of fluid. There is coarsening of the interstitium. The lungs are hyperinflated. The cardiac silhouette is within normal limits. Atherosclerotic calcifications are seen thoracic aorta. No consolidation, pneumothorax or pleural effusion seen. Degenerative changes are seen in the visualized portion of both shoulders and also spine. CT of the abdomen and pelvis: Lungs: In the visualized bases of the lungs a 5.7 mm pleural base nodule is seen posteriorly on the left (series 2, image 1). A 7.7 mm nodule is seen posteriorly in the right lower lobe that is pleural based as well. (Series 2, image 20). No consolidating pneumonia, pneumothorax or pleural effusion is seen. Changes of some emphysema are visualized. Organs: The liver, pancreas, right adrenal gland and liver are unremarkable. There are calcifications seen in the liver and also there is a calcified splenic artery again seen that is unchanged from previous study. In the area of the left adrenal gland there is a 1.7 cm hypodense thick wall cystic area. This was also seen on previous study. In the region of the tail of the pancreas a hypodense area is seen that measures 1.2 cm. This area measures fluid density on this examination. This could be an IPMN in. This was vaguely seen on previous study and also appears stable. Both kidneys show no evidence of hydronephrosis. No renal calculus is visualized. Prominent atherosclerotic calcifications are visualized. GI/bowel: No dilated loops of bowel are seen. The appendix is visualized and is unremarkable. Pelvis: In the pre sacral region there is soft tissue density posterior to the bowel that is thickened. A superior there also is some stranding visualized. The uterus is not seen. There also is a small amount of fluid. The bladder distends normally. Bones: No destructive bony lesions are seen. Slight irregularity is seen in the lower sacral region on the left.   0.82 degenerative changes are seen in both hips and is most prominent on the right. Chest x-ray: No evidence for acute cardiopulmonary process. CT of the abdomen and pelvis: 1.  2 nodules are seen in the visualized bases of the lungs. The nodule in the right lower lobe was seen on previous study but was smaller in size. There are also emphysematous changes seen in the visualized bases. Numeral 2. Hypodense lesion is seen in the region of the left adrenal gland. This could be an adrenal nodule of the possibility is necrotic lymph node . It is slightly larger than what was seen on previous study. 3.  A stable hypodense area is seen in the tail the pancreas. This could represent IPMN. 4.  There is no evidence for appendicitis, diverticulitis or bowel obstruction 5. There is presacral soft tissue density and stranding seen in the pelvis. This is new when compared to previous study. Query if patient may have had any radiation treatment due to endometrial cancer. Recommend further evaluation there is also full small amount of fluid. RECOMMENDATION: 1. Low does CT of the lungs, non emergently 2. It presacral soft tissue seen also there is a small amount of fluid. Possibility the of hemorrhage or of the etiology could be related to patient's history of endometrial cancer and any possibility of prior radiation treatment. Recommend follow-up. Also there is some irregularity in the sacrum and occult fracture is not excluded. This was discussed with JOSH Gallo, and CT of the pelvis was recommended. MRI BRAIN WO CONTRAST    Result Date: 12/15/2022  EXAMINATION: MRI OF THE BRAIN WITHOUT CONTRAST  12/15/2022 5:57 pm TECHNIQUE: Multiplanar multisequence MRI of the brain was performed without the administration of intravenous contrast. COMPARISON: Noncontrast CT head done earlier same day.  HISTORY: ORDERING SYSTEM PROVIDED HISTORY: syncope atrial fibrillation TECHNOLOGIST PROVIDED HISTORY: Reason for exam:->syncope atrial fibrillation FINDINGS: INTRACRANIAL STRUCTURES/VENTRICLES: There is no acute infarct. No mass effect or midline shift. No evidence of an acute intracranial hemorrhage. Generalized cerebral and cerebellar volume loss. Diffuse ventricular prominence may relate to central volume loss. The sellar/suprasellar regions appear unremarkable. The normal signal voids within the major intracranial vessels appear maintained. The axial FLAIR images demonstrate pontine, bilateral periventricular and deep white matter signal hyperintensities which are nonspecific but commonly seen in the setting of chronic small vessel ischemic disease. 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 ORBITS: The visualized portion of the orbits demonstrate no acute abnormality. SINUSES: Small amount of nonspecific fluid in the bilateral mastoid air cells. The paranasal sinuses are clear. BONES/SOFT TISSUES: The bone marrow signal intensity appears normal. The soft tissues demonstrate no acute abnormality. Multilevel degenerative changes in the visualized spine. 1.  No acute intracranial abnormality per unenhanced brain MRI. No acute stroke. 2.  Moderate to severe chronic small vessel ischemic disease. 3.  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 MRI PELVIS WO CONTRAST    Result Date: 12/15/2022  EXAMINATION: MRI OF THE PELVIS WITHOUT CONTRAST, 12/15/2022 4:10 pm TECHNIQUE: Multiplanar multisequence MRI of the pelvis was performed without the administration of intravenous contrast. COMPARISON: CT abdomen and pelvis 12/15/2022 HISTORY: ORDERING SYSTEM PROVIDED HISTORY: endometrial thickening rule out sacral fracture TECHNOLOGIST PROVIDED HISTORY: Reason for exam:->endometrial thickening rule out sacral fracture What reading provider will be dictating this exam?->CRC FINDINGS: SOFT TISSUES: Small amount of presacral free fluid is identified, which is stable compared to the prior CT. No large hematoma is seen.  BONE MARROW: Vertical fractures are identified through the bilateral sacral alae with associated transverse fracture of the sacral body. Fractures demonstrate classic H-shaped pattern. No hip fracture is identified. Joint alignment is intact. JOINTS: Moderate-severe bilateral hip degenerative changes noted with loss of the articular space. Small joint effusion is noted on the left hip. Multiple nondisplaced sacral fractures with a classic H-shaped pattern suggestive of sacral insufficiency fracture. US DUP LOWER EXTREMITIES BILATERAL VENOUS    Result Date: 1/6/2023  EXAMINATION: DUPLEX VENOUS ULTRASOUND OF THE BILATERAL LOWER EXTREMITIES1/6/2023 6:51 am TECHNIQUE: Duplex ultrasound using B-mode/gray scaled imaging, Doppler spectral analysis and color flow Doppler was obtained of the deep venous structures of the lower bilateral extremities. COMPARISON: None. HISTORY: ORDERING SYSTEM PROVIDED HISTORY: swelling TECHNOLOGIST PROVIDED HISTORY: Reason for exam:->swelling What reading provider will be dictating this exam?->CRC FINDINGS: Hyperechoic clot visualized in the deep femoral vein extending across the bifurcation into right common femoral vein as visualized on series 1, image 9/49, the right deep femoral vein is noncompressible and demonstrates no flow on color imaging. The right superficial femoral vein, popliteal vein and right call full veins demonstrate no evidence of deep venous thrombosis. Hyperechoic clot visualized within the left common femoral vein seen on series 1, image 27/49, the common femoral vein is noncompressible within the no flow visualized on color flow imaging findings consistent with deep venous thrombosis the is visualized extending into the proximal, mid and distal femoral vein segments into the popliteal vein. Deep vein thrombosis visualized in the right deep femoral vein. Deep vein thrombosis visualized in the left common femoral vein, left superficial femoral vein and left popliteal veins.      Most recent    Chest CT WITH CONTRAST:No results found for this or any previous visit. WITHOUT CONTRAST: No results found for this or any previous visit. CXR      2-view: Results for orders placed in visit on 10/19/22    XR CHEST STANDARD (2 VW)    Narrative  EXAMINATION:  TWO XRAY VIEWS OF THE CHEST    10/19/2022 1:40 pm    COMPARISON:  08/17/2018. HISTORY:  ORDERING SYSTEM PROVIDED HISTORY: Acute cough, Bronchitis  TECHNOLOGIST PROVIDED HISTORY:  Reason for exam:->r/o pna  What reading provider will be dictating this exam?->CRC    FINDINGS:  The cardiomediastinal silhouette is without acute process. Biapical prominence suggestive of COPD changes is seen. Peribronchial cuffing suggestive of bronchitis. The lungs are without acute focal process. There is no effusion or  pneumothorax. The osseous structures are without acute process. Impression  Peribronchial cuffing suggestive of bronchitis or airway disease. COPD  changes. Portable: Results for orders placed during the hospital encounter of 01/04/23    XR CHEST PORTABLE    Narrative  EXAMINATION:  ONE XRAY VIEW OF THE CHEST    1/4/2023 8:22 pm    COMPARISON:  December 15, 2022    HISTORY:  ORDERING SYSTEM PROVIDED HISTORY: SOB  TECHNOLOGIST PROVIDED HISTORY:  Reason for exam:->SOB  What reading provider will be dictating this exam?->CRC    FINDINGS:  Heart and the mediastinal structures are normal.  Lungs are free of acute  infiltrate or pleural effusion. Impression  Stable nonacute chest.      Echo No results found for this or any previous visit. Assessment:   This is a critically ill patient at risk of deterioration / death , needing close ICU monitoring and intervention due to below noted problems   Shock, obstructive, requiring pressors   Bilateral DVT's right femoral vein and left common femoral, superficial and left popliteal   COVID-19 infection   TOMMY, improving   Hypernatremia     Recommendations   Hospice meeting at 1pm   Would benefit from a picc line and NG, for feeding and water flushes   Labs pending for today   Continue heparin drip for DVT and probable PE   Continue precedex   Maintain blood sugar 140-180  D/C decadron   Monitor urine output and avoid overload                   Electronically signed by YUSUF Perkins - CNP,  [facility] ,on 1/6/2023 at 10:58 AM

## 2023-01-06 NOTE — CONSULTS
Chief Complaint   Patient presents with    Respiratory Distress        Patient is a 80 y.o. female who presents with a chief complaint of shortness of breath. Patient is followed on a regular basis by YUSUF Saravia - CNP. Patient is currently seen intensive care unit, she is in COVID isolation. Most of the information was taken from the staff as well as the chart. Patient currently residing in nursing home after pelvic fracture. Patient noted to be in atrial fibrillation with rapid ventricle response was placed on IV Cardizem drip as well as heparin drip. Apparently was on pressors and currently weaned off. N  Patient noted to be in Pafib during prior admission. She was felt to be a poor long-term oral anticoagulation secondary to advanced age, comorbidity as well as fall risk.         Past Medical History:   Diagnosis Date    Actinic keratoses     Arthritis     Dermatitis     Encounter for annual health examination 12/19/2011    Endometrial cancer [facility])     s/p hyst in 2009 - was told clear by Dr. Tracey Aggarwal    History of bone density study 1/11/2011    History of mammography, screening 1/11/2011    HTN (hypertension)     Hyperlipidemia     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    left lower leg    Sciatica     Vitamin D deficiency       Patient Active Problem List   Diagnosis    HTN (hypertension)    Hyperlipidemia    Sciatica    Vitamin D deficiency    Age-related macular degeneration    Arcus senilis of cornea    Combined form of senile cataract    Glaucoma suspect    Hyperopic astigmatism    Prurigo nodularis    Actinic keratoses    Dermatitis    TIA (transient ischemic attack)    At high risk for falls    Bronchitis    Acute cough    Nasal congestion    A-fib (HCC)    General weakness    Palliative care encounter    Advanced care planning/counseling discussion    Goals of care, counseling/discussion    Difficulty in walking    Pain    Moderate dementia with psychotic disturbance    COVID-19 Oscar ([facility] [address]    Encounter for hospice care discussion       Past Surgical History:   Procedure Laterality Date    CATARACT REMOVAL WITH IMPLANT      HYSTERECTOMY (CERVIX STATUS UNKNOWN)  9/2009    post-menopausal bleeding       Social History     Socioeconomic History    Marital status:    Tobacco Use    Smoking status: Never    Smokeless tobacco: Never   Substance and Sexual Activity    Alcohol use: No    Drug use: No     Social Determinants of Health     Financial Resource Strain: Low Risk     Difficulty of Paying Living Expenses: Not hard at all   Food Insecurity: No Food Insecurity    Worried About Running Out of Food in the Last Year: Never true    Ran Out of Food in the Last Year: Never true       Family History   Problem Relation Age of Onset    Coronary Art Dis Father     Other Father         glaucoma    Coronary Art Dis Brother        Current Facility-Administered Medications   Medication Dose Route Frequency Provider Last Rate Last Admin    ondansetron (ZOFRAN-ODT) disintegrating tablet 4 mg  4 mg Oral Q8H PRN YUSUF Schilling CNP        Or    ondansetron (ZOFRAN) injection 4 mg  4 mg IntraVENous Q6H PRN YUSUF Schilling CNP        polyethylene glycol (GLYCOLAX) packet 17 g  17 g Oral Daily PRN YUSUF Nelson CNP        acetaminophen (TYLENOL) tablet 650 mg  650 mg Oral Q6H PRN YUSUF Nelson CNP        Or    acetaminophen (TYLENOL) suppository 650 mg  650 mg Rectal Q6H PRN YUSUF Nelson CNP        heparin (porcine) injection 4,750 Units  80 Units/kg IntraVENous PRN Victor Manuel Mars MD   4,750 Units at 01/05/23 2018    heparin (porcine) injection 2,380 Units  40 Units/kg IntraVENous PRN Victor Manuel Christianson MD        heparin 25,000 units in dextrose 5% 250 mL (premix) infusion  5-30 Units/kg/hr IntraVENous Continuous Victor Manuel Mars MD 13.7 mL/hr at 01/05/23 2017 23 Units/kg/hr at 01/05/23 2017    norepinephrine (LEVOPHED) 16 mg in dextrose 5% 250 mL infusion  1-100 mcg/min IntraVENous Continuous Anali Felix Sedar, DO   Stopped at 01/05/23 0835    guaiFENesin-dextromethorphan (ROBITUSSIN DM) 100-10 MG/5ML syrup 5 mL  5 mL Oral Q4H PRN Nadine Duncan, APRN - CNP        lactated ringers bolus  500 mL IntraVENous Once Jigar BUNDY Sedar, DO        dexamethasone (DECADRON) injection 6 mg  6 mg IntraVENous Q24H Tamica Levine MD   6 mg at 01/05/23 1044    dextrose 5 % and 0.45 % sodium chloride infusion   IntraVENous Continuous Lydia Florentino MD 75 mL/hr at 01/05/23 1846 Rate Verify at 01/05/23 1846    insulin lispro (HUMALOG) injection vial 0-8 Units  0-8 Units SubCUTAneous Q4H Paulo Gifford MD        glucose chewable tablet 16 g  4 tablet Oral PRN Paulo Gifford MD        dextrose bolus 10% 125 mL  125 mL IntraVENous PRN Paulo Gifford MD        Or    dextrose bolus 10% 250 mL  250 mL IntraVENous PRN Paulo Gifford MD        glucagon (rDNA) injection 1 mg  1 mg SubCUTAneous PRN Paulo Gifford MD        dextrose 10 % infusion   IntraVENous Continuous PRN Paulo Gifford MD        digoxin (LANOXIN) injection 125 mcg  125 mcg IntraVENous Daily Katie Gautam MD   125 mcg at 01/05/23 2008    [START ON 1/6/2023] metoprolol (LOPRESSOR) injection 5 mg  5 mg IntraVENous Q6H Katie Gautam MD           ALLERGIES: Bactrim [sulfamethoxazole-trimethoprim], Benazepril, Codeine, and Sulfa antibiotics    Review of Systems   Unable to perform ROS: Dementia       VITALS:  Blood pressure (!) 123/97, pulse (!) 137, temperature 98.8 °F (37.1 °C), temperature source Bladder, resp. rate 15, height 5' 8\" (1.727 m), weight 131 lb (59.4 kg), SpO2 (!) 80 %, not currently breastfeeding. Body mass index is 19.92 kg/m². Physical Exam  As per H[facility]. [facility]      LABS:  Recent Results (from the past 24 hour(s))   POCT Glucose    Collection Time: 01/04/23  9:13 PM   Result Value Ref Range    Glucose 122 mg/dL    QC OK?  yes    Protime-INR    Collection Time: 01/04/23  9:55 PM   Result Value Ref Range    Protime 15.4 (H) 12.3 - 14.9 sec    INR 1.2    APTT    Collection Time: 01/04/23  9:55 PM   Result Value Ref Range    aPTT 29.5 24.4 - 36.8 sec   Troponin    Collection Time: 01/04/23 10:15 PM   Result Value Ref Range    Troponin 0.033 (HH) 0.000 - 0.010 ng/mL   D-Dimer, Quantitative    Collection Time: 01/05/23 12:14 AM   Result Value Ref Range    D-Dimer, Quant 10.21 (HH) 0.00 - 0.50 mg/L FEU   Troponin    Collection Time: 01/05/23  2:21 AM   Result Value Ref Range    Troponin 0.045 (HH) 0.000 - 0.010 ng/mL   Basic Metabolic Panel    Collection Time: 01/05/23  2:21 AM   Result Value Ref Range    Sodium 149 (H) 135 - 144 mEq/L    Potassium 3.8 3.4 - 4.9 mEq/L    Chloride 107 95 - 107 mEq/L    CO2 17 (L) 20 - 31 mEq/L    Anion Gap 25 (H) 9 - 15 mEq/L    Glucose 112 (H) 70 - 99 mg/dL     (HH) 8 - 23 mg/dL    Creatinine 2.65 (H) 0.50 - 0.90 mg/dL    Est, Glom Filt Rate 16.9 (L) >60    Calcium 8.8 8.5 - 9.9 mg/dL   Lactate, Sepsis    Collection Time: 01/05/23  3:01 AM   Result Value Ref Range    Lactic Acid, Sepsis 3.7 (HH) 0.5 - 1.9 mmol/L   Lactate, Sepsis    Collection Time: 01/05/23  4:09 AM   Result Value Ref Range    Lactic Acid, Sepsis 7.5 (HH) 0.5 - 1.9 mmol/L   SPECIMEN REJECTION    Collection Time: 01/05/23  5:23 AM   Result Value Ref Range    Rejected Test cbcwd     Reason for Rejection see below    C-Reactive Protein    Collection Time: 01/05/23  7:05 AM   Result Value Ref Range    CRP 52.4 (H) 0.0 - 5.0 mg/L   Procalcitonin    Collection Time: 01/05/23  7:05 AM   Result Value Ref Range    Procalcitonin 0.18 (H) 0.00 - 0.15 ng/mL   D-Dimer, Quantitative    Collection Time: 01/05/23  7:05 AM   Result Value Ref Range    D-Dimer, Quant 9.65 (HH) 0.00 - 0.50 mg/L FEU   CBC with Auto Differential    Collection Time: 01/05/23  7:05 AM   Result Value Ref Range    WBC 12.3 (H) 4.8 - 10.8 K/uL    RBC 3.35 (L) 4.20 - 5.40 M/uL    Hemoglobin 10.7 (L) 12.0 - 16.0 g/dL    Hematocrit 32.4 (L) 37.0 - 47.0 %    MCV 96.5 (H) 79.4 - 94.8 fL    MCH 31.8 (H) 27.0 - 31.3 pg    MCHC 33.0 33.0 - 37.0 %    RDW 14.9 (H) 11.5 - 14.5 %    Platelets 578 802 - 751 K/uL    Neutrophils % 93.9 %    Lymphocytes % 2.6 %    Monocytes % 2.8 %    Eosinophils % 0.0 %    Basophils % 0.7 %    Neutrophils Absolute 11.5 (H) 1.4 - 6.5 K/uL    Lymphocytes Absolute 0.3 (L) 1.0 - 4.8 K/uL    Monocytes Absolute 0.3 0.2 - 0.8 K/uL    Eosinophils Absolute 0.0 0.0 - 0.7 K/uL    Basophils Absolute 0.1 0.0 - 0.2 K/uL   Lactate, Sepsis    Collection Time: 01/05/23  7:05 AM   Result Value Ref Range    Lactic Acid, Sepsis 2.8 (H) 0.5 - 1.9 mmol/L   Anti-Xa, Unfractionated Heparin    Collection Time: 01/05/23  7:05 AM   Result Value Ref Range    Anti-XA Unfrac Heparin 1.10 IU/mL   Comprehensive Metabolic Panel w/ Reflex to MG    Collection Time: 01/05/23  7:05 AM   Result Value Ref Range    Sodium 150 (H) 135 - 144 mEq/L    Potassium reflex Magnesium 4.5 3.4 - 4.9 mEq/L    Chloride 114 (H) 95 - 107 mEq/L    CO2 18 (L) 20 - 31 mEq/L    Anion Gap 18 (H) 9 - 15 mEq/L    Glucose 157 (H) 70 - 99 mg/dL     (HH) 8 - 23 mg/dL    Creatinine 2.54 (H) 0.50 - 0.90 mg/dL    Est, Glom Filt Rate 17.8 (L) >60    Calcium 8.5 8.5 - 9.9 mg/dL    Total Protein 6.0 (L) 6.3 - 8.0 g/dL    Albumin 3.1 (L) 3.5 - 4.6 g/dL    Total Bilirubin 0.7 0.2 - 0.7 mg/dL    Alkaline Phosphatase 206 (H) 40 - 130 U/L    ALT 20 0 - 33 U/L    AST 31 0 - 35 U/L    Globulin 2.9 2.3 - 3.5 g/dL   Troponin    Collection Time: 01/05/23  7:05 AM   Result Value Ref Range    Troponin 0.041 (HH) 0.000 - 0.010 ng/mL   Lactate, Sepsis    Collection Time: 01/05/23  8:37 AM   Result Value Ref Range    Lactic Acid, Sepsis 4.3 (HH) 0.5 - 1.9 mmol/L   Anti-Xa, Unfractionated Heparin    Collection Time: 01/05/23  2:45 PM   Result Value Ref Range    Anti-XA Unfrac Heparin <0.10 IU/mL   Anti-Xa, Unfractionated Heparin    Collection Time: 01/05/23  6:14 PM   Result Value Ref Range    Anti-XA Unfrac Heparin <0.10 IU/mL   Basic Metabolic Panel w/ Reflex to MG    Collection Time: 01/05/23  6:14 PM   Result Value Ref Range    Sodium 154 (H) 135 - 144 mEq/L    Potassium reflex Magnesium 4.2 3.4 - 4.9 mEq/L    Chloride 113 (H) 95 - 107 mEq/L    CO2 21 20 - 31 mEq/L    Anion Gap 20 (H) 9 - 15 mEq/L    Glucose 128 (H) 70 - 99 mg/dL    BUN 98 (HH) 8 - 23 mg/dL    Creatinine 2.40 (H) 0.50 - 0.90 mg/dL    Est, Glom Filt Rate 19.0 (L) >60    Calcium 8.7 8.5 - 9.9 mg/dL     Troponin:   Lab Results   Component Value Date/Time    TROPONINI 0.041 01/05/2023 07:05 AM       EKG: atrial fibrillation      ASSESSMENT:    Active Hospital Problems    Diagnosis Date Noted    Shock ([address] [R57.9] 01/05/2023     Priority: Medium    Encounter for hospice care discussion [Z71.89] 01/05/2023     Priority: Medium    COVID-19 [U07.1] 01/04/2023     Priority: Medium     New onset atrial fibrillation with rapid ventricle response  Elevated cardiac enzyme likely demand ischemia. Elevated BNP-clinically appears dehydrated  Hx of CMP EF of 40-45% PER ECHO 12/2022  COVID-19  Possible sepsis  Respiratory insufficiency  Essential hypertension  dyslipidemia  Baseline dementia  Acute kidney injury  Status post recent syncope/fall. PLAN:   ICU supportive care and management  As always, aggressive risk factor modification is strongly recommended. We should adhere to the JNC VIII guidelines for HTN management and the NCEPATP III guidelines for LDL-C management. Cont with IV heparin for now while inpatient. Poor long-term oral anticoagulation secondary to advanced age, comorbidity as well as fall risk. HR control  IV lopressor  Cardioprotective medications when blood pressure allows. Maintain potassium between 4-5 magnesium greater than 2  Poor PCI candidate at this time. Conservative medical therapy from cardiology standpoint. Palliative care consulted.     Thank you for allowing me to participate in the care of your patient, please don't hesitate to contact me if you have any further questions.     Electronically signed by Beth Becerra DO on 1/5/2023 at 9:09 PM

## 2023-01-06 NOTE — PROGRESS NOTES
Hospitalist Progress Note      PCP: Jose Bryson, APRN - CNP    Date of Admission: 1/4/2023    Chief Complaint:  patient is off Norepinephrine support, still confused and agitated requiring Precedex infusion, in AFIB with RVR overnight requiring IV Metoprolol    Medications:  Reviewed    Infusion Medications    heparin (PORCINE) Infusion Stopped (01/06/23 0835)    norepinephrine Stopped (01/05/23 0835)    dextrose 5 % and 0.45 % NaCl 75 mL/hr at 01/06/23 0440    dextrose      dexmedetomidine (PRECEDEX) IV infusion 0.4 mcg/kg/hr (01/06/23 0440)     Scheduled Medications    dexamethasone  6 mg IntraVENous Q24H    insulin lispro  0-8 Units SubCUTAneous Q4H    digoxin  125 mcg IntraVENous Daily    metoprolol  5 mg IntraVENous Q6H     PRN Meds: ondansetron **OR** ondansetron, polyethylene glycol, acetaminophen **OR** acetaminophen, heparin (porcine), heparin (porcine), guaiFENesin-dextromethorphan, glucose, dextrose bolus **OR** dextrose bolus, glucagon (rDNA), dextrose      Intake/Output Summary (Last 24 hours) at 1/6/2023 0859  Last data filed at 1/6/2023 0645  Gross per 24 hour   Intake 2291.89 ml   Output 925 ml   Net 1366.89 ml         Exam:    BP (!) 108/59   Pulse 82   Temp 97.7 °F (36.5 °C) (Bladder)   Resp 21   Ht 5' 8\" (1.727 m)   Wt 134 lb 4.2 oz (60.9 kg)   LMP  (LMP Unknown)   SpO2 96%   BMI 20.41 kg/m²     General appearance: confused, ill-appearing. Respiratory:  diminished bilaterally. Cardiovascular: irregular rate and rhythm, S1/S2. Abdomen: Soft, active bowel sounds. Musculoskeletal: No edema bilaterally.          Labs:   Recent Labs     01/04/23 2057 01/05/23  0705 01/06/23  0646   WBC 11.6* 12.3* 10.3   HGB 10.9*  10.9* 10.7* 9.0*   HCT 32.8* 32.4* 26.7*    220 179       Recent Labs     01/05/23  0221 01/05/23  0705 01/05/23  1814   * 150* 154*   K 3.8 4.5 4.2    114* 113*   CO2 17* 18* 21   * 104* 98*   CREATININE 2.65* 2.54* 2.40*   CALCIUM 8.8 8.5 8.7 Recent Labs     01/04/23  1945 01/05/23  0705   AST 35 31   ALT 22 20   BILITOT 0.8* 0.7   ALKPHOS 231* 206*       Recent Labs     01/04/23  2155 01/06/23  0646   INR 1.2 1.5       Recent Labs     01/04/23  2215 01/05/23  0221 01/05/23  0705   TROPONINI 0.033* 0.045* 0.041*         Urinalysis:      Lab Results   Component Value Date/Time    NITRU Negative 01/04/2023 07:45 PM    WBCUA 0-2 01/04/2023 07:45 PM    BACTERIA Negative 01/04/2023 07:45 PM    RBCUA 0-2 01/04/2023 07:45 PM    BLOODU Negative 01/04/2023 07:45 PM    SPECGRAV 1.017 01/04/2023 07:45 PM    GLUCOSEU Negative 01/04/2023 07:45 PM       Radiology:  US DUP LOWER EXTREMITIES BILATERAL VENOUS   Final Result   Deep vein thrombosis visualized in the right deep femoral vein. Deep vein thrombosis visualized in the left common femoral vein, left   superficial femoral vein and left popliteal veins. CT HEAD WO CONTRAST   Final Result   1. There is no acute intracranial abnormality. Specifically, there is no   intracranial hemorrhage.    2. Atrophy and periventricular leukomalacia,         XR CHEST PORTABLE   Final Result   Stable nonacute chest.                 Assessment/Plan:    81 y/o female with history of HTN, lacunar CVAs, dementia, OA who was recently managed at [facility] for new onset PAF and pelvic fractures s/p fall who presented from NH with:     Septic shock with hypotension, leukocytosis and lactic acidosis  - in the setting of COVID infection, AFIB with RVR  - off Norepinephrine support  - blood cultures no growth  - on Decadron  - management per critical care service    AFIB with RVR / elevated troponins  - off Cardizem infusion  - ECG showed TWI in inferior and anterolateral leads  - recent TTE showed LVEF of 40-45%, DD  - on Heparin infusion, IV digoxin and IV metoprolol  - management per cardiology     Acute encephalopathy  - in the setting of COVID infection, underlying dementia  - requiring restraints and Precedex infusion  - CT head was negative  - monitor mental status    Elevated D-dimer  - doppler u/s showed bilateral LE DVT  - in the setting of COVID infection  - on Heparin infusion    TOMMY / hypernatremia  - on IVFs  - follow closely     Diet: Diet NPO Exceptions are: Other (Specify); Specify Other Exceptions: oral swabs    Code Status: DNR-CCA / DNI, family met with hospice today and decided to go with Inpatient hospice, code status changed to [facility].           Electronically signed by Sasha Oliveira MD on 1/6/2023 at 8:59 AM

## 2023-01-07 NOTE — PROGRESS NOTES
Pt's daughter notified that family is comfort care now and educated on what that meant. Daughter also told that pt has higher heart rate now and family allowed at bedside due to end of life care. Explained to daughter that pt will be transferred to hospice around 9pm. Daughter said she will be at bedside shortly. .Electronically signed by Deepthi Smiley RN on 1/6/2023 at 7:16 PM

## 2023-01-07 NOTE — PROGRESS NOTES
[facility] here to take pt to hospice. Paperwork given, report given to both squad and hospice RN, and family aware at bedside that pt is being moved. Electronically signed by Td Winter RN on 1/6/2023 at 9:48 PM

## 2023-01-09 LAB
EKG ATRIAL RATE: 375 BPM
EKG Q-T INTERVAL: 308 MS
EKG QRS DURATION: 80 MS
EKG QTC CALCULATION (BAZETT): 458 MS
EKG R AXIS: -36 DEGREES
EKG T AXIS: 174 DEGREES
EKG VENTRICULAR RATE: 133 BPM

## 2023-01-09 NOTE — TELEPHONE ENCOUNTER
Appointment canceled for Viola Valenzuela ([id number])   Visit Type: OFFICE VISIT   Date        Time      Length    Provider                  Department   1/16/2023    1:45 PM  15 mins. [address] [facility]      Reason for Cancellation: Illness/Hospitalization     Appointment Scheduled  (Newest Message First)  Bibi Colon  Patient Appointment Cancel Request Pool 6 minutes ago (8:23 AM)     Appointment canceled for Viola Valenzuela ([id number])  Visit Type: OFFICE VISIT  Date        Time      Length    Provider                  Department  1/16/2023    1:45 PM  15 mins. [address] [facility]     Reason for Cancellation: Illness/Hospitalization       Cortney Frost, YUSUF Quiles CNP  Foss Boxford M 1 hour ago (6:32 AM)     TR  This is a reminder for your upcoming appointment. Location of Appointment: [facility]  Provider: YUSUF Kapadia CNP  Date: 1/16/23  Time: 1:45 PM            There is 1 questionnaire available for your appointment. [email or web address] your available questionnaires]      Please click [email or web address] to view more details about your appointment. Chase Agudelo  Foss Boxford M 13 days ago     PM  Appointment Information:      Visit Type: Office Visit          Date: 1/16/2023                  Dept: [facility]                  Provider: Luz Brooks                  Time: 1:45 PM                  Length: 15 min     Appt Status: Scheduled          This [employer] message has not been read.

## 2023-01-09 NOTE — PROGRESS NOTES
Physician Progress Note      Chantelle Prado  CSN #:                  [id number]  :                       1935  ADMIT DATE:       2023 7:15 PM  [address] [place] DATE:        2023 9:30 PM  RESPONDING  PROVIDER #:        Mckenzie Bolden MD          QUERY TEXT:    Pt admitted with COVID-19 with sepsis and septic shock. Acute respiratory   failure with hypoxia noted by ED provider. ABG showed PO2 53% on RA. Pt was   placed on 3-4L NC, RR 18-37. If possible, please document in the progress   notes and discharge summary if respiratory failure was: The medical record reflects the following:  Risk Factors: COVID-19 with sepsis/septic shock,  Clinical Indicators: Pt presented to ED on 4L NC O2. ABG showed pH 7.671 pCo2   14 pO2 53 on room air. RR 18-37, -138. Pt was decreased to 3L NC,   currently on RA. Pt uses 2L O2 prn and at hs per H&P. Treatment: supplemental O2, Decadron, ABG, ICU monitoring    Thank you, Amanda James RN BSN CDS  [phone]  Options provided:  -- Acute respiratory failure with hypoxia confirmed after study  -- Acute respiratory failure with hypoxia treated and resolved  -- Acute respiratory failure with hypoxia ruled out after study  -- Other - I will add my own diagnosis  -- Disagree - Not applicable / Not valid  -- Disagree - Clinically unable to determine / Unknown  -- Refer to Clinical Documentation Reviewer    PROVIDER RESPONSE TEXT:    Acute respiratory failure with hypoxia confirmed after study.     Query created by: Segundo Amin on 2023 3:32 PM      Electronically signed by:  Mckenzie Bolden MD 2023 4:44 PM

## 2023-01-10 LAB
BLOOD CULTURE, ROUTINE: NORMAL
BLOOD CULTURE, ROUTINE: NORMAL

## 2023-01-21 NOTE — PROGRESS NOTES
Patient Name: Néstor Cha  Date: 1/21/2023  YOB: 1935  Medical Record Number: [id number]              History of Present Illness:      Review of Systems    Review of Systems: All 14 review of systems negative other than as stated above    Social History     Tobacco Use    Smoking status: Never    Smokeless tobacco: Never   Substance Use Topics    Alcohol use: No    Drug use: No         Past Medical History:   Diagnosis Date    Actinic keratoses     Arthritis     Dermatitis     Encounter for annual health examination 12/19/2011    Endometrial cancer ([address]     s/p hyst in 2009 - was told clear by Dr. 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    left lower leg    Sciatica     Vitamin D deficiency            Past Surgical History:   Procedure Laterality Date    CATARACT REMOVAL WITH IMPLANT      HYSTERECTOMY (CERVIX STATUS UNKNOWN)  9/2009    post-menopausal bleeding         Current Outpatient Medications on File Prior to Visit   Medication Sig Dispense Refill    aspirin 81 MG EC tablet Take 1 tablet by mouth daily (Patient taking differently: Take 81 mg by mouth daily Indications: High Amount of Fats in the Blood, High Blood Pressure Disorder) 30 tablet 3    digoxin (LANOXIN) 125 MCG tablet Take 1 tablet by mouth daily (Patient taking differently: Take 125 mcg by mouth daily Indications: Atrial Fibrillation) 30 tablet 3    losartan (COZAAR) 25 MG tablet Take 1 tablet by mouth daily (Patient taking differently: Take 25 mg by mouth daily Indications: High Blood Pressure Disorder) 30 tablet 3    metoprolol tartrate (LOPRESSOR) 50 MG tablet Take 1 tablet by mouth 2 times daily (Patient taking differently: Take 50 mg by mouth 2 times daily Indications: High Blood Pressure Disorder) 60 tablet 3    rosuvastatin (CRESTOR) 40 MG tablet Take 1 tablet by mouth nightly (Patient taking differently: Take 40 mg by mouth nightly Indications: High Amount of Fats in the Blood) 30 tablet 3    bisacodyl (DULCOLAX) 5 MG EC tablet Take 1 tablet by mouth daily as needed for Constipation      Multiple Vitamin (MULTI-VITAMIN DAILY PO) Take 1 tablet by mouth daily Indications: Nutritional Support      TURMERIC PO Take 1 capsule by mouth daily Indications: Nutritional Support      donepezil (ARICEPT) 5 MG tablet Take 1 tablet by mouth nightly (Patient taking differently: Take 5 mg by mouth nightly Indications: Decline in Cognition due to a Brain Disease) 90 tablet 1    Cholecalciferol (VITAMIN D) 2000 units CAPS capsule Take 2,000 Units by mouth daily Indications: Nutritional Support       No current facility-administered medications on file prior to visit. Allergies   Allergen Reactions    Bactrim [Sulfamethoxazole-Trimethoprim] Hives    Benazepril Other (See Comments)     cough    Codeine     Sulfa Antibiotics          Family History   Problem Relation Age of Onset    Coronary Art Dis Father     Other Father         glaucoma    Coronary Art Dis Brother          Physical Exam:      Physical Exam    not currently breastfeeding.       .   Lab Results   Component Value Date    WBC 10.3 01/06/2023    HGB 9.0 (L) 01/06/2023    HCT 26.7 (L) 01/06/2023    MCV 95.8 (H) 01/06/2023     01/06/2023     Lab Results   Component Value Date/Time     01/06/2023 06:46 AM    K 4.1 01/06/2023 06:46 AM     01/06/2023 06:46 AM    CO2 22 01/06/2023 06:46 AM    BUN 92 01/06/2023 06:46 AM    CREATININE 2.05 01/06/2023 06:46 AM    GLUCOSE 109 01/06/2023 06:46 AM    GLUCOSE 87 12/27/2011 08:41 AM    CALCIUM 8.6 01/06/2023 06:46 AM                ASSESSMENT:  Patient Active Problem List   Diagnosis    HTN (hypertension)    Hyperlipidemia    Sciatica    Vitamin D deficiency    Age-related macular degeneration    Arcus senilis of cornea    Combined form of senile cataract    Glaucoma suspect    Hyperopic astigmatism Prurigo nodularis    Actinic keratoses    Dermatitis    TIA (transient ischemic attack)    At high risk for falls    Bronchitis    Acute cough    Nasal congestion    A-fib (HCC)    General weakness    Palliative care encounter    Advanced care planning/counseling discussion    Goals of care, counseling/discussion    Difficulty in walking    Pain    Moderate dementia with psychotic disturbance    COVID-19    Shock ([facility] [address]    Encounter for hospice care discussion    TOMMY (acute kidney injury) ([facility] [address]    Respiratory failure, acute ([facility] [address]         PLAN:   Diagnosis Orders   1. Atrial fibrillation, unspecified type ([facility] [address]        2. General weakness        3. Primary hypertension        4. TIA (transient ischemic attack)              Please note orders entered on site at facility after discussion with appropriate facility nursing/therapy/ / nutritional staff. Current longstanding medical problems and acute medical issues addressed with staff. Available data and data elements in on site paper chart reviewed and analyzed. Current external consultant notes reviewed in on site chart. Ordered laboratory testing and imaging will be reviewed when available. This patient's need for psychiatric medication has been reviewed. Will consider further adjustment and possible further evaluations by mental health services.

## 2023-01-23 ASSESSMENT — ENCOUNTER SYMPTOMS
CONSTIPATION: 1
BACK PAIN: 1
COUGH: 1

## 2023-02-02 VITALS
HEART RATE: 58 BPM | DIASTOLIC BLOOD PRESSURE: 66 MMHG | RESPIRATION RATE: 16 BRPM | SYSTOLIC BLOOD PRESSURE: 109 MMHG | OXYGEN SATURATION: 98 % | TEMPERATURE: 97.1 F

## 2023-02-02 ASSESSMENT — ENCOUNTER SYMPTOMS
ABDOMINAL DISTENTION: 0
WHEEZING: 0
NAUSEA: 0
DIARRHEA: 0
CONSTIPATION: 0
VOMITING: 0
SHORTNESS OF BREATH: 0
ABDOMINAL PAIN: 0
COUGH: 1
COLOR CHANGE: 0
CHEST TIGHTNESS: 0

## 2023-02-02 NOTE — PROGRESS NOTES
Name: Kylah Alexandre: [facility]   Dulce Maria gomez  Date: 1/3/2023     Subjective:     Chief Complaint   Patient presents with    Follow-up       HPI  Soco Grant is a 80 y.o. female being seen today for evaluation of COVID-19, acute rehab progress difficulty walking weakness, dementia, and acute kidney injury. Resident tested positive for COVID-19 today, nursing staff will report abnormal labs, BUN/creatinine 89/2.30, sodium 139 potassium 4.0 hemoglobin 11.1 hematocrit 32.5. Resident has had acute kidney injury the backdrop of chronic kidney disease, she is refusing to eat, stating no appetite, appetite has been poor since admission, family members bring food in for her to eat and she declines. Informed family members that resident is positive for COVID, reported abnormal labs and discussed options of transferring to the hospital versus IV fluids here in the facility. The POA agrees to do fluids in the facility as she has a scheduled plan of care meeting with staff later this week and wants to keep her there to be treated, resident does not want to go to acute care setting. Resident does not have COVID symptoms such as cough sore throat nausea vomiting shortness of breath loss of taste loss of smell. Lung sounds clear and diminished in lower lobes. She is in isolation, on droplet precautions, roommate was moved to a new room. She did have physical therapy today, therapist reports of difficulty breathing pulse ox was 99% with shallow breathing, complains of pain due to multiple sacral fractures, unable to stand, static sitting balance is poor. On Norco for pain management, continue as ordered. Fluids have been encouraged, staff have been helping her with meals, she is totally dependent for going from a sitting to a standing position, totally dependent for ADLs, overall not making progress with therapy.   Dementia symptoms are at baseline, difficult to determine cognitive status. There are times when she is able to hold a conversation that is clear, but confused most of the time. Vital signs stable no fever. Past Medical History:   Diagnosis Date    Actinic keratoses     Arthritis     Dermatitis     Encounter for annual health examination 12/19/2011    Endometrial cancer [facility])     s/p hyst in 2009 - was told clear by Dr. 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 Negative for appetite change and fever. HENT: Negative. Respiratory:  Positive for cough. Negative for chest tightness, shortness of breath and wheezing. Cardiovascular:  Negative for chest pain, palpitations and leg swelling. Gastrointestinal:  Negative for abdominal distention, abdominal pain, constipation, diarrhea, nausea and vomiting. Genitourinary:  Negative for difficulty urinating, dysuria and hematuria. Musculoskeletal:  Positive for gait problem. Negative for joint swelling. Skin:  Negative for color change and wound. Neurological:  Positive for weakness. Negative for dizziness and light-headedness. Psychiatric/Behavioral:  Positive for confusion. Negative for behavioral problems. The patient is not nervous/anxious. Objective:   /66   Pulse 58   Temp 97.1 °F (36.2 °C)   Resp 16   LMP  (LMP Unknown)   SpO2 98%     Physical Exam  Vitals and nursing note reviewed. Constitutional:       General: She is not in acute distress. Appearance: Normal appearance. She is well-developed. She is ill-appearing. She is not toxic-appearing. HENT:      Head: Normocephalic. Mouth/Throat:      Mouth: Mucous membranes are moist.      Pharynx: Oropharynx is clear.   Eyes:      Conjunctiva/sclera: Conjunctivae normal.      Pupils: Pupils are equal, round, and reactive to light.   Neck:      Thyroid: No thyromegaly.      Vascular: No JVD.      Trachea: No tracheal deviation.   Cardiovascular:      Rate and Rhythm: Normal rate and regular rhythm.      Heart sounds: Normal heart sounds.   Pulmonary:      Effort: Pulmonary effort is normal. No respiratory distress.      Breath sounds: Normal breath sounds. No wheezing, rhonchi or rales.   Abdominal:      General: Bowel sounds are normal. There is no distension.      Palpations: Abdomen is soft. There is no mass.      Tenderness: There is no abdominal tenderness. There is no guarding.   Musculoskeletal:         General: Normal range of motion.      Cervical back: Normal range of motion and neck supple.      Right lower leg: No edema.      Left lower leg: No edema.   Skin:     General: Skin is warm and dry.   Neurological:      Mental Status: She is alert. Mental status is at baseline. She is disoriented.      Cranial Nerves: No cranial nerve deficit.      Motor: Weakness present.      Gait: Gait abnormal.   Psychiatric:         Attention and Perception: Attention normal.         Mood and Affect: Mood is depressed.         Speech: Speech normal.         Behavior: Behavior normal. Behavior is cooperative.         Cognition and Memory: Cognition is impaired. Memory is impaired.         Judgment: Judgment is impulsive.      Diagnosis Orders   1. COVID-19        2. Difficulty in walking        3. General weakness        4. Moderate dementia with psychotic disturbance, unspecified dementia type        5. TOMMY (acute kidney injury) ([facility]              Assessment and Plan:      1. COVID 19  Start D5 and half-normal saline at 70 cc an hour continuously.  Continue Covid labs and medications.  Encourage p.o. intake, offer 8 oz. of fluid of choice three  times a day, in addition to meals. PT/OT consult as needed to maintain baseline level of function  Respiratory Therapy Consult  O2 at 2 L per nasal cannula to maintain SPO2 greater than 92%  CBC and BMP on Thursday  Auscultate anterior/posterior chest for adventitious lung sounds, changes in inspiratory/expiratory effort, rate/depth of respirations; document findings and time spent with the resident. Standing orders for COVID -19 labs on diagnosis AND q week x 3 weeks:  CBC  BMP    CXR ON DIAGNOSIS    medications:    VIt C 500mg po bid x14 days  Zinc 50mg po daily x 14 days  Pepcid 20mg po daily x 14 days    Patient does not require Dexamethasone 6mg PO QD  Patient does not  require antibiotics for concomitant bacterial pneumonia. Patient does not qualify for monoclonal antibodies. Continue same meds      2. Difficulty in walking  Continue PT and OT as ordered    3. General weakness  Continue PT and OT as ordered    4. Moderate dementia with psychotic disturbance, unspecified dementia type  Symptoms at baseline, continue medications as ordered. 5. TOMMY (acute kidney injury) ([facility] [address]  See above IV fluids. CBC BMP on Thursday.       Reviewed labs:  Yes    1/3/2023 CBC With Platelet No Differential       WBC  10.6 K/uL 4.8-10.8  Final           RBC  3.43 M/uL 4.20-5.40 L Final           Hemoglobin  11.1 g/dL 12.0-16.0 L Final           Hematocrit  32.5 % 37.0-47.0 L Final           MCV  94.7 fL 79.4-94.8  Final           MCH  32.4 pg 27.0-31.3 H Final           MCHC  34.2 % 33.0-37.0  Final           RDW  14.3 % 11.5-14.5  Final           Platelet Count  646 K/uL 130-400  Final      1/4/6933 basic Metabolic Panel       Sodium  149 mEq/L 135-144 H Final           Potassium  4.0 mEq/L 3.4-4.9  Final           Chloride  107 mEq/L   Final           CO2  20 mEq/L 20-31  Final           Anion Gap  22 mEq/L 9-15 H Final           Glucose  95 mg/dL 70-99  Final           BUN  89 mg/dL 8-23 [facility] Final Creatinine  2.30 mg/dL 0.50-0.90 H Final           GFR  20.0  >60 L Final      Pediatric calculator link  [employer]. org/professionals/kdoqi/gfr_calculatorped  Effective Oct 3, 2022  These results are not intended for use in patients  <25years of age. eGFR results are calculated without  a race factor using the 2021 CKD-EPI equation. Careful  clinical correlation is recommended, particularly when  comparing to results calculated using previous equations. The CKD-EPI equation is less accurate in patients with  extremes of muscle mass, extra-renal metabolism of  creatinine, excessive creatinine ingestion, or following  therapy that affects renal tubular secretion. Calcium  8.7 mg/dL 8.5-9.9  Final    Time based visit:  time spent 50 minutes>50% spent with counseling and coordination of care. Reviewed with the patient and daughters: current clinicalstatus, medications, activities and diet. Side effects, adverse effects of the medication prescribed, as well as treatment plan and result expectations. Discussed diagnostic results, other suggested diagnostic testing, prognosis, risk and benefits of treatment options, importance of compliance with treatment options with resident and nursing staff. Reviewed therapy plan and progress with therapists. Reviewed medical record, labs, medications, etc.    I have reviewed the patient's medical history in detail and updated the computerized patient record. This note was partially generated using Dragon voice recognition system, and there may be some incorrect words, spellings, punctuation that were not noticed in checking the note before saving.     YUSUF Bray-CNP

## 2023-02-04 NOTE — PROGRESS NOTES
SUBJECTIVE:    51-year-old and seen follow-up visit for her failure to thrive hypertension bronchitis patient globally weak at her baseline coughing but no recent difficulties no change in her bowel bladder habits no recent emesis fevers or chills    ROS: Denies headaches  The rest of the 14 point ROS negative    PHYSICAL EXAM: VSS per facility record  Pupils small oral mucosa moist chest    No wheezing cardiovascular showed a irregular rate abdomen soft nontender extremity trace peripheral pulse    ASSESSMENT & PLAN:   Diagnosis Orders   1. Atrial fibrillation, unspecified type ([facility]        2. Primary hypertension        3.  Bronchitis          Continue current blood pressure control following closely this time continue supportive care            Past Medical History:   Diagnosis Date    Actinic keratoses     Arthritis     Dermatitis     Encounter for annual health examination 12/19/2011    Endometrial cancer ([facility] [address]     s/p hyst in 2009 - was told clear by Dr. 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    left lower leg    Sciatica     Vitamin D deficiency          Past Surgical History:   Procedure Laterality Date    CATARACT REMOVAL WITH IMPLANT      HYSTERECTOMY (CERVIX STATUS UNKNOWN)  9/2009    post-menopausal bleeding         Current Outpatient Medications on File Prior to Visit   Medication Sig Dispense Refill    famotidine (PEPCID) 20 MG tablet Take 20 mg by mouth daily      aspirin 81 MG EC tablet Take 1 tablet by mouth daily (Patient taking differently: Take 81 mg by mouth daily Indications: High Amount of Fats in the Blood, High Blood Pressure Disorder) 30 tablet 3    digoxin (LANOXIN) 125 MCG tablet Take 1 tablet by mouth daily (Patient taking differently: Take 125 mcg by mouth daily Indications: Atrial Fibrillation) 30 tablet 3    losartan (COZAAR) 25 MG tablet Take 1 tablet by mouth daily (Patient taking differently: Take 25 mg by mouth daily Indications: High Blood Pressure Disorder) 30 tablet 3    metoprolol tartrate (LOPRESSOR) 50 MG tablet Take 1 tablet by mouth 2 times daily (Patient taking differently: Take 50 mg by mouth 2 times daily Indications: High Blood Pressure Disorder) 60 tablet 3    rosuvastatin (CRESTOR) 40 MG tablet Take 1 tablet by mouth nightly (Patient taking differently: Take 40 mg by mouth nightly Indications: High Amount of Fats in the Blood) 30 tablet 3    bisacodyl (DULCOLAX) 5 MG EC tablet Take 1 tablet by mouth daily as needed for Constipation      Multiple Vitamin (MULTI-VITAMIN DAILY PO) Take 1 tablet by mouth daily Indications: Nutritional Support      TURMERIC PO Take 1 capsule by mouth daily Indications: Nutritional Support      donepezil (ARICEPT) 5 MG tablet Take 1 tablet by mouth nightly (Patient taking differently: Take 5 mg by mouth nightly Indications: Decline in Cognition due to a Brain Disease) 90 tablet 1    Cholecalciferol (VITAMIN D) 2000 units CAPS capsule Take 2,000 Units by mouth daily Indications: Nutritional Support       No current facility-administered medications on file prior to visit.          Family History   Problem Relation Age of Onset    Coronary Art Dis Father     Other Father         glaucoma    Coronary Art Dis Brother        Social History     Socioeconomic History    Marital status:      Spouse name: Not on file    Number of children: Not on file    Years of education: Not on file    Highest education level: Not on file   Occupational History    Not on file   Tobacco Use    Smoking status: Never    Smokeless tobacco: Never   Substance and Sexual Activity    Alcohol use: No    Drug use: No    Sexual activity: Not on file   Other Topics Concern    Not on file   Social History Narrative    Not on file     Social Determinants of Health     Financial Resource Strain: Low Risk     Difficulty of Paying Living Expenses: Not hard at all   Food Insecurity: No Food Insecurity    Worried About [address] in the Last Year: Never true    Ran Out of Food in the Last Year: Never true   Transportation Needs: Not on file   Physical Activity: Not on file   Stress: Not on file   Social Connections: Not on file   Intimate Partner Violence: Not on file   Housing Stability: Not on file         Lab Results   Component Value Date    LABA1C 4.9 12/16/2022     No results found for: EAG    Lab Results   Component Value Date/Time     01/06/2023 06:46 AM    K 4.1 01/06/2023 06:46 AM     01/06/2023 06:46 AM    CO2 22 01/06/2023 06:46 AM    BUN 92 01/06/2023 06:46 AM    CREATININE 2.05 01/06/2023 06:46 AM    GLUCOSE 109 01/06/2023 06:46 AM    GLUCOSE 87 12/27/2011 08:41 AM    CALCIUM 8.6 01/06/2023 06:46 AM        Lab Results   Component Value Date    CHOL 182 12/16/2022    CHOL 196 09/24/2020    CHOL 155 05/22/2019     Lab Results   Component Value Date    TRIG 51 12/16/2022    TRIG 50 09/24/2020    TRIG 43 05/22/2019     Lab Results   Component Value Date    HDL 94 (H) 12/16/2022    HDL 75 (H) 09/24/2020    HDL 75 (H) 05/22/2019     Lab Results   Component Value Date    LDLCALC 78 12/16/2022    LDLCALC 111 09/24/2020    LDLCALC 71 05/22/2019     No results found for: LABVLDL, VLDL  Lab Results   Component Value Date    CHOLHDLRATIO 2.6 12/27/2011       Lab Results   Component Value Date    TSH 1.140 08/22/2018       Lab Results   Component Value Date    WBC 10.3 01/06/2023    HGB 9.0 (L) 01/06/2023    HCT 26.7 (L) 01/06/2023    MCV 95.8 (H) 01/06/2023     01/06/2023       Please note orders entered on site at facility after discussion with appropriate facility nursing/therapy/ / nutritional staff. Current longstanding medical problems and acute medical issues addressed with staff. Available data and data elements in on site paper chart reviewed and analyzed.   Current external consultant notes reviewed in on site chart. Ordered laboratory testing and imaging will be reviewed when available.